# Patient Record
Sex: MALE | Race: WHITE | NOT HISPANIC OR LATINO | Employment: OTHER | URBAN - METROPOLITAN AREA
[De-identification: names, ages, dates, MRNs, and addresses within clinical notes are randomized per-mention and may not be internally consistent; named-entity substitution may affect disease eponyms.]

---

## 2017-01-03 ENCOUNTER — TELEPHONE (OUTPATIENT)
Dept: INTERNAL MEDICINE | Facility: MEDICAL CENTER | Age: 82
End: 2017-01-03

## 2017-01-03 NOTE — TELEPHONE ENCOUNTER
Called patient. Cough did not resolve with taking omeprazole. He stopped the losartan and his cough improved significantly. He saw Dr. Nicole and he  Ordered a CT scan and an xray. Told him to get robitussin DM  His blood pressures over the last 2 days have been in the 145 range.  Informed patient continue to hold losartan, if bp > 155 call office and I will need to add another bp medication.

## 2017-01-03 NOTE — TELEPHONE ENCOUNTER
1. Caller Name: pt                      Call Back Number: [phone] cell phone    2. Message: pt requesting a phone call from Dr Farley is in town today has some issues in regards to Losartan and ENT consult    3. Patient approves office to leave a detailed voicemail/MyChart message: yes

## 2017-01-19 ENCOUNTER — OFFICE VISIT (OUTPATIENT)
Dept: NEPHROLOGY | Facility: MEDICAL CENTER | Age: 82
End: 2017-01-19
Payer: MEDICARE

## 2017-01-19 VITALS
TEMPERATURE: 98 F | BODY MASS INDEX: 28.42 KG/M2 | DIASTOLIC BLOOD PRESSURE: 72 MMHG | HEIGHT: 71 IN | WEIGHT: 203 LBS | HEART RATE: 64 BPM | SYSTOLIC BLOOD PRESSURE: 138 MMHG | RESPIRATION RATE: 14 BRPM

## 2017-01-19 DIAGNOSIS — I10 ESSENTIAL HYPERTENSION: ICD-10-CM

## 2017-01-19 DIAGNOSIS — N18.30 CKD (CHRONIC KIDNEY DISEASE), STAGE III (HCC): ICD-10-CM

## 2017-01-19 DIAGNOSIS — D64.9 ANEMIA, UNSPECIFIED TYPE: ICD-10-CM

## 2017-01-19 DIAGNOSIS — E55.9 VITAMIN D DEFICIENCY DISEASE: ICD-10-CM

## 2017-01-19 PROCEDURE — 99214 OFFICE O/P EST MOD 30 MIN: CPT | Performed by: INTERNAL MEDICINE

## 2017-01-19 NOTE — TELEPHONE ENCOUNTER
----- Message from Toyin Crabtree sent at 1/19/2017 12:06 PM PST -----  Regarding: Dr Farley/rx refill  Contact: [phone]  Patient needs renewal on his medications for this year he states he saw Dr Farley last month but forgot to ask him to renew them. Please call them all into [facility]. Please call pt if you have any questions.

## 2017-01-19 NOTE — TELEPHONE ENCOUNTER
Spoke with patient and asked what medication he needed to get refilled. He stated he needed Metoprolol, Omeprazole, and Lovastatin. Patient had concerns about Losartan if it should be refilled.    Patient saw Dr. Farley on 12/23/16. Next Appointment with Dr. Farley on 02/24/17.

## 2017-01-19 NOTE — MR AVS SNAPSHOT
"        Kobe Cyr   2017 11:45 AM   Office Visit   MRN: [id number]    Department:  [facility]   Dept Phone:  [phone]    Description:  Male : 1931   Provider:  Cassandra Mccauley M.D.           Reason for Visit     Follow-Up           Allergies as of 2017     Allergen Noted Reactions    Atorvastatin 2016   Myalgia    Simvastatin 2010       ADVERSE REACTION: MYALGIA.      You were diagnosed with     CKD (chronic kidney disease), stage III   [696737]       Essential hypertension   [7551122]       Anemia, unspecified type   [4774374]       Vitamin D deficiency disease   [018768]         Vital Signs     Blood Pressure Pulse Temperature Respirations Height Weight    138/72 mmHg 64 36.7 °C (98 °F) (Temporal) 14 1.803 m (5' 11\") 92.08 kg (203 lb)    Body Mass Index Smoking Status                28.33 kg/m2 Former Smoker          Basic Information     Date Of Birth Sex Race Ethnicity Preferred Language    1931 Male White Non- English      Your appointments     2017  1:20 PM   Established Patient with Randall Farley M.D.   [facility] (--)    [address] [place] [address]  [address]  [place] [address] [phone]           You will be receiving a confirmation call a few days before your appointment from our automated call confirmation system.            2017 11:00 AM   FOLLOW UP with Edy Bradshaw M.D.   [facility] (--)    [address] [place] [address] [address]  [place] [id number] [phone]            May 18, 2017 11:30 AM   Follow Up Visit with Cassandra Mccauley M.D.   [facility] ([address])    [address] [place] [address] [address]  [place] [id number] [phone]           You will be receiving a confirmation call a few days before your appointment from our automated call confirmation system.              Problem List              ICD-10-CM Priority " Class Noted - Resolved    Hyperglycemia (Chronic) R73.9   1/6/2010 - Present    Hyperlipidemia (Chronic) E78.5   5/24/2011 - Present    CAD (coronary artery disease) I25.10   3/15/2012 - Present    Myocardial infarction (CMS-HCC) (Chronic) I21.3   Unknown - Present    Stented coronary artery Z95.5   8/29/2013 - Present    Essential hypertension I10   4/20/2016 - Present    Delirium, acute R41.0   8/1/2016 - Present    Leg pain, bilateral M79.604, M79.605   8/1/2016 - Present    Impaired fasting glucose R73.01   8/1/2016 - Present    Hematochezia K92.1   8/1/2016 - Present    Osteoarthritis of knees, bilateral M17.0   8/1/2016 - Present    GERD (gastroesophageal reflux disease) K21.9   8/1/2016 - Present    Anemia D64.9   8/1/2016 - Present    Dyslipidemia E78.5   8/1/2016 - Present    DJD (degenerative joint disease) M19.90   8/1/2016 - Present    Bilateral low back pain with left-sided sciatica M54.42   8/18/2016 - Present    CKD (chronic kidney disease), stage III N18.3   8/24/2016 - Present    Cough R05   9/23/2016 - Present    Nasal bleeding R04.0   11/21/2016 - Present    Vitamin D deficiency disease E55.9   1/19/2017 - Present      Health Maintenance        Date Due Completion Dates    IMM DTaP/Tdap/Td Vaccine (1 - Tdap) 5/17/1950 ---    COLONOSCOPY 5/17/1981 ---    IMM ZOSTER VACCINE 5/17/1991 ---    IMM PNEUMOCOCCAL 65+ (ADULT) LOW/MEDIUM RISK SERIES (1 of 2 - PCV13) 5/17/1996 ---    IMM INFLUENZA (1) 9/1/2016 11/7/2011            Current Immunizations     Influenza TIV (IM) 11/7/2011    Pneumococcal Vaccine (UF)Historical Data 11/8/2010      Below and/or attached are the medications your provider expects you to take. Review all of your home medications and newly ordered medications with your provider and/or pharmacist. Follow medication instructions as directed by your provider and/or pharmacist. Please keep your medication list with you and share with your provider. Update the information when medications  are discontinued, doses are changed, or new medications (including over-the-counter products) are added; and carry medication information at all times in the event of emergency situations     Allergies:  ATORVASTATIN - Myalgia     SIMVASTATIN - (reactions not documented)               Medications  Valid as of: January 19, 2017 - 12:00 PM    Generic Name Brand Name Tablet Size Instructions for use    Aspirin (Tab) aspirin 81 MG Take 81 mg by mouth every day.        Atorvastatin Calcium (Tab) LIPITOR 40 MG Take 40 mg by mouth every evening.        Cholecalciferol (Tab) cholecalciferol 1000 UNIT Take 1,000 Units by mouth every day.        Losartan Potassium (Tab) COZAAR 100 MG Take 100 mg by mouth every day.        Lovastatin (Tab) MEVACOR 40 MG Take 80 mg by mouth every evening.        Metoprolol Tartrate (Tab) LOPRESSOR 25 MG Take 25 mg by mouth 2 times a day.        Nitroglycerin (SL Tab) NITROSTAT 0.4 MG Place 1 Tab under tongue as needed for Chest Pain.        Omeprazole (CAPSULE DELAYED RELEASE) PRILOSEC 20 MG Take 20 mg by mouth every day.        TraMADol HCl (Tab) ULTRAM 50 MG TK 1 T PO  BID PRN        .                 Medicines prescribed today were sent to:     [facility] [id number] [place] [address], [place] [address] [facility] [address]    [address] [place] [address]    Phone: [phone] Fax: [phone]    Open 24 Hours?: No      Medication refill instructions:       If your prescription bottle indicates you have medication refills left, it is not necessary to call your provider’s office. Please contact your pharmacy and they will refill your medication.    If your prescription bottle indicates you do not have any refills left, you may request refills at any time through one of the following ways: The online [employer] system (except Urgent Care), by calling your provider’s office, or by asking your pharmacy to contact your provider’s office with a refill request. Medication refills are processed  only during regular business hours and may not be available until the next business day. Your provider may request additional information or to have a follow-up visit with you prior to refilling your medication.   *Please Note: Medication refills are assigned a new Rx number when refilled electronically. Your pharmacy may indicate that no refills were authorized even though a new prescription for the same medication is available at the pharmacy. Please request the medicine by name with the pharmacy before contacting your provider for a refill.        Your To Do List     Future Labs/Procedures Complete By Expires    BASIC METABOLIC PANEL  As directed 7/19/2017         [employer] Access Code: Activation code not generated  Current [employer] Status: Active

## 2017-01-19 NOTE — PROGRESS NOTES
"Subjective:      Kobe Cyr is a 85 y.o. male who presents with Follow-Up and Chronic Kidney Disease            HPI  Vince is coming today for f/u of CKD III with worsening creatinine level  Still c/o chronic cough ( only in the morning) -improving  No difficulties to urinate: no dysuria/hematuria.  HTN: BP well controlled  CKD : serum creatinine at baseline 1.6     Review of Systems   Constitutional: Negative.  Negative for fever, chills, weight loss, malaise/fatigue and diaphoresis.   HENT: Positive for congestion and hearing loss. Negative for nosebleeds and sore throat.    Eyes: Negative.    Respiratory: Positive for cough and sputum production. Negative for hemoptysis, shortness of breath and wheezing.    Cardiovascular: Negative for chest pain, palpitations and orthopnea. No edema  Gastrointestinal: Negative for heartburn, nausea, vomiting, abdominal pain, diarrhea and constipation.   Genitourinary: Negative for dysuria, urgency, frequency, hematuria and flank pain.   Musculoskeletal: Positive for back pain and neck pain. Negative for myalgias, joint pain and falls.   Skin: Negative.  Negative for itching and rash.   Neurological: Negative.  Negative for weakness and headaches.   All other systems reviewed and are negative.    PMH/SH/FH/allergies and allergies reviewed     Objective:     /72 mmHg  Pulse 64  Temp(Src) 36.7 °C (98 °F) (Temporal)  Resp 14  Ht 1.803 m (5' 11\")  Wt 92.08 kg (203 lb)  BMI 28.33 kg/m2     Physical Exam   Constitutional: He is oriented to person, place, and time. He appears well-developed and well-nourished. No distress.   HENT:   Head: Normocephalic and atraumatic.   Nose: Nose normal.   Mouth/Throat: Oropharynx is clear and moist.   Eyes: Conjunctivae and EOM are normal. Pupils are equal, round, and reactive to light. No scleral icterus.   Neck: Normal range of motion. Neck supple.   Cardiovascular: Normal rate.  Exam reveals no gallop and no friction rub.  "   Pulmonary/Chest: Effort normal and breath sounds normal. No respiratory distress. He has no wheezes. He has no rales.   Abdominal: Soft. Bowel sounds are normal. He exhibits no distension and no mass. There is no tenderness.   Musculoskeletal: No edema  Neurological: He is alert and oriented to person, place, and time. No cranial nerve deficit. Coordination normal.   Skin: Skin is warm. No rash noted. No erythema.   Nursing note and vitals reviewed.            Laboratory results: reviewed: d/w Pt    Creat level at 1.98  -1.95 -1.6 -1.6  Assessment/Plan:     1. CKD (chronic kidney disease), stage III      Creatinine level stable at baseline - to monitor    2. Essential hypertension      BP well controlled     3. Anemia, unspecified type     Hb stable    4. Vitamin D deficiency disease       Vit D and PTH well controlled    Recs: low Na diet, mopnitor BP, avoid nephrotoxic agents             F/u in 2-3 months with  BMP

## 2017-01-20 RX ORDER — LOVASTATIN 40 MG/1
40 TABLET ORAL 2 TIMES DAILY
Qty: 180 TAB | Refills: 1 | Status: SHIPPED | OUTPATIENT
Start: 2017-01-20 | End: 2017-07-25 | Stop reason: SDUPTHER

## 2017-01-20 RX ORDER — OMEPRAZOLE 20 MG/1
20 CAPSULE, DELAYED RELEASE ORAL DAILY
Qty: 90 CAP | Refills: 1 | Status: SHIPPED | OUTPATIENT
Start: 2017-01-20 | End: 2017-07-25 | Stop reason: SDUPTHER

## 2017-01-20 NOTE — TELEPHONE ENCOUNTER
Called patient and asked if cough has gotten better, since getting off the losartan and it has. I also asked where his blood pressure stands and he responded 130/70 to 138/74.     Spoke to Dr. Farley he would like the patient to stay off of the Losartan.     Called patient to let know what Dr. Farley had responded. Patient understood.

## 2017-02-24 ENCOUNTER — OFFICE VISIT (OUTPATIENT)
Dept: INTERNAL MEDICINE | Facility: MEDICAL CENTER | Age: 82
End: 2017-02-24
Payer: MEDICARE

## 2017-02-24 VITALS
HEART RATE: 68 BPM | HEIGHT: 71 IN | DIASTOLIC BLOOD PRESSURE: 77 MMHG | OXYGEN SATURATION: 93 % | WEIGHT: 249.8 LBS | SYSTOLIC BLOOD PRESSURE: 145 MMHG | BODY MASS INDEX: 34.97 KG/M2 | TEMPERATURE: 97.5 F

## 2017-02-24 DIAGNOSIS — R04.0 NASAL BLEEDING: ICD-10-CM

## 2017-02-24 DIAGNOSIS — R93.89 ABNORMAL CHEST X-RAY: ICD-10-CM

## 2017-02-24 DIAGNOSIS — R05.9 COUGH: ICD-10-CM

## 2017-02-24 DIAGNOSIS — I65.23 BILATERAL CAROTID ARTERY STENOSIS: ICD-10-CM

## 2017-02-24 PROCEDURE — 4040F PNEUMOC VAC/ADMIN/RCVD: CPT | Mod: 8P | Performed by: INTERNAL MEDICINE

## 2017-02-24 PROCEDURE — 1036F TOBACCO NON-USER: CPT | Performed by: INTERNAL MEDICINE

## 2017-02-24 PROCEDURE — G8419 CALC BMI OUT NRM PARAM NOF/U: HCPCS | Performed by: INTERNAL MEDICINE

## 2017-02-24 PROCEDURE — 99214 OFFICE O/P EST MOD 30 MIN: CPT | Performed by: INTERNAL MEDICINE

## 2017-02-24 PROCEDURE — G8598 ASA/ANTIPLAT THER USED: HCPCS | Performed by: INTERNAL MEDICINE

## 2017-02-24 PROCEDURE — G8484 FLU IMMUNIZE NO ADMIN: HCPCS | Performed by: INTERNAL MEDICINE

## 2017-02-24 PROCEDURE — G8432 DEP SCR NOT DOC, RNG: HCPCS | Performed by: INTERNAL MEDICINE

## 2017-02-24 PROCEDURE — 1101F PT FALLS ASSESS-DOCD LE1/YR: CPT | Performed by: INTERNAL MEDICINE

## 2017-02-24 NOTE — PROGRESS NOTES
Established Patient    Mr. Ramirez a 85 y.o. male who presents today with:  CC: Follow-Up        Assessment and Plan    1. Nasal bleeding  Improved. Currently managed by ENT. I do not have Dr. Nicole's notes to review but according to the patient he is requesting an MRI with contrast to evaluate his sinuses. The patient is concerned about the risk of contrast to his overall health. There is a slight risk of gadolinium-induced kidney disease with an MRI. Because he does have chronic kidney disease (creatinine 1.61 and GFR of 41 in January 2017), I would suggest that the patient discuss imaging in the use of contrast with his ENT and see if there is an alternative option     2. Cough  Significantly improved after discontinuation of losartan.    3. Abnormal chest x-ray  In reviewing his results from his emergency department visit in January, the radiologist read a questionable masslike density posterior to the aortic arch and recommended a CT scan. He currently has an appointment with his pulmonologist Dr. Solano on Monday. He will take the report on the chest x-ray to his pulmonologist to review    4. Bilateral carotid artery stenosis  He does have a history of arterial sclerosis and an incidental finding of some calcification of the carotids on his CT scan of the sinuses . I will order a ultrasound of his carotids for evaluation    5. Essential hypertension  I would like for his systolic blood pressure to be in the 120s to 130s. He currently is in the 140s to 150 range after discontinuing losartan secondary to a cough. He appears to be tolerating beta blockade very well and currently he does not have significant bradycardia therefore I will increase his metoprolol to 25 mg 2 tablets in the morning and one tablet in the afternoon and he will monitor for bradycardia    Current Outpatient Prescriptions   Medication Sig Dispense Refill   • lovastatin (MEVACOR) 40 MG tablet Take 1 Tab by mouth 2 Times a Day. 180 Tab 1   •  omeprazole (PRILOSEC) 20 MG delayed-release capsule Take 1 Cap by mouth every day. 90 Cap 1   • metoprolol (LOPRESSOR) 25 MG Tab Take 1 Tab by mouth 2 times a day. 180 Tab 1   • vitamin D (CHOLECALCIFEROL) 1000 UNIT Tab Take 1,000 Units by mouth every day.     • aspirin 81 MG tablet Take 81 mg by mouth every day.     • atorvastatin (LIPITOR) 40 MG Tab Take 40 mg by mouth every evening.     • nitroglycerin (NITROSTAT) 0.4 MG SUBL Place 1 Tab under tongue as needed for Chest Pain. 25 Tab 1     No current facility-administered medications for this visit.         followup Return in about 6 months (around 8/24/2017).      _______________________________________________________    HPI:   1. Nasal bleeding  He is here for follow-up of both epistaxis and chronic cough. Currently he is being managed by Dr. Nicole of ENT. Last week he had a significant nosebleed and was evaluated in the emergency department. He was told that he was bleeding from deep in the sinuses. CT scan of the sinuses performed January 5, 2017, revealed clear paranasal sinuses with a patent ostiomeatal complex. There was bilateral calcified plaque involving the internal carotid arteries. He was advised to follow up with his ENT the next day. He had a flexible laryngoscop in office. He also had his nostrils packed.. They were subsequently removed and he has not had a problem with nosebleeds since. His ENT wants to have a MRI of the sinuses with contrast the patient states. He was told that he needed to have his creatinine checked. He is concerned about the risk of kidney disease with contrast.    2. Cough  Previously was complaining about a chronic cough. I treated him empirically with PPIs for potential gastroesophageal reflux disease. This did not have any effect on his chronic cough. I also discontinued his losartan and with this his cough is remarkably better.    LOSARTAN and only taking Lopressor 25 twice a day his systolic blood pressures have been in  "the 145 or 150 range in the afternoon. The diastolic values are in the mid 70s.         has a past medical history of CAD (coronary artery disease); Myocardial infarction (CMS-HCC) (2 yrs ago); Arthritis; Cancer (CMS-HCC); Snoring; Hypertension; High cholesterol; Hyperglycemia (1/6/2010); Hyperlipidemia (5/24/2011); Left knee pain (8/1/2016); Impaired fasting glucose (8/1/2016); Hematochezia (8/1/2016); Osteoarthritis of knees, bilateral (8/1/2016); GERD (gastroesophageal reflux disease) (8/1/2016); Anemia (8/1/2016); Chronic kidney disease (CKD), stage II (mild) (8/1/2016); Dyslipidemia (8/1/2016); and DJD (degenerative joint disease) (8/1/2016).     reports that he quit smoking about 43 years ago. His smoking use included Cigarettes. He smoked 2.00 packs per day. He has never used smokeless tobacco. He reports that he drinks alcohol. He reports that he does not use illicit drugs.      ROS: As per HPI. Additional pertinent symptoms as noted below:  He denies any hemoptysis, epistaxis, sinus pain or congestion or shortness of breath      Physical Exam  /77 mmHg  Pulse 68  Temp(Src) 36.4 °C (97.5 °F)  Ht 1.803 m (5' 11\")  Wt 113.309 kg (249 lb 12.8 oz)  BMI 34.86 kg/m2  SpO2 93%  Constitutional:  oriented to person, place, and time. No distress.     Current Outpatient Prescriptions on File Prior to Visit   Medication Sig Dispense Refill   • lovastatin (MEVACOR) 40 MG tablet Take 1 Tab by mouth 2 Times a Day. 180 Tab 1   • omeprazole (PRILOSEC) 20 MG delayed-release capsule Take 1 Cap by mouth every day. 90 Cap 1   • metoprolol (LOPRESSOR) 25 MG Tab Take 1 Tab by mouth 2 times a day. 180 Tab 1   • vitamin D (CHOLECALCIFEROL) 1000 UNIT Tab Take 1,000 Units by mouth every day.     • aspirin 81 MG tablet Take 81 mg by mouth every day.     • atorvastatin (LIPITOR) 40 MG Tab Take 40 mg by mouth every evening.     • nitroglycerin (NITROSTAT) 0.4 MG SUBL Place 1 Tab under tongue as needed for Chest Pain. 25 Tab 1 "     No current facility-administered medications on file prior to visit.           Signed by: Randall Farley M.D.

## 2017-02-24 NOTE — MR AVS SNAPSHOT
"        Kobe Cyr   2017 1:20 PM   Office Visit   MRN: [id number]    Department:  [facility] Med - Internal Med   Dept Phone:  [phone]    Description:  Male : 1931   Provider:  Randall Farley M.D.           Reason for Visit     Follow-Up follow up      Allergies as of 2017     Allergen Noted Reactions    Atorvastatin 2016   Myalgia    Simvastatin 2010       ADVERSE REACTION: MYALGIA.      You were diagnosed with     Nasal bleeding   [657425]       Cough   [786.2.ICD-9-CM]         Vital Signs     Blood Pressure Pulse Temperature Height Weight Body Mass Index    145/77 mmHg 68 36.4 °C (97.5 °F) 1.803 m (5' 11\") 113.309 kg (249 lb 12.8 oz) 34.86 kg/m2    Oxygen Saturation Smoking Status                93% Former Smoker          Basic Information     Date Of Birth Sex Race Ethnicity Preferred Language    1931 Male White Non- English      Your appointments     2017 11:20 AM   FOLLOW UP with Edy Bradshaw M.D.   [facility] (--)    [address] [place] [address] [address]  [place] [address] [phone]            May 18, 2017 11:30 AM   Follow Up Visit with Cassandra Mccauley M.D.   [facility] ([address])    [address] [place] [address] [address]  [place] [address] [phone]           You will be receiving a confirmation call a few days before your appointment from our automated call confirmation system.            Aug 25, 2017  1:00 PM   Established Patient with Randall Farley M.D.   [facility] Med - Internal Medicine (--)    [address]  [address]  [place] [address] [phone]           You will be receiving a confirmation call a few days before your appointment from our automated call confirmation system.              Problem List              ICD-10-CM Priority Class Noted - Resolved    Hyperglycemia (Chronic) R73.9   2010 - Present    Hyperlipidemia (Chronic) " E78.5   5/24/2011 - Present    CAD (coronary artery disease) I25.10   3/15/2012 - Present    Myocardial infarction (CMS-HCC) (Chronic) I21.3   Unknown - Present    Stented coronary artery Z95.5   8/29/2013 - Present    Essential hypertension I10   4/20/2016 - Present    Delirium, acute R41.0   8/1/2016 - Present    Leg pain, bilateral M79.604, M79.605   8/1/2016 - Present    Impaired fasting glucose R73.01   8/1/2016 - Present    Hematochezia K92.1   8/1/2016 - Present    Osteoarthritis of knees, bilateral M17.0   8/1/2016 - Present    GERD (gastroesophageal reflux disease) K21.9   8/1/2016 - Present    Anemia D64.9   8/1/2016 - Present    Dyslipidemia E78.5   8/1/2016 - Present    DJD (degenerative joint disease) M19.90   8/1/2016 - Present    Bilateral low back pain with left-sided sciatica M54.42   8/18/2016 - Present    CKD (chronic kidney disease), stage III N18.3   8/24/2016 - Present    Cough R05   9/23/2016 - Present    Nasal bleeding R04.0   11/21/2016 - Present    Vitamin D deficiency disease E55.9   1/19/2017 - Present      Health Maintenance        Date Due Completion Dates    IMM DTaP/Tdap/Td Vaccine (1 - Tdap) 5/17/1950 ---    COLONOSCOPY 5/17/1981 ---    IMM ZOSTER VACCINE 5/17/1991 ---    IMM PNEUMOCOCCAL 65+ (ADULT) LOW/MEDIUM RISK SERIES (1 of 2 - PCV13) 5/17/1996 ---    IMM INFLUENZA (1) 9/1/2016 11/7/2011            Current Immunizations     Influenza TIV (IM) 11/7/2011    Pneumococcal Vaccine (UF)Historical Data 11/8/2010      Below and/or attached are the medications your provider expects you to take. Review all of your home medications and newly ordered medications with your provider and/or pharmacist. Follow medication instructions as directed by your provider and/or pharmacist. Please keep your medication list with you and share with your provider. Update the information when medications are discontinued, doses are changed, or new medications (including over-the-counter products) are added;  and carry medication information at all times in the event of emergency situations     Allergies:  ATORVASTATIN - Myalgia     SIMVASTATIN - (reactions not documented)               Medications  Valid as of: February 24, 2017 -  2:01 PM    Generic Name Brand Name Tablet Size Instructions for use    Aspirin (Tab) aspirin 81 MG Take 81 mg by mouth every day.        Atorvastatin Calcium (Tab) LIPITOR 40 MG Take 40 mg by mouth every evening.        Cholecalciferol (Tab) cholecalciferol 1000 UNIT Take 1,000 Units by mouth every day.        Lovastatin (Tab) MEVACOR 40 MG Take 1 Tab by mouth 2 Times a Day.        Metoprolol Tartrate (Tab) LOPRESSOR 25 MG Take 1 Tab by mouth 2 times a day.        Nitroglycerin (SL Tab) NITROSTAT 0.4 MG Place 1 Tab under tongue as needed for Chest Pain.        Omeprazole (CAPSULE DELAYED RELEASE) PRILOSEC 20 MG Take 1 Cap by mouth every day.        .                 Medicines prescribed today were sent to:     [email or web address] [id number] [facility] [place] [address] Replaced by [facility] [address]     [address] [facility] [address]    Phone: [phone] Fax: [phone]    Open 24 Hours?: No      Medication refill instructions:       If your prescription bottle indicates you have medication refills left, it is not necessary to call your provider’s office. Please contact your pharmacy and they will refill your medication.    If your prescription bottle indicates you do not have any refills left, you may request refills at any time through one of the following ways: The online [employer] system (except Urgent Care), by calling your provider’s office, or by asking your pharmacy to contact your provider’s office with a refill request. Medication refills are processed only during regular business hours and may not be available until the next business day. Your provider may request additional information or to have a follow-up visit with you prior to refilling your medication.   *Please Note: Medication  refills are assigned a new Rx number when refilled electronically. Your pharmacy may indicate that no refills were authorized even though a new prescription for the same medication is available at the pharmacy. Please request the medicine by name with the pharmacy before contacting your provider for a refill.        Instructions    Increase metoprolol to 2 tablets in am and one in pm          MyChart Access Code: Activation code not generated  Current MyChart Status: Active

## 2017-02-27 ENCOUNTER — OFFICE VISIT (OUTPATIENT)
Dept: CARDIOLOGY | Facility: MEDICAL CENTER | Age: 82
End: 2017-02-27
Payer: MEDICARE

## 2017-02-27 VITALS
OXYGEN SATURATION: 92 % | WEIGHT: 248 LBS | HEIGHT: 71 IN | SYSTOLIC BLOOD PRESSURE: 130 MMHG | BODY MASS INDEX: 34.72 KG/M2 | DIASTOLIC BLOOD PRESSURE: 70 MMHG | HEART RATE: 62 BPM

## 2017-02-27 DIAGNOSIS — I10 ESSENTIAL HYPERTENSION: ICD-10-CM

## 2017-02-27 DIAGNOSIS — N18.30 CKD (CHRONIC KIDNEY DISEASE), STAGE III (HCC): ICD-10-CM

## 2017-02-27 DIAGNOSIS — I25.10 CORONARY ARTERY DISEASE INVOLVING NATIVE CORONARY ARTERY OF NATIVE HEART WITHOUT ANGINA PECTORIS: ICD-10-CM

## 2017-02-27 DIAGNOSIS — E78.2 MIXED HYPERLIPIDEMIA: Chronic | ICD-10-CM

## 2017-02-27 DIAGNOSIS — Z95.5 STENTED CORONARY ARTERY: ICD-10-CM

## 2017-02-27 DIAGNOSIS — R93.89 ABNORMAL CHEST X-RAY: ICD-10-CM

## 2017-02-27 PROCEDURE — G8598 ASA/ANTIPLAT THER USED: HCPCS | Performed by: INTERNAL MEDICINE

## 2017-02-27 PROCEDURE — G8432 DEP SCR NOT DOC, RNG: HCPCS | Performed by: INTERNAL MEDICINE

## 2017-02-27 PROCEDURE — G8484 FLU IMMUNIZE NO ADMIN: HCPCS | Performed by: INTERNAL MEDICINE

## 2017-02-27 PROCEDURE — 1036F TOBACCO NON-USER: CPT | Performed by: INTERNAL MEDICINE

## 2017-02-27 PROCEDURE — G8419 CALC BMI OUT NRM PARAM NOF/U: HCPCS | Performed by: INTERNAL MEDICINE

## 2017-02-27 PROCEDURE — 4040F PNEUMOC VAC/ADMIN/RCVD: CPT | Mod: 8P | Performed by: INTERNAL MEDICINE

## 2017-02-27 PROCEDURE — 99213 OFFICE O/P EST LOW 20 MIN: CPT | Performed by: INTERNAL MEDICINE

## 2017-02-27 PROCEDURE — 1101F PT FALLS ASSESS-DOCD LE1/YR: CPT | Performed by: INTERNAL MEDICINE

## 2017-02-27 ASSESSMENT — ENCOUNTER SYMPTOMS
VOMITING: 0
SHORTNESS OF BREATH: 0
NAUSEA: 0
WEAKNESS: 0
PALPITATIONS: 0
NEUROLOGICAL NEGATIVE: 1
BACK PAIN: 1

## 2017-02-27 NOTE — PROGRESS NOTES
Subjective:   Kobe Cyr is a 85 y.o. male who presents today for follow-up of coronary disease with stenting of the mid and proximal LAD in December, 2005. He's had no angina at all. The patient gets short of breath with activity such as getting 50 pound bags of pallets for his wit still. He had a recent chest x-ray that was mildly abnormal. This was discussed and would recommend repeat chest x-ray in 3 months, he was given a order for this.  He's also had difficulties with recurrent epistaxis. He had a chronic cough and this resolved when he was taken off losartan.    Past Medical History   Diagnosis Date   • CAD (coronary artery disease)    • Myocardial infarction (CMS-[facility]) 2 yrs ago     2005   • Arthritis    • Cancer (CMS-[facility])      skin   • Snoring    • Hypertension    • High cholesterol    • Hyperglycemia 1/6/2010   • Hyperlipidemia 5/24/2011   • Left knee pain 8/1/2016   • Impaired fasting glucose 8/1/2016   • Hematochezia 8/1/2016   • Osteoarthritis of knees, bilateral 8/1/2016   • GERD (gastroesophageal reflux disease) 8/1/2016   • Anemia 8/1/2016   • Chronic kidney disease (CKD), stage II (mild) 8/1/2016   • Dyslipidemia 8/1/2016   • DJD (degenerative joint disease) 8/1/2016     Past Surgical History   Procedure Laterality Date   • Coronary stent procedure lad     • Knee unicompartmental  10/13/2009     Performed by SAM LINDER at SURGERY [facility]S   • Other orthopedic surgery       right shoulder rotator cuff surgery x2     No family history on file.  History   Smoking status   • Former Smoker -- 2.00 packs/day   • Types: Cigarettes   • Quit date: 04/17/1973   Smokeless tobacco   • Never Used     Allergies   Allergen Reactions   • Atorvastatin Myalgia   • Simvastatin      ADVERSE REACTION: MYALGIA.     Outpatient Encounter Prescriptions as of 2/27/2017   Medication Sig Dispense Refill   • metoprolol (LOPRESSOR) 25 MG Tab Take 3 Tabs by mouth See Admin Instructions. 2 tablets in the morning  "and one tablet in the afternoon (Patient taking differently: Take 50 mg by mouth See Admin Instructions. 2 tablets in the morning and one tablet in the afternoon) 2 Tab 0   • lovastatin (MEVACOR) 40 MG tablet Take 1 Tab by mouth 2 Times a Day. (Patient taking differently: Take 80 mg by mouth every day.) 180 Tab 1   • atorvastatin (LIPITOR) 40 MG Tab Take 40 mg by mouth every evening. Indications: Procedure to Reestablish Blood Supply to the Heart, not taking not taking     • aspirin 81 MG tablet Take 81 mg by mouth every day.     • omeprazole (PRILOSEC) 20 MG delayed-release capsule Take 1 Cap by mouth every day. 90 Cap 1   • vitamin D (CHOLECALCIFEROL) 1000 UNIT Tab Take 1,000 Units by mouth every day.     • nitroglycerin (NITROSTAT) 0.4 MG SUBL Place 1 Tab under tongue as needed for Chest Pain. 25 Tab 1     No facility-administered encounter medications on file as of 2/27/2017.     Review of Systems   Constitutional: Negative for malaise/fatigue.   Respiratory: Negative for shortness of breath.    Cardiovascular: Negative for chest pain, palpitations and leg swelling.   Gastrointestinal: Negative for nausea and vomiting.   Musculoskeletal: Positive for back pain and joint pain.   Neurological: Negative.  Negative for weakness.        Objective:   /70 mmHg  Pulse 62  Ht 1.803 m (5' 10.98\")  Wt 112.492 kg (248 lb)  BMI 34.60 kg/m2  SpO2 92%    Physical Exam   Constitutional: He is oriented to person, place, and time. He appears well-developed and well-nourished. No distress.   HENT:   Head: Atraumatic.   Eyes: Conjunctivae and EOM are normal. Pupils are equal, round, and reactive to light.   Neck: Neck supple. No JVD present.   Cardiovascular: Normal rate and regular rhythm.    No murmur heard.  Pulmonary/Chest: Effort normal and breath sounds normal. No respiratory distress. He has no wheezes. He has no rales.   Abdominal: Soft. There is no tenderness.   Obese   Musculoskeletal: He exhibits no edema. "   Neurological: He is alert and oriented to person, place, and time.   Skin: Skin is warm and dry. He is not diaphoretic.       Assessment:     1. CKD (chronic kidney disease), stage III     2. Essential hypertension     3. Stented coronary artery  DX-CHEST-2 VIEWS   4. Mixed hyperlipidemia     5. Coronary artery disease involving native coronary artery of native heart without angina pectoris     6. Abnormal chest x-ray  DX-CHEST-2 VIEWS       Medical Decision Making:  Today's Assessment / Status / Plan:     The patient is doing well without any angina. Continue current medications. Blood pressures controlled. Repeat chest x-ray in 3 months.

## 2017-02-27 NOTE — Clinical Note
[facility]   [address] [facility] [address]  [place] [address]  Phone: [phone]  Fax: [phone]              Kobe Cyr  5/17/1931    Encounter Date: 2/27/2017    Edy Bradshaw M.D.          PROGRESS NOTE:  Subjective:   Kobe Cyr is a 85 y.o. male who presents today for follow-up of coronary disease with stenting of the mid and proximal LAD in December, 2005. He's had no angina at all. The patient gets short of breath with activity such as getting 50 pound bags of pallets for his wit still. He had a recent chest x-ray that was mildly abnormal. This was discussed and would recommend repeat chest x-ray in 3 months, he was given a order for this.  He's also had difficulties with recurrent epistaxis. He had a chronic cough and this resolved when he was taken off losartan.    Past Medical History   Diagnosis Date   • CAD (coronary artery disease)    • Myocardial infarction (CMS-HCC) 2 yrs ago     2005   • Arthritis    • Cancer (CMS-[facility])      skin   • Snoring    • Hypertension    • High cholesterol    • Hyperglycemia 1/6/2010   • Hyperlipidemia 5/24/2011   • Left knee pain 8/1/2016   • Impaired fasting glucose 8/1/2016   • Hematochezia 8/1/2016   • Osteoarthritis of knees, bilateral 8/1/2016   • GERD (gastroesophageal reflux disease) 8/1/2016   • Anemia 8/1/2016   • Chronic kidney disease (CKD), stage II (mild) 8/1/2016   • Dyslipidemia 8/1/2016   • DJD (degenerative joint disease) 8/1/2016     Past Surgical History   Procedure Laterality Date   • Coronary stent procedure lad     • Knee unicompartmental  10/13/2009     Performed by SAM LINDER at SURGERY [facility] ORS   • Other orthopedic surgery       right shoulder rotator cuff surgery x2     No family history on file.  History   Smoking status   • Former Smoker -- 2.00 packs/day   • Types: Cigarettes   • Quit date: 04/17/1973   Smokeless tobacco   • Never Used     Allergies   Allergen Reactions   •  "Atorvastatin Myalgia   • Simvastatin      ADVERSE REACTION: MYALGIA.     Outpatient Encounter Prescriptions as of 2/27/2017   Medication Sig Dispense Refill   • metoprolol (LOPRESSOR) 25 MG Tab Take 3 Tabs by mouth See Admin Instructions. 2 tablets in the morning and one tablet in the afternoon (Patient taking differently: Take 50 mg by mouth See Admin Instructions. 2 tablets in the morning and one tablet in the afternoon) 2 Tab 0   • lovastatin (MEVACOR) 40 MG tablet Take 1 Tab by mouth 2 Times a Day. (Patient taking differently: Take 80 mg by mouth every day.) 180 Tab 1   • atorvastatin (LIPITOR) 40 MG Tab Take 40 mg by mouth every evening. Indications: Procedure to Reestablish Blood Supply to the Heart, not taking not taking     • aspirin 81 MG tablet Take 81 mg by mouth every day.     • omeprazole (PRILOSEC) 20 MG delayed-release capsule Take 1 Cap by mouth every day. 90 Cap 1   • vitamin D (CHOLECALCIFEROL) 1000 UNIT Tab Take 1,000 Units by mouth every day.     • nitroglycerin (NITROSTAT) 0.4 MG SUBL Place 1 Tab under tongue as needed for Chest Pain. 25 Tab 1     No facility-administered encounter medications on file as of 2/27/2017.     Review of Systems   Constitutional: Negative for malaise/fatigue.   Respiratory: Negative for shortness of breath.    Cardiovascular: Negative for chest pain, palpitations and leg swelling.   Gastrointestinal: Negative for nausea and vomiting.   Musculoskeletal: Positive for back pain and joint pain.   Neurological: Negative.  Negative for weakness.        Objective:   /70 mmHg  Pulse 62  Ht 1.803 m (5' 10.98\")  Wt 112.492 kg (248 lb)  BMI 34.60 kg/m2  SpO2 92%    Physical Exam   Constitutional: He is oriented to person, place, and time. He appears well-developed and well-nourished. No distress.   HENT:   Head: Atraumatic.   Eyes: Conjunctivae and EOM are normal. Pupils are equal, round, and reactive to light.   Neck: Neck supple. No JVD present.   Cardiovascular: " Normal rate and regular rhythm.    No murmur heard.  Pulmonary/Chest: Effort normal and breath sounds normal. No respiratory distress. He has no wheezes. He has no rales.   Abdominal: Soft. There is no tenderness.   Obese   Musculoskeletal: He exhibits no edema.   Neurological: He is alert and oriented to person, place, and time.   Skin: Skin is warm and dry. He is not diaphoretic.       Assessment:     1. CKD (chronic kidney disease), stage III     2. Essential hypertension     3. Stented coronary artery  DX-CHEST-2 VIEWS   4. Mixed hyperlipidemia     5. Coronary artery disease involving native coronary artery of native heart without angina pectoris     6. Abnormal chest x-ray  DX-CHEST-2 VIEWS       Medical Decision Making:  Today's Assessment / Status / Plan:     The patient is doing well without any angina. Continue current medications. Blood pressures controlled. Repeat chest x-ray in 3 months.      Randall Farley M.D.  [address] [address]  VIA In Basket

## 2017-02-27 NOTE — MR AVS SNAPSHOT
"        Kobe Cyr   2017 11:20 AM   Office Visit   MRN: [id number]    Department:  [facility]   Dept Phone:  [phone]    Description:  Male : 1931   Provider:  Edy Bradshaw M.D.           Reason for Visit     Follow-Up           Allergies as of 2017     Allergen Noted Reactions    Atorvastatin 2016   Myalgia    Simvastatin 2010       ADVERSE REACTION: MYALGIA.      You were diagnosed with     CKD (chronic kidney disease), stage III   [228856]       Essential hypertension   [3102545]       Stented coronary artery   [285430]       Mixed hyperlipidemia   [272.2.ICD-9-CM]       Coronary artery disease involving native coronary artery of native heart without angina pectoris   [2567791]       Abnormal chest x-ray   [044216]         Vital Signs     Blood Pressure Pulse Height Weight Body Mass Index Oxygen Saturation    130/70 mmHg 62 1.803 m (5' 10.98\") 112.492 kg (248 lb) 34.60 kg/m2 92%    Smoking Status                   Former Smoker           Basic Information     Date Of Birth Sex Race Ethnicity Preferred Language    1931 Male White Non- English      Your appointments     May 18, 2017 11:30 AM   Follow Up Visit with Cassandra Mccauley M.D.   [facility] ([address])    [address] [place] [address], [address]  [facility] [address] [phone]           You will be receiving a confirmation call a few days before your appointment from our automated call confirmation system.            Aug 25, 2017  1:00 PM   Established Patient with Randall Farley M.D.   [facility] - Internal Medicine (--)    [address] [place] [address]  [address]  [facility] [address] [phone]           You will be receiving a confirmation call a few days before your appointment from our automated call confirmation system.              Problem List              ICD-10-CM Priority Class Noted - Resolved    Hyperglycemia (Chronic) R73.9   2010 - " Present    Hyperlipidemia (Chronic) E78.5 High  5/24/2011 - Present    CAD (coronary artery disease) I25.10 High  3/15/2012 - Present    Myocardial infarction (CMS-HCC) (Chronic) I21.3   Unknown - Present    Stented coronary artery Z95.5 High  8/29/2013 - Present    Essential hypertension I10 High  4/20/2016 - Present    Delirium, acute R41.0   8/1/2016 - Present    Leg pain, bilateral M79.604, M79.605   8/1/2016 - Present    Impaired fasting glucose R73.01   8/1/2016 - Present    Hematochezia K92.1   8/1/2016 - Present    Osteoarthritis of knees, bilateral M17.0   8/1/2016 - Present    GERD (gastroesophageal reflux disease) K21.9   8/1/2016 - Present    Anemia D64.9   8/1/2016 - Present    Dyslipidemia E78.5   8/1/2016 - Present    DJD (degenerative joint disease) M19.90   8/1/2016 - Present    Bilateral low back pain with left-sided sciatica M54.42   8/18/2016 - Present    CKD (chronic kidney disease), stage III N18.3   8/24/2016 - Present    Cough R05   9/23/2016 - Present    Nasal bleeding R04.0   11/21/2016 - Present    Vitamin D deficiency disease E55.9   1/19/2017 - Present    Abnormal chest x-ray R93.8   2/24/2017 - Present    Bilateral carotid artery stenosis I65.23   2/24/2017 - Present      Health Maintenance        Date Due Completion Dates    IMM DTaP/Tdap/Td Vaccine (1 - Tdap) 5/17/1950 ---    COLONOSCOPY 5/17/1981 ---    IMM ZOSTER VACCINE 5/17/1991 ---    IMM PNEUMOCOCCAL 65+ (ADULT) LOW/MEDIUM RISK SERIES (1 of 2 - PCV13) 5/17/1996 ---    IMM INFLUENZA (1) 9/1/2016 11/7/2011            Current Immunizations     Influenza TIV (IM) 11/7/2011    Pneumococcal Vaccine (UF)Historical Data 11/8/2010      Below and/or attached are the medications your provider expects you to take. Review all of your home medications and newly ordered medications with your provider and/or pharmacist. Follow medication instructions as directed by your provider and/or pharmacist. Please keep your medication list with you and  share with your provider. Update the information when medications are discontinued, doses are changed, or new medications (including over-the-counter products) are added; and carry medication information at all times in the event of emergency situations     Allergies:  ATORVASTATIN - Myalgia     SIMVASTATIN - (reactions not documented)               Medications  Valid as of: February 27, 2017 - 12:10 PM    Generic Name Brand Name Tablet Size Instructions for use    Aspirin (Tab) aspirin 81 MG Take 81 mg by mouth every day.        Atorvastatin Calcium (Tab) LIPITOR 40 MG Take 40 mg by mouth every evening. Indications: Procedure to Reestablish Blood Supply to the Heart, not taking not taking        Cholecalciferol (Tab) cholecalciferol 1000 UNIT Take 1,000 Units by mouth every day.        Lovastatin (Tab) MEVACOR 40 MG Take 1 Tab by mouth 2 Times a Day.        Metoprolol Tartrate (Tab) LOPRESSOR 25 MG Take 3 Tabs by mouth See Admin Instructions. 2 tablets in the morning and one tablet in the afternoon        Nitroglycerin (SL Tab) NITROSTAT 0.4 MG Place 1 Tab under tongue as needed for Chest Pain.        Omeprazole (CAPSULE DELAYED RELEASE) PRILOSEC 20 MG Take 1 Cap by mouth every day.        .                 Medicines prescribed today were sent to:     [facility] [id number] [facility] [place] [address] [facility] [address]     [address] [place] [address]    Phone: [phone] Fax: [phone]    Open 24 Hours?: No      Medication refill instructions:       If your prescription bottle indicates you have medication refills left, it is not necessary to call your provider’s office. Please contact your pharmacy and they will refill your medication.    If your prescription bottle indicates you do not have any refills left, you may request refills at any time through one of the following ways: The online [employer] system (except Urgent Care), by calling your provider’s office, or by asking your pharmacy to  contact your provider’s office with a refill request. Medication refills are processed only during regular business hours and may not be available until the next business day. Your provider may request additional information or to have a follow-up visit with you prior to refilling your medication.   *Please Note: Medication refills are assigned a new Rx number when refilled electronically. Your pharmacy may indicate that no refills were authorized even though a new prescription for the same medication is available at the pharmacy. Please request the medicine by name with the pharmacy before contacting your provider for a refill.        Your To Do List     Future Labs/Procedures Complete By Expires    DX-CHEST-2 VIEWS  As directed 2/27/2018         [employer] Access Code: Activation code not generated  Current [employer] Status: Active

## 2017-03-29 DIAGNOSIS — R93.89 ABNORMAL CHEST X-RAY: ICD-10-CM

## 2017-03-29 DIAGNOSIS — Z95.5 STENTED CORONARY ARTERY: ICD-10-CM

## 2017-04-14 ENCOUNTER — TELEPHONE (OUTPATIENT)
Dept: CARDIOLOGY | Facility: MEDICAL CENTER | Age: 82
End: 2017-04-14

## 2017-04-14 ENCOUNTER — TELEPHONE (OUTPATIENT)
Dept: INTERNAL MEDICINE | Facility: MEDICAL CENTER | Age: 82
End: 2017-04-14

## 2017-04-14 DIAGNOSIS — R93.89 ABNORMAL CHEST X-RAY: ICD-10-CM

## 2017-04-14 DIAGNOSIS — R09.89 ABNORMAL PULMONARY FINDING: ICD-10-CM

## 2017-04-14 DIAGNOSIS — R04.2 HEMOPTYSIS: ICD-10-CM

## 2017-04-14 NOTE — TELEPHONE ENCOUNTER
----- Message from Your Healthcare Team sent at 4/12/2017  3:02 PM PDT -----  Regarding: RE:.  Contact: [phone]  Dr Bradshaw has not replied to me. I had another X-Ray on March 28,17 which was supposed to be compared to previous x-ray. Maybe you may call him on 4th floor of your building. thank you  ----- Message -----  From: Randall Farley M.D.  Sent: 4/12/2017 11:50 AM PDT  To: Carrie Cyr  Subject: .    At this point I am not positive it coming from your posterior throat vs. The upper part of the lung. There may be an area that bleeding from time to time. At this point I don't have further suggestion for acute management, however in February we discussed an abnormal Chest xray you had on 1/5/2017 with a spot in the lung. You were going to discuss this with Dr. Solano the following week. What became of that? You ultimately may need a broncoscopy    ----- Message -----     From: CARRIE CYR     Sent: 4/12/2017 10:35 AM PDT       To: Randall Farley M.D.  Subject: RE: Procedure Question    I do not know how related the cough is to the blood although it seems to still be  coming from my nasal area in the back of my throat when I clear my throat. Best I can tell you. Thank you  ----- Message -----  From: Randall Farley M.D.  Sent: 4/11/2017  8:53 AM PDT  To: Carrie Cyr  Subject: RE: Procedure Question    Were you coughing heavily for quite some time and then started to cough blood?  Is there sputum mixed in with the blood?  How much blood are you producing?    ----- Message -----     From: CARRIE CYR     Sent: 4/10/2017  3:49 PM PDT       To: Randall Farley M.D.  Subject: Procedure Question    Hi Dr. NGUYEN, just the old peyton from [place],I haven't had the coughing the blood for couple months Started coughing again this am, I had a lot of clots and they were very dark in color. and started spitting the red blood again. My next appointment is not till the later part of may.What  would you suggest. Thank you. Milad Cyr [birth year] .

## 2017-04-14 NOTE — TELEPHONE ENCOUNTER
Called 's office message was sent to nurse pt coughing up blood and needs appt to be seen  booked out till May scheduled appt for pt to see VANI Mcarthur on Monday at 9:40 am.Pt notified of scheduled appt for Monday

## 2017-04-14 NOTE — TELEPHONE ENCOUNTER
Dr. Bradshaw reviewed chest x ray. Per Dr. Bradshaw: pt. Needs urgent referral to pulmonology ([facility]). He does not need to come in to see Sanchez on Mon. If he doesn't want to.  Called pt. To advise. He elected to cancel appointment with Sanchez.   FYI to Sanchez Artis.

## 2017-04-14 NOTE — TELEPHONE ENCOUNTER
Mr. Cyr  You need to see Dr. Bradshaw for this coughing up blood. The xray from the 28th still shows the questionable presence of mass. This is too complicated to try to diagnose via MyChart- set up an appt with him. I will ask my staff to call his office and have them contact you

## 2017-04-14 NOTE — TELEPHONE ENCOUNTER
----- Message from Makenna Perez sent at 4/14/2017 10:59 AM PDT -----  Regarding: inquiring about order for xray  RS/Kady      Patient said at last appt with Dr. Bradshaw he was told he wanted the patient to have another xray, and he hasn't heard anything back yet. He can be reached at [phone], or [phone]

## 2017-04-14 NOTE — TELEPHONE ENCOUNTER
"Spoke with pt. At 2-27-17 F[facility] Dr. Bradshaw reviewed abnormal CXR and recommended pt. Repeat CXR in 3 months. Pt. Had CXR done 3-28-17. \"Masslike density posterior to aortic arch\" persists. Pt. Has had intermittent hemoptosis, both bright red blood and dark clots. CXR was never reviewed/received by Dr. Bradshaw.   Will hopefully have Dr. Bradshaw review today. Pt. Has [facility] with Sanchez on Mon.  Note to Sanchez Kong, Dr. Farley.  "

## 2017-04-17 NOTE — TELEPHONE ENCOUNTER
I'm referring him to pulmonary for this. Not sure if Mr. Cyr actually has a pulmonologist although he informed he had one.

## 2017-05-08 ENCOUNTER — TELEPHONE (OUTPATIENT)
Dept: INTERNAL MEDICINE | Facility: MEDICAL CENTER | Age: 82
End: 2017-05-08

## 2017-05-08 NOTE — TELEPHONE ENCOUNTER
Regarding: Prescription Question  Contact: [phone]  ----- Message from Mana Elliott, Med Ass't sent at 5/5/2017  3:12 PM PDT -----       ----- Message from Kobe Cyr to Randall Farley M.D. sent at 5/5/2017  3:05 PM -----   Dr. Farley , Please check on what pills I am taking. The last time I meet with you we  changed to taking 2 metoprolol morning, 2 at night and note that my blood pressure did not exceed 145 that it is. I still have that awful cough. Reason for the review the chart says different. thanks Milad Cyr [birth year]

## 2017-05-13 ASSESSMENT — ENCOUNTER SYMPTOMS
SHORTNESS OF BREATH: 0
RESPIRATORY SYMPTOMS COMMENTS: NO
DYSPNEA AT REST: 0
HEMOPTYSIS: 1
CHEST TIGHTNESS: 0
WHEEZING: 0

## 2017-05-15 ENCOUNTER — HOSPITAL ENCOUNTER (OUTPATIENT)
Dept: RADIOLOGY | Facility: MEDICAL CENTER | Age: 82
End: 2017-05-15

## 2017-05-16 ENCOUNTER — OFFICE VISIT (OUTPATIENT)
Dept: PULMONOLOGY | Facility: HOSPICE | Age: 82
End: 2017-05-16
Payer: MEDICARE

## 2017-05-16 VITALS
TEMPERATURE: 97.2 F | BODY MASS INDEX: 34.5 KG/M2 | SYSTOLIC BLOOD PRESSURE: 130 MMHG | HEIGHT: 71 IN | RESPIRATION RATE: 16 BRPM | HEART RATE: 68 BPM | WEIGHT: 246.4 LBS | DIASTOLIC BLOOD PRESSURE: 82 MMHG

## 2017-05-16 DIAGNOSIS — R04.2 HEMOPTYSIS: ICD-10-CM

## 2017-05-16 DIAGNOSIS — Z87.891 FORMER SMOKER, STOPPED SMOKING MANY YEARS AGO: ICD-10-CM

## 2017-05-16 DIAGNOSIS — R93.89 NONSPECIFIC ABNORMAL FINDINGS ON RADIOLOGICAL AND EXAMINATION OF INTRATHORACIC ORGANS: ICD-10-CM

## 2017-05-16 DIAGNOSIS — R05.9 COUGH: ICD-10-CM

## 2017-05-16 PROCEDURE — 1101F PT FALLS ASSESS-DOCD LE1/YR: CPT | Performed by: INTERNAL MEDICINE

## 2017-05-16 PROCEDURE — 99204 OFFICE O/P NEW MOD 45 MIN: CPT | Performed by: INTERNAL MEDICINE

## 2017-05-16 PROCEDURE — G8419 CALC BMI OUT NRM PARAM NOF/U: HCPCS | Performed by: INTERNAL MEDICINE

## 2017-05-16 NOTE — MR AVS SNAPSHOT
"        Kobe Cyr   2017 10:40 AM   Office Visit   MRN: [id number]    Department:  [facility]   Dept Phone:  [phone]    Description:  Male : 1931   Provider:  Krystyna Wood M.D.           Reason for Visit     Establish Care Referred by  for Hemoptysis.      Allergies as of 2017     Allergen Noted Reactions    Atorvastatin 2016   Myalgia    Simvastatin 2010       ADVERSE REACTION: MYALGIA.      You were diagnosed with     Nonspecific abnormal findings on radiological and examination of intrathoracic organs   [451454]       Cough   [786.2.ICD-9-CM]       Hemoptysis   [6716710]       Former smoker, stopped smoking many years ago   [941093]         Vital Signs     Blood Pressure Pulse Temperature Respirations Height Weight    130/82 mmHg 68 36.2 °C (97.2 °F) (Temporal) 16 1.803 m (5' 11\") 111.766 kg (246 lb 6.4 oz)    Body Mass Index Smoking Status                34.38 kg/m2 Former Smoker          Basic Information     Date Of Birth Sex Race Ethnicity Preferred Language    1931 Male White Non- English      Your appointments     May 18, 2017 11:30 AM   Follow Up Visit with Cassandra Mccauley M.D.   [facility] ([address])    [address] [place] [address], [address]  [facility] [id number] [phone]           You will be receiving a confirmation call a few days before your appointment from our automated call confirmation system.            Aug 08, 2017  1:40 PM   XRAY 15 with PULMONARY DX 1   [facility] ([address])    [address] [facility]  [place] [address]               Aug 08, 2017  2:00 PM   Pulmonary Function Test with PFT-RM3   [facility] (--)    [address] [facility] [address] [facility] [address]  [address]  [place] [id number] [phone]            Aug 24, 2017 11:00 AM   Established Patient with Randall Farley M.D.   [facility] (--)    [address] [place] [address]  [address]  [facility] " [id number]   [phone]           You will be receiving a confirmation call a few days before your appointment from our automated call confirmation system.            Aug 30, 2017  1:00 PM   FOLLOW UP with Edy Bradshaw M.D.   [facility] (--)    [address]  [place] [id number]              Problem List              ICD-10-CM Priority Class Noted - Resolved    Hyperglycemia (Chronic) R73.9   1/6/2010 - Present    Hyperlipidemia (Chronic) E78.5 High  5/24/2011 - Present    CAD (coronary artery disease) I25.10 High  3/15/2012 - Present    Myocardial infarction (CMS-HCC) (Chronic) I21.3   Unknown - Present    Stented coronary artery Z95.5 High  8/29/2013 - Present    Essential hypertension I10 High  4/20/2016 - Present    Delirium, acute R41.0   8/1/2016 - Present    Leg pain, bilateral M79.604, M79.605   8/1/2016 - Present    Impaired fasting glucose R73.01   8/1/2016 - Present    Hematochezia K92.1   8/1/2016 - Present    Osteoarthritis of knees, bilateral M17.0   8/1/2016 - Present    GERD (gastroesophageal reflux disease) K21.9   8/1/2016 - Present    Anemia D64.9   8/1/2016 - Present    Dyslipidemia E78.5   8/1/2016 - Present    DJD (degenerative joint disease) M19.90   8/1/2016 - Present    Bilateral low back pain with left-sided sciatica M54.42   8/18/2016 - Present    CKD (chronic kidney disease), stage III N18.3   8/24/2016 - Present    Cough R05   9/23/2016 - Present    Nasal bleeding R04.0   11/21/2016 - Present    Vitamin D deficiency disease E55.9   1/19/2017 - Present    Abnormal chest x-ray R93.8   2/24/2017 - Present    Bilateral carotid artery stenosis I65.23   2/24/2017 - Present      Health Maintenance        Date Due Completion Dates    IMM DTaP/Tdap/Td Vaccine (1 - Tdap) 5/17/1950 ---    COLONOSCOPY 5/17/1981 ---    IMM ZOSTER VACCINE 5/17/1991 ---    IMM PNEUMOCOCCAL 65+ (ADULT) LOW/MEDIUM RISK SERIES (1 of 2 - PCV13)  5/17/1996 ---            Current Immunizations     13-VALENT PCV PREVNAR 12/1/2016    Influenza TIV (IM) 10/1/2016, 11/7/2011    Pneumococcal Vaccine (UF)Historical Data 11/8/2010      Below and/or attached are the medications your provider expects you to take. Review all of your home medications and newly ordered medications with your provider and/or pharmacist. Follow medication instructions as directed by your provider and/or pharmacist. Please keep your medication list with you and share with your provider. Update the information when medications are discontinued, doses are changed, or new medications (including over-the-counter products) are added; and carry medication information at all times in the event of emergency situations     Allergies:  ATORVASTATIN - Myalgia     SIMVASTATIN - (reactions not documented)               Medications  Valid as of: May 16, 2017 - 11:37 AM    Generic Name Brand Name Tablet Size Instructions for use    Aspirin (Tab) aspirin 81 MG Take 81 mg by mouth every day.        Atorvastatin Calcium (Tab) LIPITOR 40 MG Take 40 mg by mouth every evening. Indications: Procedure to Reestablish Blood Supply to the Heart, not taking not taking        Cholecalciferol (Tab) cholecalciferol 1000 UNIT Take 1,000 Units by mouth every day.        Lovastatin (Tab) MEVACOR 40 MG Take 1 Tab by mouth 2 Times a Day.        Metoprolol Tartrate (Tab) LOPRESSOR 25 MG 2 tablets in the morning and 2 tablets in pm        Nitroglycerin (SL Tab) NITROSTAT 0.4 MG Place 1 Tab under tongue as needed for Chest Pain.        Omeprazole (CAPSULE DELAYED RELEASE) PRILOSEC 20 MG Take 1 Cap by mouth every day.        .                 Medicines prescribed today were sent to:     [id number] - [place], [place] - [address] [facility] [address]    [address] [place] [address]    Phone: [phone] Fax: [phone]    Open 24 Hours?: No      Medication refill instructions:       If your prescription  bottle indicates you have medication refills left, it is not necessary to call your provider’s office. Please contact your pharmacy and they will refill your medication.    If your prescription bottle indicates you do not have any refills left, you may request refills at any time through one of the following ways: The online Donde system (except Urgent Care), by calling your provider’s office, or by asking your pharmacy to contact your provider’s office with a refill request. Medication refills are processed only during regular business hours and may not be available until the next business day. Your provider may request additional information or to have a follow-up visit with you prior to refilling your medication.   *Please Note: Medication refills are assigned a new Rx number when refilled electronically. Your pharmacy may indicate that no refills were authorized even though a new prescription for the same medication is available at the pharmacy. Please request the medicine by name with the pharmacy before contacting your provider for a refill.        Your To Do List     Future Labs/Procedures Complete By Expires    MR-CHEST-WITH & W/O AND SEQ  As directed 5/16/2018         Donde Access Code: Activation code not generated  Current Donde Status: Active

## 2017-05-17 ENCOUNTER — OFFICE VISIT (OUTPATIENT)
Dept: NEPHROLOGY | Facility: MEDICAL CENTER | Age: 82
End: 2017-05-17
Payer: MEDICARE

## 2017-05-17 VITALS
OXYGEN SATURATION: 94 % | WEIGHT: 242 LBS | SYSTOLIC BLOOD PRESSURE: 124 MMHG | HEART RATE: 63 BPM | RESPIRATION RATE: 20 BRPM | TEMPERATURE: 97.2 F | HEIGHT: 69 IN | BODY MASS INDEX: 35.84 KG/M2 | DIASTOLIC BLOOD PRESSURE: 60 MMHG

## 2017-05-17 DIAGNOSIS — I10 ESSENTIAL HYPERTENSION: ICD-10-CM

## 2017-05-17 DIAGNOSIS — E55.9 VITAMIN D DEFICIENCY DISEASE: ICD-10-CM

## 2017-05-17 DIAGNOSIS — R22.2 CHEST MASS: ICD-10-CM

## 2017-05-17 DIAGNOSIS — N18.30 CKD (CHRONIC KIDNEY DISEASE), STAGE III (HCC): ICD-10-CM

## 2017-05-17 PROCEDURE — G8419 CALC BMI OUT NRM PARAM NOF/U: HCPCS | Performed by: INTERNAL MEDICINE

## 2017-05-17 PROCEDURE — G8432 DEP SCR NOT DOC, RNG: HCPCS | Performed by: INTERNAL MEDICINE

## 2017-05-17 PROCEDURE — 1036F TOBACCO NON-USER: CPT | Performed by: INTERNAL MEDICINE

## 2017-05-17 PROCEDURE — 1101F PT FALLS ASSESS-DOCD LE1/YR: CPT | Performed by: INTERNAL MEDICINE

## 2017-05-17 PROCEDURE — 4040F PNEUMOC VAC/ADMIN/RCVD: CPT | Performed by: INTERNAL MEDICINE

## 2017-05-17 PROCEDURE — 99214 OFFICE O/P EST MOD 30 MIN: CPT | Performed by: INTERNAL MEDICINE

## 2017-05-17 PROCEDURE — G8598 ASA/ANTIPLAT THER USED: HCPCS | Performed by: INTERNAL MEDICINE

## 2017-05-17 NOTE — PROGRESS NOTES
"Chief Complaint   Patient presents with   • Establish Care     Referred by  for Hemoptysis.       HPI: This patient is an 86 y.o. Male who is referred for hemoptysis and abnormal chest x-ray. He has a remote smoking history, having quit in 1973. Over the past 6 months he has been experiencing recurrent trace hemoptysis. He feels the bleeding is coming from his sinuses as he can \"snort\" and bring up sputum which is blood tinged. He denies gross hemoptysis. Symptoms occur sporadically. He has seen Dr. Nicole, ENT, with alleged negative ENT workup. His sinus CAT scan was unremarkable. He had a screening chest x-ray performed January 2017 showing a vague mass noted principally on lateral view. Follow-up chest x-ray March 2017 showed stable findings. He denies associated chest pain, shortness of breath, wheezing, fevers, chills, night sweats or weight loss. He had a cough however this resolved with stopping losartan. He has always run a ranch, and remains quite physically active.   He denies any past pulmonary history. Although medical records record \"pulmonary embolism\" I see no indication of PE by records or chest imaging.  He has a history of chronic kidney disease, consequently cannot have IV contrast.    Past Medical History   Diagnosis Date   • CAD (coronary artery disease)    • Myocardial infarction (CMS-HCC) 2 yrs ago     2005   • Arthritis    • Cancer (CMS-[facility])      skin   • Snoring    • Hypertension    • High cholesterol    • Hyperglycemia 1/6/2010   • Hyperlipidemia 5/24/2011   • Left knee pain 8/1/2016   • Impaired fasting glucose 8/1/2016   • Hematochezia 8/1/2016   • Osteoarthritis of knees, bilateral 8/1/2016   • GERD (gastroesophageal reflux disease) 8/1/2016   • Anemia 8/1/2016   • Chronic kidney disease (CKD), stage II (mild) 8/1/2016   • Dyslipidemia 8/1/2016   • DJD (degenerative joint disease) 8/1/2016   • Back pain    • Obesity    • Pulmonary embolism (CMS-[facility])    • Coronary heart disease  "   • Nasal drainage    • Pashto measles    • Rheumatic fever    • Scarlet fever    • Smallpox        Social History     Social History   • Marital Status:      Spouse Name: N/A   • Number of Children: N/A   • Years of Education: N/A     Occupational History   • Not on file.     Social History Main Topics   • Smoking status: Former Smoker -- 2.00 packs/day     Types: Cigarettes     Quit date: 04/17/1973   • Smokeless tobacco: Never Used   • Alcohol Use: Yes      Comment: cocktail every night   • Drug Use: No   • Sexual Activity: Not on file     Other Topics Concern   • Not on file     Social History Narrative       Family History   Problem Relation Age of Onset   • No Known Problems Mother    • No Known Problems Father        Current Outpatient Prescriptions on File Prior to Visit   Medication Sig Dispense Refill   • metoprolol (LOPRESSOR) 25 MG Tab 2 tablets in the morning and 2 tablets in pm (Patient taking differently: 1 tablet in the morning and 2 tablets in pm) 120 Tab 0   • lovastatin (MEVACOR) 40 MG tablet Take 1 Tab by mouth 2 Times a Day. (Patient taking differently: Take 80 mg by mouth every day.) 180 Tab 1   • omeprazole (PRILOSEC) 20 MG delayed-release capsule Take 1 Cap by mouth every day. 90 Cap 1   • vitamin D (CHOLECALCIFEROL) 1000 UNIT Tab Take 1,000 Units by mouth every day.     • aspirin 81 MG tablet Take 81 mg by mouth every day.     • atorvastatin (LIPITOR) 40 MG Tab Take 40 mg by mouth every evening. Indications: Procedure to Reestablish Blood Supply to the Heart, not taking not taking     • nitroglycerin (NITROSTAT) 0.4 MG SUBL Place 1 Tab under tongue as needed for Chest Pain. 25 Tab 1     No current facility-administered medications on file prior to visit.       Allergies: Atorvastatin and Simvastatin    ROS:   Constitutional: Denies fevers, chills, night sweats, fatigue or weight loss  Eyes: Denies vision loss, pain, drainage, double vision  Ears, Nose, Throat: Denies earache,  "difficulty hearing, tinnitus, +nasal congestion, hoarseness, ?epistaxis  Cardiovascular: Denies chest pain, tightness, palpitations, orthopnea or edema  Respiratory: As in history of present illness  Sleep: Denies daytime sleepiness, snoring, apneas, insomnia, morning headaches  GI: Denies heartburn, dysphagia, nausea, abdominal pain, diarrhea or constipation  : Denies frequent urination, hematuria, discharge or painful urination  Musculoskeletal: Denies back pain, painful joints, sore muscles  Neurological: Denies weakness or headaches  Skin: No rashes    Blood pressure 130/82, pulse 68, temperature 36.2 °C (97.2 °F), temperature source Temporal, resp. rate 16, height 1.803 m (5' 11\"), weight 111.766 kg (246 lb 6.4 oz).  Multi-Ox Readings  Multi Ox #1     O2 sat % at rest     O2 sat % on exertion     O2 sat average on exertion     Multi Ox #2     O2 sat % at rest     O2 sat % on exertion     O2 sat average on exertion       Oxygen Use     Oxygen Frequency     Duration of need     Is the patient mobile within the home?     CPAP Use?     BIPAP Use?     Servo Titration         Physical Exam:  Appearance: Well-nourished, well-developed, in no acute distress  HEENT: Normocephalic, atraumatic, white sclera, PERRLA, oropharynx clear  Neck: No adenopathy or masses  Respiratory: no intercostal retractions or accessory muscle use  Lungs auscultation: Clear to auscultation bilaterally  Cardiovascular: Regular rate rhythm. No murmurs, rubs or gallops.  No LE edema  Abdomen: soft, nondistended  Gait: Normal  Digits: No clubbing, cyanosis  Motor: No focal deficits  Orientation: Oriented to time, person and place    Diagnosis:  1. Nonspecific abnormal findings on radiological and examination of intrathoracic organs  MR-CHEST-WITH & W/O AND SEQ   2. Cough     3. Hemoptysis     4. Former smoker, stopped smoking many years ago         Plan:  The patient presents with recurrent trace hemoptysis, ? epistaxis, with negative ENT " evaluation. His chest x-ray raises suspicion for a posterior lung mass. Further evaluation with chest imaging is indicated. Unfortunately he cannot tolerate IV contrast secondary to his chronic kidney disease. Recommend MRI Chest for assessment. Additionally we discussed fiberoptic bronchoscopy to exclude an endobronchial source of bleeding. He was interested in pursuing further evaluation.  He will follow-up after his MRI chest.  Return for after testing.         Answers for HPI/ROS submitted by the patient on 5/13/2017   What is the reason for your visit today?: cough  Have to stop when walking or walk at a slow pace? : 2016, no  Do you cough first thing in the morning or at other times during the day?: yes  Do you have a cough on most days? If so, how long have you had this cough?: yes,2016  Bring up phlegm (mucus, sputum) in the morning or other times during the day?: yes  If so, how long have you produced phlegm (Months, Years), and what is the usual color of your phlegm?: 1.5 years, yellow,blood  Do you cough up blood from your chest?: Yes  If so, how many times, and approximately how much?: often  Do you experience wheezing?: No  How often?: frequently  Do you experience any chest tightness?: No  Experience shortness of breath?: No  Experience shortness of breath at rest?: No  Have you ever been hospitalized?: Yes  Reason, year, and hospital in which you were hospitalized:: knee,heart,throat,shoulder  Have you ever needed to be intubated or placed on a ventilator? : No  Have you ever had problems with anesthesia?: No  Have you experienced post-operative delirium?: Yes  Any complications with surgery?: No  What year did you receive your last Flu shot?: 2016  What year did you receive you last Pneumonia shot?: 2016  Please list all your occupations from your first job to your current job. Include Industry/Company, location, year, and your specific job.: flying,sales,[place]  Please list the places you have  lived, the year, and Country or State in the [place]:: [place]  Birds?: No  Dogs?: Yes  Cats?: No  Mice and/or Deer Mice?: No  Reptiles?: No  Blood Chemistries: [email or web address]  Coffee: 2

## 2017-05-17 NOTE — PROGRESS NOTES
"Subjective:      Kobe Cyr is a 86 y.o. male who presents with Follow-Up and Chronic Kidney Disease            HPI  Vince is coming today for f/u of CKD III with worsening creatinine level  Still c/o chronic cough ( only in the morning) - scheduled for MRI chest  No difficulties to urinate: no dysuria/hematuria.  HTN: BP well controlled  CKD : serum creatinine baseline 1.6  -better to 1.5    Review of Systems   Constitutional: Negative.  Negative for fever, chills, weight loss, malaise/fatigue and diaphoresis.   HENT: Positive for congestion and hearing loss. Negative for nosebleeds and sore throat.    Eyes: Negative.    Respiratory: Positive for cough and sputum production. Negative for hemoptysis, shortness of breath and wheezing.    Cardiovascular: Negative for chest pain, palpitations and orthopnea. No edema  Gastrointestinal: Negative for heartburn, nausea, vomiting, abdominal pain, diarrhea and constipation.   Genitourinary: Negative for dysuria, urgency, frequency, hematuria and flank pain.   Musculoskeletal: Positive for back pain and neck pain. Negative for myalgias, joint pain and falls.   Skin: Negative.  Negative for itching and rash.   Neurological: Negative.  Negative for weakness and headaches.   All other systems reviewed and are negative.    PMH/SH/FH/allergies and allergies reviewed     Objective:     /60 mmHg  Pulse 63  Temp(Src) 36.2 °C (97.2 °F)  Resp 20  Ht 1.753 m (5' 9.02\")  Wt 109.77 kg (242 lb)  BMI 35.72 kg/m2  SpO2 94%     Physical Exam   Constitutional: He is oriented to person, place, and time. He appears well-developed and well-nourished. No distress.   HENT:   Head: Normocephalic and atraumatic.   Nose: Nose normal.   Mouth/Throat: Oropharynx is clear and moist.   Eyes: Conjunctivae and EOM are normal. Pupils are equal, round, and reactive to light. No scleral icterus.   Neck: Normal range of motion. Neck supple.   Cardiovascular: Normal rate.  Exam reveals no " gallop and no friction rub.    Pulmonary/Chest: Effort normal and breath sounds normal. No respiratory distress. He has no wheezes. He has no rales.   Abdominal: Soft. Bowel sounds are normal. He exhibits no distension and no mass. There is no tenderness.   Musculoskeletal: No edema  Neurological: He is alert and oriented to person, place, and time. No cranial nerve deficit. Coordination normal.   Skin: Skin is warm. No rash noted. No erythema.   Nursing note and vitals reviewed.            Laboratory results: reviewed: d/w Pt    Creat level at 1.98  -1.95 -1.6 -1.6 - 1.54  Assessment/Plan:     1. CKD (chronic kidney disease), stage III      Creatinine level better - to monitor    2. Essential hypertension      BP well controlled     3. Anemia, unspecified type     Hb stable    4. Vitamin D deficiency disease       Vit D and PTH well controlled    Recs: low Na diet, monitor BP, avoid nephrotoxic agents             F/u in 3 months with  BMP

## 2017-05-17 NOTE — MR AVS SNAPSHOT
"        Kobe Cyr   2017 1:00 PM   Office Visit   MRN: [id number]    Department:  [facility]   Dept Phone:  [phone]    Description:  Male : 1931   Provider:  Cassandra Mccauley M.D.           Reason for Visit     Follow-Up           Allergies as of 2017     Allergen Noted Reactions    Atorvastatin 2016   Myalgia    Simvastatin 2010       ADVERSE REACTION: MYALGIA.      You were diagnosed with     CKD (chronic kidney disease), stage III   [408370]       Vitamin D deficiency disease   [989755]       Essential hypertension   [2759014]         Vital Signs     Blood Pressure Pulse Temperature Respirations Height Weight    124/60 mmHg 63 36.2 °C (97.2 °F) 20 1.753 m (5' 9.02\") 109.77 kg (242 lb)    Body Mass Index Oxygen Saturation Smoking Status             35.72 kg/m2 94% Former Smoker         Basic Information     Date Of Birth Sex Race Ethnicity Preferred Language    1931 Male White Non- English      Your appointments     May 17, 2017  1:00 PM   Follow Up Visit with Cassandra Mccauley M.D.   [facility] ([facility])    [address]  [place] [address]   [phone]           You will be receiving a confirmation call a few days before your appointment from our automated call confirmation system.            2017 10:40 AM   Established Patient Pul with VINNIE [facility] (--)    [address]  [address]  [place] [address]   [phone]            Aug 17, 2017 11:30 AM   Follow Up Visit with Cassandra Mccauley M.D.   [facility] ([employer])    [address]  [place] [address]   [phone]           You will be receiving a confirmation call a few days before your appointment from our automated call confirmation system.            Aug 24, 2017 11:00 AM   Established Patient with [facility]INNIE [facility] (--)    [address]  [address]  [place] [address]   [phone]           You will be receiving a confirmation call a few days before your appointment from our automated call confirmation system.            Aug 30, 2017  1:00 PM   FOLLOW UP with Edy Bradshaw M.D.   [facility] (--)    [address]  [place] [id number] [phone]              Problem List              ICD-10-CM Priority Class Noted - Resolved    Hyperglycemia (Chronic) R73.9   1/6/2010 - Present    Hyperlipidemia (Chronic) E78.5 High  5/24/2011 - Present    CAD (coronary artery disease) I25.10 High  3/15/2012 - Present    Myocardial infarction (CMS-HCC) (Chronic) I21.3   Unknown - Present    Stented coronary artery Z95.5 High  8/29/2013 - Present    Essential hypertension I10 High  4/20/2016 - Present    Delirium, acute R41.0   8/1/2016 - Present    Leg pain, bilateral M79.604, M79.605   8/1/2016 - Present    Impaired fasting glucose R73.01   8/1/2016 - Present    Hematochezia K92.1   8/1/2016 - Present    Osteoarthritis of knees, bilateral M17.0   8/1/2016 - Present    GERD (gastroesophageal reflux disease) K21.9   8/1/2016 - Present    Anemia D64.9   8/1/2016 - Present    Dyslipidemia E78.5   8/1/2016 - Present    DJD (degenerative joint disease) M19.90   8/1/2016 - Present    Bilateral low back pain with left-sided sciatica M54.42   8/18/2016 - Present    CKD (chronic kidney disease), stage III N18.3   8/24/2016 - Present    Cough R05   9/23/2016 - Present    Nasal bleeding R04.0   11/21/2016 - Present    Vitamin D deficiency disease E55.9   1/19/2017 - Present    Abnormal chest x-ray R93.8   2/24/2017 - Present    Bilateral carotid artery stenosis I65.23   2/24/2017 - Present      Health Maintenance        Date Due Completion Dates    IMM DTaP/Tdap/Td Vaccine (1 - Tdap) 5/17/1950 ---    COLONOSCOPY 5/17/1981 ---    IMM ZOSTER VACCINE  5/17/1991 ---    IMM PNEUMOCOCCAL 65+ (ADULT) LOW/MEDIUM RISK SERIES (2 of 2 - PPSV23) 12/1/2017 12/1/2016            Current Immunizations     13-VALENT PCV PREVNAR 12/1/2016    Influenza TIV (IM) 10/1/2016, 11/7/2011    Pneumococcal Vaccine (UF)Historical Data 11/8/2010      Below and/or attached are the medications your provider expects you to take. Review all of your home medications and newly ordered medications with your provider and/or pharmacist. Follow medication instructions as directed by your provider and/or pharmacist. Please keep your medication list with you and share with your provider. Update the information when medications are discontinued, doses are changed, or new medications (including over-the-counter products) are added; and carry medication information at all times in the event of emergency situations     Allergies:  ATORVASTATIN - Myalgia     SIMVASTATIN - (reactions not documented)               Medications  Valid as of: May 17, 2017 - 11:53 AM    Generic Name Brand Name Tablet Size Instructions for use    Aspirin (Tab) aspirin 81 MG Take 81 mg by mouth every day.        Atorvastatin Calcium (Tab) LIPITOR 40 MG Take 40 mg by mouth every evening. Indications: Procedure to Reestablish Blood Supply to the Heart, not taking not taking        Cholecalciferol (Tab) cholecalciferol 1000 UNIT Take 1,000 Units by mouth every day.        Lovastatin (Tab) MEVACOR 40 MG Take 1 Tab by mouth 2 Times a Day.        Metoprolol Tartrate (Tab) LOPRESSOR 25 MG 2 tablets in the morning and 2 tablets in pm        Nitroglycerin (SL Tab) NITROSTAT 0.4 MG Place 1 Tab under tongue as needed for Chest Pain.        Omeprazole (CAPSULE DELAYED RELEASE) PRILOSEC 20 MG Take 1 Cap by mouth every day.        .                 Medicines prescribed today were sent to:     [id number] - [place], [place] - [address] [facility] [address]    [address] [place] [address]    Phone: [phone] Fax:  [phone]    Open 24 Hours?: No      Medication refill instructions:       If your prescription bottle indicates you have medication refills left, it is not necessary to call your provider’s office. Please contact your pharmacy and they will refill your medication.    If your prescription bottle indicates you do not have any refills left, you may request refills at any time through one of the following ways: The online [email or web address] system (except Urgent Care), by calling your provider’s office, or by asking your pharmacy to contact your provider’s office with a refill request. Medication refills are processed only during regular business hours and may not be available until the next business day. Your provider may request additional information or to have a follow-up visit with you prior to refilling your medication.   *Please Note: Medication refills are assigned a new Rx number when refilled electronically. Your pharmacy may indicate that no refills were authorized even though a new prescription for the same medication is available at the pharmacy. Please request the medicine by name with the pharmacy before contacting your provider for a refill.        Your To Do List     Future Labs/Procedures Complete By Expires    BASIC METABOLIC PANEL  As directed 11/14/2017         [email or web address] Access Code: Activation code not generated  Current [email or web address] Status: Active

## 2017-05-18 ENCOUNTER — APPOINTMENT (OUTPATIENT)
Dept: NEPHROLOGY | Facility: MEDICAL CENTER | Age: 82
End: 2017-05-18
Payer: MEDICARE

## 2017-05-31 ENCOUNTER — TELEPHONE (OUTPATIENT)
Dept: PULMONOLOGY | Facility: HOSPICE | Age: 82
End: 2017-05-31

## 2017-05-31 ENCOUNTER — HOSPITAL ENCOUNTER (OUTPATIENT)
Dept: RADIOLOGY | Facility: MEDICAL CENTER | Age: 82
End: 2017-05-31
Attending: INTERNAL MEDICINE
Payer: MEDICARE

## 2017-05-31 DIAGNOSIS — R93.89 NONSPECIFIC ABNORMAL FINDINGS ON RADIOLOGICAL AND EXAMINATION OF INTRATHORACIC ORGANS: ICD-10-CM

## 2017-05-31 PROCEDURE — 71550 MRI CHEST W/O DYE: CPT

## 2017-05-31 NOTE — TELEPHONE ENCOUNTER
[facility] and stated that Dr. Torres said that he thinks the patient should have a chest CT instead of the MRI to check for lesions.  His extension is [phone].  Thank you.

## 2017-06-20 ENCOUNTER — OFFICE VISIT (OUTPATIENT)
Dept: PULMONOLOGY | Facility: HOSPICE | Age: 82
End: 2017-06-20
Payer: MEDICARE

## 2017-06-20 VITALS
RESPIRATION RATE: 16 BRPM | SYSTOLIC BLOOD PRESSURE: 116 MMHG | WEIGHT: 245.4 LBS | HEIGHT: 71 IN | OXYGEN SATURATION: 91 % | HEART RATE: 65 BPM | DIASTOLIC BLOOD PRESSURE: 76 MMHG | TEMPERATURE: 97.3 F | BODY MASS INDEX: 34.35 KG/M2

## 2017-06-20 DIAGNOSIS — J40 BRONCHITIS: ICD-10-CM

## 2017-06-20 DIAGNOSIS — R91.8 LUNG MASS: ICD-10-CM

## 2017-06-20 PROCEDURE — 99214 OFFICE O/P EST MOD 30 MIN: CPT | Performed by: INTERNAL MEDICINE

## 2017-06-20 RX ORDER — CEPHALEXIN 500 MG/1
CAPSULE ORAL
Refills: 0 | COMMUNITY
Start: 2017-05-18 | End: 2018-05-10

## 2017-06-20 RX ORDER — DOXYCYCLINE HYCLATE 100 MG/1
100 CAPSULE ORAL 2 TIMES DAILY
Qty: 20 CAP | Refills: 0 | Status: SHIPPED | OUTPATIENT
Start: 2017-06-20 | End: 2017-12-06 | Stop reason: SDUPTHER

## 2017-06-20 NOTE — MR AVS SNAPSHOT
"        Kobe Cyr   2017 10:40 AM   Office Visit   MRN: [id number]    Department[facility]  [facility]   Dept Phone:  [phone]    Description:  Male : 1931   Provider:  Krystyna Wood M.D.           Reason for Visit     Results MRI results.      Allergies as of 2017     Allergen Noted Reactions    Atorvastatin 2016   Myalgia    Simvastatin 2010       ADVERSE REACTION: MYALGIA.      You were diagnosed with     Lung mass   [791854]       Bronchitis   [728412]         Vital Signs     Blood Pressure Pulse Temperature Respirations Height Weight    116/76 mmHg 65 36.3 °C (97.3 °F) 16 1.803 m (5' 10.98\") 111.313 kg (245 lb 6.4 oz)    Body Mass Index Oxygen Saturation Smoking Status             34.24 kg/m2 91% Former Smoker         Basic Information     Date Of Birth Sex Race Ethnicity Preferred Language    1931 Male White Non- English      Your appointments     Aug 17, 2017 11:30 AM   Follow Up Visit with Cassandra Mccauley M.D.   [facility] ([facility])    [address] [place] [address] [address]  [place] [id number] [phone]           You will be receiving a confirmation call a few days before your appointment from our automated call confirmation system.            Aug 24, 2017 11:00 AM   Established Patient with Randall Farley M.D.   [facility] / [facility] (--)    [address] [place] [address]  [address]  [place] [id number] [phone]           You will be receiving a confirmation call a few days before your appointment from our automated call confirmation system.            Aug 30, 2017  1:00 PM   FOLLOW UP with Edy Bradshaw M.D.   [facility] (--)    [address] [facility] [facility] [address]  [place] [id number] [phone]              Problem List              ICD-10-CM Priority Class Noted - Resolved    Hyperglycemia (Chronic) R73.9   2010 - Present    Hyperlipidemia (Chronic) " E78.5 High  5/24/2011 - Present    CAD (coronary artery disease) I25.10 High  3/15/2012 - Present    Myocardial infarction (CMS-HCC) (Chronic) I21.3   Unknown - Present    Stented coronary artery Z95.5 High  8/29/2013 - Present    Essential hypertension I10 High  4/20/2016 - Present    Delirium, acute R41.0   8/1/2016 - Present    Leg pain, bilateral M79.604, M79.605   8/1/2016 - Present    Impaired fasting glucose R73.01   8/1/2016 - Present    Hematochezia K92.1   8/1/2016 - Present    Osteoarthritis of knees, bilateral M17.0   8/1/2016 - Present    GERD (gastroesophageal reflux disease) K21.9   8/1/2016 - Present    Anemia D64.9   8/1/2016 - Present    Dyslipidemia E78.5   8/1/2016 - Present    DJD (degenerative joint disease) M19.90   8/1/2016 - Present    Bilateral low back pain with left-sided sciatica M54.42   8/18/2016 - Present    CKD (chronic kidney disease), stage III N18.3   8/24/2016 - Present    Cough R05   9/23/2016 - Present    Nasal bleeding R04.0   11/21/2016 - Present    Vitamin D deficiency disease E55.9   1/19/2017 - Present    Abnormal chest x-ray R93.8   2/24/2017 - Present    Bilateral carotid artery stenosis I65.23   2/24/2017 - Present      Health Maintenance        Date Due Completion Dates    IMM DTaP/Tdap/Td Vaccine (1 - Tdap) 5/17/1950 ---    COLONOSCOPY 5/17/1981 ---    IMM ZOSTER VACCINE 5/17/1991 ---    IMM PNEUMOCOCCAL 65+ (ADULT) LOW/MEDIUM RISK SERIES (2 of 2 - PPSV23) 12/1/2017 12/1/2016            Current Immunizations     13-VALENT PCV PREVNAR 12/1/2016    Influenza TIV (IM) 10/1/2016, 11/7/2011    Pneumococcal Vaccine (UF)Historical Data 11/8/2010      Below and/or attached are the medications your provider expects you to take. Review all of your home medications and newly ordered medications with your provider and/or pharmacist. Follow medication instructions as directed by your provider and/or pharmacist. Please keep your medication list with you and share with your  provider. Update the information when medications are discontinued, doses are changed, or new medications (including over-the-counter products) are added; and carry medication information at all times in the event of emergency situations     Allergies:  ATORVASTATIN - Myalgia     SIMVASTATIN - (reactions not documented)               Medications  Valid as of: June 20, 2017 - 11:09 AM    Generic Name Brand Name Tablet Size Instructions for use    Aspirin (Tab) aspirin 81 MG Take 81 mg by mouth every day.        Atorvastatin Calcium (Tab) LIPITOR 40 MG Take 40 mg by mouth every evening. Indications: Procedure to Reestablish Blood Supply to the Heart, not taking not taking        Cephalexin (Cap) KEFLEX 500 MG TK ONE C PO  TID TAT        Cholecalciferol (Tab) cholecalciferol 1000 UNIT Take 1,000 Units by mouth every day.        Doxycycline Hyclate (Cap) VIBRAMYCIN 100 MG Take 1 Cap by mouth 2 times a day. Take until gone.        Lovastatin (Tab) MEVACOR 40 MG Take 1 Tab by mouth 2 Times a Day.        Metoprolol Tartrate (Tab) LOPRESSOR 25 MG 2 tablets in the morning and 2 tablets in pm        Nitroglycerin (SL Tab) NITROSTAT 0.4 MG Place 1 Tab under tongue as needed for Chest Pain.        Omeprazole (CAPSULE DELAYED RELEASE) PRILOSEC 20 MG Take 1 Cap by mouth every day.        .                 Medicines prescribed today were sent to:     [id number] - [place], [place] - [address] [facility] [address]    [address] [facility] [address]    Phone: [phone] Fax: [phone]    Open 24 Hours?: No      Medication refill instructions:       If your prescription bottle indicates you have medication refills left, it is not necessary to call your provider’s office. Please contact your pharmacy and they will refill your medication.    If your prescription bottle indicates you do not have any refills left, you may request refills at any time through one of the following ways: The online [employer] system  (except Urgent Care), by calling your provider’s office, or by asking your pharmacy to contact your provider’s office with a refill request. Medication refills are processed only during regular business hours and may not be available until the next business day. Your provider may request additional information or to have a follow-up visit with you prior to refilling your medication.   *Please Note: Medication refills are assigned a new Rx number when refilled electronically. Your pharmacy may indicate that no refills were authorized even though a new prescription for the same medication is available at the pharmacy. Please request the medicine by name with the pharmacy before contacting your provider for a refill.        Your To Do List     Future Labs/Procedures Complete By Expires    [id number] BASE TO MID-THIGH  As directed 6/20/2018         [employer] Access Code: Activation code not generated  Current [employer] Status: Active

## 2017-06-20 NOTE — PROGRESS NOTES
"Chief Complaint   Patient presents with   • Results     MRI results.     HPI: This patient is an 86 y.o. Male who returns for MRI chest results. He has a very remote smoking history, having quit in 1973. Over the past 6 months he has been experiencing recurrent trace hemoptysis although has not experienced symptoms in the past 2 months. He felt bleeding was coming from his sinuses as he could \"snort\" sputum which was blood tinged. He denies gross hemoptysis.  He had seen Dr. Nicole, ENT, with alleged negative ENT workup. His sinus CAT scan was unremarkable. He had a screening chest x-ray performed January 2017 showing a vague mass noted principally on lateral view. Follow-up chest x-ray March 2017 showed stable findings. He denies associated chest pain, shortness of breath, wheezing, fevers, chills, night sweats or weight loss. He had a cough productive of sputum principally in the mornings, which has been bothersome. 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    Medical records record \"pulmonary embolism\" I see no indication of PE by records or chest imaging.  He was told when he was released from the  after the [place] War that he had \"a spot on my lung\" .  He has a history of chronic kidney disease, consequently cannot have IV contrast. He had MRI of the chest May 31, 2017 which showed no mediastinal mass or adenopathy however a 3.4 cm right upper lobe spiculated mass in the lung.      Past Medical History   Diagnosis Date   • CAD (coronary artery disease)    • Myocardial infarction (CMS-HCC) 2 yrs ago     2005   • Arthritis    • Cancer (CMS-HCC)      skin   • Snoring    • Hypertension    • High cholesterol    • Hyperglycemia 1/6/2010   • Hyperlipidemia 5/24/2011   • Left knee pain 8/1/2016   • Impaired fasting glucose 8/1/2016   • Hematochezia 8/1/2016   • Osteoarthritis of knees, bilateral 8/1/2016   • GERD (gastroesophageal reflux disease) 8/1/2016   • Anemia 8/1/2016   • Chronic kidney " disease (CKD), stage II (mild) 8/1/2016   • Dyslipidemia 8/1/2016   • DJD (degenerative joint disease) 8/1/2016   • Back pain    • Obesity    • Pulmonary embolism (CMS-HCC)    • Coronary heart disease    • Nasal drainage    • [place] measles    • Rheumatic fever    • Scarlet fever    • Smallpox        Social History     Social History   • Marital Status:      Spouse Name: N/A   • Number of Children: N/A   • Years of Education: N/A     Occupational History   • Not on file.     Social History Main Topics   • Smoking status: Former Smoker -- 2.00 packs/day for 35 years     Types: Cigarettes     Quit date: 04/17/1973   • Smokeless tobacco: Never Used   • Alcohol Use: Yes      Comment: cocktail every night   • Drug Use: No   • Sexual Activity: Not on file     Other Topics Concern   • Not on file     Social History Narrative       Family History   Problem Relation Age of Onset   • No Known Problems Mother    • No Known Problems Father        Current Outpatient Prescriptions on File Prior to Visit   Medication Sig Dispense Refill   • metoprolol (LOPRESSOR) 25 MG Tab 2 tablets in the morning and 2 tablets in pm (Patient taking differently: 1 tablet in the morning and 2 tablets in pm) 120 Tab 0   • lovastatin (MEVACOR) 40 MG tablet Take 1 Tab by mouth 2 Times a Day. (Patient taking differently: Take 80 mg by mouth every day.) 180 Tab 1   • omeprazole (PRILOSEC) 20 MG delayed-release capsule Take 1 Cap by mouth every day. 90 Cap 1   • aspirin 81 MG tablet Take 81 mg by mouth every day.     • vitamin D (CHOLECALCIFEROL) 1000 UNIT Tab Take 1,000 Units by mouth every day.     • atorvastatin (LIPITOR) 40 MG Tab Take 40 mg by mouth every evening. Indications: Procedure to Reestablish Blood Supply to the Heart, not taking not taking     • nitroglycerin (NITROSTAT) 0.4 MG SUBL Place 1 Tab under tongue as needed for Chest Pain. 25 Tab 1     No current facility-administered medications on file prior to visit.       Allergies:  "Atorvastatin and Simvastatin    ROS:   Constitutional: Denies fevers, chills, night sweats, fatigue or weight loss  Eyes: Denies vision loss, pain, drainage, double vision  Ears, Nose, Throat: Denies earache, difficulty hearing, tinnitus, nasal congestion, hoarseness  Cardiovascular: Denies chest pain, tightness, palpitations, orthopnea or edema  Respiratory: As in history of present illness  Sleep: Denies daytime sleepiness, snoring, apneas, insomnia, morning headaches  GI: Denies heartburn, dysphagia, nausea, abdominal pain, diarrhea or constipation  : Denies frequent urination, hematuria, discharge or painful urination  Musculoskeletal: Denies back pain, painful joints, sore muscles  Neurological: Denies weakness or headaches  Skin: No rashes    Blood pressure 116/76, pulse 65, temperature 36.3 °C (97.3 °F), resp. rate 16, height 1.803 m (5' 10.98\"), weight 111.313 kg (245 lb 6.4 oz), SpO2 91 %.    Physical Exam:  Appearance: Well-nourished, well-developed, in no acute distress  HEENT: Normocephalic, atraumatic, white sclera, PERRLA, oropharynx clear  Neck: No adenopathy or masses  Respiratory: no intercostal retractions or accessory muscle use  Lungs auscultation: Clear to auscultation bilaterally  Cardiovascular: Regular rate rhythm. No murmurs, rubs or gallops.  No LE edema  Abdomen: soft, nondistended  Gait: Normal  Digits: No clubbing, cyanosis  Motor: No focal deficits  Orientation: Oriented to time, person and place    Diagnosis:  1. Lung mass  [id number] BASE TO MID-THIGH   2. Bronchitis  doxycycline (VIBRAMYCIN) 100 MG Cap       Plan:  The patient chest MRI shows a 3.4 cm spiculated mass in the right upper lobe of the lung. He was allegedly told that he had a mass when he was in his 20s in the , however I explained that as we cannot confirm this is the same mass, I would recommend diagnostic biopsy for evaluation. He was reluctant to pursue biopsy, and we discussed the option of PET imaging " as an alternative-if the mass represents scar tissue then no metabolic uptake would be expected on PET imaging, whereas a malignancy should show elevated uptake. After discussion, he was amenable to PET scan.  Doxycycline 100 mg twice a day for 10 days prescribed for acute bronchitis.  Return for after other testing, PET/CT.

## 2017-06-28 ENCOUNTER — HOSPITAL ENCOUNTER (OUTPATIENT)
Dept: RADIOLOGY | Facility: MEDICAL CENTER | Age: 82
End: 2017-06-28
Attending: INTERNAL MEDICINE
Payer: MEDICARE

## 2017-06-28 DIAGNOSIS — R91.8 LUNG MASS: ICD-10-CM

## 2017-06-28 PROCEDURE — A9552 F18 FDG: HCPCS

## 2017-06-29 ENCOUNTER — OFFICE VISIT (OUTPATIENT)
Dept: PULMONOLOGY | Facility: HOSPICE | Age: 82
End: 2017-06-29
Payer: MEDICARE

## 2017-06-29 VITALS
HEART RATE: 58 BPM | SYSTOLIC BLOOD PRESSURE: 120 MMHG | OXYGEN SATURATION: 93 % | RESPIRATION RATE: 16 BRPM | DIASTOLIC BLOOD PRESSURE: 82 MMHG | WEIGHT: 244.6 LBS | BODY MASS INDEX: 34.24 KG/M2 | TEMPERATURE: 97.3 F | HEIGHT: 71 IN

## 2017-06-29 DIAGNOSIS — R91.8 LUNG MASS: ICD-10-CM

## 2017-06-29 PROCEDURE — 99214 OFFICE O/P EST MOD 30 MIN: CPT | Performed by: INTERNAL MEDICINE

## 2017-06-29 NOTE — MR AVS SNAPSHOT
"        Kobe Cyr   2017 1:20 PM   Office Visit   MRN: [id number]    Department:  [facility]   Dept Phone:  [phone]    Description:  Male : 1931   Provider:  Krystyna Wood M.D.           Reason for Visit     Results PET results.      Allergies as of 2017     Allergen Noted Reactions    Atorvastatin 2016   Myalgia    Simvastatin 2010       ADVERSE REACTION: MYALGIA.      You were diagnosed with     Lung mass   [549326]         Vital Signs     Blood Pressure Pulse Temperature Respirations Height Weight    120/82 mmHg 58 36.3 °C (97.3 °F) 16 1.803 m (5' 10.98\") 110.95 kg (244 lb 9.6 oz)    Body Mass Index Oxygen Saturation Smoking Status             34.13 kg/m2 93% Former Smoker         Basic Information     Date Of Birth Sex Race Ethnicity Preferred Language    1931 Male White Non- English      Your appointments     Aug 17, 2017 11:30 AM   Follow Up Visit with Cassandra Mccauley M.D.   [facility] ([facility])    [address] [place] [address] [address]  [place] [address] [phone]           You will be receiving a confirmation call a few days before your appointment from our automated call confirmation system.            Aug 24, 2017 11:00 AM   Established Patient with Randall Farley M.D.   [facility] Med - Internal Medicine (--)    [address]  [address]  [place] [address] [phone]           You will be receiving a confirmation call a few days before your appointment from our automated call confirmation system.            Aug 30, 2017  1:00 PM   FOLLOW UP with Edy Bradshaw M.D.   [facility] (--)    [address]  [place] [address] [phone]            Dec 28, 2017 11:20 AM   Established Patient Pul with Krystyna Wood M.D.   [facility] Pulmonary Medicine (--)    [address]  [address]  [place] [address] [phone]      "        Problem List              ICD-10-CM Priority Class Noted - Resolved    Hyperglycemia (Chronic) R73.9   1/6/2010 - Present    Hyperlipidemia (Chronic) E78.5 High  5/24/2011 - Present    CAD (coronary artery disease) I25.10 High  3/15/2012 - Present    Myocardial infarction (CMS-HCC) (Chronic) I21.3   Unknown - Present    Stented coronary artery Z95.5 High  8/29/2013 - Present    Essential hypertension I10 High  4/20/2016 - Present    Delirium, acute R41.0   8/1/2016 - Present    Leg pain, bilateral M79.604, M79.605   8/1/2016 - Present    Impaired fasting glucose R73.01   8/1/2016 - Present    Hematochezia K92.1   8/1/2016 - Present    Osteoarthritis of knees, bilateral M17.0   8/1/2016 - Present    GERD (gastroesophageal reflux disease) K21.9   8/1/2016 - Present    Anemia D64.9   8/1/2016 - Present    Dyslipidemia E78.5   8/1/2016 - Present    DJD (degenerative joint disease) M19.90   8/1/2016 - Present    Bilateral low back pain with left-sided sciatica M54.42   8/18/2016 - Present    CKD (chronic kidney disease), stage III N18.3   8/24/2016 - Present    Cough R05   9/23/2016 - Present    Nasal bleeding R04.0   11/21/2016 - Present    Vitamin D deficiency disease E55.9   1/19/2017 - Present    Abnormal chest x-ray R93.8   2/24/2017 - Present    Bilateral carotid artery stenosis I65.23   2/24/2017 - Present      Health Maintenance        Date Due Completion Dates    IMM DTaP/Tdap/Td Vaccine (1 - Tdap) 5/17/1950 ---    COLONOSCOPY 5/17/1981 ---    IMM ZOSTER VACCINE 5/17/1991 ---    IMM PNEUMOCOCCAL 65+ (ADULT) LOW/MEDIUM RISK SERIES (2 of 2 - PPSV23) 12/1/2017 12/1/2016            Current Immunizations     13-VALENT PCV PREVNAR 12/1/2016    Influenza TIV (IM) 10/1/2016, 11/7/2011    Pneumococcal Vaccine (UF)Historical Data 11/8/2010      Below and/or attached are the medications your provider expects you to take. Review all of your home medications and newly ordered medications with your provider and/or  pharmacist. Follow medication instructions as directed by your provider and/or pharmacist. Please keep your medication list with you and share with your provider. Update the information when medications are discontinued, doses are changed, or new medications (including over-the-counter products) are added; and carry medication information at all times in the event of emergency situations     Allergies:  ATORVASTATIN - Myalgia     SIMVASTATIN - (reactions not documented)               Medications  Valid as of: June 29, 2017 -  1:49 PM    Generic Name Brand Name Tablet Size Instructions for use    Aspirin (Tab) aspirin 81 MG Take 81 mg by mouth every day.        Atorvastatin Calcium (Tab) LIPITOR 40 MG Take 40 mg by mouth every evening. Indications: Procedure to Reestablish Blood Supply to the Heart, not taking not taking        Cephalexin (Cap) KEFLEX 500 MG TK ONE C PO  TID TAT        Cholecalciferol (Tab) cholecalciferol 1000 UNIT Take 1,000 Units by mouth every day.        Doxycycline Hyclate (Cap) VIBRAMYCIN 100 MG Take 1 Cap by mouth 2 times a day. Take until gone.        Lovastatin (Tab) MEVACOR 40 MG Take 1 Tab by mouth 2 Times a Day.        Metoprolol Tartrate (Tab) LOPRESSOR 25 MG 2 tablets in the morning and 2 tablets in pm        Nitroglycerin (SL Tab) NITROSTAT 0.4 MG Place 1 Tab under tongue as needed for Chest Pain.        Omeprazole (CAPSULE DELAYED RELEASE) PRILOSEC 20 MG Take 1 Cap by mouth every day.        .                 Medicines prescribed today were sent to:     [facility] [id number] [place] [address], [place] [address] [facility]    [address] [place] [address]    Phone: [phone] Fax: [phone]    Open 24 Hours?: No      Medication refill instructions:       If your prescription bottle indicates you have medication refills left, it is not necessary to call your provider’s office. Please contact your pharmacy and they will refill your medication.    If your  prescription bottle indicates you do not have any refills left, you may request refills at any time through one of the following ways: The online Prompt[employer]ly system (except Urgent Care), by calling your provider’s office, or by asking your pharmacy to contact your provider’s office with a refill request. Medication refills are processed only during regular business hours and may not be available until the next business day. Your provider may request additional information or to have a follow-up visit with you prior to refilling your medication.   *Please Note: Medication refills are assigned a new Rx number when refilled electronically. Your pharmacy may indicate that no refills were authorized even though a new prescription for the same medication is available at the pharmacy. Please request the medicine by name with the pharmacy before contacting your provider for a refill.        Your To Do List     Future Labs/Procedures Complete By Expires    DX-CHEST-2 VIEWS  As directed 6/29/2018    Scheduling Instructions:    When: 12/17  Where: [facility]         ChatStathart Access Code: Activation code not generated  Current Prompt.ly Status: Active

## 2017-06-30 NOTE — PROGRESS NOTES
"Chief Complaint   Patient presents with   • Results     PET results.     HPI: This patient is an 86 y.o. Male who returns for lung mass. He has a very remote smoking history, having quit in 1973. Over the past 6 months he has been experiencing recurrent trace hemoptysis although has not experienced symptoms in the past 2 months. He felt bleeding was coming from his sinuses as he could \"snort\" sputum which was blood tinged. He denies gross hemoptysis.  He had seen Dr. Nicole, ENT, with alleged negative ENT workup. His sinus CAT scan was unremarkable. He had a screening chest x-ray January 2017 showing a vague mass noted principally on lateral view. Follow-up chest x-ray March 2017 showed stable findings. He denies associated chest pain, shortness of breath, wheezing, fevers, chills, night sweats or weight loss. He had a productive cough which was treated with doxycycline, with resolution. He feels well and has no specific complaints.  Medical records record \"pulmonary embolism\" I see no indication of PE by records or chest imaging.  He was told when he was released from the  after the [place] War that he had \"a spot on my lung\" .  He has a history of chronic kidney disease, consequently cannot have IV contrast. He had MRI of the chest May 31, 2017 which showed no mediastinal mass or adenopathy however a 3.4 cm right upper lobe spiculated mass in the lung. Subsequent PET scan was performed on June 28, 2017 showing the cavitary, partially calcified 3.8 x 2.9 cm right upper lobe mass which appeared radiologically stable since 2005, and showed SUV uptake of 6.9. Mild honeycombing was noted within the lower lobes with mild FDG uptake, SUV 2.9-3.8.    Past Medical History   Diagnosis Date   • CAD (coronary artery disease)    • Myocardial infarction (CMS-HCC) 2 yrs ago     2005   • Arthritis    • Cancer (CMS-HCC)      skin   • Snoring    • Hypertension    • High cholesterol    • Hyperglycemia 1/6/2010   • " Hyperlipidemia 5/24/2011   • Left knee pain 8/1/2016   • Impaired fasting glucose 8/1/2016   • Hematochezia 8/1/2016   • Osteoarthritis of knees, bilateral 8/1/2016   • GERD (gastroesophageal reflux disease) 8/1/2016   • Anemia 8/1/2016   • Chronic kidney disease (CKD), stage II (mild) 8/1/2016   • Dyslipidemia 8/1/2016   • DJD (degenerative joint disease) 8/1/2016   • Back pain    • Obesity    • Pulmonary embolism (CMS-HCC)    • Coronary heart disease    • Nasal drainage    • B[place] measles    • Rheumatic fever    • Scarlet fever    • Smallpox        Social History     Social History   • Marital Status:      Spouse Name: N/A   • Number of Children: N/A   • Years of Education: N/A     Occupational History   • Not on file.     Social History Main Topics   • Smoking status: Former Smoker -- 2.00 packs/day for 35 years     Types: Cigarettes     Quit date: 04/17/1973   • Smokeless tobacco: Never Used   • Alcohol Use: Yes      Comment: cocktail every night   • Drug Use: No   • Sexual Activity: Not on file     Other Topics Concern   • Not on file     Social History Narrative       Family History   Problem Relation Age of Onset   • No Known Problems Mother    • No Known Problems Father        Current Outpatient Prescriptions on File Prior to Visit   Medication Sig Dispense Refill   • doxycycline (VIBRAMYCIN) 100 MG Cap Take 1 Cap by mouth 2 times a day. Take until gone. 20 Cap 0   • metoprolol (LOPRESSOR) 25 MG Tab 2 tablets in the morning and 2 tablets in pm (Patient taking differently: 1 tablet in the morning and 2 tablets in pm) 120 Tab 0   • lovastatin (MEVACOR) 40 MG tablet Take 1 Tab by mouth 2 Times a Day. (Patient taking differently: Take 80 mg by mouth every day.) 180 Tab 1   • omeprazole (PRILOSEC) 20 MG delayed-release capsule Take 1 Cap by mouth every day. 90 Cap 1   • aspirin 81 MG tablet Take 81 mg by mouth every day.     • cephALEXin (KEFLEX) 500 MG Cap TK ONE C PO  TID TAT  0   • vitamin D  "(CHOLECALCIFEROL) 1000 UNIT Tab Take 1,000 Units by mouth every day.     • atorvastatin (LIPITOR) 40 MG Tab Take 40 mg by mouth every evening. Indications: Procedure to Reestablish Blood Supply to the Heart, not taking not taking     • nitroglycerin (NITROSTAT) 0.4 MG SUBL Place 1 Tab under tongue as needed for Chest Pain. 25 Tab 1     No current facility-administered medications on file prior to visit.       Allergies: Atorvastatin and Simvastatin    ROS:   Constitutional: Denies fevers, chills, night sweats, fatigue or weight loss  Eyes: Denies vision loss, pain, drainage, double vision  Ears, Nose, Throat: Denies earache, difficulty hearing, tinnitus, nasal congestion, hoarseness  Cardiovascular: Denies chest pain, tightness, palpitations, orthopnea or edema  Respiratory: As in history of present illness  Sleep: Denies daytime sleepiness, snoring, apneas, insomnia, morning headaches  GI: Denies heartburn, dysphagia, nausea, abdominal pain, diarrhea or constipation  : Denies frequent urination, hematuria, discharge or painful urination  Musculoskeletal: Denies back pain, painful joints, sore muscles  Neurological: Denies weakness or headaches  Skin: No rashes    Blood pressure 120/82, pulse 58, temperature 36.3 °C (97.3 °F), resp. rate 16, height 1.803 m (5' 10.98\"), weight 110.95 kg (244 lb 9.6 oz), SpO2 93 %.    Physical Exam:  Appearance: Well-nourished, well-developed, in no acute distress  HEENT: Normocephalic, atraumatic, white sclera, PERRLA, oropharynx clear  Neck: No adenopathy or masses  Respiratory: no intercostal retractions or accessory muscle use  Lungs auscultation: Clear to auscultation bilaterally  Cardiovascular: Regular rate rhythm. No murmurs, rubs or gallops.  No LE edema  Abdomen: soft, nondistended  Gait: Normal  Digits: No clubbing, cyanosis  Motor: No focal deficits  Orientation: Oriented to time, person and place    Diagnosis:  1. Lung mass  DX-CHEST-2 VIEWS       Plan:  The patient has " a cavitary right upper lobe lung mass, which has been present radiologically for over a decade. The mass does demonstrate mild FDG uptake, which may be secondary to infection, inflammation, less likely malignancy given radiographic stability. We discussed surgical resection, which he declines. We will present at lung tumor board for medical treatment consensus. At this time the patient is asymptomatic and does not wish to pursue further diagnostic workup.  Return in about 6 months (around 12/29/2017) for with CXR.

## 2017-08-17 ENCOUNTER — OFFICE VISIT (OUTPATIENT)
Dept: NEPHROLOGY | Facility: MEDICAL CENTER | Age: 82
End: 2017-08-17
Payer: MEDICARE

## 2017-08-17 VITALS
RESPIRATION RATE: 14 BRPM | SYSTOLIC BLOOD PRESSURE: 118 MMHG | HEART RATE: 54 BPM | OXYGEN SATURATION: 93 % | DIASTOLIC BLOOD PRESSURE: 60 MMHG | WEIGHT: 244 LBS | TEMPERATURE: 97.2 F | HEIGHT: 70 IN | BODY MASS INDEX: 34.93 KG/M2

## 2017-08-17 DIAGNOSIS — N18.30 CKD (CHRONIC KIDNEY DISEASE), STAGE III (HCC): ICD-10-CM

## 2017-08-17 DIAGNOSIS — D64.9 ANEMIA, UNSPECIFIED TYPE: ICD-10-CM

## 2017-08-17 DIAGNOSIS — I10 ESSENTIAL HYPERTENSION: ICD-10-CM

## 2017-08-17 DIAGNOSIS — E55.9 VITAMIN D DEFICIENCY DISEASE: ICD-10-CM

## 2017-08-17 PROCEDURE — 99214 OFFICE O/P EST MOD 30 MIN: CPT | Performed by: INTERNAL MEDICINE

## 2017-08-17 ASSESSMENT — PATIENT HEALTH QUESTIONNAIRE - PHQ9: CLINICAL INTERPRETATION OF PHQ2 SCORE: 0

## 2017-08-17 ASSESSMENT — PAIN SCALES - GENERAL: PAINLEVEL: NO PAIN

## 2017-08-17 NOTE — MR AVS SNAPSHOT
"        Kobe Cyr   2017 11:30 AM   Office Visit   MRN: [id number]    Department:  [facility]   Dept Phone:  [phone]    Description:  Male : 1931   Provider:  Cassandra Mccauley M.D.           Reason for Visit     Follow-Up           Allergies as of 2017     Allergen Noted Reactions    Atorvastatin 2016   Myalgia    Simvastatin 2010       ADVERSE REACTION: MYALGIA.      You were diagnosed with     CKD (chronic kidney disease), stage III   [405718]       Essential hypertension   [1356422]       Vitamin D deficiency disease   [897632]       Anemia, unspecified type   [6331255]         Vital Signs     Blood Pressure Pulse Temperature Respirations Height Weight    118/60 mmHg 54 36.2 °C (97.2 °F) 14 1.778 m (5' 10\") 110.678 kg (244 lb)    Body Mass Index Oxygen Saturation Smoking Status             35.01 kg/m2 93% Former Smoker         Basic Information     Date Of Birth Sex Race Ethnicity Preferred Language    1931 Male White Non- English      Your appointments     Aug 24, 2017 11:00 AM   Established Patient with Randall Farley M.D.   [facility] - Internal Medicine (--)    [address] [place] [address]  [address]  [facility] [address] [phone]           You will be receiving a confirmation call a few days before your appointment from our automated call confirmation system.            Aug 30, 2017  1:00 PM   FOLLOW UP with dEy Bradhsaw M.D.   [facility] (--)    [address] [place] [address] [address]  [place] [address] [phone]            Dec 07, 2017 11:30 AM   Follow Up Visit with Cassandra Mccauley M.D.   [facility] ([address])    [address] [place] [address] [address]  [place] [address] [phone]           You will be receiving a confirmation call a few days before your appointment from our automated call confirmation system.            Dec 28, 2017 11:00 AM   XRAY 15 with " PULMONARY DX 1   [facility] - Pulmonary ([address])    [address]  [place] [address]               Dec 28, 2017 11:20 AM   Established Patient Pul with Krystyna Wood M.D.   [facility] Pulmonary Medicine (--)    [address]  [place] [address] [phone]              Problem List              ICD-10-CM Priority Class Noted - Resolved    Hyperglycemia (Chronic) R73.9   1/6/2010 - Present    Hyperlipidemia (Chronic) E78.5 High  5/24/2011 - Present    CAD (coronary artery disease) I25.10 High  3/15/2012 - Present    Myocardial infarction (CMS-HCC) (Chronic) I21.3   Unknown - Present    Stented coronary artery Z95.5 High  8/29/2013 - Present    Essential hypertension I10 High  4/20/2016 - Present    Delirium, acute R41.0   8/1/2016 - Present    Leg pain, bilateral M79.604, M79.605   8/1/2016 - Present    Impaired fasting glucose R73.01   8/1/2016 - Present    Hematochezia K92.1   8/1/2016 - Present    Osteoarthritis of knees, bilateral M17.0   8/1/2016 - Present    GERD (gastroesophageal reflux disease) K21.9   8/1/2016 - Present    Anemia D64.9   8/1/2016 - Present    Dyslipidemia E78.5   8/1/2016 - Present    DJD (degenerative joint disease) M19.90   8/1/2016 - Present    Bilateral low back pain with left-sided sciatica M54.42   8/18/2016 - Present    CKD (chronic kidney disease), stage III N18.3   8/24/2016 - Present    Cough R05   9/23/2016 - Present    Nasal bleeding R04.0   11/21/2016 - Present    Vitamin D deficiency disease E55.9   1/19/2017 - Present    Abnormal chest x-ray R93.8   2/24/2017 - Present    Bilateral carotid artery stenosis I65.23   2/24/2017 - Present      Health Maintenance        Date Due Completion Dates    IMM DTaP/Tdap/Td Vaccine (1 - Tdap) 5/17/1950 ---    COLONOSCOPY 5/17/1981 ---    IMM ZOSTER VACCINE 5/17/1991 ---    IMM INFLUENZA (1) 9/1/2017 10/1/2016, 11/7/2011    IMM PNEUMOCOCCAL 65+ (ADULT) LOW/MEDIUM RISK SERIES (2 of 2 - PPSV23) 12/1/2017 12/1/2016             Current Immunizations     13-VALENT PCV PREVNAR 12/1/2016    Influenza TIV (IM) 10/1/2016, 11/7/2011    Pneumococcal Vaccine (UF)Historical Data 11/8/2010      Below and/or attached are the medications your provider expects you to take. Review all of your home medications and newly ordered medications with your provider and/or pharmacist. Follow medication instructions as directed by your provider and/or pharmacist. Please keep your medication list with you and share with your provider. Update the information when medications are discontinued, doses are changed, or new medications (including over-the-counter products) are added; and carry medication information at all times in the event of emergency situations     Allergies:  ATORVASTATIN - Myalgia     SIMVASTATIN - (reactions not documented)               Medications  Valid as of: August 17, 2017 - 11:59 AM    Generic Name Brand Name Tablet Size Instructions for use    Aspirin (Tab) aspirin 81 MG Take 81 mg by mouth every day.        Atorvastatin Calcium (Tab) LIPITOR 40 MG Take 40 mg by mouth every evening. Indications: Procedure to Reestablish Blood Supply to the Heart, not taking not taking        Cephalexin (Cap) KEFLEX 500 MG TK ONE C PO  TID TAT        Cholecalciferol (Tab) cholecalciferol 1000 UNIT Take 1,000 Units by mouth every day.        Doxycycline Hyclate (Cap) VIBRAMYCIN 100 MG Take 1 Cap by mouth 2 times a day. Take until gone.        Lovastatin (Tab) MEVACOR 40 MG TAKE 1 TABLET BY MOUTH TWICE DAILY        Metoprolol Tartrate (Tab) LOPRESSOR 25 MG 2 tablets in the morning and 2 tablets in pm        Metoprolol Tartrate (Tab) LOPRESSOR 25 MG TAKE 1 TABLET BY MOUTH TWICE DAILY        Nitroglycerin (SL Tab) NITROSTAT 0.4 MG Place 1 Tab under tongue as needed for Chest Pain.        Omeprazole (CAPSULE DELAYED RELEASE) PRILOSEC 20 MG TAKE 1 CAPSULE BY MOUTH EVERY DAY        .                 Medicines prescribed today were sent to:     [id number] - [place], [place] - [address] [facility] [address]    [address] [place] [address]    Phone: [phone] Fax: [phone]    Open 24 Hours?: No      Medication refill instructions:       If your prescription bottle indicates you have medication refills left, it is not necessary to call your provider’s office. Please contact your pharmacy and they will refill your medication.    If your prescription bottle indicates you do not have any refills left, you may request refills at any time through one of the following ways: The online [employer] system (except Urgent Care), by calling your provider’s office, or by asking your pharmacy to contact your provider’s office with a refill request. Medication refills are processed only during regular business hours and may not be available until the next business day. Your provider may request additional information or to have a follow-up visit with you prior to refilling your medication.   *Please Note: Medication refills are assigned a new Rx number when refilled electronically. Your pharmacy may indicate that no refills were authorized even though a new prescription for the same medication is available at the pharmacy. Please request the medicine by name with the pharmacy before contacting your provider for a refill.        Your To Do List     Future Labs/Procedures Complete By Expires    BASIC METABOLIC PANEL  As directed 2/14/2018    CBC WITHOUT DIFFERENTIAL  As directed 2/14/2018    URINALYSIS  As directed 2/14/2018         PFI Acquisitionhart Access Code: Activation code not generated  Current [employer] Status: Active

## 2017-08-17 NOTE — PROGRESS NOTES
"Subjective:      Kobe Cyr is a 86 y.o. male who presents with Follow-Up and Chronic Kidney Disease            HPI  Vicne is coming today for f/u of CKD III   No complaints, doing well  No difficulties to urinate: no dysuria/hematuria.  HTN: BP well controlled  CKD III : serum creatinine baseline 1.6  - stable  Cough better.  Review of Systems   Constitutional: Negative.  Negative for fever, chills, weight loss, malaise/fatigue and diaphoresis.   HENT: Positive for congestion and hearing loss. Negative for nosebleeds and sore throat.    Eyes: Negative.    Respiratory: Positive for cough and sputum production. Negative for hemoptysis, shortness of breath and wheezing.    Cardiovascular: Negative for chest pain, palpitations and orthopnea. No edema  Gastrointestinal: Negative for heartburn, nausea, vomiting, abdominal pain, diarrhea and constipation.   Genitourinary: Negative for dysuria, urgency, frequency, hematuria and flank pain.   Musculoskeletal: Positive for back pain and neck pain. Negative for myalgias, joint pain and falls.   Skin: Negative.  Negative for itching and rash.   Neurological: Negative.  Negative for weakness and headaches.   All other systems reviewed and are negative.    PMH/SH/FH/allergies and allergies reviewed     Objective:     /60 mmHg  Pulse 54  Temp(Src) 36.2 °C (97.2 °F)  Resp 14  Ht 1.778 m (5' 10\")  Wt 110.678 kg (244 lb)  BMI 35.01 kg/m2  SpO2 93%     Physical Exam   Constitutional: He is oriented to person, place, and time. He appears well-developed and well-nourished. No distress.   HENT:   Head: Normocephalic and atraumatic.   Nose: Nose normal.   Mouth/Throat: Oropharynx is clear and moist.   Eyes: Conjunctivae and EOM are normal. Pupils are equal, round, and reactive to light. No scleral icterus.   Neck: Normal range of motion. Neck supple.   Cardiovascular: Normal rate.  Exam reveals no gallop and no friction rub.    Pulmonary/Chest: Effort normal and breath " sounds normal. No respiratory distress. He has no wheezes. He has no rales.   Abdominal: Soft. Bowel sounds are normal. He exhibits no distension and no mass. There is no tenderness.   Musculoskeletal: No edema  Neurological: He is alert and oriented to person, place, and time. No cranial nerve deficit. Coordination normal.   Skin: Skin is warm. No rash noted. No erythema.   Nursing note and vitals reviewed.            Laboratory results: reviewed: d/w Pt    Creat level at 1.98  -1.95 -1.6 -1.6 - 1.54 -1.6  Assessment/Plan:     1. CKD (chronic kidney disease), stage III      Creatinine level stable - to monitor    2. Essential hypertension      BP well controlled     3. Anemia, unspecified type     Hb stable    4. Vitamin D deficiency disease       Vit D and PTH well controlled    Recs: low Na diet, monitor BP, avoid nephrotoxic agents             F/u in 3 months with  BMP, CBC, UA. Vit D

## 2017-08-24 ENCOUNTER — OFFICE VISIT (OUTPATIENT)
Dept: INTERNAL MEDICINE | Facility: MEDICAL CENTER | Age: 82
End: 2017-08-24
Payer: MEDICARE

## 2017-08-24 VITALS
OXYGEN SATURATION: 96 % | HEIGHT: 70 IN | HEART RATE: 61 BPM | SYSTOLIC BLOOD PRESSURE: 138 MMHG | BODY MASS INDEX: 35.3 KG/M2 | DIASTOLIC BLOOD PRESSURE: 78 MMHG | TEMPERATURE: 96.6 F | WEIGHT: 246.6 LBS

## 2017-08-24 DIAGNOSIS — R73.9 HYPERGLYCEMIA: Chronic | ICD-10-CM

## 2017-08-24 DIAGNOSIS — R05.9 COUGH: ICD-10-CM

## 2017-08-24 DIAGNOSIS — R04.2 HEMOPTYSIS: ICD-10-CM

## 2017-08-24 DIAGNOSIS — M25.512 CHRONIC LEFT SHOULDER PAIN: ICD-10-CM

## 2017-08-24 DIAGNOSIS — I10 ESSENTIAL HYPERTENSION: ICD-10-CM

## 2017-08-24 DIAGNOSIS — E78.2 MIXED HYPERLIPIDEMIA: Chronic | ICD-10-CM

## 2017-08-24 DIAGNOSIS — G89.29 CHRONIC LEFT SHOULDER PAIN: ICD-10-CM

## 2017-08-24 DIAGNOSIS — N18.30 CKD (CHRONIC KIDNEY DISEASE), STAGE III (HCC): ICD-10-CM

## 2017-08-24 PROCEDURE — 99214 OFFICE O/P EST MOD 30 MIN: CPT | Performed by: INTERNAL MEDICINE

## 2017-08-24 NOTE — PROGRESS NOTES
Established Patient    Mr. Ramirez a 86 y.o. male who presents today with:  CC: Hypertension and Hyperlipidemia        Assessment and Plan    1. Hemoptysis  Improving. 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 PET scan not suggestive of malignancy but potential chronic infection versus inflammation. Patient appears to have  responded very well to doxycycline and has no hemoptysis since finishing the dose.    2. Cough  Improved.    3. Chronic left shoulder pain  He has a pretty bad shoulder. Rotator cuff tears, labral tears, DJD. He is to the point where he wants some intervention. I think it very reasonable to refer him to orthopedic surgery and obtain a current MRI of the shoulder.    4. Hyperglycemia  We'll order A1c. He has not had one checked in quite some time  - HEMOGLOBIN A1C; Future    5. Mixed hyperlipidemia  Continue with lovastatin 40 mg daily for now. May try to increase again to 80 mg in the future.  - LIPID PANEL    6. Essential hypertension  Stable his systolic blood pressures in the 130s on metoprolol 80 mg twice a day. He does not like taking medications and thus I think this is reasonable for now    7. CKD (chronic kidney disease), stage III  Stable. Followed by nephrology      Current Outpatient Prescriptions   Medication Sig Dispense Refill   • omeprazole (PRILOSEC) 20 MG delayed-release capsule TAKE 1 CAPSULE BY MOUTH EVERY DAY 90 Cap 0   • lovastatin (MEVACOR) 40 MG tablet TAKE 1 TABLET BY MOUTH TWICE DAILY (Patient taking differently: One tablet at hs) 180 Tab 2   • metoprolol (LOPRESSOR) 25 MG Tab 2 tablets in the morning and 2 tablets in pm (Patient taking differently: Take 25 mg by mouth 2 times a day.) 120 Tab 0   • vitamin D (CHOLECALCIFEROL) 1000 UNIT Tab Take 1,000 Units by mouth every day.     • nitroglycerin (NITROSTAT) 0.4 MG SUBL Place 1 Tab under tongue as needed for Chest Pain. 25 Tab 1   • aspirin 81 MG tablet Take 81 mg by mouth  every day.     • metoprolol (LOPRESSOR) 25 MG Tab TAKE 1 TABLET BY MOUTH TWICE DAILY 180 Tab 2   • cephALEXin (KEFLEX) 500 MG Cap TK ONE C PO  TID TAT  0   • doxycycline (VIBRAMYCIN) 100 MG Cap Take 1 Cap by mouth 2 times a day. Take until gone. 20 Cap 0   • atorvastatin (LIPITOR) 40 MG Tab Take 40 mg by mouth every evening. Indications: Procedure to Reestablish Blood Supply to the Heart, not taking not taking       No current facility-administered medications for this visit.         followup Return in about 6 weeks (around 10/5/2017).    This note was created using voice recognition software (Dragon). The accuracy of the dictation is limited by the abilities of the software. I have reviewed the note prior to signing, however some errors in grammar and context are still possible. If you have any questions related to this note please do not hesitate to contact our office.   _______________________________________________________    HPI:   1. Hemoptysis  He is here for follow-up of chronic hemoptysis. He has a history of chronic intermittent hemoptysis. Chest x-ray in the past is abnormal. CT scan revealed a cavitary mass in the right upper quadrant. Cavitary lesion has been present on prior imaging for several years. He has a prior history of tobacco use. He was evaluated by pulmonology. Given option of surgical resection which he refused. Treat empirically with doxycycline. This was in June. Since the antibiotics, he has not had any further hemoptysis. Cough is also much better. Denies any weight loss, fevers, pleuritic pain, chest pain. As she is doing quite well.     2. Cough  Improved significantly since doxycycline.    3. Chronic left shoulder pain  His main complaint today is his left shoulder. The pain and limitation in range of motion is becoming severe. He's had problems with his left shoulder for several years. He had an MRI of the left shoulder in 2015 which showed many areas of abnormalities including  rotator cuff tendon tears, labral tears, DJD. He currently describes daily pain with most activities. He has a very difficult time abducting the shoulder greater than 30°. He has a hard time putting on longsleeved shirts. It is very difficult for him to find a comfortable position. He states about one month ago he was helping someone lift the couch had sudden severe arm pain and then bruising in the area of the medial biceps. Since then his strength has not been the same as compared to the right. He has not had any procedures for his left shoulder pain. He has never had an injection.      5. Mixed hyperlipidemia  We reviewed his medication list. He currently is compliant with lovastatin 40 mg daily. Previously he was taking 80 mg but he cut down I believe because a sore muscle and fatigue. He is doing okay on the 40 mg daily    6. Essential hypertension  As a history of essential hypertension. He brought in his blood pressures. Systolic are running in the 120 to 1:30 range with diastolics over the 70-90. With a pulse greater than 60. He is compliant with metoprolol twice a day     7. 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       has a past medical history of CAD (coronary artery disease); Myocardial infarction (CMS-HCC) (2 yrs ago); Arthritis; Cancer (CMS-HCC); Snoring; Hypertension; High cholesterol; Hyperglycemia (1/6/2010); Hyperlipidemia (5/24/2011); Left knee pain (8/1/2016); Impaired fasting glucose (8/1/2016); Hematochezia (8/1/2016); Osteoarthritis of knees, bilateral (8/1/2016); GERD (gastroesophageal reflux disease) (8/1/2016); Anemia (8/1/2016); Chronic kidney disease (CKD), stage II (mild) (8/1/2016); Dyslipidemia (8/1/2016); DJD (degenerative joint disease) (8/1/2016); Back pain; Obesity; Pulmonary embolism (CMS-HCC); Coronary heart disease; Nasal drainage; [place] measles; Rheumatic fever; Scarlet fever; and Smallpox.     reports that he  "quit smoking about 44 years ago. His smoking use included Cigarettes. He has a 70 pack-year smoking history. He has never used smokeless tobacco. He reports that he drinks alcohol. He reports that he does not use illicit drugs.      ROS: Pertinent positives as stated in HPI, all others reviewed as F:        Physical Exam  /78 mmHg  Pulse 61  Temp(Src) 35.9 °C (96.6 °F)  Ht 1.778 m (5' 10\")  Wt 111.857 kg (246 lb 9.6 oz)  BMI 35.38 kg/m2  SpO2 96%  muscle skeletal: Very limited range of motion of the left shoulder without significant pain. No lumps or bruising along the left biceps. Pain along the acromion and at the subacromial bursa. Positive Burks and Neer's test. Strength in the left biceps 4 minus out of 5 compared to right.        Current Outpatient Prescriptions on File Prior to Visit   Medication Sig Dispense Refill   • omeprazole (PRILOSEC) 20 MG delayed-release capsule TAKE 1 CAPSULE BY MOUTH EVERY DAY 90 Cap 0   • lovastatin (MEVACOR) 40 MG tablet TAKE 1 TABLET BY MOUTH TWICE DAILY (Patient taking differently: One tablet at hs) 180 Tab 2   • metoprolol (LOPRESSOR) 25 MG Tab 2 tablets in the morning and 2 tablets in pm (Patient taking differently: Take 25 mg by mouth 2 times a day.) 120 Tab 0   • vitamin D (CHOLECALCIFEROL) 1000 UNIT Tab Take 1,000 Units by mouth every day.     • nitroglycerin (NITROSTAT) 0.4 MG SUBL Place 1 Tab under tongue as needed for Chest Pain. 25 Tab 1   • aspirin 81 MG tablet Take 81 mg by mouth every day.     • metoprolol (LOPRESSOR) 25 MG Tab TAKE 1 TABLET BY MOUTH TWICE DAILY 180 Tab 2   • cephALEXin (KEFLEX) 500 MG Cap TK ONE C PO  TID TAT  0   • doxycycline (VIBRAMYCIN) 100 MG Cap Take 1 Cap by mouth 2 times a day. Take until gone. 20 Cap 0   • atorvastatin (LIPITOR) 40 MG Tab Take 40 mg by mouth every evening. Indications: Procedure to Reestablish Blood Supply to the Heart, not taking not taking       No current facility-administered medications on file prior to " visit.           Signed by: Randall Farley M.D.

## 2017-08-24 NOTE — MR AVS SNAPSHOT
"        Kobe Cyr   2017 11:00 AM   Office Visit   MRN: [id number]    Department:  [facility] - Internal Med   Dept Phone:  [phone]    Description:  Male : 1931   Provider:  Randall Farley M.D.           Reason for Visit     Hypertension F/u     Hyperlipidemia Decreased Lovastatin to q hs       Allergies as of 2017     Allergen Noted Reactions    Atorvastatin 2016   Myalgia    Simvastatin 2010       ADVERSE REACTION: MYALGIA.      You were diagnosed with     Hyperglycemia   [324234]       Mixed hyperlipidemia   [272.2.ICD-9-CM]         Vital Signs     Blood Pressure Pulse Temperature Height Weight Body Mass Index    138/78 mmHg 61 35.9 °C (96.6 °F) 1.778 m (5' 10\") 111.857 kg (246 lb 9.6 oz) 35.38 kg/m2    Oxygen Saturation Smoking Status                96% Former Smoker          Basic Information     Date Of Birth Sex Race Ethnicity Preferred Language    1931 Male White Non- English      Your appointments     Aug 30, 2017  1:20 PM   FOLLOW UP with Edy Bradshaw M.D.   [facility] (--)    [address]  [place] [id number] [phone]            Oct 05, 2017 10:40 AM   Established Patient with Randall Farley M.D.   [facility] - Internal Medicine (--)    [address]  [address]  [place] [id number] [phone]           You will be receiving a confirmation call a few days before your appointment from our automated call confirmation system.            Dec 07, 2017 11:30 AM   Follow Up Visit with Cassandra Mccauley M.D.   [facility] ([address])    [address] [place] [address] [address]  [place] [id number] [phone]           You will be receiving a confirmation call a few days before your appointment from our automated call confirmation system.            Dec 28, 2017 11:00 AM   XRAY 15 with PULMONARY DX 1   [facility] - Pulmonary ([address])    [address] [facility] [place] [address]               Dec 28, 2017 11:20 AM   Established Patient Pul with Krystyna Wood M.D.   [facility] (--)    [address] [place] [address] [address]  [place] [id number] [phone]              Problem List              ICD-10-CM Priority Class Noted - Resolved    Hyperglycemia (Chronic) R73.9   1/6/2010 - Present    Hyperlipidemia (Chronic) E78.5 High  5/24/2011 - Present    CAD (coronary artery disease) I25.10 High  3/15/2012 - Present    Myocardial infarction (CMS-HCC) (Chronic) I21.3   Unknown - Present    Stented coronary artery Z95.5 High  8/29/2013 - Present    Essential hypertension I10 High  4/20/2016 - Present    Delirium, acute R41.0   8/1/2016 - Present    Leg pain, bilateral M79.604, M79.605   8/1/2016 - Present    Impaired fasting glucose R73.01   8/1/2016 - Present    Hematochezia K92.1   8/1/2016 - Present    Osteoarthritis of knees, bilateral M17.0   8/1/2016 - Present    GERD (gastroesophageal reflux disease) K21.9   8/1/2016 - Present    Anemia D64.9   8/1/2016 - Present    Dyslipidemia E78.5   8/1/2016 - Present    DJD (degenerative joint disease) M19.90   8/1/2016 - Present    Bilateral low back pain with left-sided sciatica M54.42   8/18/2016 - Present    CKD (chronic kidney disease), stage III N18.3   8/24/2016 - Present    Cough R05   9/23/2016 - Present    Nasal bleeding R04.0   11/21/2016 - Present    Vitamin D deficiency disease E55.9   1/19/2017 - Present    Abnormal chest x-ray R93.8   2/24/2017 - Present    Bilateral carotid artery stenosis I65.23   2/24/2017 - Present      Health Maintenance        Date Due Completion Dates    IMM DTaP/Tdap/Td Vaccine (1 - Tdap) 5/17/1950 ---    COLONOSCOPY 5/17/1981 ---    IMM ZOSTER VACCINE 5/17/1991 ---    IMM PNEUMOCOCCAL 65+ (ADULT) LOW/MEDIUM RISK SERIES (2 of 2 - PPSV23) 6/12/2017 6/12/2016    IMM INFLUENZA (1) 9/1/2017 10/1/2016, 11/7/2011            Current Immunizations     13-VALENT PCV  PREVNAR 6/12/2016    Influenza TIV (IM) 10/1/2016, 11/7/2011    Pneumococcal Vaccine (UF)Historical Data 11/8/2010      Below and/or attached are the medications your provider expects you to take. Review all of your home medications and newly ordered medications with your provider and/or pharmacist. Follow medication instructions as directed by your provider and/or pharmacist. Please keep your medication list with you and share with your provider. Update the information when medications are discontinued, doses are changed, or new medications (including over-the-counter products) are added; and carry medication information at all times in the event of emergency situations     Allergies:  ATORVASTATIN - Myalgia     SIMVASTATIN - (reactions not documented)               Medications  Valid as of: August 24, 2017 - 12:30 PM    Generic Name Brand Name Tablet Size Instructions for use    Aspirin (Tab) aspirin 81 MG Take 81 mg by mouth every day.        Atorvastatin Calcium (Tab) LIPITOR 40 MG Take 40 mg by mouth every evening. Indications: Procedure to Reestablish Blood Supply to the Heart, not taking not taking        Cephalexin (Cap) KEFLEX 500 MG TK ONE C PO  TID TAT        Cholecalciferol (Tab) cholecalciferol 1000 UNIT Take 1,000 Units by mouth every day.        Doxycycline Hyclate (Cap) VIBRAMYCIN 100 MG Take 1 Cap by mouth 2 times a day. Take until gone.        Lovastatin (Tab) MEVACOR 40 MG TAKE 1 TABLET BY MOUTH TWICE DAILY        Metoprolol Tartrate (Tab) LOPRESSOR 25 MG 2 tablets in the morning and 2 tablets in pm        Metoprolol Tartrate (Tab) LOPRESSOR 25 MG TAKE 1 TABLET BY MOUTH TWICE DAILY        Nitroglycerin (SL Tab) NITROSTAT 0.4 MG Place 1 Tab under tongue as needed for Chest Pain.        Omeprazole (CAPSULE DELAYED RELEASE) PRILOSEC 20 MG TAKE 1 CAPSULE BY MOUTH EVERY DAY        .                 Medicines prescribed today were sent to:     [id number] - [place], [place] - [address]  [facility] [address]    [address] [place] [address]    Phone: [phone] Fax: [phone]    Open 24 Hours?: No      Medication refill instructions:       If your prescription bottle indicates you have medication refills left, it is not necessary to call your provider’s office. Please contact your pharmacy and they will refill your medication.    If your prescription bottle indicates you do not have any refills left, you may request refills at any time through one of the following ways: The online [employer] system (except Urgent Care), by calling your provider’s office, or by asking your pharmacy to contact your provider’s office with a refill request. Medication refills are processed only during regular business hours and may not be available until the next business day. Your provider may request additional information or to have a follow-up visit with you prior to refilling your medication.   *Please Note: Medication refills are assigned a new Rx number when refilled electronically. Your pharmacy may indicate that no refills were authorized even though a new prescription for the same medication is available at the pharmacy. Please request the medicine by name with the pharmacy before contacting your provider for a refill.        Your To Do List     Future Labs/Procedures Complete By Expires    HEMOGLOBIN A1C  11/22/2017 8/24/2018         [employer] Access Code: Activation code not generated  Current [employer] Status: Active

## 2017-08-30 ENCOUNTER — OFFICE VISIT (OUTPATIENT)
Dept: CARDIOLOGY | Facility: MEDICAL CENTER | Age: 82
End: 2017-08-30
Payer: MEDICARE

## 2017-08-30 VITALS
HEART RATE: 60 BPM | SYSTOLIC BLOOD PRESSURE: 132 MMHG | HEIGHT: 71 IN | WEIGHT: 247 LBS | BODY MASS INDEX: 34.58 KG/M2 | DIASTOLIC BLOOD PRESSURE: 70 MMHG

## 2017-08-30 DIAGNOSIS — I25.10 CORONARY ARTERY DISEASE INVOLVING NATIVE CORONARY ARTERY OF NATIVE HEART WITHOUT ANGINA PECTORIS: ICD-10-CM

## 2017-08-30 DIAGNOSIS — I21.09 ST ELEVATION MYOCARDIAL INFARCTION (STEMI) INVOLVING OTHER CORONARY ARTERY OF ANTERIOR WALL (HCC): Chronic | ICD-10-CM

## 2017-08-30 DIAGNOSIS — N18.30 CKD (CHRONIC KIDNEY DISEASE), STAGE III (HCC): ICD-10-CM

## 2017-08-30 PROCEDURE — 99214 OFFICE O/P EST MOD 30 MIN: CPT | Performed by: INTERNAL MEDICINE

## 2017-08-30 ASSESSMENT — ENCOUNTER SYMPTOMS
PALPITATIONS: 0
WEAKNESS: 0
HEMOPTYSIS: 1
NAUSEA: 0
NEUROLOGICAL NEGATIVE: 1
BACK PAIN: 1
VOMITING: 0
SHORTNESS OF BREATH: 0

## 2017-08-30 NOTE — LETTER
[facility]   [address], [facility], [place] [address]  [place] [address]  Phone: [phone]  Fax: [phone]              Kobe Cyr  5/17/1931    Encounter Date: 8/30/2017    Edy Bradshaw M.D.          PROGRESS NOTE:  Subjective:   Kobe Cyr is a 86 y.o. male who presents today For follow-up of remote anterior wall MI with stenting of the LAD in 2005. He also has hyperlipidemia and hypertension. The patient has had no angina. He was recently seen by pulmonary because of hemoptysis and PET scan revealed no tumor. He's had mild recurrent hemoptysis which is felt to be infectious in etiology.  The patient is not feeling well and states he is weak and has low energy. He denies depression. He denies edema. There is no history of hypothyroidism.    Past Medical History:   Diagnosis Date   • Left knee pain 8/1/2016   • Impaired fasting glucose 8/1/2016   • Hematochezia 8/1/2016   • Osteoarthritis of knees, bilateral 8/1/2016   • GERD (gastroesophageal reflux disease) 8/1/2016   • Anemia 8/1/2016   • Chronic kidney disease (CKD), stage II (mild) 8/1/2016   • Dyslipidemia 8/1/2016   • DJD (degenerative joint disease) 8/1/2016   • Hyperlipidemia 5/24/2011   • Hyperglycemia 1/6/2010   • Arthritis    • Back pain    • CAD (coronary artery disease)    • Cancer (CMS-[facility])     skin   • Coronary heart disease    • Pashto measles    • High cholesterol    • Hypertension    • Myocardial infarction (CMS-HCC) 2 yrs ago    2005   • Nasal drainage    • Obesity    • Pulmonary embolism (CMS-HCC)    • Rheumatic fever    • Scarlet fever    • Smallpox    • Snoring      Past Surgical History:   Procedure Laterality Date   • KNEE UNICOMPARTMENTAL  10/13/2009    Performed by SAM LINDER at SURGERY [facility] ORS   • ARTHROSCOPY, KNEE     • CORONARY STENT PROCEDURE LAD     • OTHER ORTHOPEDIC SURGERY      right shoulder rotator cuff surgery x2     Family History   Problem Relation Age of Onset    • No Known Problems Mother    • No Known Problems Father      History   Smoking Status   • Former Smoker   • Packs/day: 2.00   • Years: 35.00   • Types: Cigarettes   • Quit date: 4/17/1973   Smokeless Tobacco   • Never Used     Allergies   Allergen Reactions   • Atorvastatin Myalgia   • Simvastatin      ADVERSE REACTION: MYALGIA.     Outpatient Encounter Prescriptions as of 8/30/2017   Medication Sig Dispense Refill   • omeprazole (PRILOSEC) 20 MG delayed-release capsule TAKE 1 CAPSULE BY MOUTH EVERY DAY 90 Cap 0   • lovastatin (MEVACOR) 40 MG tablet TAKE 1 TABLET BY MOUTH TWICE DAILY (Patient taking differently: One tablet at hs) 180 Tab 2   • metoprolol (LOPRESSOR) 25 MG Tab 2 tablets in the morning and 2 tablets in pm (Patient taking differently: Take 25 mg by mouth 2 times a day.) 120 Tab 0   • vitamin D (CHOLECALCIFEROL) 1000 UNIT Tab Take 1,000 Units by mouth every day.     • nitroglycerin (NITROSTAT) 0.4 MG SUBL Place 1 Tab under tongue as needed for Chest Pain. 25 Tab 1   • aspirin 81 MG tablet Take 81 mg by mouth every day.     • metoprolol (LOPRESSOR) 25 MG Tab TAKE 1 TABLET BY MOUTH TWICE DAILY 180 Tab 2   • cephALEXin (KEFLEX) 500 MG Cap TK ONE C PO  TID TAT  0   • doxycycline (VIBRAMYCIN) 100 MG Cap Take 1 Cap by mouth 2 times a day. Take until gone. 20 Cap 0   • [DISCONTINUED] atorvastatin (LIPITOR) 40 MG Tab Take 40 mg by mouth every evening. Indications: Procedure to Reestablish Blood Supply to the Heart, not taking not taking       No facility-administered encounter medications on file as of 8/30/2017.      Review of Systems   Constitutional: Positive for malaise/fatigue.   Respiratory: Positive for hemoptysis. Negative for shortness of breath.    Cardiovascular: Negative for chest pain, palpitations and leg swelling.   Gastrointestinal: Negative for nausea and vomiting.   Musculoskeletal: Positive for back pain and joint pain.   Neurological: Negative.  Negative for weakness.        Objective:    "  /70   Pulse 60   Ht 1.803 m (5' 11\")   Wt 112 kg (247 lb)   BMI 34.45 kg/m²      Physical Exam   Constitutional: He is oriented to person, place, and time. He appears well-developed and well-nourished. No distress.   HENT:   Head: Atraumatic.   Eyes: Conjunctivae and EOM are normal. Pupils are equal, round, and reactive to light.   Neck: Neck supple. No JVD present.   Cardiovascular: Normal rate and regular rhythm.    No murmur heard.  Pulmonary/Chest: Effort normal and breath sounds normal. No respiratory distress. He has no wheezes. He has no rales.   Abdominal: Soft. There is no tenderness.   Obese   Musculoskeletal: He exhibits no edema.   Neurological: He is alert and oriented to person, place, and time.   Skin: Skin is warm and dry. He is not diaphoretic.       Assessment:     1. CKD (chronic kidney disease), stage III  LIPID PROFILE    THYROID PANEL   2. Coronary artery disease involving native coronary artery of native heart without angina pectoris  Echocardiogram Comp w/ Cont    LIPID PROFILE    THYROID PANEL   3. ST elevation myocardial infarction (STEMI) involving other coronary artery of anterior wall  Echocardiogram Comp w/ Cont       Medical Decision Making:  Today's Assessment / Status / Plan:   On exam, there is no evidence of volume overload. The patient's cardiac exam is unchanged. He complains of fatigue. Recommend checking CBC and thyroid panel also check an echo to evaluate his LV function and also to evaluate his pulmonary artery pressures. He'll be notified of results. Otherwise return in 6 months.      Randall Farley M.D.  [address] [address]  VIA In Basket                 "

## 2017-08-30 NOTE — PROGRESS NOTES
Subjective:   Kobe Cyr is a 86 y.o. male who presents today For follow-up of remote anterior wall MI with stenting of the LAD in 2005. He also has hyperlipidemia and hypertension. The patient has had no angina. He was recently seen by pulmonary because of hemoptysis and PET scan revealed no tumor. He's had mild recurrent hemoptysis which is felt to be infectious in etiology.  The patient is not feeling well and states he is weak and has low energy. He denies depression. He denies edema. There is no history of hypothyroidism.    Past Medical History:   Diagnosis Date   • Left knee pain 8/1/2016   • Impaired fasting glucose 8/1/2016   • Hematochezia 8/1/2016   • Osteoarthritis of knees, bilateral 8/1/2016   • GERD (gastroesophageal reflux disease) 8/1/2016   • Anemia 8/1/2016   • Chronic kidney disease (CKD), stage II (mild) 8/1/2016   • Dyslipidemia 8/1/2016   • DJD (degenerative joint disease) 8/1/2016   • Hyperlipidemia 5/24/2011   • Hyperglycemia 1/6/2010   • Arthritis    • Back pain    • CAD (coronary artery disease)    • Cancer (CMS-HCC)     skin   • Coronary heart disease    • Fijian measles    • High cholesterol    • Hypertension    • Myocardial infarction (CMS-HCC) 2 yrs ago    2005   • Nasal drainage    • Obesity    • Pulmonary embolism (CMS-HCC)    • Rheumatic fever    • Scarlet fever    • Smallpox    • Snoring      Past Surgical History:   Procedure Laterality Date   • KNEE UNICOMPARTMENTAL  10/13/2009    Performed by SAM LINDER at SURGERY [facility] ORS   • ARTHROSCOPY, KNEE     • CORONARY STENT PROCEDURE LAD     • OTHER ORTHOPEDIC SURGERY      right shoulder rotator cuff surgery x2     Family History   Problem Relation Age of Onset   • No Known Problems Mother    • No Known Problems Father      History   Smoking Status   • Former Smoker   • Packs/day: 2.00   • Years: 35.00   • Types: Cigarettes   • Quit date: 4/17/1973   Smokeless Tobacco   • Never Used     Allergies   Allergen Reactions  "  • Atorvastatin Myalgia   • Simvastatin      ADVERSE REACTION: MYALGIA.     Outpatient Encounter Prescriptions as of 8/30/2017   Medication Sig Dispense Refill   • omeprazole (PRILOSEC) 20 MG delayed-release capsule TAKE 1 CAPSULE BY MOUTH EVERY DAY 90 Cap 0   • lovastatin (MEVACOR) 40 MG tablet TAKE 1 TABLET BY MOUTH TWICE DAILY (Patient taking differently: One tablet at hs) 180 Tab 2   • metoprolol (LOPRESSOR) 25 MG Tab 2 tablets in the morning and 2 tablets in pm (Patient taking differently: Take 25 mg by mouth 2 times a day.) 120 Tab 0   • vitamin D (CHOLECALCIFEROL) 1000 UNIT Tab Take 1,000 Units by mouth every day.     • nitroglycerin (NITROSTAT) 0.4 MG SUBL Place 1 Tab under tongue as needed for Chest Pain. 25 Tab 1   • aspirin 81 MG tablet Take 81 mg by mouth every day.     • metoprolol (LOPRESSOR) 25 MG Tab TAKE 1 TABLET BY MOUTH TWICE DAILY 180 Tab 2   • cephALEXin (KEFLEX) 500 MG Cap TK ONE C PO  TID TAT  0   • doxycycline (VIBRAMYCIN) 100 MG Cap Take 1 Cap by mouth 2 times a day. Take until gone. 20 Cap 0   • [DISCONTINUED] atorvastatin (LIPITOR) 40 MG Tab Take 40 mg by mouth every evening. Indications: Procedure to Reestablish Blood Supply to the Heart, not taking not taking       No facility-administered encounter medications on file as of 8/30/2017.      Review of Systems   Constitutional: Positive for malaise/fatigue.   Respiratory: Positive for hemoptysis. Negative for shortness of breath.    Cardiovascular: Negative for chest pain, palpitations and leg swelling.   Gastrointestinal: Negative for nausea and vomiting.   Musculoskeletal: Positive for back pain and joint pain.   Neurological: Negative.  Negative for weakness.        Objective:   /70   Pulse 60   Ht 1.803 m (5' 11\")   Wt 112 kg (247 lb)   BMI 34.45 kg/m²     Physical Exam   Constitutional: He is oriented to person, place, and time. He appears well-developed and well-nourished. No distress.   HENT:   Head: Atraumatic.   Eyes: " Conjunctivae and EOM are normal. Pupils are equal, round, and reactive to light.   Neck: Neck supple. No JVD present.   Cardiovascular: Normal rate and regular rhythm.    No murmur heard.  Pulmonary/Chest: Effort normal and breath sounds normal. No respiratory distress. He has no wheezes. He has no rales.   Abdominal: Soft. There is no tenderness.   Obese   Musculoskeletal: He exhibits no edema.   Neurological: He is alert and oriented to person, place, and time.   Skin: Skin is warm and dry. He is not diaphoretic.       Assessment:     1. CKD (chronic kidney disease), stage III  LIPID PROFILE    THYROID PANEL   2. Coronary artery disease involving native coronary artery of native heart without angina pectoris  Echocardiogram Comp w/ Cont    LIPID PROFILE    THYROID PANEL   3. ST elevation myocardial infarction (STEMI) involving other coronary artery of anterior wall  Echocardiogram Comp w/ Cont       Medical Decision Making:  Today's Assessment / Status / Plan:   On exam, there is no evidence of volume overload. The patient's cardiac exam is unchanged. He complains of fatigue. Recommend checking CBC and thyroid panel also check an echo to evaluate his LV function and also to evaluate his pulmonary artery pressures. He'll be notified of results. Otherwise return in 6 months.

## 2017-09-07 ENCOUNTER — APPOINTMENT (OUTPATIENT)
Dept: RADIOLOGY | Facility: MEDICAL CENTER | Age: 82
End: 2017-09-07
Attending: INTERNAL MEDICINE
Payer: MEDICARE

## 2017-09-07 DIAGNOSIS — G89.29 CHRONIC LEFT SHOULDER PAIN: ICD-10-CM

## 2017-09-07 DIAGNOSIS — M25.512 CHRONIC LEFT SHOULDER PAIN: ICD-10-CM

## 2017-09-07 DIAGNOSIS — I25.10 CORONARY ARTERY DISEASE INVOLVING NATIVE CORONARY ARTERY OF NATIVE HEART WITHOUT ANGINA PECTORIS: ICD-10-CM

## 2017-09-07 DIAGNOSIS — N18.30 CKD (CHRONIC KIDNEY DISEASE), STAGE III: ICD-10-CM

## 2017-09-07 PROCEDURE — 73221 MRI JOINT UPR EXTREM W/O DYE: CPT | Mod: LT

## 2017-09-08 ENCOUNTER — PATIENT MESSAGE (OUTPATIENT)
Dept: INTERNAL MEDICINE | Facility: MEDICAL CENTER | Age: 82
End: 2017-09-08

## 2017-09-08 NOTE — TELEPHONE ENCOUNTER
From: Kobe Cyr  To: Randall Farley M.D.  Sent: 9/8/2017 11:53 AM PDT  Subject: Non-Urgent Medical Question    Dr. Farley: Milad Cyr, [birth year]. I had the MRI done 9/7/17 at [facility] on [address]. Please send a copy to Dr. Avina his phone number is  for my appointment with him on 9/25/17 at 3:30 P. M. Thank you,

## 2017-09-11 NOTE — TELEPHONE ENCOUNTER
Please help patient request medical record documentation to his orthopedic surgeon for the MRI of the shoulder I ordered

## 2017-09-12 ENCOUNTER — HOSPITAL ENCOUNTER (OUTPATIENT)
Dept: CARDIOLOGY | Facility: MEDICAL CENTER | Age: 82
End: 2017-09-12
Attending: INTERNAL MEDICINE
Payer: MEDICARE

## 2017-09-12 DIAGNOSIS — I21.09 ST ELEVATION MYOCARDIAL INFARCTION (STEMI) INVOLVING OTHER CORONARY ARTERY OF ANTERIOR WALL (HCC): Chronic | ICD-10-CM

## 2017-09-12 DIAGNOSIS — I25.10 CORONARY ARTERY DISEASE INVOLVING NATIVE CORONARY ARTERY OF NATIVE HEART WITHOUT ANGINA PECTORIS: ICD-10-CM

## 2017-09-12 PROCEDURE — 93325 DOPPLER ECHO COLOR FLOW MAPG: CPT | Mod: 26 | Performed by: INTERNAL MEDICINE

## 2017-09-12 PROCEDURE — 93321 DOPPLER ECHO F-UP/LMTD STD: CPT | Mod: 26 | Performed by: INTERNAL MEDICINE

## 2017-09-12 PROCEDURE — 93325 DOPPLER ECHO COLOR FLOW MAPG: CPT

## 2017-09-12 PROCEDURE — 93321 DOPPLER ECHO F-UP/LMTD STD: CPT

## 2017-09-12 PROCEDURE — 93308 TTE F-UP OR LMTD: CPT | Mod: 26 | Performed by: INTERNAL MEDICINE

## 2017-09-12 PROCEDURE — 93308 TTE F-UP OR LMTD: CPT

## 2017-09-13 LAB
LV EJECT FRACT  99904: 55
LV EJECT FRACT MOD 2C 99903: 54.25
LV EJECT FRACT MOD 4C 99902: 57.21
LV EJECT FRACT MOD BP 99901: 54.13

## 2017-09-14 ENCOUNTER — TELEPHONE (OUTPATIENT)
Dept: CARDIOLOGY | Facility: MEDICAL CENTER | Age: 82
End: 2017-09-14

## 2017-09-14 NOTE — TELEPHONE ENCOUNTER
----- Message from Sisi Calvert R.N. sent at 9/13/2017  5:20 PM PDT -----      ----- Message -----  From: Edy Bradshaw M.D.  Sent: 9/13/2017   4:34 PM  To: Kady Deshpande L.P.N.      Echo shows normal LV function  Pulmonary artery pressures are moderately elevated which may be to to sleep apnea and weight.  Nothing alarming on echo.

## 2017-10-25 ENCOUNTER — OFFICE VISIT (OUTPATIENT)
Dept: INTERNAL MEDICINE | Facility: MEDICAL CENTER | Age: 82
End: 2017-10-25
Payer: MEDICARE

## 2017-10-25 VITALS
TEMPERATURE: 97.5 F | OXYGEN SATURATION: 93 % | WEIGHT: 247 LBS | BODY MASS INDEX: 34.58 KG/M2 | HEART RATE: 64 BPM | DIASTOLIC BLOOD PRESSURE: 64 MMHG | HEIGHT: 71 IN | SYSTOLIC BLOOD PRESSURE: 116 MMHG

## 2017-10-25 DIAGNOSIS — I10 ESSENTIAL HYPERTENSION: ICD-10-CM

## 2017-10-25 DIAGNOSIS — R04.2 HEMOPTYSIS: ICD-10-CM

## 2017-10-25 DIAGNOSIS — R73.9 HYPERGLYCEMIA: Chronic | ICD-10-CM

## 2017-10-25 DIAGNOSIS — M25.512 CHRONIC LEFT SHOULDER PAIN: ICD-10-CM

## 2017-10-25 DIAGNOSIS — E78.5 DYSLIPIDEMIA: ICD-10-CM

## 2017-10-25 DIAGNOSIS — N18.30 CKD (CHRONIC KIDNEY DISEASE), STAGE III (HCC): ICD-10-CM

## 2017-10-25 DIAGNOSIS — R53.83 OTHER FATIGUE: ICD-10-CM

## 2017-10-25 DIAGNOSIS — G89.29 CHRONIC LEFT SHOULDER PAIN: ICD-10-CM

## 2017-10-25 PROCEDURE — 99214 OFFICE O/P EST MOD 30 MIN: CPT | Performed by: INTERNAL MEDICINE

## 2017-10-25 NOTE — PROGRESS NOTES
Established Patient    Mr. Ramirez a 86 y.o. male who presents today with:  CC: No chief complaint on file.        Assessment and Plan    1. Other fatigue  Discussed with patient. He is not that worried about fatigue at this time. Suspect he may have dysthmia. He may also have deconditioning as well. No organic etiologies that I can identify. Will check CBC, CMP to complete the workup    2. Hemoptysis  Stable. Had some mild hemoptysis with coughing but resolving. He had no problems after treatment with doxycycline.    3. Chronic left shoulder pain  Patient has significant chronic DJD, tears and bicep tear of the left shoulder. He was improving with PT and steroid injections. Recently suffered a set back after a fall. He is going to see his pain physician tomorrow.    4. Hyperglycemia  Will provide him with a lab slip to A1c  5. Dyslipidemia  Reviewed his most recent lipid panel. LDL is 80 on statin    6. Essential hypertension  Stable and controlled    7. CKD (chronic kidney disease), stage III  Stable. Followed by nephrology      Current Outpatient Prescriptions   Medication Sig Dispense Refill   • omeprazole (PRILOSEC) 20 MG delayed-release capsule TAKE 1 CAPSULE BY MOUTH EVERY DAY 90 Cap 0   • metoprolol (LOPRESSOR) 25 MG Tab TAKE 1 TABLET BY MOUTH TWICE DAILY 180 Tab 2   • lovastatin (MEVACOR) 40 MG tablet TAKE 1 TABLET BY MOUTH TWICE DAILY (Patient taking differently: One tablet at hs) 180 Tab 2   • cephALEXin (KEFLEX) 500 MG Cap TK ONE C PO  TID TAT  0   • doxycycline (VIBRAMYCIN) 100 MG Cap Take 1 Cap by mouth 2 times a day. Take until gone. 20 Cap 0   • metoprolol (LOPRESSOR) 25 MG Tab 2 tablets in the morning and 2 tablets in pm (Patient taking differently: Take 25 mg by mouth 2 times a day.) 120 Tab 0   • vitamin D (CHOLECALCIFEROL) 1000 UNIT Tab Take 1,000 Units by mouth every day.     • nitroglycerin (NITROSTAT) 0.4 MG SUBL Place 1 Tab under tongue as needed for Chest Pain. 25 Tab 1   • aspirin 81 MG  tablet Take 81 mg by mouth every day.       No current facility-administered medications for this visit.          followup No Follow-up on file.    This note was created using voice recognition software (Dragon). The accuracy of the dictation is limited by the abilities of the software. I have reviewed the note prior to signing, however some errors in grammar and context are still possible. If you have any questions related to this note please do not hesitate to contact our office.   _______________________________________________________    HPI:   1. Other fatigue  He was seen by his cardiologist, Dr. Bradshaw in August for routine follow-up appointment. Patient complained of fatigue at the time. Differential diagnosis included pulmonary hypertension cardiomyopathy hypothyroidism, anemia. An echocardiogram was performed. Results were as follows  Normal left ventricular chamber size.  Left ventricular ejection fraction is visually estimated to be 55%.  Moderate concentric left ventricular hypertrophy.  Mild tricuspid regurgitation.  Right ventricular systolic pressure is estimated to be 55 mmHg.  TSH was within normal limits. CBC and BMP were not drawn for some reason. He relates the fatigue to sitting around all of the time. He has nothing to do everyday. But he feels like he runs out of breath with activity, but this only happens when he rushes to do activities toward the end of the day. Sometimes he has lack of energy. Could be depression. He states he doesn't do anything. Spends a lot of time cruising the web. Wakes up at 630, drinks coffee, feeds the horses and dogs. Eats breakfast at 10. Goes to post office at 1130. Balances books when he gets home. EVening chores at 630, Jeopardy at 7pm. Then news at 8 and then [employer] and other program until he goes to sleep. Wakes up 1130 and then goes to back to sleep. No true anhedonia or depressed mood     2. Hemoptysis  Resolved. No further episodes until today after falling  yesterday. This morning when he coughed he saw traces of blood in sputum for a small amount. Hasn't had more since this morning. No cough    3. Chronic left shoulder pain  He has history of chronic left shoulder pain. Get an MRI September which revealed rotator cuff tears, labral tears DJD. I referred him to orthopedic surgery. He was seen by orthopedic and was told he would need a tremoundous operation on his shoulder- not a candidate. He received a steroid injection of the left shoulder and was referred to PT. Slight improvement with both treatments  Yesterday while standing he tripped and fell on his left side and landed on his left shoulder and hit his head without LoC. Before he fell he could lift his shoulder completely. Today, he can still abduct 90 but it is still painful.  4. Hyperglycemia  At the last visit, I recommended he have a hemoglobin A1c. He has a history of hyperglycemia. This test was not performed.    5. Dyslipidemia  He has history of CAD. He is compliant with lovastatin. Most recent lipid panel from September 7 his total cholesterol 157, HDL 50, LDL 80, triglycerides 118.    6. Essential hypertension  He has a history of essential hypertension and CAD. This is being controlled with metoprolol very well.    7. CKD (chronic kidney disease), stage III  He has chronic kidney disease followed by Dr. Mccauley of nephrology.       has a past medical history of Anemia (8/1/2016); Arthritis; Back pain; CAD (coronary artery disease); Cancer (CMS-HCC); Chronic kidney disease (CKD), stage II (mild) (8/1/2016); Coronary heart disease; DJD (degenerative joint disease) (8/1/2016); Dyslipidemia (8/1/2016); GERD (gastroesophageal reflux disease) (8/1/2016); Kazakh measles; Hematochezia (8/1/2016); High cholesterol; Hyperglycemia (1/6/2010); Hyperlipidemia (5/24/2011); Hypertension; Impaired fasting glucose (8/1/2016); Left knee pain (8/1/2016); Myocardial infarction (2 yrs ago); Nasal drainage; Obesity;  Osteoarthritis of knees, bilateral (8/1/2016); Pulmonary embolism (CMS-HCC); Rheumatic fever; Scarlet fever; Smallpox; and Snoring.     reports that he quit smoking about 44 years ago. His smoking use included Cigarettes. He has a 70.00 pack-year smoking history. He has never used smokeless tobacco. He reports that he drinks alcohol. He reports that he does not use drugs.      ROS: Pertinent positives as stated in HPI, all others reviewed as negative:        Physical Exam  There were no vitals taken for this visit.  Constitutional:  oriented to person, place, and time. No distress.   Musculoskeletal:   no edema. Able to abduct left shoulder 120° with mild pain. Full range of motion at the shoulder with overall pain.        Current Outpatient Prescriptions on File Prior to Visit   Medication Sig Dispense Refill   • omeprazole (PRILOSEC) 20 MG delayed-release capsule TAKE 1 CAPSULE BY MOUTH EVERY DAY 90 Cap 0   • metoprolol (LOPRESSOR) 25 MG Tab TAKE 1 TABLET BY MOUTH TWICE DAILY 180 Tab 2   • lovastatin (MEVACOR) 40 MG tablet TAKE 1 TABLET BY MOUTH TWICE DAILY (Patient taking differently: One tablet at hs) 180 Tab 2   • cephALEXin (KEFLEX) 500 MG Cap TK ONE C PO  TID TAT  0   • doxycycline (VIBRAMYCIN) 100 MG Cap Take 1 Cap by mouth 2 times a day. Take until gone. 20 Cap 0   • metoprolol (LOPRESSOR) 25 MG Tab 2 tablets in the morning and 2 tablets in pm (Patient taking differently: Take 25 mg by mouth 2 times a day.) 120 Tab 0   • vitamin D (CHOLECALCIFEROL) 1000 UNIT Tab Take 1,000 Units by mouth every day.     • nitroglycerin (NITROSTAT) 0.4 MG SUBL Place 1 Tab under tongue as needed for Chest Pain. 25 Tab 1   • aspirin 81 MG tablet Take 81 mg by mouth every day.       No current facility-administered medications on file prior to visit.            Signed by: Randall Farley M.D.

## 2017-11-03 LAB — HBA1C MFR BLD: 6.4 % (ref ?–5.8)

## 2017-12-06 ENCOUNTER — OFFICE VISIT (OUTPATIENT)
Dept: PULMONOLOGY | Facility: HOSPICE | Age: 82
End: 2017-12-06
Payer: MEDICARE

## 2017-12-06 ENCOUNTER — APPOINTMENT (OUTPATIENT)
Dept: RADIOLOGY | Facility: IMAGING CENTER | Age: 82
End: 2017-12-06
Attending: INTERNAL MEDICINE
Payer: MEDICARE

## 2017-12-06 VITALS
HEART RATE: 63 BPM | SYSTOLIC BLOOD PRESSURE: 136 MMHG | BODY MASS INDEX: 34.72 KG/M2 | WEIGHT: 248 LBS | OXYGEN SATURATION: 94 % | HEIGHT: 71 IN | RESPIRATION RATE: 16 BRPM | DIASTOLIC BLOOD PRESSURE: 74 MMHG | TEMPERATURE: 97.7 F

## 2017-12-06 DIAGNOSIS — J40 BRONCHITIS: ICD-10-CM

## 2017-12-06 DIAGNOSIS — R91.8 LUNG MASS: ICD-10-CM

## 2017-12-06 PROCEDURE — 99214 OFFICE O/P EST MOD 30 MIN: CPT | Performed by: INTERNAL MEDICINE

## 2017-12-06 PROCEDURE — 71020 DX-CHEST-2 VIEWS: CPT | Mod: TC | Performed by: INTERNAL MEDICINE

## 2017-12-06 RX ORDER — DOXYCYCLINE HYCLATE 100 MG/1
100 CAPSULE ORAL 2 TIMES DAILY
Qty: 20 CAP | Refills: 2 | Status: SHIPPED | OUTPATIENT
Start: 2017-12-06 | End: 2018-05-10

## 2017-12-07 ENCOUNTER — APPOINTMENT (OUTPATIENT)
Dept: NEPHROLOGY | Facility: MEDICAL CENTER | Age: 82
End: 2017-12-07
Payer: MEDICARE

## 2017-12-07 NOTE — PROGRESS NOTES
"Chief Complaint   Patient presents with   • Follow-Up     Hx of lung mass     HPI: This patient is an 86 y.o. Male who returns for lung mass. He has a very remote smoking history, having quit in 1973. He experiences recurrent trace hemoptysis although has not experienced symptoms in months. He felt bleeding was coming from his sinuses as he could \"snort\" sputum which was blood tinged. He denies gross hemoptysis.  He had seen Dr. Nicole, ENT, with alleged negative ENT workup. His sinus CAT scan was unremarkable. He had a screening chest x-ray January 2017 showing a vague mass noted principally on lateral view. Follow-up chest x-ray March 2017 showed stable findings. He denies associated chest pain, shortness of breath, wheezing, fevers, chills, night sweats or weight loss. He was treated empirically with doxycycline with subjective benefit.   He was told when he was released from the  after the [place] War that he had \"a spot on my lung\" .  He has a history of chronic kidney disease, consequently cannot have IV contrast. He had MRI of the chest May 31, 2017 which showed no mediastinal mass or adenopathy however a 3.4 cm right upper lobe spiculated mass in the lung. Subsequent PET scan was performed on June 28, 2017 showing the cavitary, partially calcified 3.8 x 2.9 cm right upper lobe mass which appeared radiologically stable since 2005, and showed SUV uptake of 6.9. Mild honeycombing was noted within the lower lobes with mild FDG uptake, SUV 2.9-3.8. [facility] and it was felt given the duration of stability of the lung mass with minimal FDG uptake on PET that malignancy was very unlikely.  He has declined surgical resection or any further diagnostic workup. Chest x-ray today shows stable right upper lobe mass.      Past Medical History:   Diagnosis Date   • Anemia 8/1/2016   • Arthritis    • Back pain    • CAD (coronary artery disease)    • Cancer (CMS-HCC)     skin   • Chronic " kidney disease (CKD), stage II (mild) 8/1/2016   • Coronary heart disease    • DJD (degenerative joint disease) 8/1/2016   • Dyslipidemia 8/1/2016   • GERD (gastroesophageal reflux disease) 8/1/2016   • [place] measles    • Hematochezia 8/1/2016   • High cholesterol    • Hyperglycemia 1/6/2010   • Hyperlipidemia 5/24/2011   • Hypertension    • Impaired fasting glucose 8/1/2016   • Left knee pain 8/1/2016   • Myocardial infarction 2 yrs ago    2005   • Nasal drainage    • Obesity    • Osteoarthritis of knees, bilateral 8/1/2016   • Pulmonary embolism (CMS-HCC)    • Rheumatic fever    • Scarlet fever    • Smallpox    • Snoring        Social History     Social History   • Marital status:      Spouse name: N/A   • Number of children: N/A   • Years of education: N/A     Occupational History   • Not on file.     Social History Main Topics   • Smoking status: Former Smoker     Packs/day: 2.00     Years: 35.00     Types: Cigarettes     Quit date: 4/17/1973   • Smokeless tobacco: Never Used   • Alcohol use Yes      Comment: cocktail every night   • Drug use: No   • Sexual activity: Not on file     Other Topics Concern   • Not on file     Social History Narrative   • No narrative on file       Family History   Problem Relation Age of Onset   • No Known Problems Mother    • No Known Problems Father        Current Outpatient Prescriptions on File Prior to Visit   Medication Sig Dispense Refill   • omeprazole (PRILOSEC) 20 MG delayed-release capsule TAKE 1 CAPSULE BY MOUTH EVERY DAY 90 Cap 0   • metoprolol (LOPRESSOR) 25 MG Tab TAKE 1 TABLET BY MOUTH TWICE DAILY 180 Tab 2   • metoprolol (LOPRESSOR) 25 MG Tab 2 tablets in the morning and 2 tablets in pm (Patient taking differently: Take 25 mg by mouth 2 times a day.) 120 Tab 0   • vitamin D (CHOLECALCIFEROL) 1000 UNIT Tab Take 1,000 Units by mouth every day.     • nitroglycerin (NITROSTAT) 0.4 MG SUBL Place 1 Tab under tongue as needed for Chest Pain. 25 Tab 1   • aspirin  "81 MG tablet Take 81 mg by mouth every day.     • lovastatin (MEVACOR) 40 MG tablet TAKE 1 TABLET BY MOUTH TWICE DAILY (Patient taking differently: One tablet at hs) 180 Tab 2   • cephALEXin (KEFLEX) 500 MG Cap TK ONE C PO  TID TAT  0     No current facility-administered medications on file prior to visit.        Allergies: Atorvastatin and Simvastatin    ROS:   Constitutional: Denies fevers, chills, night sweats, fatigue or weight loss  Eyes: Denies vision loss, pain, drainage, double vision  Ears, Nose, Throat: Denies earache, difficulty hearing, tinnitus, nasal congestion, hoarseness  Cardiovascular: Denies chest pain, tightness, palpitations, orthopnea or edema  Respiratory: As in history of present illness  Sleep: Denies daytime sleepiness, snoring, apneas, insomnia, morning headaches  GI: Denies heartburn, dysphagia, nausea, abdominal pain, diarrhea or constipation  : Denies frequent urination, hematuria, discharge or painful urination  Musculoskeletal: Denies back pain, painful joints, sore muscles  Neurological: Denies weakness or headaches  Skin: No rashes    Blood pressure 136/74, pulse 63, temperature 36.5 °C (97.7 °F), resp. rate 16, height 1.803 m (5' 11\"), weight 112.5 kg (248 lb), SpO2 94 %.    Physical Exam:  Appearance: Well-nourished, well-developed, in no acute distress  HEENT: Normocephalic, atraumatic, white sclera, PERRLA, oropharynx clear  Neck: No adenopathy or masses  Respiratory: no intercostal retractions or accessory muscle use  Lungs auscultation: Clear to auscultation bilaterally, distant breath sounds  Cardiovascular: Regular rate rhythm. No murmurs, rubs or gallops.  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 Bronchitis  doxycycline (VIBRAMYCIN) 100 MG Cap "       Plan:  The patient has a right upper lobe lung mass, stable for over a decade, showing minimal PET uptake. This is highly unlikely to represent malignancy. He declines biopsy or surgical resection.  He was provided doxycycline to maintain on hand for bronchitic symptoms.  Update sputum culture and QuantiFERON TB Gold test.  Return in about 6 months (around 6/6/2018).

## 2017-12-08 DIAGNOSIS — R91.8 LUNG MASS: ICD-10-CM

## 2018-04-02 NOTE — TELEPHONE ENCOUNTER
Last seen: 10/25/17 by Dr. Farley  Next appt: 04/27/18 with Dr. Farley    Was the patient seen in the last year in this department? Yes   Does patient have an active prescription for medications requested? No   Received Request Via: Pharmacy

## 2018-05-10 ENCOUNTER — OFFICE VISIT (OUTPATIENT)
Dept: INTERNAL MEDICINE | Facility: MEDICAL CENTER | Age: 83
End: 2018-05-10
Payer: MEDICARE

## 2018-05-10 VITALS
WEIGHT: 243 LBS | HEIGHT: 71 IN | SYSTOLIC BLOOD PRESSURE: 136 MMHG | DIASTOLIC BLOOD PRESSURE: 74 MMHG | BODY MASS INDEX: 34.02 KG/M2 | OXYGEN SATURATION: 94 % | HEART RATE: 54 BPM | TEMPERATURE: 97.1 F

## 2018-05-10 DIAGNOSIS — R04.0 NASAL BLEEDING: ICD-10-CM

## 2018-05-10 DIAGNOSIS — M10.079 ACUTE IDIOPATHIC GOUT OF FOOT, UNSPECIFIED LATERALITY: ICD-10-CM

## 2018-05-10 DIAGNOSIS — D64.9 ANEMIA, UNSPECIFIED TYPE: ICD-10-CM

## 2018-05-10 DIAGNOSIS — E78.2 MIXED HYPERLIPIDEMIA: Chronic | ICD-10-CM

## 2018-05-10 DIAGNOSIS — Z13.29 SCREENING FOR THYROID DISORDER: ICD-10-CM

## 2018-05-10 DIAGNOSIS — R73.01 IMPAIRED FASTING GLUCOSE: ICD-10-CM

## 2018-05-10 DIAGNOSIS — K21.9 GASTROESOPHAGEAL REFLUX DISEASE WITHOUT ESOPHAGITIS: ICD-10-CM

## 2018-05-10 DIAGNOSIS — E55.9 VITAMIN D DEFICIENCY DISEASE: ICD-10-CM

## 2018-05-10 DIAGNOSIS — I10 ESSENTIAL HYPERTENSION: ICD-10-CM

## 2018-05-10 PROBLEM — M10.9 ACUTE GOUT OF FOOT: Status: ACTIVE | Noted: 2018-05-10

## 2018-05-10 PROCEDURE — 99214 OFFICE O/P EST MOD 30 MIN: CPT | Performed by: INTERNAL MEDICINE

## 2018-05-10 RX ORDER — LOVASTATIN 40 MG/1
TABLET ORAL
Qty: 180 TAB | Refills: 2 | Status: SHIPPED | OUTPATIENT
Start: 2018-05-10 | End: 2019-02-27 | Stop reason: SDUPTHER

## 2018-05-10 NOTE — PATIENT INSTRUCTIONS
Follow gout appropriate gout diet  Get labs drawn prior to next visit  Try breath right nasal streets

## 2018-05-10 NOTE — PROGRESS NOTES
Established Patient    Mr. Ramirez a 86 y.o. male who presents today with:  CC: Results (Labs)        Assessment and Plan    1. Acute idiopathic gout of foot, bilateral-he's had 3 steroid injections within the last 2 months. His symptoms and signs are consistent with gout. He also has significant osteoarthritis of both big toes. I think it reasonable to check a uric acid. He will most likely be a candidate for allopurinol treatment. We discussed appropriate diet. His frequent eating of nuts, tunafish in beans may exacerbate flares. He is taking dark cherry juice, ttumeric and pineapple which he can continue for now  2. Vitamin D deficiency-history of vitamin D deficiency. Continue with supplementation and I will check a vitamin D level  3. Impaired fasting glucose-last hemoglobin A1c was 6.4 November 2017. I will recheck. Consider using metformin if he has not developed diabetes. Unlikely that he can manage this with lifestyle modifications  4. Mixed dyslipidemia.-Compliant with lovastatin. Recheck lipid panel  5. History of gastroesophageal reflux disease-currently asymptomatic on PPI.  6. Essential hypertension-at goal on current medications.  7. Anemia-history of anemia but most recent hemoglobin in November 2017 14  8. Nasal bleeding-resolved most likely secondary to sinusitis                  Current Outpatient Prescriptions   Medication Sig Dispense Refill   • metoprolol (LOPRESSOR) 25 MG Tab TAKE 1 TABLET BY MOUTH TWICE DAILY 180 Tab 0   • lovastatin (MEVACOR) 40 MG tablet TAKE 1 TABLET BY MOUTH TWICE DAILY (Patient taking differently: One tablet at hs) 180 Tab 2   • aspirin 81 MG tablet Take 81 mg by mouth every day.     • doxycycline (VIBRAMYCIN) 100 MG Cap Take 1 Cap by mouth 2 times a day. Take until gone. 20 Cap 2   • omeprazole (PRILOSEC) 20 MG delayed-release capsule TAKE 1 CAPSULE BY MOUTH EVERY DAY 90 Cap 0   • cephALEXin (KEFLEX) 500 MG Cap TK ONE C PO  TID TAT  0   • vitamin D (CHOLECALCIFEROL)  1000 UNIT Tab Take 1,000 Units by mouth every day.     • nitroglycerin (NITROSTAT) 0.4 MG SUBL Place 1 Tab under tongue as needed for Chest Pain. 25 Tab 1     No current facility-administered medications for this visit.          followup No Follow-up on file.    This note was created using voice recognition software (Dragon). The accuracy of the dictation is limited by the abilities of the software. I have reviewed the note prior to signing, however some errors in grammar and context are still possible. If you have any questions related to this note please do not hesitate to contact our office.   _______________________________________________________    HPI  He is here today to discuss gout.  He has not had a significant problem with gout before in the past. However he is now Having problems with the 1st metatarsals of both feet. Intermittent swelling with significant pain. And redness.. Right foot swelling from the first digit to the dorsum to the ankle several weeks ago.. Most of his shoes irritated the area. His podiatrist has injected both toes twice within 2 weeks. Not effective for him. 1.5 weeks ago had pain again and then saw Dr. Mendoza. Diagnosed with gout. 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    Dr. Mendoza gave him a 3rd shot. Dr. Mendoza thought he would need surgery for arthritis on his first toe.  Someone suggested dark cherry juice and he's been drinking this since 1 week. Going back to Dr. Mendoza in 6 weeks. Currently doing okay  Per patient podiatry ordered uric acid and the value was 5.6.  He eats a lot of nuts, sweet beans and tunafish. He also occasionally drinks alcohol.  He previously complained of intermittent hemoptysis, nasal congestion. He was evaluated by his pulmonologist who gave him a prescription of doxycycline with refills. This is for sinusitis. It appears that the doxycycline helped with his symptoms significantly. He denies shortness of breath, hemoptysis currently, sinus  "pain, sinus discharge. He also has not had nasal bleeding recently. He has a history gastroesophageal reflux disease but currently is asymptomatic. History of vitamin D deficiency and is compliant with over-the-counter vitamin D supplementation. History of impaired fasting glucose and unfortunately not very active in trying to lose weight.   has a past medical history of Anemia (8/1/2016); Arthritis; Back pain; CAD (coronary artery disease); Cancer (CMS-HCC) ([facility]); Chronic kidney disease (CKD), stage II (mild) (8/1/2016); Coronary heart disease; DJD (degenerative joint disease) (8/1/2016); Dyslipidemia (8/1/2016); GERD (gastroesophageal reflux disease) (8/1/2016); [place] measles; Hematochezia (8/1/2016); High cholesterol; Hyperglycemia (1/6/2010); Hyperlipidemia (5/24/2011); Hypertension; Impaired fasting glucose (8/1/2016); Left knee pain (8/1/2016); Myocardial infarction ([facility]) (2 yrs ago); Nasal drainage; Obesity; Osteoarthritis of knees, bilateral (8/1/2016); Pulmonary embolism (CMS-HCC) ([facility]); Rheumatic fever; Scarlet fever; Smallpox; and Snoring.     reports that he quit smoking about 45 years ago. His smoking use included Cigarettes. He has a 70.00 pack-year smoking history. He has never used smokeless tobacco. He reports that he drinks alcohol. He reports that he does not use drugs.      ROS: Pertinent positives as stated in HPI, all others reviewed as negative:  Musculoskeletal/Extremities ROS: No pain, redness or swelling on the joints        Physical Exam  /74   Pulse (!) 54   Temp 36.2 °C (97.1 °F)   Ht 1.803 m (5' 11\")   Wt 110.2 kg (243 lb)   SpO2 94%   BMI 33.89 kg/m²   Constitutional:  oriented to person, place, and time. No distress.     Musculoskeletal: Bilateral hypertrophy of the first metatarsals without erythema, swelling or pain with passive motion.          Current Outpatient Prescriptions on File Prior to Visit   Medication Sig Dispense Refill   • metoprolol (LOPRESSOR) 25 MG Tab " TAKE 1 TABLET BY MOUTH TWICE DAILY 180 Tab 0   • lovastatin (MEVACOR) 40 MG tablet TAKE 1 TABLET BY MOUTH TWICE DAILY (Patient taking differently: One tablet at hs) 180 Tab 2   • aspirin 81 MG tablet Take 81 mg by mouth every day.     • doxycycline (VIBRAMYCIN) 100 MG Cap Take 1 Cap by mouth 2 times a day. Take until gone. 20 Cap 2   • omeprazole (PRILOSEC) 20 MG delayed-release capsule TAKE 1 CAPSULE BY MOUTH EVERY DAY 90 Cap 0   • cephALEXin (KEFLEX) 500 MG Cap TK ONE C PO  TID TAT  0   • vitamin D (CHOLECALCIFEROL) 1000 UNIT Tab Take 1,000 Units by mouth every day.     • nitroglycerin (NITROSTAT) 0.4 MG SUBL Place 1 Tab under tongue as needed for Chest Pain. 25 Tab 1     No current facility-administered medications on file prior to visit.            Signed by: Randall Farley M.D.

## 2018-05-24 ENCOUNTER — APPOINTMENT (RX ONLY)
Dept: URBAN - METROPOLITAN AREA CLINIC 4 | Facility: CLINIC | Age: 83
Setting detail: DERMATOLOGY
End: 2018-05-24

## 2018-05-24 DIAGNOSIS — L81.4 OTHER MELANIN HYPERPIGMENTATION: ICD-10-CM

## 2018-05-24 DIAGNOSIS — L82.1 OTHER SEBORRHEIC KERATOSIS: ICD-10-CM

## 2018-05-24 DIAGNOSIS — Z85.828 PERSONAL HISTORY OF OTHER MALIGNANT NEOPLASM OF SKIN: ICD-10-CM

## 2018-05-24 PROBLEM — K21.9 GASTRO-ESOPHAGEAL REFLUX DISEASE WITHOUT ESOPHAGITIS: Status: ACTIVE | Noted: 2018-05-24

## 2018-05-24 PROBLEM — D48.5 NEOPLASM OF UNCERTAIN BEHAVIOR OF SKIN: Status: ACTIVE | Noted: 2018-05-24

## 2018-05-24 PROBLEM — L57.0 ACTINIC KERATOSIS: Status: ACTIVE | Noted: 2018-05-24

## 2018-05-24 PROBLEM — E78.5 HYPERLIPIDEMIA, UNSPECIFIED: Status: ACTIVE | Noted: 2018-05-24

## 2018-05-24 PROBLEM — I10 ESSENTIAL (PRIMARY) HYPERTENSION: Status: ACTIVE | Noted: 2018-05-24

## 2018-05-24 PROCEDURE — ? OBSERVATION

## 2018-05-24 PROCEDURE — 11100: CPT

## 2018-05-24 PROCEDURE — 99213 OFFICE O/P EST LOW 20 MIN: CPT | Mod: 25

## 2018-05-24 PROCEDURE — ? BIOPSY BY SHAVE METHOD

## 2018-05-24 ASSESSMENT — LOCATION DETAILED DESCRIPTION DERM
LOCATION DETAILED: LEFT DISTAL POSTERIOR UPPER ARM
LOCATION DETAILED: LEFT SUPERIOR PARIETAL SCALP
LOCATION DETAILED: RIGHT DISTAL POSTERIOR UPPER ARM
LOCATION DETAILED: RIGHT MEDIAL UPPER BACK
LOCATION DETAILED: MID POSTERIOR NECK
LOCATION DETAILED: LEFT MEDIAL FOREHEAD
LOCATION DETAILED: SUPERIOR THORACIC SPINE

## 2018-05-24 ASSESSMENT — LOCATION ZONE DERM
LOCATION ZONE: ARM
LOCATION ZONE: SCALP
LOCATION ZONE: TRUNK
LOCATION ZONE: FACE
LOCATION ZONE: NECK

## 2018-05-24 ASSESSMENT — LOCATION SIMPLE DESCRIPTION DERM
LOCATION SIMPLE: LEFT FOREHEAD
LOCATION SIMPLE: LEFT UPPER ARM
LOCATION SIMPLE: POSTERIOR NECK
LOCATION SIMPLE: RIGHT UPPER ARM
LOCATION SIMPLE: UPPER BACK
LOCATION SIMPLE: SCALP
LOCATION SIMPLE: RIGHT UPPER BACK

## 2018-05-24 NOTE — PROCEDURE: BIOPSY BY SHAVE METHOD
Type Of Destruction Used: Curettage
Detail Level: Detailed
Biopsy Type: H and E
Anesthesia Volume In Cc: 0.5
Notification Instructions: Patient will be notified of biopsy results. However, patient instructed to call the office if not contacted within 2 weeks.
Path Notes (To The Dermatopathologist): Re check in 2 mo if +
Curettage Text: The wound bed was treated with curettage after the biopsy was performed.
Additional Anesthesia Volume In Cc (Will Not Render If 0): 0
Was A Bandage Applied: Yes
Hemostasis: Drysol and Electrocautery
Post-Care Instructions: I reviewed with the patient in detail post-care instructions. Patient is to keep the biopsy site dry overnight, and then apply vasaline twice daily until healed.
Dressing: Band-Aid
Bill 12090 For Specimen Handling/Conveyance To Laboratory?: no
Anesthesia Type: 1% lidocaine with epinephrine and a 1:10 solution of 8.4% sodium bicarbonate
Lab Facility: 
Lab: [id number]
Biopsy Method: Personna blade
Electrodesiccation And Curettage Text: The wound bed was treated with electrodesiccation and curettage after the biopsy was performed.
Billing Type: Third-Party Bill
Consent: Written consent was obtained and risks were reviewed including but not limited to scarring, infection, bleeding, scabbing, incomplete removal, nerve damage and allergy to anesthesia.
Wound Care: Vaseline
Electrodesiccation Text: The wound bed was treated with electrodesiccation after the biopsy was performed.

## 2018-06-06 ENCOUNTER — TELEPHONE (OUTPATIENT)
Dept: PULMONOLOGY | Facility: HOSPICE | Age: 83
End: 2018-06-06

## 2018-06-06 NOTE — TELEPHONE ENCOUNTER
I called the patient to see if he had the lab work ordered by  on 12/6/17. He stated that he had no symptoms so he didn't get the labs done. He will keep his appointment for 06/07/2018

## 2018-06-07 ENCOUNTER — OFFICE VISIT (OUTPATIENT)
Dept: INTERNAL MEDICINE | Facility: MEDICAL CENTER | Age: 83
End: 2018-06-07
Payer: MEDICARE

## 2018-06-07 ENCOUNTER — OFFICE VISIT (OUTPATIENT)
Dept: PULMONOLOGY | Facility: HOSPICE | Age: 83
End: 2018-06-07
Payer: MEDICARE

## 2018-06-07 VITALS
DIASTOLIC BLOOD PRESSURE: 88 MMHG | RESPIRATION RATE: 16 BRPM | SYSTOLIC BLOOD PRESSURE: 146 MMHG | HEART RATE: 61 BPM | BODY MASS INDEX: 33.96 KG/M2 | TEMPERATURE: 97.7 F | OXYGEN SATURATION: 94 % | HEIGHT: 71 IN | WEIGHT: 242.6 LBS

## 2018-06-07 VITALS
HEIGHT: 71 IN | HEART RATE: 64 BPM | DIASTOLIC BLOOD PRESSURE: 60 MMHG | SYSTOLIC BLOOD PRESSURE: 110 MMHG | BODY MASS INDEX: 33.94 KG/M2 | WEIGHT: 242.4 LBS | OXYGEN SATURATION: 91 % | TEMPERATURE: 98.1 F

## 2018-06-07 DIAGNOSIS — R73.9 HYPERGLYCEMIA: Chronic | ICD-10-CM

## 2018-06-07 DIAGNOSIS — I10 ESSENTIAL HYPERTENSION: ICD-10-CM

## 2018-06-07 DIAGNOSIS — M10.079 ACUTE IDIOPATHIC GOUT OF FOOT, UNSPECIFIED LATERALITY: ICD-10-CM

## 2018-06-07 DIAGNOSIS — E78.2 MIXED HYPERLIPIDEMIA: Chronic | ICD-10-CM

## 2018-06-07 DIAGNOSIS — N18.30 CKD (CHRONIC KIDNEY DISEASE), STAGE III (HCC): ICD-10-CM

## 2018-06-07 DIAGNOSIS — I25.10 CORONARY ARTERY DISEASE INVOLVING NATIVE CORONARY ARTERY OF NATIVE HEART WITHOUT ANGINA PECTORIS: ICD-10-CM

## 2018-06-07 DIAGNOSIS — R91.8 LUNG MASS: ICD-10-CM

## 2018-06-07 PROCEDURE — 99214 OFFICE O/P EST MOD 30 MIN: CPT | Performed by: INTERNAL MEDICINE

## 2018-06-07 RX ORDER — PENICILLIN V POTASSIUM 500 MG/1
TABLET ORAL
COMMUNITY
End: 2019-05-29

## 2018-06-07 RX ORDER — CEPHALEXIN 500 MG/1
CAPSULE ORAL
COMMUNITY
End: 2019-02-27

## 2018-06-07 RX ORDER — LOSARTAN POTASSIUM 100 MG/1
TABLET ORAL
COMMUNITY
End: 2018-09-05

## 2018-06-07 RX ORDER — OMEPRAZOLE 20 MG/1
CAPSULE, DELAYED RELEASE ORAL
COMMUNITY
End: 2019-02-27

## 2018-06-07 RX ORDER — DOXYCYCLINE HYCLATE 100 MG/1
CAPSULE ORAL
COMMUNITY
End: 2018-09-05 | Stop reason: SDUPTHER

## 2018-06-07 RX ORDER — LOVASTATIN 40 MG/1
TABLET ORAL
COMMUNITY
End: 2019-02-27

## 2018-06-07 NOTE — PROGRESS NOTES
"Chief Complaint   Patient presents with   • Follow-Up     Hx of lung mass/6 mo f/v     HPI: This patient is an 87 y.o. Male who returns for lung mass. He has a remote smoking history, having quit in 1973. He was experiencing recurrent trace hemoptysis, had seen Dr. Nicole, ENT, with alleged negative ENT workup. His sinus CAT scan was unremarkable. He had a screening chest x-ray January 2017 showing a vague mass. He denies associated chest pain, shortness of breath, wheezing, fevers, chills, night sweats or weight loss.  He was told when he was released from the  after the [place] War that he had \"a spot on my lung\". He has chronic kidney disease, consequently cannot have IV contrast. He consequently had MRI of the chest May 31, 2017 which showed a 3.4 cm right upper lobe spiculated mass in the lung. Subsequent PET scan was performed on June 28, 2017 showing cavitary, partially calcified 3.8 x 2.9 cm right upper lobe mass which appeared radiologically stable since 2005, with SUV uptake of 6.9. Mild honeycombing was noted within the lower lobes with mild FDG uptake, SUV 2.9-3.8. [facility] was [facility] at [facility] and it was felt given the stability of the lung mass from 2005, and minimal FDG uptake on PET, that malignancy was very unlikely.  He has in any case declined surgical resection or any further diagnostic workup. Last chest x-ray showed 12/17 showed  stable right upper lobe mass.  He took a course of doxycycline and has had resolution of hemoptysis and sputum production.  He has never been unable to provide sputum culture.  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  He denies fevers, night sweats or weight loss.      Past Medical History:   Diagnosis Date   • Anemia 8/1/2016   • Arthritis    • Back pain    • CAD (coronary artery disease)    • Cancer (HCC)     skin   • Chronic kidney disease (CKD), stage II (mild) 8/1/2016   • Coronary heart disease    • DJD " (degenerative joint disease) 8/1/2016   • Dyslipidemia 8/1/2016   • GERD (gastroesophageal reflux disease) 8/1/2016   • [place] measles    • Hematochezia 8/1/2016   • High cholesterol    • Hyperglycemia 1/6/2010   • Hyperlipidemia 5/24/2011   • Hypertension    • Impaired fasting glucose 8/1/2016   • Left knee pain 8/1/2016   • Myocardial infarction (HCC) 2 yrs ago    2005   • Nasal drainage    • Obesity    • Osteoarthritis of knees, bilateral 8/1/2016   • Pulmonary embolism (HCC)    • Rheumatic fever    • Scarlet fever    • Smallpox    • Snoring        Social History     Social History   • Marital status:      Spouse name: N/A   • Number of children: N/A   • Years of education: N/A     Occupational History   • Not on file.     Social History Main Topics   • Smoking status: Former Smoker     Packs/day: 2.00     Years: 35.00     Types: Cigarettes     Quit date: 4/17/1973   • Smokeless tobacco: Never Used   • Alcohol use Yes      Comment: cocktail every night   • Drug use: No   • Sexual activity: Not on file     Other Topics Concern   • Not on file     Social History Narrative   • No narrative on file       Family History   Problem Relation Age of Onset   • No Known Problems Mother    • No Known Problems Father        Current Outpatient Prescriptions on File Prior to Visit   Medication Sig Dispense Refill   • lovastatin (MEVACOR) 40 MG tablet One tablet at hs 180 Tab 2   • metoprolol (LOPRESSOR) 25 MG Tab Take 1 Tab by mouth 2 times a day. 180 Tab 3   • nitroglycerin (NITROSTAT) 0.4 MG SUBL Place 1 Tab under tongue as needed for Chest Pain. 25 Tab 1   • aspirin 81 MG tablet Take 81 mg by mouth every day.       No current facility-administered medications on file prior to visit.        Allergies: Atorvastatin and Simvastatin    ROS:   Constitutional: Denies fevers, chills, night sweats, fatigue or weight loss  Eyes: Denies vision loss, pain, drainage, double vision  Ears, Nose, Throat: Denies earache, difficulty  "hearing, tinnitus, nasal congestion, hoarseness  Cardiovascular: Denies chest pain, tightness, palpitations, orthopnea or edema  Respiratory: Denies shortness of breath, cough, wheezing, hemoptysis  Sleep: Denies daytime sleepiness, snoring, apneas, insomnia, morning headaches  GI: Denies heartburn, dysphagia, nausea, abdominal pain, diarrhea or constipation  : Denies frequent urination, hematuria, discharge or painful urination  Musculoskeletal: Denies back pain, painful joints, sore muscles  Neurological: Denies weakness or headaches  Skin: No rashes    Blood pressure 146/88, pulse 61, temperature 36.5 °C (97.7 °F), resp. rate 16, height 1.803 m (5' 11\"), weight 110 kg (242 lb 9.6 oz), SpO2 94 %.    Physical Exam:  Appearance: Well-nourished, well-developed, in no acute distress  HEENT: Normocephalic, atraumatic, white sclera, PERRLA, oropharynx clear  Neck: No adenopathy or masses  Respiratory: no intercostal retractions or accessory muscle use  Lungs auscultation: Clear to auscultation bilaterally  Cardiovascular: Regular rate rhythm. No murmurs, rubs or gallops.  No LE edema  Abdomen: soft, nondistended  Gait: Normal  Digits: No clubbing, cyanosis  Motor: No focal deficits  Orientation: Oriented to time, person and place    Diagnosis:  1. 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  He declines biopsy or surgical resection.  His respiratory symptoms have resolved.  We will monitor with chest x-ray in another 6 months.  He was encouraged to obtain his QuantiFERON TB Gold test.  Return in about 6 months (around 12/7/2018) for with CXR.      "

## 2018-06-08 NOTE — PROGRESS NOTES
Established Patient    Mr. Ramirez a 87 y.o. male who presents today with:  CC: Follow-Up (idiopathic gout and labs)        Assessment and Plan    1. Hyperglycemia  A1c at 6.4. Risk factors include htn, CKD dyslipidemia and patient with established arteriosclerosis. Fortunately no neuropathy. He is 87 and does not prefer more medications. Although he may benefit from a DDP4, not glucophage due to CKD, I think it reasonable to work on diet such as minimal high glycemic index foods and weight loss to reduce his risk of complications from hyperglycemia. He agrees with this plan. I provided him with a list of glycemic index foods    2. Mixed hyperlipidemia  Stable and at goal on lovastatin. I would prefer high dose statins such as  Crestor or Lipitor but patient cannot tolerate these meds    3. Coronary artery disease involving native coronary artery of native heart without angina pectoris  Stable CAD and asymptomatic. Continue statin, ASA, losartan and beta blockade    4. Essential hypertension  Controlled and at goal. Continue current regimen    5. CKD (chronic kidney disease), stage III  Stable and baseline GFR. Continue BP control, ARB, statin and avoidance of NSAIDs    6. Acute idiopathic gout of foot, unspecified laterality  Patient has UA of 7.4. Could consider allopurinol but patient is sastified with diet control of gout at this time thus he can continue his cherry juice and pineapple and elimination diet      Current Outpatient Prescriptions   Medication Sig Dispense Refill   • lovastatin (MEVACOR) 40 MG tablet lovastatin 40 mg tablet     • metoprolol (LOPRESSOR) 25 MG Tab metoprolol tartrate 25 mg tablet     • cephALEXin (KEFLEX) 500 MG Cap cephalexin 500 mg capsule     • doxycycline (VIBRAMYCIN) 100 MG Cap doxycycline hyclate 100 mg capsule     • losartan (COZAAR) 100 MG Tab losartan 100 mg tablet     • omeprazole (PRILOSEC) 20 MG delayed-release capsule omeprazole 20 mg capsule,delayed release     •  penicillin v potassium (VEETID) 500 MG Tab penicillin V potassium 500 mg tablet     • lovastatin (MEVACOR) 40 MG tablet One tablet at hs 180 Tab 2   • metoprolol (LOPRESSOR) 25 MG Tab Take 1 Tab by mouth 2 times a day. 180 Tab 3   • nitroglycerin (NITROSTAT) 0.4 MG SUBL Place 1 Tab under tongue as needed for Chest Pain. 25 Tab 1   • aspirin 81 MG tablet Take 81 mg by mouth every day.       No current facility-administered medications for this visit.          followup No Follow-up on file.    This note was created using voice recognition software (Dragon). The accuracy of the dictation is limited by the abilities of the software. I have reviewed the note prior to signing, however some errors in grammar and context are still possible. If you have any questions related to this note please do not hesitate to contact our office.   _______________________________________________________    HPI: He is here to discuss lab results and follow up on gout  1. Hyperglycemia  He has a history of hyperglycemia. A1c 2017 6.4 and most recent A1c in May 6.4. He eats a fair amount of high glycemic foods. No exercise regimen. History of CAD, HTN and dyslipidemia    2. Mixed hyperlipidemia  Currently treated with lovastatin due to intolerance to more recent statins. No side effects. LDL <100 May 2018    3. Coronary artery disease involving native coronary artery of native heart without angina pectoris  Hx of CAD. Compliant with beta blocker, ASA and statin. BP controlled. No chest pain or decreased exercise tolerance. No specific physical activity plan    4. Essential hypertension  Compliant with beta blocker and losartan. States BP at home with sbp in the 1 teens thru 120s and dbp in the 80s. Minimal salt use and occasional alcohol use    5. CKD (chronic kidney disease), stage III  Managed by nephrology. Baseline GFR approx 43 and most recent GFR in May at 43. On ARB, statin and BP controlled. No NSAIDs per patient    6. Acute  idiopathic gout of foot, unspecified laterality  Last visit, he complained of significant gouty flares in 2018. Required steroids and injections by podiatrist. Modified diet since last visit since we last discussed. Daily tart cherry juice and pineapples. Minimal beans, nuts and tuna. Has not had flare of gout since last visit. UA 7.4 in May 2018.       has a past medical history of Anemia (8/1/2016); Arthritis; Back pain; CAD (coronary artery disease); Cancer (HCC); Chronic kidney disease (CKD), stage II (mild) (8/1/2016); Coronary heart disease; DJD (degenerative joint disease) (8/1/2016); Dyslipidemia (8/1/2016); GERD (gastroesophageal reflux disease) (8/1/2016); Burmese measles; Hematochezia (8/1/2016); High cholesterol; Hyperglycemia (1/6/2010); Hyperlipidemia (5/24/2011); Hypertension; Impaired fasting glucose (8/1/2016); Left knee pain (8/1/2016); Myocardial infarction (HCC) (2 yrs ago); Nasal drainage; Obesity; Osteoarthritis of knees, bilateral (8/1/2016); Pulmonary embolism (HCC); Rheumatic fever; Scarlet fever; Smallpox; and Snoring.     reports that he quit smoking about 45 years ago. His smoking use included Cigarettes. He has a 70.00 pack-year smoking history. He has never used smokeless tobacco. He reports that he drinks alcohol. He reports that he does not use drugs.      ROS: Pertinent positives as stated in HPI, all others reviewed as negative:  Constitutional ROS: No unexpected change in weight, No weakness, No fatigue, No unexplained fevers, sweats, or chills  Mouth/Throat ROS: No bleeding gums, No tongue complaints, No recent change in voice or hoarseness  Pulmonary ROS: No chronic cough, sputum, or hemoptysis, No dyspnea on exertion, No wheezing, No shortness of breath, No recent change in breathing, no nosebleeds  Cardiovascular ROS: No exercise intolerance, No chest pain, No shortness of breath, No dyspnea on exertion, No diaphoresis, No cyanosis, No edema, No  "palpitations  Musculoskeletal/Extremities ROS: No peripheral edema, No pain, redness or swelling on the joints  Psychiatric ROS: No depression      Physical Exam  /60   Pulse 64   Temp 36.7 °C (98.1 °F)   Ht 1.803 m (5' 11\")   Wt 110 kg (242 lb 6.4 oz)   SpO2 91%   BMI 33.81 kg/m²   Constitutional:  oriented to person, place, and time. No distress.   Eyes: Pupils are equal, round, and reactive to light. No scleral icterus.     Musculoskeletal:   no edema. No joint tenderness    Neurological: alert and oriented to person, place, and time.   Skin: No cyanosis. Nails show no clubbing.          Current Outpatient Prescriptions on File Prior to Visit   Medication Sig Dispense Refill   • lovastatin (MEVACOR) 40 MG tablet lovastatin 40 mg tablet     • metoprolol (LOPRESSOR) 25 MG Tab metoprolol tartrate 25 mg tablet     • cephALEXin (KEFLEX) 500 MG Cap cephalexin 500 mg capsule     • doxycycline (VIBRAMYCIN) 100 MG Cap doxycycline hyclate 100 mg capsule     • losartan (COZAAR) 100 MG Tab losartan 100 mg tablet     • omeprazole (PRILOSEC) 20 MG delayed-release capsule omeprazole 20 mg capsule,delayed release     • penicillin v potassium (VEETID) 500 MG Tab penicillin V potassium 500 mg tablet     • lovastatin (MEVACOR) 40 MG tablet One tablet at hs 180 Tab 2   • metoprolol (LOPRESSOR) 25 MG Tab Take 1 Tab by mouth 2 times a day. 180 Tab 3   • nitroglycerin (NITROSTAT) 0.4 MG SUBL Place 1 Tab under tongue as needed for Chest Pain. 25 Tab 1   • aspirin 81 MG tablet Take 81 mg by mouth every day.       No current facility-administered medications on file prior to visit.            Signed by: Randall Farley M.D.  "

## 2018-06-15 DIAGNOSIS — R91.8 LUNG MASS: ICD-10-CM

## 2018-07-31 ENCOUNTER — APPOINTMENT (RX ONLY)
Dept: URBAN - METROPOLITAN AREA CLINIC 4 | Facility: CLINIC | Age: 83
Setting detail: DERMATOLOGY
End: 2018-07-31

## 2018-07-31 DIAGNOSIS — L82.0 INFLAMED SEBORRHEIC KERATOSIS: ICD-10-CM

## 2018-07-31 PROBLEM — C44.722 SQUAMOUS CELL CARCINOMA OF SKIN OF RIGHT LOWER LIMB, INCLUDING HIP: Status: ACTIVE | Noted: 2018-07-31

## 2018-07-31 PROCEDURE — ? LIQUID NITROGEN (COSMETIC)

## 2018-07-31 PROCEDURE — ? OBSERVATION

## 2018-07-31 PROCEDURE — 99212 OFFICE O/P EST SF 10 MIN: CPT

## 2018-07-31 ASSESSMENT — LOCATION DETAILED DESCRIPTION DERM
LOCATION DETAILED: RIGHT CENTRAL LATERAL NECK
LOCATION DETAILED: RIGHT INFERIOR LATERAL NECK

## 2018-07-31 ASSESSMENT — LOCATION SIMPLE DESCRIPTION DERM: LOCATION SIMPLE: NECK

## 2018-07-31 ASSESSMENT — LOCATION ZONE DERM: LOCATION ZONE: NECK

## 2018-07-31 NOTE — PROCEDURE: LIQUID NITROGEN (COSMETIC)
Price (Use Numbers Only, No Special Characters Or $): 0
Consent: The patient's consent was obtained including but not limited to risks of crusting, scabbing, blistering, scarring, darker or lighter pigmentary change, recurrence, incomplete removal and infection. The patient understands that the procedure is cosmetic in nature and is not covered by insurance.
Detail Level: Detailed
Render Post-Care Instructions In Note?: no
Post-Care Instructions: I reviewed with the patient in detail post-care instructions. Patient is to wear sunprotection, and avoid picking at any of the treated lesions. Pt may apply Vaseline to crusted or scabbing areas.

## 2018-08-28 ENCOUNTER — TELEPHONE (OUTPATIENT)
Dept: INTERNAL MEDICINE | Facility: MEDICAL CENTER | Age: 83
End: 2018-08-28

## 2018-08-28 NOTE — TELEPHONE ENCOUNTER
He should see Dr. Wood first but if unable to be seen, he can be seen by any of the doctors here in clinic

## 2018-08-28 NOTE — TELEPHONE ENCOUNTER
1. Caller Name: Pt                      Call Back Number: [phone] (home)     2. Message: Patient called and left a message stating he needed a call back.    3. Patient approves office to leave a detailed voicemail/MyChart message: N\A    I called patient back today and was able to get a hold of him. He let me know he has been having trouble with a cough now for 3 weeks and is asking if he should be seen here or with Dr. Wood? I told him I would put the message in but that I would recommend seeing Dr. Wood to see if it's not pulmonary stuff. He said he would wait to see if he can be placed anywhere on [facility]'s schedule first then he would check with Dr. Wood

## 2018-08-28 NOTE — TELEPHONE ENCOUNTER
Spoke to patient and let him know that he should try seeing Dr. Wood and if he can not get in to see her then he can make an appt with out office with any available doctor. He states he will be calling to see what they have available

## 2018-09-05 ENCOUNTER — OFFICE VISIT (OUTPATIENT)
Dept: INTERNAL MEDICINE | Facility: MEDICAL CENTER | Age: 83
End: 2018-09-05
Payer: MEDICARE

## 2018-09-05 VITALS
SYSTOLIC BLOOD PRESSURE: 110 MMHG | WEIGHT: 243.6 LBS | HEIGHT: 71 IN | BODY MASS INDEX: 34.1 KG/M2 | TEMPERATURE: 97.4 F | HEART RATE: 64 BPM | DIASTOLIC BLOOD PRESSURE: 60 MMHG | OXYGEN SATURATION: 95 %

## 2018-09-05 DIAGNOSIS — R05.9 COUGH: ICD-10-CM

## 2018-09-05 PROCEDURE — 99214 OFFICE O/P EST MOD 30 MIN: CPT | Performed by: INTERNAL MEDICINE

## 2018-09-05 RX ORDER — FLUTICASONE PROPIONATE 50 MCG
1 SPRAY, SUSPENSION (ML) NASAL 2 TIMES DAILY
Qty: 2 BOTTLE | Refills: 3 | Status: SHIPPED | OUTPATIENT
Start: 2018-09-05 | End: 2019-05-29

## 2018-09-05 RX ORDER — DOXYCYCLINE HYCLATE 100 MG/1
CAPSULE ORAL
Qty: 20 CAP | Refills: 0 | Status: SHIPPED | OUTPATIENT
Start: 2018-09-05 | End: 2019-05-29 | Stop reason: SDUPTHER

## 2018-09-05 ASSESSMENT — PATIENT HEALTH QUESTIONNAIRE - PHQ9: CLINICAL INTERPRETATION OF PHQ2 SCORE: 0

## 2018-09-05 NOTE — PROGRESS NOTES
Established Patient    Mr. Ramirez a 87 y.o. male who presents today with:  CC: Follow-Up (hyperglycemia) and Cough (x4 months)        Assessment and Plan    There are no diagnoses linked to this encounter.    Current Outpatient Prescriptions   Medication Sig Dispense Refill   • cephALEXin (KEFLEX) 500 MG Cap cephalexin 500 mg capsule     • losartan (COZAAR) 100 MG Tab losartan 100 mg tablet     • omeprazole (PRILOSEC) 20 MG delayed-release capsule omeprazole 20 mg capsule,delayed release     • lovastatin (MEVACOR) 40 MG tablet lovastatin 40 mg tablet     • metoprolol (LOPRESSOR) 25 MG Tab metoprolol tartrate 25 mg tablet     • lovastatin (MEVACOR) 40 MG tablet One tablet at hs 180 Tab 2   • metoprolol (LOPRESSOR) 25 MG Tab Take 1 Tab by mouth 2 times a day. 180 Tab 3   • nitroglycerin (NITROSTAT) 0.4 MG SUBL Place 1 Tab under tongue as needed for Chest Pain. 25 Tab 1   • aspirin 81 MG tablet Take 81 mg by mouth every day.     • doxycycline (VIBRAMYCIN) 100 MG Cap doxycycline hyclate 100 mg capsule     • penicillin v potassium (VEETID) 500 MG Tab penicillin V potassium 500 mg tablet       No current facility-administered medications for this visit.          followup No Follow-up on file.    This note was created using voice recognition software (Dragon). The accuracy of the dictation is limited by the abilities of the software. I have reviewed the note prior to signing, however some errors in grammar and context are still possible. If you have any questions related to this note please do not hesitate to contact our office.   _______________________________________________________    HPI:   His wife is here. Describes his cough. Wife states it sounds chesty. It is worse when he spits up blood. He coughs and sneezes at the same time. He loses his breath. Mostly in the morning. He'll wake and start coughing. He coughs so hard he feels like he has to vomit.  He does not have much blood when he coughs. He only coughs  "blood a small amount after a long period of coughing. When he is not coughing his wife states he is short of breath. 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 Doesn't have SOB with this, until he works with the feed and the hay     has a past medical history of Anemia (8/1/2016); Arthritis; Back pain; CAD (coronary artery disease); Cancer (HCC); Chronic kidney disease (CKD), stage II (mild) (8/1/2016); Coronary heart disease; DJD (degenerative joint disease) (8/1/2016); Dyslipidemia (8/1/2016); GERD (gastroesophageal reflux disease) (8/1/2016); Portuguese measles; Hematochezia (8/1/2016); High cholesterol; Hyperglycemia (1/6/2010); Hyperlipidemia (5/24/2011); Hypertension; Impaired fasting glucose (8/1/2016); Left knee pain (8/1/2016); Myocardial infarction (HCC) (2 yrs ago); Nasal drainage; Obesity; Osteoarthritis of knees, bilateral (8/1/2016); Pulmonary embolism (HCC); Rheumatic fever; Scarlet fever; Smallpox; and Snoring.     reports that he quit smoking about 45 years ago. His smoking use included Cigarettes. He has a 70.00 pack-year smoking history. He has never used smokeless tobacco. He reports that he drinks alcohol. He reports that he does not use drugs.      ROS: Pertinent positives as stated in HPI, all others reviewed as negative:  {ROS:15879}      Physical Exam  /60   Pulse 64   Temp 36.3 °C (97.4 °F)   Ht 1.803 m (5' 11\")   Wt 110.5 kg (243 lb 9.6 oz)   SpO2 95%   BMI 33.98 kg/m²   {DPPE:23647}        Current Outpatient Prescriptions on File Prior to Visit   Medication Sig Dispense Refill   • cephALEXin (KEFLEX) 500 MG Cap cephalexin 500 mg capsule     • losartan (COZAAR) 100 MG Tab losartan 100 mg tablet     • omeprazole (PRILOSEC) 20 MG delayed-release capsule omeprazole 20 mg capsule,delayed release     • lovastatin (MEVACOR) 40 MG tablet lovastatin 40 mg tablet     • metoprolol (LOPRESSOR) 25 MG Tab metoprolol tartrate 25 mg tablet     • lovastatin (MEVACOR) 40 MG tablet One tablet " at hs 180 Tab 2   • metoprolol (LOPRESSOR) 25 MG Tab Take 1 Tab by mouth 2 times a day. 180 Tab 3   • nitroglycerin (NITROSTAT) 0.4 MG SUBL Place 1 Tab under tongue as needed for Chest Pain. 25 Tab 1   • aspirin 81 MG tablet Take 81 mg by mouth every day.     • doxycycline (VIBRAMYCIN) 100 MG Cap doxycycline hyclate 100 mg capsule     • penicillin v potassium (VEETID) 500 MG Tab penicillin V potassium 500 mg tablet       No current facility-administered medications on file prior to visit.            Signed by: Randall Farley M.D.

## 2018-09-07 NOTE — PROGRESS NOTES
Established Patient    Mr. Ramirez a 87 y.o. male who presents today with:  CC: Follow-Up (hyperglycemia) and Cough (x4 months)        Assessment and Plan    1.  Cough he has a history of intermittent coughs.  Sometimes responsive to treatment with doxycycline.  Currently no evidence of gastroesophageal reflux or esophagitis.  Could be secondary to postnasal drip but I suspect that he may have allergies.  It is noticeable that his cough is worse after feeding his horses and being exposed to feed and headache.  He may actually have asthma that is not being addressed.  He does occasionally have shortness of breath and he could have cough variant asthma.  I think it reasonable to treat him with Nasonex for now and order pulmonary function tests.  Because he may have some symptoms suggestive of sinusitis may be reasonable for him to use doxycycline again.    Current Outpatient Prescriptions   Medication Sig Dispense Refill   • cephALEXin (KEFLEX) 500 MG Cap cephalexin 500 mg capsule     • losartan (COZAAR) 100 MG Tab losartan 100 mg tablet     • omeprazole (PRILOSEC) 20 MG delayed-release capsule omeprazole 20 mg capsule,delayed release     • lovastatin (MEVACOR) 40 MG tablet lovastatin 40 mg tablet     • metoprolol (LOPRESSOR) 25 MG Tab metoprolol tartrate 25 mg tablet     • lovastatin (MEVACOR) 40 MG tablet One tablet at hs 180 Tab 2   • metoprolol (LOPRESSOR) 25 MG Tab Take 1 Tab by mouth 2 times a day. 180 Tab 3   • nitroglycerin (NITROSTAT) 0.4 MG SUBL Place 1 Tab under tongue as needed for Chest Pain. 25 Tab 1   • aspirin 81 MG tablet Take 81 mg by mouth every day.     • doxycycline (VIBRAMYCIN) 100 MG Cap doxycycline hyclate 100 mg capsule     • penicillin v potassium (VEETID) 500 MG Tab penicillin V potassium 500 mg tablet       No current facility-administered medications for this visit.          followup No Follow-up on file.    This note was created using voice recognition software (Dragon). The accuracy of  the dictation is limited by the abilities of the software. I have reviewed the note prior to signing, however some errors in grammar and context are still possible. If you have any questions related to this note please do not hesitate to contact our office.   _______________________________________________________    HPI:   His wife is here. Describes his cough. Wife states it sounds chesty. It is worse when he spits up blood. He coughs and sneezes at the same time. He loses his breath. Mostly in the morning. He'll wake and start coughing. He coughs so hard he feels like he has to vomit.  He does not have much blood when he coughs. He only coughs blood a small amount after a long period of coughing. When he is not coughing his wife states he is short of breath. 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 Doesn't have SOB with this, until he works with the feed and the hay.     has a past medical history of Anemia (8/1/2016); Arthritis; Back pain; CAD (coronary artery disease); Cancer (HCC); Chronic kidney disease (CKD), stage II (mild) (8/1/2016); Coronary heart disease; DJD (degenerative joint disease) (8/1/2016); Dyslipidemia (8/1/2016); GERD (gastroesophageal reflux disease) (8/1/2016); Maltese measles; Hematochezia (8/1/2016); High cholesterol; Hyperglycemia (1/6/2010); Hyperlipidemia (5/24/2011); Hypertension; Impaired fasting glucose (8/1/2016); Left knee pain (8/1/2016); Myocardial infarction (HCC) (2 yrs ago); Nasal drainage; Obesity; Osteoarthritis of knees, bilateral (8/1/2016); Pulmonary embolism (HCC); Rheumatic fever; Scarlet fever; Smallpox; and Snoring.     reports that he quit smoking about 45 years ago. His smoking use included Cigarettes. He has a 70.00 pack-year smoking history. He has never used smokeless tobacco. He reports that he drinks alcohol. He reports that he does not use drugs.      ROS: Pertinent positives as stated in HPI, all others reviewed as negative:  Mild sinus congestion  "and pain bilaterally.    Physical Exam  /60   Pulse 64   Temp 36.3 °C (97.4 °F)   Ht 1.803 m (5' 11\")   Wt 110.5 kg (243 lb 9.6 oz)   SpO2 95%   BMI 33.98 kg/m²   Constitutional:  oriented to person, place, and time. No distress.   Eyes: Pupils are equal, round, and reactive to light. No scleral icterus.   Neck: Neck supple. No thyromegaly present.   Cardiovascular: Normal rate, regular rhythm and normal heart sounds.  Exam reveals no gallop and no friction rub.    No murmur heard.  Pulmonary/Chest: Breath sounds normal. Chest wall is not dull to percussion.   Musculoskeletal:   no edema.   Lymphadenopathy: no cervical adenopathy  Neurological: alert and oriented to person, place, and time.   Skin: No cyanosis. Nails show no clubbing.        Current Outpatient Prescriptions on File Prior to Visit   Medication Sig Dispense Refill   • cephALEXin (KEFLEX) 500 MG Cap cephalexin 500 mg capsule     • losartan (COZAAR) 100 MG Tab losartan 100 mg tablet     • omeprazole (PRILOSEC) 20 MG delayed-release capsule omeprazole 20 mg capsule,delayed release     • lovastatin (MEVACOR) 40 MG tablet lovastatin 40 mg tablet     • metoprolol (LOPRESSOR) 25 MG Tab metoprolol tartrate 25 mg tablet     • lovastatin (MEVACOR) 40 MG tablet One tablet at hs 180 Tab 2   • metoprolol (LOPRESSOR) 25 MG Tab Take 1 Tab by mouth 2 times a day. 180 Tab 3   • nitroglycerin (NITROSTAT) 0.4 MG SUBL Place 1 Tab under tongue as needed for Chest Pain. 25 Tab 1   • aspirin 81 MG tablet Take 81 mg by mouth every day.     • doxycycline (VIBRAMYCIN) 100 MG Cap doxycycline hyclate 100 mg capsule     • penicillin v potassium (VEETID) 500 MG Tab penicillin V potassium 500 mg tablet       No current facility-administered medications on file prior to visit.            Signed by: Randall Farley M.D.  "

## 2018-09-25 ENCOUNTER — TELEPHONE (OUTPATIENT)
Dept: INTERNAL MEDICINE | Facility: MEDICAL CENTER | Age: 83
End: 2018-09-25

## 2018-09-25 DIAGNOSIS — H49.9 OCULAR PALSY OF LEFT EYE: ICD-10-CM

## 2018-09-25 DIAGNOSIS — H53.2 DOUBLE VISION: ICD-10-CM

## 2018-09-25 NOTE — TELEPHONE ENCOUNTER
1. Caller Name: Pt                      Call Back Number: [phone] (home)     2. Message: Patient called yesterday stating he went to his eye doctor and was told it looked like he might have had a mini heart attack or a mini stroke and would like for Dr. Farley to call him in regards to this matter as well to update on cough and needing maybe a nasal spray.    3. Patient approves office to leave a detailed voicemail/MyChart message: N\A

## 2018-09-26 NOTE — TELEPHONE ENCOUNTER
Symptoms of left eye double vision since Friday of last week.  No numbness, weakness, change in speech, LOC, dizziness, difficulty swallowing.   Symptoms the same since Friday  Seen by his opthalmology (Dr. Vargas Lay [phone] in [place]). Told he may have a mild stroke affecting his ocular muscles.   No change since Friday.    Will order CT of the head. Patient will increase his ASA to 325mg coated and buffered    Please check with patient tomorrow to confirm CT scheduled.   Set patient up with appointment with resident for double vision left eye.  May require carotid doppler

## 2018-09-26 NOTE — TELEPHONE ENCOUNTER
Called patient and spoke with wife. Asked if it was okay to schedule for patient which ever date and time would be fine. I called imaging scheduling and got him scheduled for tomorrow at the [place] location at 11:45AM. Called [facility] back but she did not answer. I left a voicemail notifying of time and date with address of location

## 2018-09-27 ENCOUNTER — APPOINTMENT (OUTPATIENT)
Dept: RADIOLOGY | Facility: MEDICAL CENTER | Age: 83
End: 2018-09-27
Attending: INTERNAL MEDICINE
Payer: MEDICARE

## 2018-10-09 ENCOUNTER — OFFICE VISIT (OUTPATIENT)
Dept: INTERNAL MEDICINE | Facility: MEDICAL CENTER | Age: 83
End: 2018-10-09
Payer: MEDICARE

## 2018-10-09 VITALS
SYSTOLIC BLOOD PRESSURE: 132 MMHG | DIASTOLIC BLOOD PRESSURE: 68 MMHG | WEIGHT: 240.5 LBS | BODY MASS INDEX: 33.67 KG/M2 | TEMPERATURE: 97.4 F | OXYGEN SATURATION: 96 % | HEIGHT: 71 IN | HEART RATE: 60 BPM

## 2018-10-09 DIAGNOSIS — R04.2 HEMOPTYSIS: ICD-10-CM

## 2018-10-09 DIAGNOSIS — H53.2 DIPLOPIA: ICD-10-CM

## 2018-10-09 DIAGNOSIS — R05.9 COUGH: ICD-10-CM

## 2018-10-09 PROCEDURE — 99214 OFFICE O/P EST MOD 30 MIN: CPT | Performed by: INTERNAL MEDICINE

## 2018-10-09 NOTE — PROGRESS NOTES
Established Patient    Mr. Ramirez a 87 y.o. male who presents today with:  CC: Cough (Follow up.Nasal spray. Cough came back and now coughing up blood.) and Possible Stroke (Mini. Per Eye dr, he had a mini stroke. Follow up.)        Assessment and Plan    1 hemoptysis and cough-patient has long-standing history of hemoptysis and cough. Although he reports improvement with doxycycline CT scans and ENT evaluations have been essentially negative. He has not responded to intranasal steroids. 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 QuantiFERON goal was negative. His hemoptysis is precipitated by coughing spasms.  He is very frustrated with his hemoptysis as it is significantly affecting his quality of life. I have added low-dose of his hemoptysis that he brought in to clinic into his medical record. He is requesting to be referred to [facility]. I suggested that he be seen at a cough clinic and it appears that there is one in the [facility] system and I will refer him there for further evaluation..    2. Diplopia-improving. CT scan negative for TIA or stroke. Patient will continue to follow-up with ophthalmology. Will request medical records to evaluate diagnostic workup.    Current Outpatient Prescriptions   Medication Sig Dispense Refill   • lovastatin (MEVACOR) 40 MG tablet One tablet at hs 180 Tab 2   • metoprolol (LOPRESSOR) 25 MG Tab Take 1 Tab by mouth 2 times a day. 180 Tab 3   • nitroglycerin (NITROSTAT) 0.4 MG SUBL Place 1 Tab under tongue as needed for Chest Pain. 25 Tab 1   • doxycycline (VIBRAMYCIN) 100 MG Cap doxycycline hyclate 100 mg capsule 20 Cap 0   • fluticasone (FLONASE) 50 MCG/ACT nasal spray Spray 1 Spray in nose 2 times a day. 2 Bottle 3   • cephALEXin (KEFLEX) 500 MG Cap cephalexin 500 mg capsule     • omeprazole (PRILOSEC) 20 MG delayed-release capsule omeprazole 20 mg capsule,delayed release     • penicillin v potassium (VEETID) 500 MG Tab  penicillin V potassium 500 mg tablet     • lovastatin (MEVACOR) 40 MG tablet lovastatin 40 mg tablet     • metoprolol (LOPRESSOR) 25 MG Tab metoprolol tartrate 25 mg tablet     • aspirin 81 MG tablet Take 81 mg by mouth every day.       No current facility-administered medications for this visit.          followup No Follow-up on file.    This note was created using voice recognition software (Dragon). The accuracy of the dictation is limited by the abilities of the software. I have reviewed the note prior to signing, however some errors in grammar and context are still possible. If you have any questions related to this note please do not hesitate to contact our office.   _______________________________________________________    HPI:   He is here for follow-up of hemoptysis. It is becoming more frequent and lasting longer. He is very frustrated with this. He has been evaluated by both pulmonology and ENT. He has been treated for sinusitis, gastroesophageal reflux disease. Ace inhibitors have been discontinued without effect. At the last visit I prescribed nasal steroids for possible postnasal drip related to allergies, however Flonase cause more nosebleeds according to patient.    Still coughing up blood   multiple times per day. Symptoms currently rated them 1 year. Usually small amounts mixed in with mucus. Also had nose bleeds x 4. Nosebleeds are spontaneous bleeds. Stops spontaneously. When he coughs he can start bleeding.  Usually he starts with coughing spasms. After several minutes he will have a small amount of blood with the cough. The coughing spasms can last anywhere from 1 minute to 15 minutes. Eventually the bleeding stops. Occasionally he coughs up small blood clots. He does not have hemoptysis without coughing. He denies any shortness of breath, chest pain. No fevers no night sweats.  Chest x-ray December 2017 with ill-defined right upper lobe opacity with central lucency will correlate to a  "residual cavitary mass or scarring seen on prior PET CT. Sinus CT scan in the past unremarkable. MRI chest 2017 with 3.4 cm right upper lobe speculated mass PET scan in June 2017 with cavitary 3.8 x 2.9 right upper lobe mass had not changed since 2005. Pulmonologist discuss his case with the tumor Board and unlikely that mass was malignant. QuantiFERON negative in June 2018    He recently called because of acute changes in vision and double vision in the left eye. His ophthalmologist thought he may have had a mini stroke. CT scan of the head was not consistent with that. In the interim I increased his aspirin to 325. His vision has improved significantly.   has a past medical history of Anemia (8/1/2016); Arthritis; Back pain; CAD (coronary artery disease); Cancer (HCC); Chronic kidney disease (CKD), stage II (mild) (8/1/2016); Coronary heart disease; DJD (degenerative joint disease) (8/1/2016); Dyslipidemia (8/1/2016); GERD (gastroesophageal reflux disease) (8/1/2016); Cymro measles; Hematochezia (8/1/2016); High cholesterol; Hyperglycemia (1/6/2010); Hyperlipidemia (5/24/2011); Hypertension; Impaired fasting glucose (8/1/2016); Left knee pain (8/1/2016); Myocardial infarction (HCC) (2 yrs ago); Nasal drainage; Obesity; Osteoarthritis of knees, bilateral (8/1/2016); Pulmonary embolism (HCC); Rheumatic fever; Scarlet fever; Smallpox; and Snoring.     reports that he quit smoking about 45 years ago. His smoking use included Cigarettes. He has a 70.00 pack-year smoking history. He has never used smokeless tobacco. He reports that he drinks alcohol. He reports that he does not use drugs.      ROS: Pertinent positives as stated in HPI, all others reviewed as negative:        Physical Exam  /68 (BP Location: Left arm, Patient Position: Sitting, BP Cuff Size: Large adult)   Pulse 60   Temp 36.3 °C (97.4 °F) (Temporal)   Ht 1.803 m (5' 11\")   Wt 109.1 kg (240 lb 8 oz)   SpO2 96%   BMI 33.54 kg/m² "   Constitutional:  oriented to person, place, and time. No distress.   Cardiovascular: Normal rate, regular rhythm and normal heart sounds.  Exam reveals no gallop and no friction rub.    No murmur heard.  Pulmonary/Chest: Breath sounds normal. Chest wall is not dull to percussion.   Eyes: Extraocular movements intact bilaterally.    Current Outpatient Prescriptions on File Prior to Visit   Medication Sig Dispense Refill   • lovastatin (MEVACOR) 40 MG tablet One tablet at hs 180 Tab 2   • metoprolol (LOPRESSOR) 25 MG Tab Take 1 Tab by mouth 2 times a day. 180 Tab 3   • nitroglycerin (NITROSTAT) 0.4 MG SUBL Place 1 Tab under tongue as needed for Chest Pain. 25 Tab 1   • doxycycline (VIBRAMYCIN) 100 MG Cap doxycycline hyclate 100 mg capsule 20 Cap 0   • fluticasone (FLONASE) 50 MCG/ACT nasal spray Spray 1 Spray in nose 2 times a day. 2 Bottle 3   • cephALEXin (KEFLEX) 500 MG Cap cephalexin 500 mg capsule     • omeprazole (PRILOSEC) 20 MG delayed-release capsule omeprazole 20 mg capsule,delayed release     • penicillin v potassium (VEETID) 500 MG Tab penicillin V potassium 500 mg tablet     • lovastatin (MEVACOR) 40 MG tablet lovastatin 40 mg tablet     • metoprolol (LOPRESSOR) 25 MG Tab metoprolol tartrate 25 mg tablet     • aspirin 81 MG tablet Take 81 mg by mouth every day.       No current facility-administered medications on file prior to visit.            Signed by: Randall Farley M.D.

## 2018-10-11 ENCOUNTER — TELEPHONE (OUTPATIENT)
Dept: INTERNAL MEDICINE | Facility: MEDICAL CENTER | Age: 83
End: 2018-10-11

## 2018-10-11 NOTE — TELEPHONE ENCOUNTER
[facility] called stating they would like to do the US for patient but the dx code placed on order wasn't a covered code under medicare. Dr. Farley stepped out of office so I had to call and ask what other code would be covered. He let me know to try TIA and I let the schedulers know and they were able to run it through no problem

## 2018-11-29 ENCOUNTER — APPOINTMENT (OUTPATIENT)
Dept: RADIOLOGY | Facility: IMAGING CENTER | Age: 83
End: 2018-11-29
Attending: INTERNAL MEDICINE
Payer: MEDICARE

## 2018-11-29 ENCOUNTER — APPOINTMENT (OUTPATIENT)
Dept: PULMONOLOGY | Facility: HOSPICE | Age: 83
End: 2018-11-29
Payer: MEDICARE

## 2018-12-04 ENCOUNTER — OFFICE VISIT (OUTPATIENT)
Dept: PULMONOLOGY | Facility: HOSPICE | Age: 83
End: 2018-12-04
Payer: MEDICARE

## 2018-12-04 ENCOUNTER — APPOINTMENT (OUTPATIENT)
Dept: RADIOLOGY | Facility: IMAGING CENTER | Age: 83
End: 2018-12-04
Payer: MEDICARE

## 2018-12-04 VITALS
BODY MASS INDEX: 32.76 KG/M2 | OXYGEN SATURATION: 93 % | RESPIRATION RATE: 16 BRPM | DIASTOLIC BLOOD PRESSURE: 78 MMHG | HEIGHT: 71 IN | WEIGHT: 234 LBS | HEART RATE: 62 BPM | SYSTOLIC BLOOD PRESSURE: 130 MMHG | TEMPERATURE: 97.2 F

## 2018-12-04 DIAGNOSIS — R04.2 HEMOPTYSIS: ICD-10-CM

## 2018-12-04 DIAGNOSIS — R91.8 LUNG MASS: ICD-10-CM

## 2018-12-04 DIAGNOSIS — R05.9 COUGH: ICD-10-CM

## 2018-12-04 PROCEDURE — 99214 OFFICE O/P EST MOD 30 MIN: CPT | Performed by: INTERNAL MEDICINE

## 2018-12-04 PROCEDURE — 71046 X-RAY EXAM CHEST 2 VIEWS: CPT | Mod: TC,FY | Performed by: INTERNAL MEDICINE

## 2018-12-04 RX ORDER — AZITHROMYCIN 250 MG/1
TABLET, FILM COATED ORAL
Qty: 6 TAB | Refills: 0 | Status: SHIPPED | OUTPATIENT
Start: 2018-12-04 | End: 2019-02-27

## 2018-12-04 RX ORDER — PREDNISONE 10 MG/1
TABLET ORAL
Qty: 18 TAB | Refills: 0 | Status: SHIPPED | OUTPATIENT
Start: 2018-12-04 | End: 2019-02-27

## 2018-12-04 NOTE — PROGRESS NOTES
"Chief Complaint   Patient presents with   • Follow-Up     6 month follow up with CXR.       HPI: This patient is an 88 y.o. Male who returns for lung mass. He has a remote smoking history, having quit in 1973. He was experiencing recurrent trace hemoptysis, had seen Dr. Nicole, ENT, with alleged negative ENT workup. His sinus CAT scan was unremarkable. He had a screening chest x-ray January 2017 showing a vague mass. He denies associated chest pain, shortness of breath, wheezing, fevers, chills, night sweats or weight loss.  He was told when he was released from the  after the [place] that he had \"a spot on my lung\". He has chronic kidney disease, consequently cannot have IV contrast. He consequently had MRI of the chest May 31, 2017 which showed a 3.4 cm right upper lobe spiculated mass in the lung. Subsequent PET scan June 28, 2017 showed cavitary, partially calcified 3.8 x 2.9 cm right upper lobe mass which appeared radiologically stable since 2005, with SUV uptake of 6.9. Mild honeycombing was noted within the lower lobes with mild FDG uptake, SUV 2.9-3.8. He was presented at [facility] and it was felt given the stability of the lung mass from 2005, and minimal FDG uptake on PET, that malignancy was very unlikely.  He has declined surgical resection or any further diagnostic workup. CXR today shows stable right upper lobe mass.  Quantiferon TB Gold is negative.  Since his last visit he was evaluated by [facility] pulmonary, who concurred that his recurrent hemoptysis was likely from his lung mass.  He remains uninterested in any invasive treatment.  He denies hemoptysis at this time however has been suffering from productive cough over the past 2 months.      Past Medical History:   Diagnosis Date   • Anemia 8/1/2016   • Arthritis    • Back pain    • CAD (coronary artery disease)    • Cancer (HCC)     skin   • Chronic kidney disease (CKD), stage II (mild) 8/1/2016   • Coronary heart disease    • " DJD (degenerative joint disease) 8/1/2016   • Dyslipidemia 8/1/2016   • GERD (gastroesophageal reflux disease) 8/1/2016   • Faroese measles    • Hematochezia 8/1/2016   • High cholesterol    • Hyperglycemia 1/6/2010   • Hyperlipidemia 5/24/2011   • Hypertension    • Impaired fasting glucose 8/1/2016   • Left knee pain 8/1/2016   • Myocardial infarction (HCC) 2 yrs ago    2005   • Nasal drainage    • Obesity    • Osteoarthritis of knees, bilateral 8/1/2016   • Pulmonary embolism (HCC)    • Rheumatic fever    • Scarlet fever    • Smallpox    • Snoring        Social History     Social History   • Marital status:      Spouse name: N/A   • Number of children: N/A   • Years of education: N/A     Occupational History   • Not on file.     Social History Main Topics   • Smoking status: Former Smoker     Packs/day: 2.00     Years: 35.00     Types: Cigarettes     Quit date: 4/17/1973   • Smokeless tobacco: Never Used   • Alcohol use Yes      Comment: cocktail every night   • Drug use: No   • Sexual activity: Not on file     Other Topics Concern   • Not on file     Social History Narrative   • No narrative on file       Family History   Problem Relation Age of Onset   • No Known Problems Mother    • No Known Problems Father        Current Outpatient Prescriptions on File Prior to Visit   Medication Sig Dispense Refill   • aspirin 81 MG tablet Take 81 mg by mouth every day.     • doxycycline (VIBRAMYCIN) 100 MG Cap doxycycline hyclate 100 mg capsule 20 Cap 0   • fluticasone (FLONASE) 50 MCG/ACT nasal spray Spray 1 Spray in nose 2 times a day. 2 Bottle 3   • cephALEXin (KEFLEX) 500 MG Cap cephalexin 500 mg capsule     • omeprazole (PRILOSEC) 20 MG delayed-release capsule omeprazole 20 mg capsule,delayed release     • penicillin v potassium (VEETID) 500 MG Tab penicillin V potassium 500 mg tablet     • lovastatin (MEVACOR) 40 MG tablet lovastatin 40 mg tablet     • metoprolol (LOPRESSOR) 25 MG Tab metoprolol tartrate 25 mg  "tablet     • lovastatin (MEVACOR) 40 MG tablet One tablet at hs (Patient taking differently: Take 40 mg by mouth every day. One tablet at hs) 180 Tab 2   • metoprolol (LOPRESSOR) 25 MG Tab Take 1 Tab by mouth 2 times a day. 180 Tab 3   • nitroglycerin (NITROSTAT) 0.4 MG SUBL Place 1 Tab under tongue as needed for Chest Pain. 25 Tab 1     No current facility-administered medications on file prior to visit.        Allergies: Atorvastatin and Simvastatin    ROS:   Constitutional: Denies fevers, chills, night sweats, fatigue or weight loss  Eyes: Denies vision loss, pain, drainage, double vision  Ears, Nose, Throat: Denies earache, difficulty hearing, tinnitus, nasal congestion, hoarseness  Cardiovascular: Denies chest pain, tightness, palpitations, orthopnea or edema  Respiratory: Denies shortness of breath, +cough, denies wheezing, hemoptysis  Sleep: Denies daytime sleepiness, snoring, apneas, insomnia, morning headaches  GI: Denies heartburn, dysphagia, nausea, abdominal pain, diarrhea or constipation  : Denies frequent urination, hematuria, discharge or painful urination  Musculoskeletal: Denies back pain, painful joints, sore muscles  Neurological: Denies weakness or headaches  Skin: No rashes    Blood pressure 130/78, pulse 62, temperature 36.2 °C (97.2 °F), resp. rate 16, height 1.803 m (5' 11\"), weight 106.1 kg (234 lb), SpO2 93 %.    Physical Exam:  Appearance: Well-nourished, well-developed, in no acute distress  HEENT: Normocephalic, atraumatic, white sclera, PERRLA, oropharynx clear  Neck: No adenopathy or masses  Respiratory: no intercostal retractions or accessory muscle use  Lungs auscultation: Basilar dry rales  Cardiovascular: Regular rate rhythm. No murmurs, rubs or gallops.  No LE edema  Abdomen: soft, nondistended  Gait: Normal  Digits: No clubbing, cyanosis  Motor: No focal deficits  Orientation: Oriented to time, person and place    Diagnosis:  1. Cough  predniSONE (DELTASONE) 10 MG Tab    " azithromycin (ZITHROMAX) 250 MG Tab   2.      Lung mass  3.      Hemoptysis      Plan:  The patient's lung mass has been stable for over a decade, and is highly unlikely to be malignant.  He does however experience periodic hemoptysis secondary to his mass.  Symptoms typically resolve with antibiotics.  [facility] pulmonary concurred with his evaluation.  He is not interested in any invasive procedures for treatment.  Recommend azithromycin and prednisone taper for the following week; if hemoptysis becomes severe then surgical treatment may need to be reconsidered.  Return in about 6 months (around 6/4/2019).

## 2019-01-15 ENCOUNTER — OFFICE VISIT (OUTPATIENT)
Dept: CARDIOLOGY | Facility: MEDICAL CENTER | Age: 84
End: 2019-01-15
Payer: MEDICARE

## 2019-01-15 VITALS
BODY MASS INDEX: 34.02 KG/M2 | SYSTOLIC BLOOD PRESSURE: 132 MMHG | HEART RATE: 60 BPM | DIASTOLIC BLOOD PRESSURE: 70 MMHG | HEIGHT: 71 IN | WEIGHT: 243 LBS | OXYGEN SATURATION: 97 %

## 2019-01-15 DIAGNOSIS — I25.10 CORONARY ARTERY DISEASE INVOLVING NATIVE CORONARY ARTERY OF NATIVE HEART WITHOUT ANGINA PECTORIS: ICD-10-CM

## 2019-01-15 DIAGNOSIS — N18.30 CKD (CHRONIC KIDNEY DISEASE), STAGE III (HCC): ICD-10-CM

## 2019-01-15 DIAGNOSIS — Z95.5 STENTED CORONARY ARTERY: ICD-10-CM

## 2019-01-15 DIAGNOSIS — I10 ESSENTIAL HYPERTENSION: ICD-10-CM

## 2019-01-15 DIAGNOSIS — E78.2 MIXED HYPERLIPIDEMIA: Chronic | ICD-10-CM

## 2019-01-15 PROCEDURE — 99213 OFFICE O/P EST LOW 20 MIN: CPT | Performed by: INTERNAL MEDICINE

## 2019-01-15 RX ORDER — PREDNISOLONE ACETATE 10 MG/ML
1 SUSPENSION/ DROPS OPHTHALMIC 4 TIMES DAILY
Refills: 2 | Status: ON HOLD | COMMUNITY
Start: 2018-12-28 | End: 2021-07-09

## 2019-01-15 RX ORDER — OFLOXACIN 3 MG/ML
0.3 SOLUTION/ DROPS OPHTHALMIC 4 TIMES DAILY
Refills: 2 | Status: ON HOLD | COMMUNITY
Start: 2018-12-28 | End: 2021-07-09

## 2019-01-15 RX ORDER — DOXYCYCLINE HYCLATE 100 MG
100 TABLET ORAL
Refills: 3 | COMMUNITY
Start: 2018-11-05 | End: 2019-02-27

## 2019-01-15 ASSESSMENT — ENCOUNTER SYMPTOMS
NEUROLOGICAL NEGATIVE: 1
BLURRED VISION: 1
PALPITATIONS: 0
NAUSEA: 0
VOMITING: 0
SHORTNESS OF BREATH: 1
HEMOPTYSIS: 1
WEAKNESS: 0
BACK PAIN: 1

## 2019-01-15 NOTE — PROGRESS NOTES
Chief Complaint   Patient presents with   • Coronary Artery Disease       Subjective:   Kobe Cyr is a 87 y.o. male who presents today for follow-up of coronary disease and remote stenting of his LAD in 2005.  He has had no angina.  His last stress test was a year ago.  [facility] and [facility] to get me a copy.  He has known stage III kidney disease and is seeing nephrology previously but did not want to follow-up.  He had recent cataract surgery last week.  No other interval problems    Past Medical History:   Diagnosis Date   • Anemia 8/1/2016   • Arthritis    • Back pain    • CAD (coronary artery disease)    • Cancer (HCC)     skin   • Chronic kidney disease (CKD), stage II (mild) 8/1/2016   • Coronary heart disease    • DJD (degenerative joint disease) 8/1/2016   • Dyslipidemia 8/1/2016   • GERD (gastroesophageal reflux disease) 8/1/2016   • Serbian measles    • Hematochezia 8/1/2016   • High cholesterol    • Hyperglycemia 1/6/2010   • Hyperlipidemia 5/24/2011   • Hypertension    • Impaired fasting glucose 8/1/2016   • Left knee pain 8/1/2016   • Myocardial infarction (HCC) 2 yrs ago    2005   • Nasal drainage    • Obesity    • Osteoarthritis of knees, bilateral 8/1/2016   • Pulmonary embolism (HCC)    • Rheumatic fever    • Scarlet fever    • Smallpox    • Snoring      Past Surgical History:   Procedure Laterality Date   • KNEE UNICOMPARTMENTAL  10/13/2009    Performed by SAM LINDER at SURGERY [facility] ORS   • ARTHROSCOPY, KNEE     • CORONARY STENT PROCEDURE LAD     • OTHER ORTHOPEDIC SURGERY      right shoulder rotator cuff surgery x2     Family History   Problem Relation Age of Onset   • No Known Problems Mother    • No Known Problems Father      Social History     Social History   • Marital status:      Spouse name: N/A   • Number of children: N/A   • Years of education: N/A     Occupational History   • Not on file.     Social History Main Topics   • Smoking  status: Former Smoker     Packs/day: 2.00     Years: 35.00     Types: Cigarettes     Quit date: 4/17/1973   • Smokeless tobacco: Never Used   • Alcohol use Yes      Comment: cocktail every night   • Drug use: No   • Sexual activity: Not on file     Other Topics Concern   • Not on file     Social History Narrative   • No narrative on file     Allergies   Allergen Reactions   • Atorvastatin Myalgia   • Simvastatin      ADVERSE REACTION: MYALGIA.     Outpatient Encounter Prescriptions as of 1/15/2019   Medication Sig Dispense Refill   • doxycycline (VIBRAMYCIN) 100 MG Tab Take 100 mg by mouth 1 time daily as needed.  3   • ofloxacin (OCUFLOX) 0.3 % Solution Place 0.3 Drops in both eyes 4 times a day.  2   • prednisoLONE acetate (PRED FORTE) 1 % Suspension Place 1 Drop in both eyes 4 times a day.  2   • predniSONE (DELTASONE) 10 MG Tab Take 30mg x 3 days, then take 20mg x 3 days, then take 10mg x 3 days, with food, then discontinue. 18 Tab 0   • doxycycline (VIBRAMYCIN) 100 MG Cap doxycycline hyclate 100 mg capsule 20 Cap 0   • omeprazole (PRILOSEC) 20 MG delayed-release capsule omeprazole 20 mg capsule,delayed release     • penicillin v potassium (VEETID) 500 MG Tab penicillin V potassium 500 mg tablet     • lovastatin (MEVACOR) 40 MG tablet One tablet at hs (Patient taking differently: Take 40 mg by mouth every day. One tablet at hs) 180 Tab 2   • metoprolol (LOPRESSOR) 25 MG Tab Take 1 Tab by mouth 2 times a day. 180 Tab 3   • nitroglycerin (NITROSTAT) 0.4 MG SUBL Place 1 Tab under tongue as needed for Chest Pain. 25 Tab 1   • aspirin 81 MG tablet Take 81 mg by mouth every day.     • azithromycin (ZITHROMAX) 250 MG Tab Take 2 tablets on day 1, then take 1 tablet a day for 4 days. (Patient not taking: Reported on 1/15/2019) 6 Tab 0   • fluticasone (FLONASE) 50 MCG/ACT nasal spray Spray 1 Spray in nose 2 times a day. (Patient not taking: Reported on 1/15/2019) 2 Bottle 3   • cephALEXin (KEFLEX) 500 MG Cap cephalexin  "500 mg capsule     • lovastatin (MEVACOR) 40 MG tablet lovastatin 40 mg tablet     • metoprolol (LOPRESSOR) 25 MG Tab metoprolol tartrate 25 mg tablet       No facility-administered encounter medications on file as of 1/15/2019.      Review of Systems   Constitutional: Positive for malaise/fatigue.   Eyes: Positive for blurred vision.        Recent bilateral cataract surgery   Respiratory: Positive for hemoptysis and shortness of breath.    Cardiovascular: Negative for chest pain, palpitations and leg swelling.   Gastrointestinal: Negative for nausea and vomiting.   Musculoskeletal: Positive for back pain and joint pain.   Neurological: Negative.  Negative for weakness.        Objective:   /70 (BP Location: Left arm, Patient Position: Sitting, BP Cuff Size: Adult)   Pulse 60   Ht 1.803 m (5' 11\")   Wt 110.2 kg (243 lb)   SpO2 97%   BMI 33.89 kg/m²     Physical Exam   Constitutional: He is oriented to person, place, and time. He appears well-nourished. No distress.   Obese   HENT:   Head: Normocephalic and atraumatic.   Eyes: Pupils are equal, round, and reactive to light. No scleral icterus.   Pulmonary/Chest: He has rales.   Bibasilar rales   Abdominal: Soft. He exhibits no distension. There is no tenderness.   Obese   Musculoskeletal: He exhibits no edema.   Neurological: He is alert and oriented to person, place, and time.   Skin: Skin is warm and dry.       Assessment:     1. Coronary artery disease involving native coronary artery of native heart without angina pectoris     2. Essential hypertension     3. Mixed hyperlipidemia     4. Stented coronary artery     5. CKD (chronic kidney disease), stage III ([facility])         Medical Decision Making:  Today's Assessment / Status / Plan:   .  Coronary disease: Status post stenting of LAD in 2005.  No angina.  Plan stress testing next visit.    Hypertension: Under good control on current regimen.    Hyperlipidemia: [facility] " copy.    Chronic kidney disease stage III: He was on a copy of his lab.    Return in 6 months and plan stress testing that time

## 2019-02-27 ENCOUNTER — OFFICE VISIT (OUTPATIENT)
Dept: INTERNAL MEDICINE | Facility: MEDICAL CENTER | Age: 84
End: 2019-02-27
Payer: MEDICARE

## 2019-02-27 VITALS
TEMPERATURE: 97.5 F | DIASTOLIC BLOOD PRESSURE: 62 MMHG | OXYGEN SATURATION: 93 % | SYSTOLIC BLOOD PRESSURE: 120 MMHG | HEART RATE: 54 BPM | BODY MASS INDEX: 34.5 KG/M2 | HEIGHT: 71 IN | WEIGHT: 246.4 LBS

## 2019-02-27 DIAGNOSIS — I25.10 CORONARY ARTERY DISEASE INVOLVING NATIVE CORONARY ARTERY OF NATIVE HEART WITHOUT ANGINA PECTORIS: ICD-10-CM

## 2019-02-27 DIAGNOSIS — R04.2 HEMOPTYSIS: ICD-10-CM

## 2019-02-27 PROCEDURE — 99214 OFFICE O/P EST MOD 30 MIN: CPT | Performed by: INTERNAL MEDICINE

## 2019-02-27 ASSESSMENT — PATIENT HEALTH QUESTIONNAIRE - PHQ9: CLINICAL INTERPRETATION OF PHQ2 SCORE: 0

## 2019-02-27 NOTE — PROGRESS NOTES
Established Patient    Mr. Ramirez a 87 y.o. male who presents today with:  CC: Follow-Up (labs)        Assessment and Plan    1.  Hemoptysis-stable.  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  Right upper lobe lung cavitary lesion may occasionally be infected.  Patient was seen at [facility] recommended to use patient was not interested in surgical options or bronchoscopy.  He was instructed for larger bleeds to lie on the right side and call 911 if it occurs.  In the last 2 months he has not had any hemoptysis    2.  CAD-stable.  Status post stents.  Compliant with statin therapy.  On ASA.  Blood pressure well controlled.  No signs or symptoms of ischemia.  Has follow-up appointment with his cardiologist in July will most likely have a stress test at that time.    Current Outpatient Prescriptions   Medication Sig Dispense Refill   • ofloxacin (OCUFLOX) 0.3 % Solution Place 0.3 Drops in both eyes 4 times a day.  2   • prednisoLONE acetate (PRED FORTE) 1 % Suspension Place 1 Drop in both eyes 4 times a day.  2   • doxycycline (VIBRAMYCIN) 100 MG Cap doxycycline hyclate 100 mg capsule 20 Cap 0   • penicillin v potassium (VEETID) 500 MG Tab penicillin V potassium 500 mg tablet     • lovastatin (MEVACOR) 40 MG tablet One tablet at hs (Patient taking differently: Take 40 mg by mouth every day. One tablet at hs) 180 Tab 2   • metoprolol (LOPRESSOR) 25 MG Tab Take 1 Tab by mouth 2 times a day. 180 Tab 3   • aspirin 81 MG tablet Take 81 mg by mouth every day.     • doxycycline (VIBRAMYCIN) 100 MG Tab Take 100 mg by mouth 1 time daily as needed.  3   • predniSONE (DELTASONE) 10 MG Tab Take 30mg x 3 days, then take 20mg x 3 days, then take 10mg x 3 days, with food, then discontinue. 18 Tab 0   • azithromycin (ZITHROMAX) 250 MG Tab Take 2 tablets on day 1, then take 1 tablet a day for 4 days. (Patient not taking: Reported on 1/15/2019) 6 Tab 0   •  fluticasone (FLONASE) 50 MCG/ACT nasal spray Spray 1 Spray in nose 2 times a day. (Patient not taking: Reported on 1/15/2019) 2 Bottle 3   • cephALEXin (KEFLEX) 500 MG Cap cephalexin 500 mg capsule     • omeprazole (PRILOSEC) 20 MG delayed-release capsule omeprazole 20 mg capsule,delayed release     • lovastatin (MEVACOR) 40 MG tablet lovastatin 40 mg tablet     • metoprolol (LOPRESSOR) 25 MG Tab metoprolol tartrate 25 mg tablet     • nitroglycerin (NITROSTAT) 0.4 MG SUBL Place 1 Tab under tongue as needed for Chest Pain. 25 Tab 1     No current facility-administered medications for this visit.          followup No Follow-up on file.    This note was created using voice recognition software (Dragon). The accuracy of the dictation is limited by the abilities of the software. I have reviewed the note prior to signing, however some errors in grammar and context are still possible. If you have any questions related to this note please do not hesitate to contact our office.   _______________________________________________________    HPI:   Mr. Cyr is a 87-year-old gentleman with history of chronic cough right upper lobe cavitary lesion, chronic back pain and knee pain, CAD and carotid artery stenosis, chronic kidney disease, hypertension.    He has intermittent chronic hemoptysis of a small amount..  He was referred to JUSTIN [facility] for this.  I reviewed the medical record.  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 They discuss surgery, bronchoscopy or using as needed antibiotics as needed.  Suspected that he may occasionally have an infection of the right upper lobe cavity which presents as hemoptysis.  It was recommended that he take doxycycline as needed when he had hemoptysis.  He was called after the visit by  Nicholas to take doxycycline one pill when he has a cough for 10 days. Since the visit only one episode of coughing that he took the doxycycline and cough went away. The last time  he had hemoptysis was several months ago. Still has a morning cough with clear sputum.   He has a history of heart disease. Stent placement in 2005.  His blood pressure s have been okay.  Blood pressures at home 120 or 70s. No chest pain with activity. However, complains of PURI with mild activity like moving iván of hay (2). On metoprolol on statin and aspirin 81.  He has an appointment with his cardiologist in July.  And he was told at the last visit that he would have a stress test.    He sleeps on his right side with his hand under his head. In the morning, his thumb and 2nd finger may have numbness.. His poodle sleeps in the bed and effects his sleep.  The numbness goes away within minutes after he wakes.  No decreased hand strength.    He spent most of the visit discussing boredom.  He does not have depression.  His routine consist of waking up, eating breakfast, reading the paper, feeding the horses  Or throwing a couple of iván of hay, taking the poodle on rides in the tractor, watching the news, having a vodka at night and then falling asleep in his recliner.  His wife states that he needs to get out of the house.  He has one friend that he sees 3 times a week he enjoys those 30-minute r visits.  Does not really leave his property unless he has an errand  to run.     has a past medical history of Anemia (8/1/2016); Arthritis; Back pain; CAD (coronary artery disease); Cancer (HCC); Chronic kidney disease (CKD), stage II (mild) (8/1/2016); Coronary heart disease; DJD (degenerative joint disease) (8/1/2016); Dyslipidemia (8/1/2016); GERD (gastroesophageal reflux disease) (8/1/2016); [place] measles; Hematochezia (8/1/2016); High cholesterol; Hyperglycemia (1/6/2010); Hyperlipidemia (5/24/2011); Hypertension; Impaired fasting glucose (8/1/2016); Left knee pain (8/1/2016); Myocardial infarction (HCC) (2 yrs ago); Nasal drainage; Obesity; Osteoarthritis of knees, bilateral (8/1/2016); Pulmonary embolism (HCC);  "Rheumatic fever; Scarlet fever; Smallpox; and Snoring.     reports that he quit smoking about 45 years ago. His smoking use included Cigarettes. He has a 70.00 pack-year smoking history. He has never used smokeless tobacco. He reports that he drinks alcohol. He reports that he does not use drugs.      ROS: Pertinent positives as stated in HPI, all others reviewed as negative:  Cardiovascular ROS: No exercise intolerance, No chest pain, No shortness of breath, No dyspnea on exertion, No edema, No palpitations, No syncope      Physical Exam  /62 (BP Location: Right arm, Patient Position: Sitting, BP Cuff Size: Large adult)   Pulse (!) 54   Temp 36.4 °C (97.5 °F) (Temporal)   Ht 1.803 m (5' 11\")   Wt 111.8 kg (246 lb 6.4 oz)   SpO2 93%   BMI 34.37 kg/m²   Constitutional:  oriented to person, place, and time. No distress.   Eyes: Pupils are equal, round, and reactive to light. No scleral icterus.   Neck: Neck supple. No thyromegaly present.   Cardiovascular: Normal rate, regular rhythm and normal heart sounds.  Exam reveals no gallop and no friction rub.    No murmur heard.  Pulmonary/Chest: Breath sounds normal. Chest wall is not dull to percussion.   Musculoskeletal:   no edema.  Right hand, no thenar atrophy.  Hand  strength is 5 out of 5.          Current Outpatient Prescriptions on File Prior to Visit   Medication Sig Dispense Refill   • ofloxacin (OCUFLOX) 0.3 % Solution Place 0.3 Drops in both eyes 4 times a day.  2   • prednisoLONE acetate (PRED FORTE) 1 % Suspension Place 1 Drop in both eyes 4 times a day.  2   • doxycycline (VIBRAMYCIN) 100 MG Cap doxycycline hyclate 100 mg capsule 20 Cap 0   • penicillin v potassium (VEETID) 500 MG Tab penicillin V potassium 500 mg tablet     • lovastatin (MEVACOR) 40 MG tablet One tablet at hs (Patient taking differently: Take 40 mg by mouth every day. One tablet at hs) 180 Tab 2   • metoprolol (LOPRESSOR) 25 MG Tab Take 1 Tab by mouth 2 times a day. 180 Tab " 3   • aspirin 81 MG tablet Take 81 mg by mouth every day.     • doxycycline (VIBRAMYCIN) 100 MG Tab Take 100 mg by mouth 1 time daily as needed.  3   • predniSONE (DELTASONE) 10 MG Tab Take 30mg x 3 days, then take 20mg x 3 days, then take 10mg x 3 days, with food, then discontinue. 18 Tab 0   • azithromycin (ZITHROMAX) 250 MG Tab Take 2 tablets on day 1, then take 1 tablet a day for 4 days. (Patient not taking: Reported on 1/15/2019) 6 Tab 0   • fluticasone (FLONASE) 50 MCG/ACT nasal spray Spray 1 Spray in nose 2 times a day. (Patient not taking: Reported on 1/15/2019) 2 Bottle 3   • cephALEXin (KEFLEX) 500 MG Cap cephalexin 500 mg capsule     • omeprazole (PRILOSEC) 20 MG delayed-release capsule omeprazole 20 mg capsule,delayed release     • lovastatin (MEVACOR) 40 MG tablet lovastatin 40 mg tablet     • metoprolol (LOPRESSOR) 25 MG Tab metoprolol tartrate 25 mg tablet     • nitroglycerin (NITROSTAT) 0.4 MG SUBL Place 1 Tab under tongue as needed for Chest Pain. 25 Tab 1     No current facility-administered medications on file prior to visit.            Signed by: Randall Farley M.D.

## 2019-02-27 NOTE — LETTER
[facility]  Randall Farley M.D.  [address] [facility] [address]  [place] [address]  Fax: [phone]   Authorization for Release/Disclosure of   Protected Health Information   Name: CARRIE CAMPBELL : 1931 SSN: [id number]   Address: [address] [place] [id number] Phone:    [phone] (home)    I authorize the entity listed below to release/disclose the PHI below to:   [facility]/Randall Farley M.D. and Randall Farley M.D.   Provider or Entity Name: Dr. Mendez     Address   City, State, Zip   Phone: [phone]      Fax:     Reason for request: continuity of care   Information to be released:    [  ] LAST COLONOSCOPY,  including any PATH REPORT and follow-up  [  ] LAST FIT/COLOGUARD RESULT [  ] LAST DEXA  [  ] LAST MAMMOGRAM  [  ] LAST PAP  [  ] LAST LABS [  ] RETINA EXAM REPORT  [  ] IMMUNIZATION RECORDS  [ x ] Release all info      [  ] Check here and initial the line next to each item to release ALL health information INCLUDING  _____ Care and treatment for drug and / or alcohol abuse  _____ HIV testing, infection status, or AIDS  _____ Genetic Testing    DATES OF SERVICE OR TIME PERIOD TO BE DISCLOSED: _____________  I understand and acknowledge that:  * This Authorization may be revoked at any time by you in writing, except if your health information has already been used or disclosed.  * Your health information that will be used or disclosed as a result of you signing this authorization could be re-disclosed by the recipient. If this occurs, your re-disclosed health information may no longer be protected by State or Federal laws.  * You may refuse to sign this Authorization. Your refusal will not affect your ability to obtain treatment.  * This Authorization becomes effective upon signing and will  on (date) __________.      If no date is indicated, this Authorization will  one (1) year from the signature date.    Name: Carrie Campbell    Signature:   Date:          2/27/2019       PLEASE FAX REQUESTED RECORDS BACK TO: [phone]

## 2019-03-01 RX ORDER — LOVASTATIN 40 MG/1
40 TABLET ORAL DAILY
Qty: 180 TAB | Refills: 2 | Status: SHIPPED | OUTPATIENT
Start: 2019-03-01 | End: 2019-05-29 | Stop reason: SDUPTHER

## 2019-05-29 ENCOUNTER — OFFICE VISIT (OUTPATIENT)
Dept: INTERNAL MEDICINE | Facility: MEDICAL CENTER | Age: 84
End: 2019-05-29
Payer: MEDICARE

## 2019-05-29 VITALS
WEIGHT: 252.2 LBS | HEIGHT: 71 IN | BODY MASS INDEX: 35.31 KG/M2 | SYSTOLIC BLOOD PRESSURE: 122 MMHG | DIASTOLIC BLOOD PRESSURE: 70 MMHG | TEMPERATURE: 97.9 F | OXYGEN SATURATION: 94 % | HEART RATE: 57 BPM

## 2019-05-29 DIAGNOSIS — R06.09 DYSPNEA ON EXERTION: ICD-10-CM

## 2019-05-29 PROCEDURE — 99214 OFFICE O/P EST MOD 30 MIN: CPT | Performed by: INTERNAL MEDICINE

## 2019-05-29 RX ORDER — LOVASTATIN 40 MG/1
40 TABLET ORAL DAILY
Qty: 180 TAB | Refills: 2 | Status: ON HOLD | OUTPATIENT
Start: 2019-05-29 | End: 2021-07-14

## 2019-05-29 RX ORDER — DOXYCYCLINE HYCLATE 100 MG/1
CAPSULE ORAL
Qty: 20 CAP | Refills: 1 | Status: SHIPPED | OUTPATIENT
Start: 2019-05-29 | End: 2020-11-05

## 2019-05-29 NOTE — PROGRESS NOTES
Established Patient    Mr. Ramirez a 88 y.o. male who presents today with:  CC: Follow-Up (hemoptysis); Cough (still presistant); Shortness of Breath; and Medication Refill (metoprolol, lovastatin and doxycycline)        Assessment and Plan  1.  Shortness of breath-patient has gradual increasing shortness of breath with exertion.  He has a prior history of a right upper lung cavitary lesion most likely related to old pulmonary infection.  Malignancy and tuberculosis have both been evaluated and are not present.  On a prior CT scan he has significant honeycombing scarring of the bilateral lungs and at that time a suspicion for interstitial pneumonitis.  He also has a history of coronary disease last echocardiogram revealed a EF of 55%.  He is managed by cardiology.  He was planned to have a stress test in July.  I suspect that his shortness of breath is pulmonary in origin.  Most likely related to interstitial lung disease or pulmonary fibrosis.  His sats have been stable.  I will order a pulmonary function test to evaluate for emphysema or COPD since he does have a chronic cough.  We will also evaluate for restrictive lung disease due to his occupational exposures in the past.  Also reasonable for him to schedule his stress test although ischemic cardiomyopathy is unlikely in his case.  He has an appointment pending with Dr. Wood later in June.  His abnormal CT scan can be addressed during that appointment.      Current Outpatient Prescriptions   Medication Sig Dispense Refill   • lovastatin (MEVACOR) 40 MG tablet Take 1 Tab by mouth every day. One tablet at hs 180 Tab 2   • doxycycline (VIBRAMYCIN) 100 MG Cap doxycycline hyclate 100 mg capsule 20 Cap 0   • metoprolol (LOPRESSOR) 25 MG Tab Take 1 Tab by mouth 2 times a day. 180 Tab 3   • ofloxacin (OCUFLOX) 0.3 % Solution Place 0.3 Drops in both eyes 4 times a day.  2   • prednisoLONE acetate (PRED FORTE) 1 % Suspension Place 1 Drop in both eyes 4 times a day.  2    • fluticasone (FLONASE) 50 MCG/ACT nasal spray Spray 1 Spray in nose 2 times a day. (Patient not taking: Reported on 1/15/2019) 2 Bottle 3   • penicillin v potassium (VEETID) 500 MG Tab penicillin V potassium 500 mg tablet     • nitroglycerin (NITROSTAT) 0.4 MG SUBL Place 1 Tab under tongue as needed for Chest Pain. 25 Tab 1   • aspirin 81 MG tablet Take 81 mg by mouth every day.       No current facility-administered medications for this visit.          followup No Follow-up on file.    This note was created using voice recognition software (Dragon). The accuracy of the dictation is limited by the abilities of the software. I have reviewed the note prior to signing, however some errors in grammar and context are still possible. If you have any questions related to this note please do not hesitate to contact our office.   _______________________________________________________    HPI:   Mr. Cyr is a 88-year-old gentleman with a history of coronary artery disease status post bypass 2005, chronic kidney disease, hemoptysis secondary to chronic right upper lung mass.  He is here to discuss chronic shortness of breath.    Describes SOB that is mostly exertional like when he moves hay in the barn. No symptoms at rest. He has chronic cough which is not that symptomatic currently. Produces phlegm yellowish clear sometimes darker.  Most of the phlegm occurs in the morning.  Very viscous.. ONce he sits the SOB resolves quickly. 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  Denies any wheezing.  Has not had much hemoptysis over the last 4 months.      Chest x-ray December 2018 with redemonstration of ill-defined patchy parenchymal opacities throughout both lungs most in the right upper lobe and bilateral lobes no definite new opacity.  No pleural effusion no pneumothorax.  Stable cardiopericardial silhouette.  Pulmonary function tests were  ordered in 2018 but these were not performed.  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  Managed by pulmonology seen at [facility] pulmonary clinic.  Patient's case was discussed in the tumor board and 2017 thought to be unlikely related to malignancy.  Prior interferon test negative for TB.  Associated with hemoptysis.  Patient recommended to use doxycycline for 10 days in the event of increasing cough.  In the last 4 months he is only used doxycycline once.  CT scan of the lungs in 2017 with PET scan with peripheral fibrotic changes associated with honeycombing predominating in the lower lobes may represent usual interstitial pneumonitis.     has a past medical history of Anemia (8/1/2016); Arthritis; Back pain; CAD (coronary artery disease); Cancer (HCC); Chronic kidney disease (CKD), stage II (mild) (8/1/2016); Coronary heart disease; DJD (degenerative joint disease) (8/1/2016); Dyslipidemia (8/1/2016); GERD (gastroesophageal reflux disease) (8/1/2016); [place] measles; Hematochezia (8/1/2016); High cholesterol; Hyperglycemia (1/6/2010); Hyperlipidemia (5/24/2011); Hypertension; Impaired fasting glucose (8/1/2016); Left knee pain (8/1/2016); Myocardial infarction (HCC) (2 yrs ago); Nasal drainage; Obesity; Osteoarthritis of knees, bilateral (8/1/2016); Pulmonary embolism (HCC); Rheumatic fever; Scarlet fever; Smallpox; and Snoring.     reports that he quit smoking about 46 years ago. His smoking use included Cigarettes. He has a 70.00 pack-year smoking history. He has never used smokeless tobacco. He reports that he drinks alcohol. He reports that he does not use drugs.      ROS: Pertinent positives as stated in HPI, all others reviewed as negative:  Constitutional ROS: No unexpected change in weight, No weakness, No fatigue, No unexplained fevers, sweats, or chills  Pulmonary ROS: No wheezing, No recent change in breathing  Cardiovascular ROS: No exercise  "intolerance, No chest pain, No diaphoresis, No cyanosis, No edema, No palpitations  Musculoskeletal/Extremities ROS: No peripheral edema  Hematologic/Lymphatic ROS: No anemia, No abnormal bleeding, No chills, No night sweats, No swollen nodes      Physical Exam  /70 (BP Location: Right arm, Patient Position: Sitting, BP Cuff Size: Adult)   Pulse (!) 57   Temp 36.6 °C (97.9 °F) (Temporal)   Ht 1.803 m (5' 11\")   Wt 114.4 kg (252 lb 3.2 oz)   SpO2 94%   BMI 35.17 kg/m²   Constitutional:  oriented to person, place, and time. No distress.   Eyes: Pupils are equal, round, and reactive to light. No scleral icterus.   Neck: Neck supple. No thyromegaly present.   Cardiovascular: Normal rate, regular rhythm and normal heart sounds.  Exam reveals no gallop and no friction rub.    No murmur heard.  No S3  Pulmonary/Chest: Breath sounds normal. Chest wall is not dull to percussion.  No crackles no wheezing.  Musculoskeletal:   no edema.   Lymphadenopathy: no cervical adenopathy  Neurological: alert and oriented to person, place, and time.   Skin: No cyanosis. Nails show no clubbing.          Current Outpatient Prescriptions on File Prior to Visit   Medication Sig Dispense Refill   • lovastatin (MEVACOR) 40 MG tablet Take 1 Tab by mouth every day. One tablet at hs 180 Tab 2   • doxycycline (VIBRAMYCIN) 100 MG Cap doxycycline hyclate 100 mg capsule 20 Cap 0   • metoprolol (LOPRESSOR) 25 MG Tab Take 1 Tab by mouth 2 times a day. 180 Tab 3   • ofloxacin (OCUFLOX) 0.3 % Solution Place 0.3 Drops in both eyes 4 times a day.  2   • prednisoLONE acetate (PRED FORTE) 1 % Suspension Place 1 Drop in both eyes 4 times a day.  2   • fluticasone (FLONASE) 50 MCG/ACT nasal spray Spray 1 Spray in nose 2 times a day. (Patient not taking: Reported on 1/15/2019) 2 Bottle 3   • penicillin v potassium (VEETID) 500 MG Tab penicillin V potassium 500 mg tablet     • nitroglycerin (NITROSTAT) 0.4 MG SUBL Place 1 Tab under tongue as needed " for Chest Pain. 25 Tab 1   • aspirin 81 MG tablet Take 81 mg by mouth every day.       No current facility-administered medications on file prior to visit.            Signed by: Randall Farley M.D.

## 2019-06-05 ENCOUNTER — HOSPITAL ENCOUNTER (OUTPATIENT)
Dept: RADIOLOGY | Facility: MEDICAL CENTER | Age: 84
End: 2019-06-05
Attending: INTERNAL MEDICINE
Payer: MEDICARE

## 2019-06-05 DIAGNOSIS — R06.09 DYSPNEA ON EXERTION: ICD-10-CM

## 2019-06-05 PROCEDURE — A9502 TC99M TETROFOSMIN: HCPCS

## 2019-06-05 PROCEDURE — 700111 HCHG RX REV CODE 636 W/ 250 OVERRIDE (IP)

## 2019-06-05 RX ORDER — REGADENOSON 0.08 MG/ML
INJECTION, SOLUTION INTRAVENOUS
Status: COMPLETED
Start: 2019-06-05 | End: 2019-06-05

## 2019-06-05 RX ADMIN — REGADENOSON 0.4 MG: 0.08 INJECTION, SOLUTION INTRAVENOUS at 15:50

## 2019-06-24 ENCOUNTER — TELEPHONE (OUTPATIENT)
Dept: PULMONOLOGY | Facility: HOSPICE | Age: 84
End: 2019-06-24

## 2019-06-24 DIAGNOSIS — R06.02 SOB (SHORTNESS OF BREATH): ICD-10-CM

## 2019-06-25 ENCOUNTER — NON-PROVIDER VISIT (OUTPATIENT)
Dept: PULMONOLOGY | Facility: HOSPICE | Age: 84
End: 2019-06-25
Payer: MEDICARE

## 2019-06-25 DIAGNOSIS — R06.02 SOB (SHORTNESS OF BREATH): ICD-10-CM

## 2019-06-25 PROCEDURE — 94729 DIFFUSING CAPACITY: CPT | Performed by: INTERNAL MEDICINE

## 2019-06-25 PROCEDURE — 94726 PLETHYSMOGRAPHY LUNG VOLUMES: CPT | Performed by: INTERNAL MEDICINE

## 2019-06-25 PROCEDURE — 94060 EVALUATION OF WHEEZING: CPT | Performed by: INTERNAL MEDICINE

## 2019-06-25 ASSESSMENT — PULMONARY FUNCTION TESTS
FVC_PREDICTED: 3.84
FVC: 3.02
FEV1/FVC: 66
FEV1/FVC_PERCENT_PREDICTED: 94
FEV1/FVC_PREDICTED: 70
FEV1/FVC_PERCENT_PREDICTED: 94
FEV1_PERCENT_PREDICTED: 70
FEV1_PERCENT_PREDICTED: 75
FEV1/FVC: 66.23
FVC_PERCENT_PREDICTED: 73
FEV1/FVC_PERCENT_CHANGE: 0
FEV1: 1.87
FVC_PERCENT_PREDICTED: 78
FEV1_PERCENT_CHANGE: 6
FEV1/FVC_PERCENT_CHANGE: 100
FVC_LLN: 3.21
FEV1/FVC: 66
FEV1/FVC_PERCENT_LLN: 58
FVC: 2.84
FEV1: 2
FEV1/FVC_PERCENT_PREDICTED: 96
FEV1/FVC_PERCENT_PREDICTED: 96
FEV1/FVC: 66
FEV1_LLN: 2.22
FEV1_PERCENT_CHANGE: 6
FEV1/FVC_PERCENT_PREDICTED: 69
FEV1_PREDICTED: 2.66

## 2019-06-25 NOTE — PROCEDURES
Good patient effort & cooperation.  The results of this test meet the ATS/ERS standards for acceptability and repeatability.  Predicted equations for Spirometry are N-Nathan II, ITS for lung volumes, and [facility] for DLCO.  The DLCO was uncorrected for Hgb.  A bronchodilator of Ventolin HFA- 2puffs via spacer were administered.  DLCO was performed during dilation period.    The FVC is 3.02 L or 78%, FEV1 is 2 L of 75%, FEV1/FVC: 66%.  Total lung capacity: 77%.  DLCO: 51%.  No significant bronchodilator response.    Interpretation:  Mixed obstructive and restrictive ventilatory defects noted.  Moderate gas transfer impairment.  PFTs are consistent with COPD + obesity/interstitial lung disease. Clinical correlation required.

## 2019-06-27 ENCOUNTER — OFFICE VISIT (OUTPATIENT)
Dept: PULMONOLOGY | Facility: HOSPICE | Age: 84
End: 2019-06-27
Payer: MEDICARE

## 2019-06-27 VITALS
BODY MASS INDEX: 34.97 KG/M2 | HEART RATE: 56 BPM | WEIGHT: 249.8 LBS | RESPIRATION RATE: 16 BRPM | OXYGEN SATURATION: 92 % | SYSTOLIC BLOOD PRESSURE: 144 MMHG | HEIGHT: 71 IN | DIASTOLIC BLOOD PRESSURE: 70 MMHG

## 2019-06-27 DIAGNOSIS — R91.8 LUNG MASS: ICD-10-CM

## 2019-06-27 DIAGNOSIS — R05.9 COUGH: ICD-10-CM

## 2019-06-27 DIAGNOSIS — R04.2 HEMOPTYSIS: ICD-10-CM

## 2019-06-27 PROCEDURE — 99214 OFFICE O/P EST MOD 30 MIN: CPT | Performed by: INTERNAL MEDICINE

## 2019-06-27 RX ORDER — DOXYCYCLINE HYCLATE 100 MG/1
100 CAPSULE ORAL 2 TIMES DAILY
Qty: 20 CAP | Refills: 1 | Status: SHIPPED | OUTPATIENT
Start: 2019-06-27 | End: 2020-11-05

## 2019-06-27 RX ORDER — DOXYCYCLINE HYCLATE 100 MG
TABLET ORAL
Refills: 3 | COMMUNITY
Start: 2019-05-13 | End: 2020-11-05

## 2019-06-27 RX ORDER — PREDNISONE 10 MG/1
TABLET ORAL
Qty: 10 TAB | Refills: 1 | Status: ON HOLD | OUTPATIENT
Start: 2019-06-27 | End: 2021-07-09

## 2019-06-27 NOTE — PROGRESS NOTES
"Chief Complaint   Patient presents with   • Follow-Up     Cough       HPI: This patient is an 88 y.o. Male who returns for lung mass. He has a remote smoking history, having quit in 1973. He was experiencing recurrent hemoptysis, had seen Dr. Nicole, ENT, with alleged negative ENT workup. His sinus CAT scan was unremarkable. He had a screening chest x-ray January 2017 showing a mass. He denies associated chest pain, wheezing, fevers, chills, night sweats or weight loss.  He was told when he was released from the  after the [place] that he had \"a spot on my lung\". He has chronic kidney disease, consequently cannot have IV contrast. He consequently had MRI of the chest May 31, 2017 which showed a 3.4 cm right upper lobe spiculated mass in the lung. Subsequent PET scan June 28, 2017 showed cavitary, partially calcified 3.8 x 2.9 cm right upper lobe mass which appeared radiologically stable since 2005, with SUV uptake of 6.9. Mild honeycombing was noted within the lower lobes with mild FDG uptake, SUV 2.9-3.8. [facility] and it was felt given the stability of the lung mass from 2005, and minimal FDG uptake on PET, that malignancy was very unlikely.  In any case, he has declined surgical resection or any further diagnostic workup. CXR  12/18 showed stable right upper lobe mass. Quantiferon TB Gold is negative.  Pulmonary function testing show mild restrictive ventilatory defect, total lung capacity: 77%, DLCO: 51%.  He has had increased cough in recent months.  He denies any hemoptysis.  He has had good clinical response to doxycycline.  He had been evaluated by [facility] pulmonary, who concurred with our conclusions.    Past Medical History:   Diagnosis Date   • Anemia 8/1/2016   • Arthritis    • Back pain    • CAD (coronary artery disease)    • Cancer (HCC)     skin   • Chronic kidney disease (CKD), stage II (mild) 8/1/2016   • Coronary heart disease    • DJD (degenerative joint " disease) 8/1/2016   • Dyslipidemia 8/1/2016   • GERD (gastroesophageal reflux disease) 8/1/2016   • Icelandic measles    • Hematochezia 8/1/2016   • High cholesterol    • Hyperglycemia 1/6/2010   • Hyperlipidemia 5/24/2011   • Hypertension    • Impaired fasting glucose 8/1/2016   • Left knee pain 8/1/2016   • Myocardial infarction (HCC) 2 yrs ago    2005   • Nasal drainage    • Obesity    • Osteoarthritis of knees, bilateral 8/1/2016   • Pulmonary embolism (HCC)    • Rheumatic fever    • Scarlet fever    • Smallpox    • Snoring        Social History     Social History   • Marital status:      Spouse name: N/A   • Number of children: N/A   • Years of education: N/A     Occupational History   • Not on file.     Social History Main Topics   • Smoking status: Former Smoker     Packs/day: 2.00     Years: 35.00     Types: Cigarettes     Quit date: 4/17/1973   • Smokeless tobacco: Never Used   • Alcohol use Yes      Comment: cocktail every night   • Drug use: No   • Sexual activity: Not on file     Other Topics Concern   • Not on file     Social History Narrative   • No narrative on file       Family History   Problem Relation Age of Onset   • No Known Problems Mother    • No Known Problems Father        Current Outpatient Prescriptions on File Prior to Visit   Medication Sig Dispense Refill   • lovastatin (MEVACOR) 40 MG tablet Take 1 Tab by mouth every day. One tablet at hs 180 Tab 2   • metoprolol (LOPRESSOR) 25 MG Tab Take 1 Tab by mouth 2 times a day. 180 Tab 2   • aspirin 81 MG tablet Take 81 mg by mouth every day.     • doxycycline (VIBRAMYCIN) 100 MG Cap 1 tablet p.o. daily x10 days at the onset of severe cough. (Patient not taking: Reported on 6/27/2019) 20 Cap 1   • ofloxacin (OCUFLOX) 0.3 % Solution Place 0.3 Drops in both eyes 4 times a day.  2   • prednisoLONE acetate (PRED FORTE) 1 % Suspension Place 1 Drop in both eyes 4 times a day.  2   • nitroglycerin (NITROSTAT) 0.4 MG SUBL Place 1 Tab under tongue  "as needed for Chest Pain. (Patient not taking: Reported on 6/27/2019) 25 Tab 1     No current facility-administered medications on file prior to visit.        Allergies: Atorvastatin and Simvastatin    ROS:   Constitutional: Denies fevers, chills, night sweats, +fatigue, denies weight loss  Eyes: Denies vision loss, pain, drainage, double vision  Ears, Nose, Throat: Denies earache, difficulty hearing, tinnitus, nasal congestion, hoarseness  Cardiovascular: Denies chest pain, tightness, palpitations, orthopnea or edema  Respiratory:As in HPI  Sleep: Denies daytime sleepiness, snoring, apneas, insomnia, morning headaches  GI: Denies heartburn, dysphagia, nausea, abdominal pain, diarrhea or constipation  : Denies frequent urination, hematuria, discharge or painful urination  Musculoskeletal: Denies back pain, +painful joints, denies sore muscles  Neurological: Denies weakness or headaches  Skin: No rashes    /70 (BP Location: Left arm, Patient Position: Sitting, BP Cuff Size: Adult)   Pulse (!) 56   Resp 16   Ht 1.803 m (5' 11\")   Wt 113.3 kg (249 lb 12.8 oz)   SpO2 92%     Physical Exam:  Appearance: Well-nourished, well-developed, in no acute distress  HEENT: Normocephalic, atraumatic, white sclera, PERRLA, oropharynx clear  Neck: No adenopathy or masses  Respiratory: no intercostal retractions or accessory muscle use  Lungs auscultation: Basilar dry rales  Cardiovascular: Regular rate rhythm. No murmurs, rubs or gallops.  No LE edema  Abdomen: soft, obese  Gait: Normal  Digits: No clubbing, cyanosis  Motor: No focal deficits  Orientation: Oriented to time, person and place    Diagnosis:  1. Lung mass     2. Hemoptysis     3. Cough         Plan:  The patient has had a cavitary lung mass for over a decade, felt highly unlikely to be malignant given duration.  His chest x-ray shows stable lung mass.  He also has evidence of interstitial lung disease ?IPF. PFT's confirm restrictive lung disease. His " hemoptysis has resolved however he notes increased cough in recent months.  He declines any invasive testing or treatment.  Recommend empiric treatment with doxycycline 100mg twice daily for 10 days with prednisone 10 mg.  Return in about 3 months (around 9/27/2019).

## 2019-07-10 RX ORDER — TIOTROPIUM BROMIDE 18 UG/1
18 CAPSULE ORAL; RESPIRATORY (INHALATION) DAILY
Qty: 30 CAP | Refills: 3 | Status: SHIPPED | OUTPATIENT
Start: 2019-07-10 | End: 2020-11-05

## 2019-07-17 ENCOUNTER — OFFICE VISIT (OUTPATIENT)
Dept: CARDIOLOGY | Facility: MEDICAL CENTER | Age: 84
End: 2019-07-17
Payer: MEDICARE

## 2019-07-17 VITALS
DIASTOLIC BLOOD PRESSURE: 64 MMHG | SYSTOLIC BLOOD PRESSURE: 132 MMHG | HEART RATE: 60 BPM | OXYGEN SATURATION: 92 % | HEIGHT: 71 IN | WEIGHT: 248 LBS | BODY MASS INDEX: 34.72 KG/M2

## 2019-07-17 DIAGNOSIS — I21.01 ST ELEVATION MYOCARDIAL INFARCTION INVOLVING LEFT MAIN CORONARY ARTERY (HCC): Chronic | ICD-10-CM

## 2019-07-17 DIAGNOSIS — E78.00 PURE HYPERCHOLESTEROLEMIA: Chronic | ICD-10-CM

## 2019-07-17 DIAGNOSIS — I10 ESSENTIAL HYPERTENSION: ICD-10-CM

## 2019-07-17 DIAGNOSIS — Z95.5 STENTED CORONARY ARTERY: ICD-10-CM

## 2019-07-17 PROCEDURE — 99213 OFFICE O/P EST LOW 20 MIN: CPT | Performed by: INTERNAL MEDICINE

## 2019-07-17 ASSESSMENT — ENCOUNTER SYMPTOMS
NAUSEA: 0
PALPITATIONS: 0
WEAKNESS: 0
VOMITING: 0
BACK PAIN: 1
NEUROLOGICAL NEGATIVE: 1
SHORTNESS OF BREATH: 1
BLURRED VISION: 0

## 2019-07-17 NOTE — PROGRESS NOTES
"Chief Complaint   Patient presents with   • Coronary Artery Disease   • COPD       Subjective:   Kobe Cyr is a 88 y.o. male who presents today for follow-up of coronary disease with remote stenting of his LAD in 2005.  Recent stress test showed no ischemia or infarction.  He has had 2 types of chest pain one is brief nonexertional last only seconds, and he has also right left lower chest pain where he tripped and fell and landed on his walker recently.  Wife is quite concerned about his \"sciatica\".  Patient minimizes his back problems.  He has had a good response to cataract surgery and is quite happy with the result.  Lab from the [facility] in January, 2019 reveals LDL of 86.    Past Medical History:   Diagnosis Date   • Anemia 8/1/2016   • Arthritis    • Back pain    • CAD (coronary artery disease)    • Cancer (HCC)     skin   • Chronic kidney disease (CKD), stage II (mild) 8/1/2016   • Coronary heart disease    • DJD (degenerative joint disease) 8/1/2016   • Dyslipidemia 8/1/2016   • GERD (gastroesophageal reflux disease) 8/1/2016   • Azeri measles    • Hematochezia 8/1/2016   • High cholesterol    • Hyperglycemia 1/6/2010   • Hyperlipidemia 5/24/2011   • Hypertension    • Impaired fasting glucose 8/1/2016   • Left knee pain 8/1/2016   • Myocardial infarction (HCC) 2 yrs ago    2005   • Nasal drainage    • Obesity    • Osteoarthritis of knees, bilateral 8/1/2016   • Pulmonary embolism (HCC)    • Rheumatic fever    • Scarlet fever    • Smallpox    • Snoring      Past Surgical History:   Procedure Laterality Date   • KNEE UNICOMPARTMENTAL  10/13/2009    Performed by SAM LINDER at SURGERY [facility]   • ARTHROSCOPY, KNEE     • CORONARY STENT PROCEDURE LAD     • OTHER ORTHOPEDIC SURGERY      right shoulder rotator cuff surgery x2     Family History   Problem Relation Age of Onset   • No Known Problems Mother    • No Known Problems Father      Social History     Social History   • Marital status: "      Spouse name: N/A   • Number of children: N/A   • Years of education: N/A     Occupational History   • Not on file.     Social History Main Topics   • Smoking status: Former Smoker     Packs/day: 2.00     Years: 35.00     Types: Cigarettes     Quit date: 4/17/1973   • Smokeless tobacco: Never Used   • Alcohol use Yes      Comment: cocktail every night   • Drug use: No   • Sexual activity: Not on file     Other Topics Concern   • Not on file     Social History Narrative   • No narrative on file     Allergies   Allergen Reactions   • Atorvastatin Myalgia   • Simvastatin      ADVERSE REACTION: MYALGIA.     Outpatient Encounter Prescriptions as of 7/17/2019   Medication Sig Dispense Refill   • tiotropium (SPIRIVA) 18 MCG Cap Inhale 1 Cap by mouth every day. 30 Cap 3   • lovastatin (MEVACOR) 40 MG tablet Take 1 Tab by mouth every day. One tablet at hs 180 Tab 2   • metoprolol (LOPRESSOR) 25 MG Tab Take 1 Tab by mouth 2 times a day. 180 Tab 2   • aspirin 81 MG tablet Take 81 mg by mouth every day.     • doxycycline (VIBRAMYCIN) 100 MG Tab TK 1 T PO  BID FOR 10 DAYS. TK WITH 8 OZ OF WATER  3   • doxycycline (VIBRAMYCIN) 100 MG Cap Take 1 Cap by mouth 2 times a day. Take until gone. 20 Cap 1   • predniSONE (DELTASONE) 10 MG Tab Take 1 pill daily until finished (Patient not taking: Reported on 7/17/2019) 10 Tab 1   • doxycycline (VIBRAMYCIN) 100 MG Cap 1 tablet p.o. daily x10 days at the onset of severe cough. (Patient not taking: Reported on 6/27/2019) 20 Cap 1   • ofloxacin (OCUFLOX) 0.3 % Solution Place 0.3 Drops in both eyes 4 times a day.  2   • prednisoLONE acetate (PRED FORTE) 1 % Suspension Place 1 Drop in both eyes 4 times a day.  2   • nitroglycerin (NITROSTAT) 0.4 MG SUBL Place 1 Tab under tongue as needed for Chest Pain. (Patient not taking: Reported on 6/27/2019) 25 Tab 1     No facility-administered encounter medications on file as of 7/17/2019.      Review of Systems   Constitutional: Positive for  "malaise/fatigue.   Eyes: Negative for blurred vision.        Recent bilateral cataract surgery   Respiratory: Positive for shortness of breath.    Cardiovascular: Positive for chest pain. Negative for palpitations and leg swelling.   Gastrointestinal: Negative for nausea and vomiting.   Musculoskeletal: Positive for back pain and joint pain.   Neurological: Negative.  Negative for weakness.        Objective:   /64 (BP Location: Left arm, Patient Position: Sitting, BP Cuff Size: Adult)   Pulse 60   Ht 1.803 m (5' 11\")   Wt 112.5 kg (248 lb)   SpO2 92%   BMI 34.59 kg/m²     Physical Exam   Constitutional: He is oriented to person, place, and time. He appears well-nourished. No distress.   Obese   HENT:   Head: Normocephalic and atraumatic.   Eyes: Pupils are equal, round, and reactive to light. No scleral icterus.   Pulmonary/Chest: He has wheezes. He has rales.   Bibasilar rales   Abdominal: Soft. He exhibits no distension. There is no tenderness.   Obese   Musculoskeletal: He exhibits no edema.   Neurological: He is alert and oriented to person, place, and time.   Skin: Skin is warm and dry.       Assessment:     1. ST elevation myocardial infarction involving left main coronary artery (HCC)     2. Stented coronary artery     3. Pure hypercholesterolemia     4. Essential hypertension         Medical Decision Making:  Today's Assessment / Status / Plan:   Coronary disease: Status post remote stenting of LAD.  His chest pain does not sound cardiac in etiology.  Recent myocardial perfusion scan was quite reassuring.    Hyperlipidemia: LDL to target.    Hypertension: Under good control with current medications.  Would recommend reducing his metoprolol to once daily because his underlying lung disease.    COPD: Patient having more difficulty with his lungs is followed by pulmonary.  Metoprolol as outlined above ultimately would like to get him off the medication altogether.  Otherwise return in 6 months  "

## 2020-07-30 ENCOUNTER — OFFICE VISIT (OUTPATIENT)
Dept: CARDIOLOGY | Facility: MEDICAL CENTER | Age: 85
End: 2020-07-30
Payer: MEDICARE

## 2020-07-30 ENCOUNTER — TELEPHONE (OUTPATIENT)
Dept: CARDIOLOGY | Facility: MEDICAL CENTER | Age: 85
End: 2020-07-30

## 2020-07-30 VITALS
HEIGHT: 71 IN | HEART RATE: 62 BPM | WEIGHT: 252 LBS | SYSTOLIC BLOOD PRESSURE: 132 MMHG | OXYGEN SATURATION: 90 % | BODY MASS INDEX: 35.28 KG/M2 | DIASTOLIC BLOOD PRESSURE: 70 MMHG | RESPIRATION RATE: 18 BRPM

## 2020-07-30 DIAGNOSIS — I25.10 CORONARY ARTERY DISEASE INVOLVING NATIVE CORONARY ARTERY OF NATIVE HEART WITHOUT ANGINA PECTORIS: ICD-10-CM

## 2020-07-30 DIAGNOSIS — Z95.5 STENTED CORONARY ARTERY: ICD-10-CM

## 2020-07-30 DIAGNOSIS — E78.2 MIXED HYPERLIPIDEMIA: Chronic | ICD-10-CM

## 2020-07-30 DIAGNOSIS — R73.9 HYPERGLYCEMIA: Chronic | ICD-10-CM

## 2020-07-30 DIAGNOSIS — I10 ESSENTIAL HYPERTENSION: ICD-10-CM

## 2020-07-30 PROCEDURE — 99213 OFFICE O/P EST LOW 20 MIN: CPT | Performed by: INTERNAL MEDICINE

## 2020-07-30 ASSESSMENT — ENCOUNTER SYMPTOMS
MUSCULOSKELETAL NEGATIVE: 1
SHORTNESS OF BREATH: 1
COUGH: 1
GASTROINTESTINAL NEGATIVE: 1
NEUROLOGICAL NEGATIVE: 1
SPUTUM PRODUCTION: 1

## 2020-07-30 ASSESSMENT — PATIENT HEALTH QUESTIONNAIRE - PHQ9: CLINICAL INTERPRETATION OF PHQ2 SCORE: 0

## 2020-07-30 NOTE — PROGRESS NOTES
Chief Complaint   Patient presents with   • Follow-Up     CAD       Subjective:   oKbe Cyr is a 89 y.o. male who presents today for follow-up of coronary disease.  Underwent stenting of his LAD in 2005.  Patient is had no exertional angina.  Myocardial perfusion scan in June, 2019 revealed no ischemia.  He has had no exertional chest pain.  He occasionally will get localized chest discomfort that reliably is relieved by drinking a glass of water.  His main complaint is his pulmonary status.  He has had hemoptysis.  The patient been evaluated previously at [facility].  Currently he is having significant sputum production and he also complains of fatigue.  He has placed himself on antibiotics at least 1 time and this has at least temporarily reduced his sputum production.  He denies any sense of palpitations or edema.  Most recent echo revealed pulmonary pressure in the 55 range.    Past Medical History:   Diagnosis Date   • Anemia 8/1/2016   • Arthritis    • Back pain    • CAD (coronary artery disease)    • Cancer (HCC)     skin   • Chronic kidney disease (CKD), stage II (mild) 8/1/2016   • Coronary heart disease    • DJD (degenerative joint disease) 8/1/2016   • Dyslipidemia 8/1/2016   • GERD (gastroesophageal reflux disease) 8/1/2016   • Uzbek measles    • Hematochezia 8/1/2016   • High cholesterol    • Hyperglycemia 1/6/2010   • Hyperlipidemia 5/24/2011   • Hypertension    • Impaired fasting glucose 8/1/2016   • Left knee pain 8/1/2016   • Myocardial infarction (HCC) 2 yrs ago    2005   • Nasal drainage    • Obesity    • Osteoarthritis of knees, bilateral 8/1/2016   • Pulmonary embolism (HCC)    • Rheumatic fever    • Scarlet fever    • Smallpox    • Snoring      Past Surgical History:   Procedure Laterality Date   • KNEE UNICOMPARTMENTAL  10/13/2009    Performed by SAM LINDER at SURGERY [facility]   • ARTHROSCOPY, KNEE     • CORONARY STENT PROCEDURE LAD     • OTHER ORTHOPEDIC SURGERY      right  shoulder rotator cuff surgery x2     Family History   Problem Relation Age of Onset   • No Known Problems Mother    • No Known Problems Father      Social History     Socioeconomic History   • Marital status:      Spouse name: Not on file   • Number of children: Not on file   • Years of education: Not on file   • Highest education level: Not on file   Occupational History   • Not on file   Social Needs   • Financial resource strain: Not on file   • Food insecurity     Worry: Not on file     Inability: Not on file   • Transportation needs     Medical: Not on file     Non-medical: Not on file   Tobacco Use   • Smoking status: Former Smoker     Packs/day: 2.00     Years: 35.00     Pack years: 70.00     Types: Cigarettes     Last attempt to quit: 1973     Years since quittin.3   • Smokeless tobacco: Never Used   Substance and Sexual Activity   • Alcohol use: Yes     Comment: cocktail every night   • Drug use: No   • Sexual activity: Not on file   Lifestyle   • Physical activity     Days per week: Not on file     Minutes per session: Not on file   • Stress: Not on file   Relationships   • Social connections     Talks on phone: Not on file     Gets together: Not on file     Attends Baptism service: Not on file     Active member of club or organization: Not on file     Attends meetings of clubs or organizations: Not on file     Relationship status: Not on file   • Intimate partner violence     Fear of current or ex partner: Not on file     Emotionally abused: Not on file     Physically abused: Not on file     Forced sexual activity: Not on file   Other Topics Concern   • Not on file   Social History Narrative   • Not on file     Allergies   Allergen Reactions   • Atorvastatin Myalgia   • Simvastatin      ADVERSE REACTION: MYALGIA.     Outpatient Encounter Medications as of 2020   Medication Sig Dispense Refill   • tiotropium (SPIRIVA) 18 MCG Cap Inhale 1 Cap by mouth every day. 30 Cap 3   •  "doxycycline (VIBRAMYCIN) 100 MG Cap Take 1 Cap by mouth 2 times a day. Take until gone. 20 Cap 1   • lovastatin (MEVACOR) 40 MG tablet Take 1 Tab by mouth every day. One tablet at hs 180 Tab 2   • metoprolol (LOPRESSOR) 25 MG Tab Take 1 Tab by mouth 2 times a day. 180 Tab 2   • ofloxacin (OCUFLOX) 0.3 % Solution Place 0.3 Drops in both eyes 4 times a day.  2   • prednisoLONE acetate (PRED FORTE) 1 % Suspension Place 1 Drop in both eyes 4 times a day.  2   • nitroglycerin (NITROSTAT) 0.4 MG SUBL Place 1 Tab under tongue as needed for Chest Pain. 25 Tab 1   • aspirin 81 MG tablet Take 81 mg by mouth every day.     • doxycycline (VIBRAMYCIN) 100 MG Tab TK 1 T PO  BID FOR 10 DAYS. TK WITH 8 OZ OF WATER  3   • predniSONE (DELTASONE) 10 MG Tab Take 1 pill daily until finished (Patient not taking: Reported on 7/17/2019) 10 Tab 1   • doxycycline (VIBRAMYCIN) 100 MG Cap 1 tablet p.o. daily x10 days at the onset of severe cough. (Patient not taking: Reported on 6/27/2019) 20 Cap 1     No facility-administered encounter medications on file as of 7/30/2020.      Review of Systems   Constitutional: Positive for malaise/fatigue.   HENT: Negative.    Respiratory: Positive for cough, sputum production and shortness of breath.    Cardiovascular: Positive for chest pain.   Gastrointestinal: Negative.    Musculoskeletal: Negative.    Skin: Negative.    Neurological: Negative.    Endo/Heme/Allergies: Negative.         Objective:   /70 (BP Location: Left arm, Patient Position: Sitting, BP Cuff Size: Adult)   Pulse 62   Resp 18   Ht 1.803 m (5' 11\")   Wt 114.3 kg (252 lb)   SpO2 90%   BMI 35.15 kg/m²     Physical Exam   Constitutional: He is oriented to person, place, and time. He appears well-nourished. No distress.   HENT:   Head: Normocephalic and atraumatic.   Eyes: Pupils are equal, round, and reactive to light. No scleral icterus.   Neck: No JVD present. No tracheal deviation present.   Cardiovascular: Normal rate and " regular rhythm.   No murmur heard.  Pulmonary/Chest: No respiratory distress. He has no wheezes. He has rales.   Bilateral rales at the bases   Abdominal: Soft. Bowel sounds are normal. He exhibits no distension. There is no abdominal tenderness.   Obese   Musculoskeletal:         General: No edema.   Neurological: He is alert and oriented to person, place, and time.   Skin: Skin is warm and dry.   Psychiatric: He has a normal mood and affect.       Assessment:     1. Mixed hyperlipidemia     2. Hyperglycemia     3. Stented coronary artery     4. Coronary artery disease involving native coronary artery of native heart without angina pectoris     5. Essential hypertension         Medical Decision Making:  Today's Assessment / Status / Plan:   Coronary disease: Status post remote stenting of LAD no angina he is up-to-date on stress testing.    Hyperlipidemia: [facility]    Cough and sputum production.  On exam today the patient has significant rales bilaterally mostly at the bases.  I do not hear any wheezes.  He said chronic significant sputum production and has had hemoptysis in the past.  By his report, he has had difficulty obtaining an appointment with his pulmonologist even though he spoken to 1 of the supervisors there previously.  We will try and expedite pulmonary appointment for him as this appears to be his main problem.  Return 1 year

## 2020-07-31 NOTE — TELEPHONE ENCOUNTER
RS wants patient to see PMA for Bilateral Rales.  I left a message with PMA  and did not hear back from her today.  To Kady to follow up.

## 2020-08-04 ENCOUNTER — OFFICE VISIT (OUTPATIENT)
Dept: PULMONOLOGY | Facility: HOSPICE | Age: 85
End: 2020-08-04
Payer: MEDICARE

## 2020-08-04 VITALS
SYSTOLIC BLOOD PRESSURE: 136 MMHG | RESPIRATION RATE: 16 BRPM | BODY MASS INDEX: 34.72 KG/M2 | HEART RATE: 60 BPM | HEIGHT: 71 IN | OXYGEN SATURATION: 94 % | TEMPERATURE: 97.6 F | DIASTOLIC BLOOD PRESSURE: 72 MMHG | WEIGHT: 248 LBS

## 2020-08-04 DIAGNOSIS — R91.8 LUNG MASS: ICD-10-CM

## 2020-08-04 DIAGNOSIS — R04.2 HEMOPTYSIS: ICD-10-CM

## 2020-08-04 DIAGNOSIS — J84.9 ILD (INTERSTITIAL LUNG DISEASE) (HCC): ICD-10-CM

## 2020-08-04 DIAGNOSIS — R09.02 HYPOXEMIA: ICD-10-CM

## 2020-08-04 DIAGNOSIS — R06.02 SOB (SHORTNESS OF BREATH): ICD-10-CM

## 2020-08-04 DIAGNOSIS — R05.9 COUGH: ICD-10-CM

## 2020-08-04 PROCEDURE — 94761 N-INVAS EAR/PLS OXIMETRY MLT: CPT | Performed by: INTERNAL MEDICINE

## 2020-08-04 PROCEDURE — 99214 OFFICE O/P EST MOD 30 MIN: CPT | Mod: 25 | Performed by: INTERNAL MEDICINE

## 2020-08-04 ASSESSMENT — PAIN SCALES - GENERAL: PAINLEVEL: NO PAIN

## 2020-08-04 NOTE — PROCEDURES
Multi-Ox Readings  Multi Ox #1 Room air   O2 sat % at rest 92   O2 sat % on exertion 83   O2 sat average on exertion 88   Multi Ox #2 2 LPM   O2 sat % at rest 97   O2 sat % on exertion 93   O2 sat average on exertion 95     Oxygen Use     Oxygen Frequency     Duration of need     Is the patient mobile within the home?     CPAP Use?     BIPAP Use?     Servo Titration

## 2020-08-04 NOTE — PROGRESS NOTES
"Chief Complaint   Patient presents with   • Follow-Up     Dyspnea with exertion       HPI:  This patient is an 89 y.o. Male who returns for lung mass. He has a remote smoking history, having quit in 1973. He was experiencing recurrent hemoptysis, had seen Dr. Nicole, ENT, with alleged negative ENT workup. His sinus CAT scan was unremarkable. He had a screening chest x-ray January 2017 showing a mass. He denies associated chest pain, wheezing, fevers, chills, night sweats or weight loss.  He was told when he was released from the  after the [place] that he had \"a spot on my lung\". He has chronic kidney disease, consequently cannot have IV contrast. He consequently had MRI of the chest May 31, 2017 which showed a 3.4 cm right upper lobe spiculated mass in the lung. Subsequent PET scan June 28, 2017 showed cavitary, partially calcified 3.8 x 2.9 cm right upper lobe mass which appeared radiologically stable since 2005, with SUV uptake of 6.9. Mild honeycombing was noted within the lower lobes with mild FDG uptake, SUV 2.9-3.8. [facility] was [facility] at [facility] and it was felt given the stability of the lung mass from 2005, and minimal FDG uptake on PET, that malignancy was very unlikely.  In any case, he has declined surgical resection or any further diagnostic workup. CXR  12/18 showed stable right upper lobe mass. Quantiferon TB Gold is negative.  Pulmonary function testing show mild restrictive ventilatory defect, total lung capacity: 77%, DLCO: 51%.  He has had increased cough in recent months.  He denies any hemoptysis.  He has had good clinical response to doxycycline.  He had been evaluated by [facility] pulmonary.    He has noted increasing shortness of breath over the past year or so.  He gets short of breath with activities such as helping his wife change the sheets on the bed.  Overall his activity has been curtailed by chronic shoulder back and hip pain.  He is not as active as he once was.  He " continues with a cough that produces green/yellow sputum with occasionally a spot of blood.  He has taken 1 course of doxycycline which is helped his cough.    In addition to his right upper lobe mass/nodule his chest x-rays have shown bibasilar increased markings.  His pulmonary function testing shows findings consistent with a restrictive process.  He has bibasilar crackles.  He does have some interstitial lung disease.  I do not have any of his multiple prior CT scans for review.  He tells me that most of his scans were done at [facility].  Our system he had a PET scan in 2017 but no other chest CTs.  His recent chest x-ray was in December 2018 showing increased markings at the bases.    In the office his resting oxygen saturation was 92% but dropped to 83% with walking.  On 2 L of supplemental oxygen saturation at rest was 97% falling to 93% with walking and he felt better.    Past Medical History:   Diagnosis Date   • Anemia 8/1/2016   • Arthritis    • Back pain    • CAD (coronary artery disease)    • Cancer (HCC)     skin   • Chronic kidney disease (CKD), stage II (mild) 8/1/2016   • Coronary heart disease    • DJD (degenerative joint disease) 8/1/2016   • Dyslipidemia 8/1/2016   • GERD (gastroesophageal reflux disease) 8/1/2016   • Lao measles    • Hematochezia 8/1/2016   • High cholesterol    • Hyperglycemia 1/6/2010   • Hyperlipidemia 5/24/2011   • Hypertension    • Impaired fasting glucose 8/1/2016   • Left knee pain 8/1/2016   • Myocardial infarction (HCC) 2 yrs ago    2005   • Nasal drainage    • Obesity    • Osteoarthritis of knees, bilateral 8/1/2016   • Pulmonary embolism (HCC)    • Rheumatic fever    • Scarlet fever    • Smallpox    • Snoring        ROS:   Constitutional: Denies fevers, chills, night sweats, fatigue or weight loss  Eyes: Denies vision loss, pain, drainage, double vision  Ears, Nose, Throat: Denies earache, tinnitus, hoarseness  Cardiovascular: Denies chest  pain, tightness, palpitations this time  Respiratory: See HPI  Sleep: Denies, snoring, apnea  GI: Denies abdominal pain, nausea, vomiting, diarrhea  : Denies frequent urination, hematuria, painful urination  Musculoskeletal: Chronic back, hip, and shoulder pain  Neurological: Denies headaches, seizures  Skin: Denies rashes, color changes  Psychiatric: Denies depression or thoughts of suicide  Hematologic: Denies bleeding tendency or clotting tendency  Allergic/Immunologic: Denies rhinitis, skin sensitivity    Social History     Socioeconomic History   • Marital status:      Spouse name: Not on file   • Number of children: Not on file   • Years of education: Not on file   • Highest education level: Not on file   Occupational History   • Not on file   Social Needs   • Financial resource strain: Not on file   • Food insecurity     Worry: Not on file     Inability: Not on file   • Transportation needs     Medical: Not on file     Non-medical: Not on file   Tobacco Use   • Smoking status: Former Smoker     Packs/day: 2.00     Years: 35.00     Pack years: 70.00     Types: Cigarettes     Last attempt to quit: 1973     Years since quittin.3   • Smokeless tobacco: Never Used   Substance and Sexual Activity   • Alcohol use: Yes     Comment: cocktail every night   • Drug use: No   • Sexual activity: Not on file   Lifestyle   • Physical activity     Days per week: Not on file     Minutes per session: Not on file   • Stress: Not on file   Relationships   • Social connections     Talks on phone: Not on file     Gets together: Not on file     Attends Gnosticism service: Not on file     Active member of club or organization: Not on file     Attends meetings of clubs or organizations: Not on file     Relationship status: Not on file   • Intimate partner violence     Fear of current or ex partner: Not on file     Emotionally abused: Not on file     Physically abused: Not on file     Forced sexual activity: Not on  "file   Other Topics Concern   • Not on file   Social History Narrative   • Not on file     Atorvastatin and Simvastatin  Current Outpatient Medications on File Prior to Visit   Medication Sig Dispense Refill   • tiotropium (SPIRIVA) 18 MCG Cap Inhale 1 Cap by mouth every day. 30 Cap 3   • lovastatin (MEVACOR) 40 MG tablet Take 1 Tab by mouth every day. One tablet at hs 180 Tab 2   • metoprolol (LOPRESSOR) 25 MG Tab Take 1 Tab by mouth 2 times a day. 180 Tab 2   • ofloxacin (OCUFLOX) 0.3 % Solution Place 0.3 Drops in both eyes 4 times a day.  2   • prednisoLONE acetate (PRED FORTE) 1 % Suspension Place 1 Drop in both eyes 4 times a day.  2   • nitroglycerin (NITROSTAT) 0.4 MG SUBL Place 1 Tab under tongue as needed for Chest Pain. 25 Tab 1   • aspirin 81 MG tablet Take 81 mg by mouth every day.     • doxycycline (VIBRAMYCIN) 100 MG Tab TK 1 T PO  BID FOR 10 DAYS. TK WITH 8 OZ OF WATER  3   • doxycycline (VIBRAMYCIN) 100 MG Cap Take 1 Cap by mouth 2 times a day. Take until gone. (Patient not taking: Reported on 8/4/2020) 20 Cap 1   • predniSONE (DELTASONE) 10 MG Tab Take 1 pill daily until finished (Patient not taking: Reported on 7/17/2019) 10 Tab 1   • doxycycline (VIBRAMYCIN) 100 MG Cap 1 tablet p.o. daily x10 days at the onset of severe cough. (Patient not taking: Reported on 6/27/2019) 20 Cap 1     No current facility-administered medications on file prior to visit.      /72   Pulse 60   Temp 36.4 °C (97.6 °F) (Temporal)   Resp 16   Ht 1.803 m (5' 11\")   Wt 112.5 kg (248 lb)   SpO2 94%   Family History   Problem Relation Age of Onset   • No Known Problems Mother    • No Known Problems Father        Physical Exam:  No distress at rest on room air  HEENT: PERRLA, EOMI, no scleral icterus, no nasal or oral lesions  Neck: No thyromegaly, no adenopathy, no bruits  Mallampatti: Grade II  Lungs: He has bibasilar crackles  Heart: Regular rate and rhythm, no gallops or murmurs  Abdomen: Soft, benign, no " organomegaly  Extremities: No clubbing, cyanosis, or edema  Neurologic: Cranial nerve, motor, and sensory exam are normal    1. Lung mass    2. Hemoptysis    3. Cough    4. SOB (shortness of breath)    5. Hypoxemia    6. ILD (interstitial lung disease) (HCC)        This man has chronic right upper lobe cavitary nodule which is remained stable for over 10 years.  He is symptomatic with a cough, sputum production, and occasional mild hemoptysis which seems to respond to doxycycline.  He most likely has some underlying emphysema and uses Spiriva.  In addition he has a component of interstitial lung disease as outlined above.  At his age he is not really a candidate for an intensive work-up and treatment with anti-fibrotic medication.  He does not really want any intensive work-up.  He does qualify for supplemental oxygen which will be provided.  We will also make an attempt to obtain any recent imaging.  He said he had recent chest x-rays done at [facility] in [place].  He had prior films done at [facility].  We will see him back in 3 months for repeat evaluation.

## 2020-08-05 ENCOUNTER — TELEPHONE (OUTPATIENT)
Dept: PULMONOLOGY | Facility: HOSPICE | Age: 85
End: 2020-08-05

## 2020-09-16 ENCOUNTER — TELEPHONE (OUTPATIENT)
Dept: PULMONOLOGY | Facility: HOSPICE | Age: 85
End: 2020-09-16

## 2020-09-16 NOTE — TELEPHONE ENCOUNTER
The patient left a  mssg asking for a phone call.  He did not say what he needed.  I returned his call and got his .  Left a [facility] to call back.

## 2020-09-18 NOTE — TELEPHONE ENCOUNTER
The patient called and stated that he has been talking to [employer] for a POC.  Soraya is actually waiting for [employer] to fax a new order to us to have signed and to fax back.  I did transfer the call to Soraya so that she can go over the Medicare reimbursement procedure with him.

## 2020-10-28 ENCOUNTER — TELEPHONE (OUTPATIENT)
Dept: SLEEP MEDICINE | Facility: MEDICAL CENTER | Age: 85
End: 2020-10-28

## 2020-10-28 NOTE — TELEPHONE ENCOUNTER
Caller: Jaylene from [facility]    Phone Number: [phone]  Fax# [phone]    Message: They need to verify pt is on oxygen so they can deliver a POC to pt.         10/28/20-A Recent order was sent to Kristan..  Called and spoke with Gregorioolya.  Asked for her to fax soething over requesting this information. Provided her with our fax number.

## 2020-11-05 ENCOUNTER — OFFICE VISIT (OUTPATIENT)
Dept: SLEEP MEDICINE | Facility: MEDICAL CENTER | Age: 85
End: 2020-11-05
Payer: MEDICARE

## 2020-11-05 VITALS
RESPIRATION RATE: 16 BRPM | HEIGHT: 71 IN | DIASTOLIC BLOOD PRESSURE: 70 MMHG | BODY MASS INDEX: 34.3 KG/M2 | SYSTOLIC BLOOD PRESSURE: 128 MMHG | OXYGEN SATURATION: 97 % | TEMPERATURE: 98.1 F | WEIGHT: 245 LBS | HEART RATE: 74 BPM

## 2020-11-05 DIAGNOSIS — R09.02 HYPOXEMIA: ICD-10-CM

## 2020-11-05 DIAGNOSIS — R93.89 ABNORMAL CHEST CT: ICD-10-CM

## 2020-11-05 DIAGNOSIS — R05.9 COUGH: ICD-10-CM

## 2020-11-05 DIAGNOSIS — R06.02 SOB (SHORTNESS OF BREATH): ICD-10-CM

## 2020-11-05 PROCEDURE — 99214 OFFICE O/P EST MOD 30 MIN: CPT | Performed by: INTERNAL MEDICINE

## 2020-11-05 RX ORDER — BENZONATATE 200 MG/1
200 CAPSULE ORAL 2 TIMES DAILY PRN
Qty: 60 CAP | Refills: 6 | Status: SHIPPED | OUTPATIENT
Start: 2020-11-05 | End: 2021-05-26 | Stop reason: SDUPTHER

## 2020-11-05 RX ORDER — PREDNISONE 10 MG/1
TABLET ORAL
Qty: 18 TAB | Refills: 2 | Status: ON HOLD | OUTPATIENT
Start: 2020-11-05 | End: 2021-07-09

## 2020-11-05 RX ORDER — DOXYCYCLINE HYCLATE 100 MG/1
100 CAPSULE ORAL DAILY
Qty: 21 CAP | Refills: 6 | Status: ON HOLD | OUTPATIENT
Start: 2020-11-05 | End: 2021-07-09

## 2020-11-05 RX ORDER — OMEPRAZOLE 20 MG/1
CAPSULE, DELAYED RELEASE ORAL
Status: ON HOLD | COMMUNITY
End: 2021-07-10

## 2020-11-05 ASSESSMENT — PAIN SCALES - GENERAL: PAINLEVEL: NO PAIN

## 2020-11-05 NOTE — PROGRESS NOTES
Chief Complaint   Patient presents with   • Follow-Up     Hypoxia       HPI:  Patient is an 89-year-old man who has a chronic right upper lobe cavitary mass which has been present for many years.  In the past he has had episodes of hemoptysis that have been successfully treated with doxycycline.  Please see my prior note from 8/4/2020.  After his last visit we got him started on supplemental oxygen at night and with exercise.  He is sleeping much better.  He can do more when he uses his oxygen during the day.  He tried Spiriva which did not help much and he has discontinued it.  His biggest complaint now is a cough that is quite severe.  He says he coughs every morning to the point of almost vomiting.  He says the cough takes a lot out of him.  He is not wheezing.  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  His cough is productive of green-yellow sputum and has responded to doxycycline and prednisone in the past.    Past Medical History:   Diagnosis Date   • Anemia 8/1/2016   • Arthritis    • Back pain    • CAD (coronary artery disease)    • Cancer (HCC)     skin   • Chronic kidney disease (CKD), stage II (mild) 8/1/2016   • Coronary heart disease    • DJD (degenerative joint disease) 8/1/2016   • Dyslipidemia 8/1/2016   • GERD (gastroesophageal reflux disease) 8/1/2016   • [place] measles    • Hematochezia 8/1/2016   • High cholesterol    • Hyperglycemia 1/6/2010   • Hyperlipidemia 5/24/2011   • Hypertension    • Impaired fasting glucose 8/1/2016   • Left knee pain 8/1/2016   • Myocardial infarction (HCC) 2 yrs ago    2005   • Nasal drainage    • Obesity    • Osteoarthritis of knees, bilateral 8/1/2016   • Pulmonary embolism (HCC)    • Rheumatic fever    • Scarlet fever    • Smallpox    • Snoring        ROS:   Constitutional: Denies fevers, chills, night sweats, fatigue or weight loss  Eyes: Denies vision loss, pain, drainage, double vision  Ears, Nose, Throat: Denies earache, tinnitus,  hoarseness  Cardiovascular: Denies chest pain, tightness, palpitations at this time  Respiratory: See HPI  Sleep: Denies, snoring, apnea  GI: Denies abdominal pain, nausea, vomiting, diarrhea  : Denies frequent urination, hematuria, painful urination  Musculoskeletal: Denies back pain, painful joints, sore muscles  Neurological: Denies headaches, seizures  Skin: Denies rashes, color changes  Psychiatric: Denies depression or thoughts of suicide  Hematologic: Denies bleeding tendency or clotting tendency  Allergic/Immunologic: Denies rhinitis, skin sensitivity    Social History     Socioeconomic History   • Marital status:      Spouse name: Not on file   • Number of children: Not on file   • Years of education: Not on file   • Highest education level: Not on file   Occupational History   • Not on file   Social Needs   • Financial resource strain: Not on file   • Food insecurity     Worry: Not on file     Inability: Not on file   • Transportation needs     Medical: Not on file     Non-medical: Not on file   Tobacco Use   • Smoking status: Former Smoker     Packs/day: 2.00     Years: 35.00     Pack years: 70.00     Types: Cigarettes     Quit date: 1973     Years since quittin.5   • Smokeless tobacco: Never Used   Substance and Sexual Activity   • Alcohol use: Yes     Comment: cocktail every night   • Drug use: No   • Sexual activity: Not on file   Lifestyle   • Physical activity     Days per week: Not on file     Minutes per session: Not on file   • Stress: Not on file   Relationships   • Social connections     Talks on phone: Not on file     Gets together: Not on file     Attends Oriental orthodox service: Not on file     Active member of club or organization: Not on file     Attends meetings of clubs or organizations: Not on file     Relationship status: Not on file   • Intimate partner violence     Fear of current or ex partner: Not on file     Emotionally abused: Not on file     Physically abused: Not on  "file     Forced sexual activity: Not on file   Other Topics Concern   • Not on file   Social History Narrative   • Not on file     Atorvastatin and Simvastatin  Current Outpatient Medications on File Prior to Visit   Medication Sig Dispense Refill   • lovastatin (MEVACOR) 40 MG tablet Take 1 Tab by mouth every day. One tablet at hs 180 Tab 2   • metoprolol (LOPRESSOR) 25 MG Tab Take 1 Tab by mouth 2 times a day. 180 Tab 2   • nitroglycerin (NITROSTAT) 0.4 MG SUBL Place 1 Tab under tongue as needed for Chest Pain. 25 Tab 1   • aspirin 81 MG tablet Take 81 mg by mouth every day.     • omeprazole (PRILOSEC) 20 MG delayed-release capsule omeprazole 20 mg capsule,delayed release     • predniSONE (DELTASONE) 10 MG Tab Take 1 pill daily until finished (Patient not taking: Reported on 7/17/2019) 10 Tab 1   • ofloxacin (OCUFLOX) 0.3 % Solution Place 0.3 Drops in both eyes 4 times a day.  2   • prednisoLONE acetate (PRED FORTE) 1 % Suspension Place 1 Drop in both eyes 4 times a day.  2     No current facility-administered medications on file prior to visit.      /70   Pulse 74   Temp 36.7 °C (98.1 °F) (Temporal)   Resp 16   Ht 1.803 m (5' 11\")   Wt 111.1 kg (245 lb)   SpO2 97%   Family History   Problem Relation Age of Onset   • No Known Problems Mother    • No Known Problems Father        Physical Exam:  No distress at rest on supplemental oxygen  HEENT: PERRLA, EOMI, no scleral icterus, no nasal or oral lesions  Neck: No thyromegaly, no adenopathy, no bruits  Mallampatti: Grade II  Lungs: Normal breath sounds, no wheezes or crackles on today's exam  Heart: Regular rate and rhythm, no gallops or murmurs  Abdomen: Soft, benign, no organomegaly  Extremities: No clubbing, cyanosis, or edema  Neurologic: Cranial nerve, motor, and sensory exam are normal    1. SOB (shortness of breath)    2. Hypoxemia    3. Abnormal chest CT    4. Cough      He will continue his supplemental oxygen at night and with activity.  He has " been encouraged to use it more with activity.  His cough is his biggest problem.  We will give him a brief course of prednisone 30 mg x 3 days, 20 mg x 3 days, 10 mg x 3 days.  We will give him 7-day course of doxycycline and initiate treatment with Tessalon Perles twice daily.  I have asked him to use the doxycycline for 7 days each month.  We will see him back in 3 months for repeat evaluation.

## 2021-02-24 ENCOUNTER — OFFICE VISIT (OUTPATIENT)
Dept: SLEEP MEDICINE | Facility: MEDICAL CENTER | Age: 86
End: 2021-02-24
Payer: MEDICARE

## 2021-02-24 VITALS
DIASTOLIC BLOOD PRESSURE: 80 MMHG | HEART RATE: 74 BPM | HEIGHT: 71 IN | RESPIRATION RATE: 18 BRPM | OXYGEN SATURATION: 94 % | BODY MASS INDEX: 32.9 KG/M2 | TEMPERATURE: 98 F | SYSTOLIC BLOOD PRESSURE: 160 MMHG | WEIGHT: 235 LBS

## 2021-02-24 DIAGNOSIS — R05.9 COUGH: ICD-10-CM

## 2021-02-24 DIAGNOSIS — R93.89 ABNORMAL CHEST CT: ICD-10-CM

## 2021-02-24 DIAGNOSIS — R91.8 LUNG MASS: ICD-10-CM

## 2021-02-24 DIAGNOSIS — R06.02 SOB (SHORTNESS OF BREATH): ICD-10-CM

## 2021-02-24 DIAGNOSIS — R09.02 HYPOXEMIA: ICD-10-CM

## 2021-02-24 PROCEDURE — 99214 OFFICE O/P EST MOD 30 MIN: CPT | Performed by: INTERNAL MEDICINE

## 2021-02-24 ASSESSMENT — PAIN SCALES - GENERAL: PAINLEVEL: NO PAIN

## 2021-02-24 NOTE — PROGRESS NOTES
Chief Complaint   Patient presents with   • Follow-Up     Hypoxia       HPI:  The patient is an 89-year-old man who has a chronic right upper lobe cavitary mass which has been present for many years.  In the past he has had episodes of hemoptysis that have been successfully treated with doxycycline.  Please see my prior note from 8/4/2020.  We started him on supplemental oxygen at night and with exercise.  He is sleeping better.  He can do more when he uses his oxygen during the day.  However he is still frustrated that he cannot do as much as he did previously.  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 He tried Spiriva which did not help much and he has discontinued it.  His biggest complaint at his last visit was a severe cough absolutely.  He said he coughs every morning to the point of almost vomiting.  He says the cough takes a lot out of him.  He is not wheezing.  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  His cough is productive of green-yellow sputum and has responded to doxycycline and prednisone in the past.  After that visit we placed him on a brief course of prednisone and doxycycline which helped his cough.  We asked him to use his doxycycline for the first 7 days of each month.  He may have a component of bronchiectasis.  He still tells me that he desaturates sometimes below 80% with activity despite using his portable oxygen concentrator and sometimes in the cylinder.  He was late to the office today and was in a rush.  His initial oxygen saturation was 76%.  With supplemental oxygen at 4 L/min continuous his saturations cash to 94%.  We explained to him that he desaturates very easily with activity if he moves too quickly.  He tells me again that inhalers have never helped him.    Past Medical History:   Diagnosis Date   • Anemia 8/1/2016   • Arthritis    • Back pain    • CAD (coronary artery disease)    •  Cancer (HCC)     skin   • Chronic kidney disease (CKD), stage II (mild) 2016   • Coronary heart disease    • DJD (degenerative joint disease) 2016   • Dyslipidemia 2016   • GERD (gastroesophageal reflux disease) 2016   • [place] measles    • Hematochezia 2016   • High cholesterol    • Hyperglycemia 2010   • Hyperlipidemia 2011   • Hypertension    • Impaired fasting glucose 2016   • Left knee pain 2016   • Myocardial infarction (HCC) 2 yrs ago       • Nasal drainage    • Obesity    • Osteoarthritis of knees, bilateral 2016   • Pulmonary embolism (HCC)    • Rheumatic fever    • Scarlet fever    • Smallpox    • Snoring        ROS:   Constitutional: Denies fevers, chills, night sweats, fatigue or weight loss  Eyes: Denies vision loss, pain, drainage, double vision  Ears, Nose, Throat: Denies earache, tinnitus, hoarseness  Cardiovascular: Denies chest pain, tightness, palpitations  Respiratory: See HPI  Sleep: Denies, snoring, apnea  GI: Denies abdominal pain, nausea, vomiting, diarrhea  : Denies frequent urination, hematuria, painful urination  Musculoskeletal: Denies back pain, painful joints, sore muscles  Neurological: Denies headaches, seizures  Skin: Denies rashes, color changes  Psychiatric: Denies depression or thoughts of suicide  Hematologic: Denies bleeding tendency or clotting tendency  Allergic/Immunologic: Denies rhinitis, skin sensitivity    Social History     Socioeconomic History   • Marital status:      Spouse name: Not on file   • Number of children: Not on file   • Years of education: Not on file   • Highest education level: Not on file   Occupational History   • Not on file   Tobacco Use   • Smoking status: Former Smoker     Packs/day: 2.00     Years: 35.00     Pack years: 70.00     Types: Cigarettes     Quit date: 1973     Years since quittin.8   • Smokeless tobacco: Never Used   Substance and Sexual Activity   • Alcohol use: Yes      Comment: cocktail every night   • Drug use: No   • Sexual activity: Not on file   Other Topics Concern   • Not on file   Social History Narrative   • Not on file     Social Determinants of Health     Financial Resource Strain:    • Difficulty of Paying Living Expenses:    Food Insecurity:    • Worried About Running Out of Food in the Last Year:    • Ran Out of Food in the Last Year:    Transportation Needs:    • Lack of Transportation (Medical):    • Lack of Transportation (Non-Medical):    Physical Activity:    • Days of Exercise per Week:    • Minutes of Exercise per Session:    Stress:    • Feeling of Stress :    Social Connections:    • Frequency of Communication with Friends and Family:    • Frequency of Social Gatherings with Friends and Family:    • Attends Orthodox Services:    • Active Member of Clubs or Organizations:    • Attends Club or Organization Meetings:    • Marital Status:    Intimate Partner Violence:    • Fear of Current or Ex-Partner:    • Emotionally Abused:    • Physically Abused:    • Sexually Abused:      Atorvastatin and Simvastatin  Current Outpatient Medications on File Prior to Visit   Medication Sig Dispense Refill   • omeprazole (PRILOSEC) 20 MG delayed-release capsule omeprazole 20 mg capsule,delayed release     • predniSONE (DELTASONE) 10 MG Tab Take 30mg x 3 days, then take 20mg x 3 days, then take 10mg x 3 days, with food, then discontinue. 18 Tab 2   • doxycycline (VIBRAMYCIN) 100 MG Cap Take 1 Cap by mouth every day. One po qd for 7 days each month 21 Cap 6   • benzonatate (TESSALON) 200 MG capsule Take 1 Cap by mouth 2 times a day as needed for Cough. Do not chew. Swallow whole. 60 Cap 6   • predniSONE (DELTASONE) 10 MG Tab Take 1 pill daily until finished (Patient not taking: Reported on 7/17/2019) 10 Tab 1   • lovastatin (MEVACOR) 40 MG tablet Take 1 Tab by mouth every day. One tablet at hs 180 Tab 2   • metoprolol (LOPRESSOR) 25 MG Tab Take 1 Tab by mouth 2 times a day. 180  Tab 2   • ofloxacin (OCUFLOX) 0.3 % Solution Place 0.3 Drops in both eyes 4 times a day.  2   • prednisoLONE acetate (PRED FORTE) 1 % Suspension Place 1 Drop in both eyes 4 times a day.  2   • nitroglycerin (NITROSTAT) 0.4 MG SUBL Place 1 Tab under tongue as needed for Chest Pain. 25 Tab 1   • aspirin 81 MG tablet Take 81 mg by mouth every day.       No current facility-administered medications on file prior to visit.     There were no vitals taken for this visit.  Family History   Problem Relation Age of Onset   • No Known Problems Mother    • No Known Problems Father        Physical Exam:  No distress at rest on 4 L of supplemental oxygen continuously  HEENT: PERRLA, EOMI, no scleral icterus, no nasal or oral lesions  Neck: No thyromegaly, no adenopathy, no bruits  Mallampatti: Grade II  Lungs: He has wet sounding crackles at the bases  Heart: Regular rate and rhythm, no gallops or murmurs  Abdomen: Soft, benign, no organomegaly  Extremities: No clubbing, cyanosis, or edema  Neurologic: Cranial nerve, motor, and sensory exam are normal    1. SOB (shortness of breath)    2. Hypoxemia    3. Abnormal chest CT    4. Cough      As discussed previously this man has a chronic right upper lobe cavitary nodule which is remained stable for over 10 years.  He also may have a component of COPD with emphysema and also may have a component of bronchiectasis or interstitial lung disease.  We discussed previously the fact that he does not want an intensive work-up at his age.  We have provided supplemental oxygen as well as monthly doxycycline and occasional prednisone taper.  He needs to limit his activity a bit to avoid oxygen desaturation which occurs when he moves too quickly or works for prolonged period of time.  We will see him back in several months for repeat evaluation to see if the doxycycline is helping his cough and sputum production.

## 2021-03-11 ENCOUNTER — TELEPHONE (OUTPATIENT)
Dept: SLEEP MEDICINE | Facility: MEDICAL CENTER | Age: 86
End: 2021-03-11

## 2021-03-11 DIAGNOSIS — R04.0 EPISTAXIS: ICD-10-CM

## 2021-03-11 DIAGNOSIS — R05.9 COUGH: ICD-10-CM

## 2021-03-12 NOTE — TELEPHONE ENCOUNTER
Patient called and is requesting referral for ENT. He states he spoke to Dr. Tobin at his last visit.   He is requesting to go to [facility].

## 2021-05-26 ENCOUNTER — OFFICE VISIT (OUTPATIENT)
Dept: SLEEP MEDICINE | Facility: MEDICAL CENTER | Age: 86
End: 2021-05-26
Payer: MEDICARE

## 2021-05-26 VITALS
HEART RATE: 84 BPM | BODY MASS INDEX: 34.02 KG/M2 | RESPIRATION RATE: 16 BRPM | HEIGHT: 71 IN | OXYGEN SATURATION: 93 % | WEIGHT: 243 LBS | SYSTOLIC BLOOD PRESSURE: 130 MMHG | DIASTOLIC BLOOD PRESSURE: 84 MMHG

## 2021-05-26 DIAGNOSIS — R05.9 COUGH: ICD-10-CM

## 2021-05-26 DIAGNOSIS — R09.02 HYPOXIA: ICD-10-CM

## 2021-05-26 DIAGNOSIS — R04.2 HEMOPTYSIS: ICD-10-CM

## 2021-05-26 DIAGNOSIS — R93.89 ABNORMAL CHEST X-RAY: ICD-10-CM

## 2021-05-26 PROCEDURE — 99214 OFFICE O/P EST MOD 30 MIN: CPT | Performed by: PHYSICIAN ASSISTANT

## 2021-05-26 RX ORDER — BENZONATATE 200 MG/1
200 CAPSULE ORAL 2 TIMES DAILY PRN
Qty: 60 CAPSULE | Refills: 6 | Status: SHIPPED
Start: 2021-05-26 | End: 2021-12-08

## 2021-05-26 RX ORDER — DOXYCYCLINE HYCLATE 100 MG/1
100 CAPSULE ORAL 2 TIMES DAILY
Qty: 14 CAPSULE | Refills: 6 | Status: ON HOLD | OUTPATIENT
Start: 2021-05-26 | End: 2021-07-09

## 2021-05-26 ASSESSMENT — ENCOUNTER SYMPTOMS
COUGH: 1
DIZZINESS: 1
TREMORS: 0
CHILLS: 0
SINUS PAIN: 0
SPUTUM PRODUCTION: 1
WHEEZING: 1
PALPITATIONS: 0
SHORTNESS OF BREATH: 1
WEIGHT LOSS: 0
HEADACHES: 0
INSOMNIA: 0
ORTHOPNEA: 0
SORE THROAT: 0
GASTROINTESTINAL NEGATIVE: 1
HEARTBURN: 0
FEVER: 0

## 2021-05-26 NOTE — PATIENT INSTRUCTIONS
1-prior authorization benzonatate  2-OTC alternative is Mucinex (guaifenesin) available in both pills and liquid can be taken every 12 hours  3-filled out [employer] for [place] [employer]  [facility]    5-resume antibiotics x one week a month for intermittent hemoptysis d/t cavitary lesion  6-follow up in 6 months

## 2021-05-26 NOTE — PROGRESS NOTES
CC: Intermittent hemoptysis    HPI:  Kobe Cyr is a 89 y.o. year old male here today for follow-up on chronic right upper lobe cavitary lesion present for many years.  Last seen in clinic 2/24/2021.  He is a former smoker with reported quit date in 1973 and 70-pack-year history.    Pertinent past medical history includes intermittent hemoptysis, MI, coronary artery disease, stented coronary artery, bilateral carotid artery stenosis, chronic kidney disease stage III, gout, GERD.      Reviewed in clinic vitals including /84, HR 84, oxygen saturation of 93% on 2 L. Patient's body mass index is 33.89 kg/m². Exercise and nutrition counseling were performed at this visit.    Reviewed home medication regimen including doxycycline which he reports taking 1 week a month, omeprazole, benzonatate.  Patient reports having trialed himself off monthly doxycycline with recurrence of hemoptysis including brown sputum in a.m. and stringy red sputum during day.  Reports benzonatate is quite expensive.  Also reports he is on ipratropium nasal spray prescribed by Dr. Valdovinos his ENT.    No recent imaging, chest x-ray obtained 12/4/2018 demonstrated continued ill-defined patchy parenchymal opacities in both lungs, greater in right upper lobe and bilateral lower lobes.  No significant interval change.    Echocardiogram obtained 9/13/2017 demonstrated moderate concentric left ventricular hypertrophy, normal left ventricular chamber size and systolic function, LVEF estimated at 55%.  Diastolic function not assessed.  Normal right ventricular size and function, moderately dilated left atrium, mild tricuspid regurgitation, RVSP estimated at 55 mmHg.    CT PET scan obtained 6/28/2017 demonstrated a spiculated partially cavitary 3.8 x 2.9 cm right upper lobe mass dense central calcification, moderately hypermetabolic maximum standard uptake 6.9, presence of calcification suggests this may be inflammatory, stable radiographically  since 2005, linear density extending to the pleural also supports inflammatory or postinflammatory scarring.  Most consistent with prior granulomatous infection with associated active inflammation or infection. Findings highly suspicious for UIP with peripheral interstitial changes and honeycombing with mild increase in activity indicating active inflammation.    Pulmonary function testing last obtained 6/25/2019 demonstrated FEV1 of 1.87 L or 70% predicted, FVC of 2.84 L or 73% predicted, FEV1/FVC ratio of 66.  No significant postbronchodilator change, residual volume 95% predicted, TLC 77% predicted, DLCO 51% predicted. Per pulmonologist interpretation mixed obstructive and restrictive ventilatory defects noted.  Moderate gas transfer impairment.  PFTs consistent with COPD plus obesity/interstitial lung disease.    Review of Systems   Constitutional: Negative for chills, fever, malaise/fatigue and weight loss.   HENT: Positive for congestion, nosebleeds (very mild) and tinnitus. Negative for hearing loss, sinus pain and sore throat.    Eyes:        Presc glasses   Respiratory: Positive for cough, sputum production (green-yellow ), shortness of breath (does okay with oxygen ) and wheezing (occasioally when sleeping ).    Cardiovascular: Positive for leg swelling. Negative for chest pain, palpitations and orthopnea.   Gastrointestinal: Negative.  Negative for heartburn.        Missing a few, no difficulty swallowing    Neurological: Positive for dizziness. Negative for tremors and headaches.   Psychiatric/Behavioral: The patient does not have insomnia.        Past Medical History:   Diagnosis Date   • Anemia 8/1/2016   • Arthritis    • Back pain    • CAD (coronary artery disease)    • Cancer (HCC)     skin   • Chronic kidney disease (CKD), stage II (mild) 8/1/2016   • Coronary heart disease    • DJD (degenerative joint disease) 8/1/2016   • Dyslipidemia 8/1/2016   • GERD (gastroesophageal reflux disease) 8/1/2016    • [place] measles    • Hematochezia 2016   • High cholesterol    • Hyperglycemia 2010   • Hyperlipidemia 2011   • Hypertension    • Impaired fasting glucose 2016   • Left knee pain 2016   • Myocardial infarction (HCC) 2 yrs ago       • Nasal drainage    • Obesity    • Osteoarthritis of knees, bilateral 2016   • Pulmonary embolism (HCC)    • Rheumatic fever    • Scarlet fever    • Smallpox    • Snoring        Past Surgical History:   Procedure Laterality Date   • KNEE UNICOMPARTMENTAL  10/13/2009    Performed by SAM LINDER at SURGERY [facility]   • ARTHROSCOPY, KNEE     • CORONARY STENT PROCEDURE LAD     • OTHER ORTHOPEDIC SURGERY      right shoulder rotator cuff surgery x2       Family History   Problem Relation Age of Onset   • No Known Problems Mother    • No Known Problems Father        Social History     Socioeconomic History   • Marital status:      Spouse name: Not on file   • Number of children: Not on file   • Years of education: Not on file   • Highest education level: Not on file   Occupational History   • Not on file   Tobacco Use   • Smoking status: Former Smoker     Packs/day: 2.00     Years: 35.00     Pack years: 70.00     Types: Cigarettes     Quit date: 1973     Years since quittin.1   • Smokeless tobacco: Never Used   Vaping Use   • Vaping Use: Never used   Substance and Sexual Activity   • Alcohol use: Yes     Comment: cocktail every night   • Drug use: No   • Sexual activity: Not on file   Other Topics Concern   • Not on file   Social History Narrative   • Not on file     Social Determinants of Health     Financial Resource Strain:    • Difficulty of Paying Living Expenses:    Food Insecurity:    • Worried About Running Out of Food in the Last Year:    • Ran Out of Food in the Last Year:    Transportation Needs:    • Lack of Transportation (Medical):    • Lack of Transportation (Non-Medical):    Physical Activity:    • Days of Exercise per  "Week:    • Minutes of Exercise per Session:    Stress:    • Feeling of Stress :    Social Connections:    • Frequency of Communication with Friends and Family:    • Frequency of Social Gatherings with Friends and Family:    • Attends [employer] Services:    • Active Member of Clubs or Organizations:    • Attends Club or Organization Meetings:    • Marital Status:    Intimate Partner Violence:    • Fear of Current or Ex-Partner:    • Emotionally Abused:    • Physically Abused:    • Sexually Abused:        Allergies as of 05/26/2021 - Reviewed 05/26/2021   Allergen Reaction Noted   • Atorvastatin Myalgia 04/20/2016   • Simvastatin  07/08/2010        @Vital signs for this encounter:  Vitals:    05/26/21 1435   Height: 1.803 m (5' 11\")   Weight: 110 kg (243 lb)   Weight % change since last entry.: 0 %   BP: 130/84   Pulse: 84   BMI (Calculated): 33.89   Resp: 16       Current medications as of today   Current Outpatient Medications   Medication Sig Dispense Refill   • omeprazole (PRILOSEC) 20 MG delayed-release capsule omeprazole 20 mg capsule,delayed release     • benzonatate (TESSALON) 200 MG capsule Take 1 Cap by mouth 2 times a day as needed for Cough. Do not chew. Swallow whole. 60 Cap 6   • lovastatin (MEVACOR) 40 MG tablet Take 1 Tab by mouth every day. One tablet at hs 180 Tab 2   • metoprolol (LOPRESSOR) 25 MG Tab Take 1 Tab by mouth 2 times a day. 180 Tab 2   • ofloxacin (OCUFLOX) 0.3 % Solution Place 0.3 Drops in both eyes 4 times a day.  2   • prednisoLONE acetate (PRED FORTE) 1 % Suspension Place 1 Drop in both eyes 4 times a day.  2   • nitroglycerin (NITROSTAT) 0.4 MG SUBL Place 1 Tab under tongue as needed for Chest Pain. 25 Tab 1   • aspirin 81 MG tablet Take 81 mg by mouth every day.     • predniSONE (DELTASONE) 10 MG Tab Take 30mg x 3 days, then take 20mg x 3 days, then take 10mg x 3 days, with food, then discontinue. (Patient not taking: Reported on 5/26/2021) 18 Tab 2   • doxycycline (VIBRAMYCIN) 100 " MG Cap Take 1 Cap by mouth every day. One po qd for 7 days each month (Patient not taking: Reported on 2/24/2021) 21 Cap 6   • predniSONE (DELTASONE) 10 MG Tab Take 1 pill daily until finished (Patient not taking: Reported on 7/17/2019) 10 Tab 1     No current facility-administered medications for this visit.         Physical Exam:   Gen:           Alert and oriented, No apparent distress. Mood and affect     appropriate, normal interaction with provider.  Eyes:          sclere white, conjunctive moist.  Hearing:     Grossly intact.  Dentition:    Fair dentition.  Oropharynx:   Tongue normal, posterior pharynx without erythema or exudate.  Neck:        Supple, trachea midline, no masses.  Respiratory Effort: No intercostal retractions or use of accessory muscles.   Lung Auscultation:      Diminished, faint scattered rales, no rhonchi or wheezing.  CV:            Regular rate and rhythm.  Trace lower extremity edema. No murmurs, rubs or gallops.  Digits, Nails, Ext: No clubbing, cyanosis, petechiae, or nodes.   Skin:        No rashes, lesions or ulcers noted on exposed skin surfaces.                     Assessment:  1. Cough  benzonatate (TESSALON) 200 MG capsule    doxycycline (VIBRAMYCIN) 100 MG Cap   2. Abnormal chest x-ray     3. Hemoptysis  doxycycline (VIBRAMYCIN) 100 MG Cap   4. Hypoxia         Immunizations:    Flu: 9/25/2020  Pneumovax 23: 11/8/2010  Prevnar 13: 6/28/2016    Plan:    89 y.o. year old male here today for follow-up on chronic right upper lobe cavitary lesion present for many years.  Last seen in clinic 2/24/2021.  He is a former smoker with reported quit date in 1973 and 70-pack-year history.    Pertinent past medical history includes intermittent hemoptysis, MI, coronary artery disease, stented coronary artery, bilateral carotid artery stenosis, chronic kidney disease stage III, gout, GERD.      Reviewed in clinic vitals including /84, HR 84, oxygen saturation of 93% on 2 L. Patient's  body mass index is 33.89 kg/m². Exercise and nutrition counseling were performed at this visit.    Reviewed home medication regimen including doxycycline which he reports taking 1 week a month, omeprazole, benzonatate.  Patient reports having trialed himself off monthly doxycycline with recurrence of hemoptysis including brown sputum in a.m. and stringy red sputum during day.  Reports benzonatate is quite expensive.  Also reports he is on ipratropium nasal spray prescribed by Dr. Valdovinos his ENT.    Hemoptysis: Known cavitary right upper lobe lesion, abnormal imaging.  Declines further work-up given his age.  Patient wishes to continue monthly doxycycline course as per previous provider.  Refill doxycycline.    Cough: Improved short-term following doxycycline.  Benefit with benzonatate, refill provided.  Discussion regarding cost will trial prior authorization.  Trial over-the-counter Mucinex and assess for benefit.    Hypoxia: States continued benefit with oxygen use.  Filled out paperwork for [place] [employer] program.  Office visit notes to [facility] to document continued use and benefit.    Follow-up in 6 months, sooner if needed.    This dictation was created using voice recognition software. The accuracy of the dictation is limited to the abilities of the software. I expect there may be some errors of grammar and possibly content.

## 2021-06-17 ENCOUNTER — TELEPHONE (OUTPATIENT)
Dept: SLEEP MEDICINE | Facility: MEDICAL CENTER | Age: 86
End: 2021-06-17

## 2021-06-17 NOTE — TELEPHONE ENCOUNTER
Patient called to verify status of paperwork to [employer] for his Scooter. Please call him to advise the status  [phone]

## 2021-06-17 NOTE — TELEPHONE ENCOUNTER
We do not provide orders for scooters. This needs to come from PCP or another provider that usually handles these.

## 2021-06-18 ENCOUNTER — TELEPHONE (OUTPATIENT)
Dept: SLEEP MEDICINE | Facility: MEDICAL CENTER | Age: 86
End: 2021-06-18

## 2021-06-18 NOTE — TELEPHONE ENCOUNTER
Patient LVM regarding [place] [employer] program paperwork and it not being received. I do not see that this has been scanned into media.   Please advise patient and see if you have paperwork

## 2021-07-09 ENCOUNTER — HOSPITAL ENCOUNTER (INPATIENT)
Facility: MEDICAL CENTER | Age: 86
LOS: 5 days | DRG: 280 | End: 2021-07-14
Attending: INTERNAL MEDICINE | Admitting: HOSPITALIST
Payer: MEDICARE

## 2021-07-09 DIAGNOSIS — I27.20 PULMONARY HTN (HCC): ICD-10-CM

## 2021-07-09 DIAGNOSIS — I21.4 NSTEMI (NON-ST ELEVATED MYOCARDIAL INFARCTION) (HCC): ICD-10-CM

## 2021-07-09 DIAGNOSIS — J96.21 ACUTE ON CHRONIC RESPIRATORY FAILURE WITH HYPOXIA (HCC): ICD-10-CM

## 2021-07-09 LAB
ALBUMIN SERPL BCP-MCNC: 3.4 G/DL (ref 3.2–4.9)
ALBUMIN/GLOB SERPL: 1.1 G/DL
ALP SERPL-CCNC: 42 U/L (ref 30–99)
ALT SERPL-CCNC: 9 U/L (ref 2–50)
ANION GAP SERPL CALC-SCNC: 10 MMOL/L (ref 7–16)
APTT PPP: 30.4 SEC (ref 24.7–36)
AST SERPL-CCNC: 21 U/L (ref 12–45)
BASOPHILS # BLD AUTO: 1.2 % (ref 0–1.8)
BASOPHILS # BLD: 0.07 K/UL (ref 0–0.12)
BILIRUB SERPL-MCNC: 0.3 MG/DL (ref 0.1–1.5)
BUN SERPL-MCNC: 28 MG/DL (ref 8–22)
CALCIUM SERPL-MCNC: 8.8 MG/DL (ref 8.5–10.5)
CHLORIDE SERPL-SCNC: 104 MMOL/L (ref 96–112)
CO2 SERPL-SCNC: 25 MMOL/L (ref 20–33)
CREAT SERPL-MCNC: 1.84 MG/DL (ref 0.5–1.4)
D DIMER PPP IA.FEU-MCNC: 0.74 UG/ML (FEU) (ref 0–0.5)
EKG IMPRESSION: NORMAL
EOSINOPHIL # BLD AUTO: 0.09 K/UL (ref 0–0.51)
EOSINOPHIL NFR BLD: 1.5 % (ref 0–6.9)
ERYTHROCYTE [DISTWIDTH] IN BLOOD BY AUTOMATED COUNT: 53.5 FL (ref 35.9–50)
ERYTHROCYTE [DISTWIDTH] IN BLOOD BY AUTOMATED COUNT: 53.5 FL (ref 35.9–50)
GLOBULIN SER CALC-MCNC: 3.2 G/DL (ref 1.9–3.5)
GLUCOSE SERPL-MCNC: 127 MG/DL (ref 65–99)
HCT VFR BLD AUTO: 35.1 % (ref 42–52)
HCT VFR BLD AUTO: 35.1 % (ref 42–52)
HGB BLD-MCNC: 11.2 G/DL (ref 14–18)
HGB BLD-MCNC: 11.2 G/DL (ref 14–18)
IMM GRANULOCYTES # BLD AUTO: 0.04 K/UL (ref 0–0.11)
IMM GRANULOCYTES NFR BLD AUTO: 0.7 % (ref 0–0.9)
INR PPP: 1.13 (ref 0.87–1.13)
LYMPHOCYTES # BLD AUTO: 1.23 K/UL (ref 1–4.8)
LYMPHOCYTES NFR BLD: 20.7 % (ref 22–41)
MCH RBC QN AUTO: 33.2 PG (ref 27–33)
MCH RBC QN AUTO: 33.2 PG (ref 27–33)
MCHC RBC AUTO-ENTMCNC: 31.9 G/DL (ref 33.7–35.3)
MCHC RBC AUTO-ENTMCNC: 31.9 G/DL (ref 33.7–35.3)
MCV RBC AUTO: 104.2 FL (ref 81.4–97.8)
MCV RBC AUTO: 104.2 FL (ref 81.4–97.8)
MONOCYTES # BLD AUTO: 0.63 K/UL (ref 0–0.85)
MONOCYTES NFR BLD AUTO: 10.6 % (ref 0–13.4)
NEUTROPHILS # BLD AUTO: 3.88 K/UL (ref 1.82–7.42)
NEUTROPHILS NFR BLD: 65.3 % (ref 44–72)
NRBC # BLD AUTO: 0 K/UL
NRBC BLD-RTO: 0 /100 WBC
PLATELET # BLD AUTO: 152 K/UL (ref 164–446)
PLATELET # BLD AUTO: 152 K/UL (ref 164–446)
PMV BLD AUTO: 10.2 FL (ref 9–12.9)
PMV BLD AUTO: 10.2 FL (ref 9–12.9)
POTASSIUM SERPL-SCNC: 4.7 MMOL/L (ref 3.6–5.5)
PROT SERPL-MCNC: 6.6 G/DL (ref 6–8.2)
PROTHROMBIN TIME: 14.2 SEC (ref 12–14.6)
RBC # BLD AUTO: 3.37 M/UL (ref 4.7–6.1)
RBC # BLD AUTO: 3.37 M/UL (ref 4.7–6.1)
SODIUM SERPL-SCNC: 139 MMOL/L (ref 135–145)
TROPONIN T SERPL-MCNC: 216 NG/L (ref 6–19)
TROPONIN T SERPL-MCNC: 218 NG/L (ref 6–19)
UFH PPP CHRO-ACNC: <0.1 IU/ML
WBC # BLD AUTO: 6.2 K/UL (ref 4.8–10.8)
WBC # BLD AUTO: 6.2 K/UL (ref 4.8–10.8)

## 2021-07-09 PROCEDURE — 93010 ELECTROCARDIOGRAM REPORT: CPT | Performed by: INTERNAL MEDICINE

## 2021-07-09 PROCEDURE — A9270 NON-COVERED ITEM OR SERVICE: HCPCS | Performed by: INTERNAL MEDICINE

## 2021-07-09 PROCEDURE — 80053 COMPREHEN METABOLIC PANEL: CPT

## 2021-07-09 PROCEDURE — 85025 COMPLETE CBC W/AUTO DIFF WBC: CPT

## 2021-07-09 PROCEDURE — 700102 HCHG RX REV CODE 250 W/ 637 OVERRIDE(OP): Performed by: INTERNAL MEDICINE

## 2021-07-09 PROCEDURE — 85520 HEPARIN ASSAY: CPT

## 2021-07-09 PROCEDURE — 99223 1ST HOSP IP/OBS HIGH 75: CPT | Performed by: INTERNAL MEDICINE

## 2021-07-09 PROCEDURE — 85379 FIBRIN DEGRADATION QUANT: CPT

## 2021-07-09 PROCEDURE — 700111 HCHG RX REV CODE 636 W/ 250 OVERRIDE (IP): Performed by: INTERNAL MEDICINE

## 2021-07-09 PROCEDURE — 99223 1ST HOSP IP/OBS HIGH 75: CPT | Mod: AI | Performed by: INTERNAL MEDICINE

## 2021-07-09 PROCEDURE — 36415 COLL VENOUS BLD VENIPUNCTURE: CPT

## 2021-07-09 PROCEDURE — 770020 HCHG ROOM/CARE - TELE (206)

## 2021-07-09 PROCEDURE — 93005 ELECTROCARDIOGRAM TRACING: CPT | Performed by: INTERNAL MEDICINE

## 2021-07-09 PROCEDURE — 85610 PROTHROMBIN TIME: CPT

## 2021-07-09 PROCEDURE — 85730 THROMBOPLASTIN TIME PARTIAL: CPT

## 2021-07-09 PROCEDURE — 84484 ASSAY OF TROPONIN QUANT: CPT | Mod: 91

## 2021-07-09 RX ORDER — ONDANSETRON 4 MG/1
4 TABLET, ORALLY DISINTEGRATING ORAL EVERY 4 HOURS PRN
Status: DISCONTINUED | OUTPATIENT
Start: 2021-07-09 | End: 2021-07-14 | Stop reason: HOSPADM

## 2021-07-09 RX ORDER — HEPARIN SODIUM 1000 [USP'U]/ML
2000 INJECTION, SOLUTION INTRAVENOUS; SUBCUTANEOUS PRN
Status: DISCONTINUED | OUTPATIENT
Start: 2021-07-09 | End: 2021-07-12

## 2021-07-09 RX ORDER — ONDANSETRON 2 MG/ML
4 INJECTION INTRAMUSCULAR; INTRAVENOUS EVERY 4 HOURS PRN
Status: DISCONTINUED | OUTPATIENT
Start: 2021-07-09 | End: 2021-07-14 | Stop reason: HOSPADM

## 2021-07-09 RX ORDER — ENALAPRILAT 1.25 MG/ML
1.25 INJECTION INTRAVENOUS EVERY 6 HOURS PRN
Status: DISCONTINUED | OUTPATIENT
Start: 2021-07-09 | End: 2021-07-14 | Stop reason: HOSPADM

## 2021-07-09 RX ORDER — OMEPRAZOLE 20 MG/1
20 CAPSULE, DELAYED RELEASE ORAL DAILY
Status: DISCONTINUED | OUTPATIENT
Start: 2021-07-10 | End: 2021-07-14 | Stop reason: HOSPADM

## 2021-07-09 RX ORDER — REGADENOSON 0.08 MG/ML
0.4 INJECTION, SOLUTION INTRAVENOUS
Status: DISCONTINUED | OUTPATIENT
Start: 2021-07-09 | End: 2021-07-14 | Stop reason: HOSPADM

## 2021-07-09 RX ORDER — BISACODYL 10 MG
10 SUPPOSITORY, RECTAL RECTAL
Status: DISCONTINUED | OUTPATIENT
Start: 2021-07-09 | End: 2021-07-14 | Stop reason: HOSPADM

## 2021-07-09 RX ORDER — HEPARIN SODIUM 5000 [USP'U]/100ML
0-30 INJECTION, SOLUTION INTRAVENOUS CONTINUOUS
Status: DISCONTINUED | OUTPATIENT
Start: 2021-07-09 | End: 2021-07-12

## 2021-07-09 RX ORDER — LOSARTAN POTASSIUM 25 MG/1
25 TABLET ORAL
Status: DISCONTINUED | OUTPATIENT
Start: 2021-07-09 | End: 2021-07-10

## 2021-07-09 RX ORDER — POLYETHYLENE GLYCOL 3350 17 G/17G
1 POWDER, FOR SOLUTION ORAL
Status: DISCONTINUED | OUTPATIENT
Start: 2021-07-09 | End: 2021-07-14 | Stop reason: HOSPADM

## 2021-07-09 RX ORDER — NITROGLYCERIN 0.4 MG/1
0.4 TABLET SUBLINGUAL
Status: DISCONTINUED | OUTPATIENT
Start: 2021-07-09 | End: 2021-07-14 | Stop reason: HOSPADM

## 2021-07-09 RX ORDER — ACETAMINOPHEN 325 MG/1
650 TABLET ORAL EVERY 6 HOURS PRN
Status: DISCONTINUED | OUTPATIENT
Start: 2021-07-09 | End: 2021-07-14 | Stop reason: HOSPADM

## 2021-07-09 RX ORDER — AMOXICILLIN 250 MG
2 CAPSULE ORAL 2 TIMES DAILY
Status: DISCONTINUED | OUTPATIENT
Start: 2021-07-09 | End: 2021-07-14 | Stop reason: HOSPADM

## 2021-07-09 RX ORDER — LABETALOL HYDROCHLORIDE 5 MG/ML
10 INJECTION, SOLUTION INTRAVENOUS EVERY 4 HOURS PRN
Status: DISCONTINUED | OUTPATIENT
Start: 2021-07-09 | End: 2021-07-14 | Stop reason: HOSPADM

## 2021-07-09 RX ORDER — HEPARIN SODIUM 1000 [USP'U]/ML
4000 INJECTION, SOLUTION INTRAVENOUS; SUBCUTANEOUS ONCE
Status: COMPLETED | OUTPATIENT
Start: 2021-07-09 | End: 2021-07-09

## 2021-07-09 RX ORDER — AMINOPHYLLINE 25 MG/ML
100 INJECTION, SOLUTION INTRAVENOUS
Status: DISCONTINUED | OUTPATIENT
Start: 2021-07-09 | End: 2021-07-14 | Stop reason: HOSPADM

## 2021-07-09 RX ORDER — OFLOXACIN 3 MG/ML
0.3 SOLUTION/ DROPS OPHTHALMIC 4 TIMES DAILY
Status: DISCONTINUED | OUTPATIENT
Start: 2021-07-09 | End: 2021-07-09

## 2021-07-09 RX ORDER — LOVASTATIN 20 MG/1
40 TABLET ORAL DAILY
Status: DISCONTINUED | OUTPATIENT
Start: 2021-07-10 | End: 2021-07-10

## 2021-07-09 RX ADMIN — HEPARIN SODIUM 12 UNITS/KG/HR: 5000 INJECTION, SOLUTION INTRAVENOUS at 23:38

## 2021-07-09 RX ADMIN — LOSARTAN POTASSIUM 25 MG: 25 TABLET, FILM COATED ORAL at 23:31

## 2021-07-09 RX ADMIN — METOPROLOL TARTRATE 25 MG: 25 TABLET, FILM COATED ORAL at 20:20

## 2021-07-09 RX ADMIN — HEPARIN SODIUM 4000 UNITS: 1000 INJECTION, SOLUTION INTRAVENOUS; SUBCUTANEOUS at 23:37

## 2021-07-09 ASSESSMENT — COGNITIVE AND FUNCTIONAL STATUS - GENERAL
DAILY ACTIVITIY SCORE: 24
MOBILITY SCORE: 24
SUGGESTED CMS G CODE MODIFIER DAILY ACTIVITY: CH
SUGGESTED CMS G CODE MODIFIER MOBILITY: CH

## 2021-07-09 ASSESSMENT — LIFESTYLE VARIABLES
ON A TYPICAL DAY WHEN YOU DRINK ALCOHOL HOW MANY DRINKS DO YOU HAVE: 0
TOTAL SCORE: 0
AVERAGE NUMBER OF DAYS PER WEEK YOU HAVE A DRINK CONTAINING ALCOHOL: 0
TOTAL SCORE: 0
HOW MANY TIMES IN THE PAST YEAR HAVE YOU HAD 5 OR MORE DRINKS IN A DAY: 0
CONSUMPTION TOTAL: NEGATIVE
ALCOHOL_USE: NO
TOTAL SCORE: 0
HAVE YOU EVER FELT YOU SHOULD CUT DOWN ON YOUR DRINKING: NO
EVER FELT BAD OR GUILTY ABOUT YOUR DRINKING: NO
HAVE PEOPLE ANNOYED YOU BY CRITICIZING YOUR DRINKING: NO
DOES PATIENT WANT TO STOP DRINKING: NO
EVER HAD A DRINK FIRST THING IN THE MORNING TO STEADY YOUR NERVES TO GET RID OF A HANGOVER: NO

## 2021-07-09 ASSESSMENT — ENCOUNTER SYMPTOMS
VOMITING: 0
DEPRESSION: 0
INSOMNIA: 0
HEARTBURN: 0
COUGH: 0
BACK PAIN: 0
DIARRHEA: 0
ABDOMINAL PAIN: 0
EYE DISCHARGE: 0
FOCAL WEAKNESS: 0
PALPITATIONS: 0
EYE REDNESS: 0
EYE PAIN: 0
CHILLS: 0
FEVER: 0
DIZZINESS: 1
SHORTNESS OF BREATH: 0
ORTHOPNEA: 0
HEADACHES: 0
SEIZURES: 0
NECK PAIN: 0
BLURRED VISION: 0
WEIGHT LOSS: 0
NAUSEA: 0
STRIDOR: 0
MYALGIAS: 0
SPUTUM PRODUCTION: 0
NERVOUS/ANXIOUS: 0

## 2021-07-09 ASSESSMENT — PAIN DESCRIPTION - PAIN TYPE: TYPE: ACUTE PAIN

## 2021-07-10 ENCOUNTER — APPOINTMENT (OUTPATIENT)
Dept: CARDIOLOGY | Facility: MEDICAL CENTER | Age: 86
DRG: 280 | End: 2021-07-10
Attending: INTERNAL MEDICINE
Payer: MEDICARE

## 2021-07-10 PROBLEM — J44.9 COPD (CHRONIC OBSTRUCTIVE PULMONARY DISEASE) (HCC): Status: ACTIVE | Noted: 2021-07-10

## 2021-07-10 PROBLEM — J96.11 CHRONIC RESPIRATORY FAILURE WITH HYPOXIA (HCC): Status: ACTIVE | Noted: 2021-07-10

## 2021-07-10 LAB
25(OH)D3 SERPL-MCNC: 43 NG/ML (ref 30–100)
ANION GAP SERPL CALC-SCNC: 9 MMOL/L (ref 7–16)
BUN SERPL-MCNC: 31 MG/DL (ref 8–22)
CALCIUM SERPL-MCNC: 9.1 MG/DL (ref 8.5–10.5)
CHLORIDE SERPL-SCNC: 103 MMOL/L (ref 96–112)
CHOLEST SERPL-MCNC: 138 MG/DL (ref 100–199)
CO2 SERPL-SCNC: 24 MMOL/L (ref 20–33)
CREAT SERPL-MCNC: 1.97 MG/DL (ref 0.5–1.4)
EKG IMPRESSION: NORMAL
EST. AVERAGE GLUCOSE BLD GHB EST-MCNC: 137 MG/DL
GLUCOSE SERPL-MCNC: 110 MG/DL (ref 65–99)
HBA1C MFR BLD: 6.4 % (ref 4–5.6)
HDLC SERPL-MCNC: 52 MG/DL
LDLC SERPL CALC-MCNC: 75 MG/DL
LV EJECT FRACT  99904: 75
LV EJECT FRACT MOD 2C 99903: 76.9
LV EJECT FRACT MOD 4C 99902: 77.73
LV EJECT FRACT MOD BP 99901: 77.51
POTASSIUM SERPL-SCNC: 5 MMOL/L (ref 3.6–5.5)
SODIUM SERPL-SCNC: 136 MMOL/L (ref 135–145)
TRIGL SERPL-MCNC: 55 MG/DL (ref 0–149)
TROPONIN T SERPL-MCNC: 249 NG/L (ref 6–19)
UFH PPP CHRO-ACNC: 0.35 IU/ML
UFH PPP CHRO-ACNC: 0.4 IU/ML

## 2021-07-10 PROCEDURE — 36415 COLL VENOUS BLD VENIPUNCTURE: CPT

## 2021-07-10 PROCEDURE — 700102 HCHG RX REV CODE 250 W/ 637 OVERRIDE(OP): Performed by: INTERNAL MEDICINE

## 2021-07-10 PROCEDURE — 83036 HEMOGLOBIN GLYCOSYLATED A1C: CPT

## 2021-07-10 PROCEDURE — 700111 HCHG RX REV CODE 636 W/ 250 OVERRIDE (IP): Performed by: INTERNAL MEDICINE

## 2021-07-10 PROCEDURE — 93306 TTE W/DOPPLER COMPLETE: CPT | Mod: 26 | Performed by: INTERNAL MEDICINE

## 2021-07-10 PROCEDURE — 93005 ELECTROCARDIOGRAM TRACING: CPT | Performed by: INTERNAL MEDICINE

## 2021-07-10 PROCEDURE — 80048 BASIC METABOLIC PNL TOTAL CA: CPT

## 2021-07-10 PROCEDURE — 99233 SBSQ HOSP IP/OBS HIGH 50: CPT | Performed by: HOSPITALIST

## 2021-07-10 PROCEDURE — 82306 VITAMIN D 25 HYDROXY: CPT

## 2021-07-10 PROCEDURE — 85520 HEPARIN ASSAY: CPT | Mod: 91

## 2021-07-10 PROCEDURE — 93306 TTE W/DOPPLER COMPLETE: CPT

## 2021-07-10 PROCEDURE — 93010 ELECTROCARDIOGRAM REPORT: CPT | Performed by: INTERNAL MEDICINE

## 2021-07-10 PROCEDURE — A9270 NON-COVERED ITEM OR SERVICE: HCPCS | Performed by: INTERNAL MEDICINE

## 2021-07-10 PROCEDURE — 770020 HCHG ROOM/CARE - TELE (206)

## 2021-07-10 PROCEDURE — A9270 NON-COVERED ITEM OR SERVICE: HCPCS | Performed by: NURSE PRACTITIONER

## 2021-07-10 PROCEDURE — 80061 LIPID PANEL: CPT

## 2021-07-10 PROCEDURE — 700102 HCHG RX REV CODE 250 W/ 637 OVERRIDE(OP): Performed by: NURSE PRACTITIONER

## 2021-07-10 PROCEDURE — 84484 ASSAY OF TROPONIN QUANT: CPT

## 2021-07-10 PROCEDURE — 99233 SBSQ HOSP IP/OBS HIGH 50: CPT | Performed by: INTERNAL MEDICINE

## 2021-07-10 PROCEDURE — 700117 HCHG RX CONTRAST REV CODE 255: Performed by: INTERNAL MEDICINE

## 2021-07-10 RX ORDER — ISOSORBIDE MONONITRATE 30 MG/1
30 TABLET, EXTENDED RELEASE ORAL
Status: DISCONTINUED | OUTPATIENT
Start: 2021-07-10 | End: 2021-07-12

## 2021-07-10 RX ORDER — ALBUTEROL SULFATE 90 UG/1
2 AEROSOL, METERED RESPIRATORY (INHALATION) EVERY 4 HOURS PRN
Status: DISCONTINUED | OUTPATIENT
Start: 2021-07-10 | End: 2021-07-13

## 2021-07-10 RX ORDER — ROSUVASTATIN CALCIUM 20 MG/1
20 TABLET, COATED ORAL EVERY EVENING
Status: DISCONTINUED | OUTPATIENT
Start: 2021-07-10 | End: 2021-07-12

## 2021-07-10 RX ORDER — CLOPIDOGREL BISULFATE 75 MG/1
75 TABLET ORAL DAILY
Status: DISCONTINUED | OUTPATIENT
Start: 2021-07-10 | End: 2021-07-14 | Stop reason: HOSPADM

## 2021-07-10 RX ORDER — ALBUTEROL SULFATE 90 UG/1
2 AEROSOL, METERED RESPIRATORY (INHALATION)
Status: DISCONTINUED | OUTPATIENT
Start: 2021-07-10 | End: 2021-07-10

## 2021-07-10 RX ORDER — FUROSEMIDE 10 MG/ML
20 INJECTION INTRAMUSCULAR; INTRAVENOUS
Status: DISCONTINUED | OUTPATIENT
Start: 2021-07-11 | End: 2021-07-11

## 2021-07-10 RX ADMIN — OMEPRAZOLE 20 MG: 20 CAPSULE, DELAYED RELEASE ORAL at 05:27

## 2021-07-10 RX ADMIN — ASPIRIN 81 MG: 81 TABLET, COATED ORAL at 05:26

## 2021-07-10 RX ADMIN — DOCUSATE SODIUM 50 MG AND SENNOSIDES 8.6 MG 2 TABLET: 8.6; 5 TABLET, FILM COATED ORAL at 18:17

## 2021-07-10 RX ADMIN — ROSUVASTATIN CALCIUM 20 MG: 20 TABLET, FILM COATED ORAL at 20:08

## 2021-07-10 RX ADMIN — DOCUSATE SODIUM 50 MG AND SENNOSIDES 8.6 MG 2 TABLET: 8.6; 5 TABLET, FILM COATED ORAL at 05:27

## 2021-07-10 RX ADMIN — ISOSORBIDE MONONITRATE 30 MG: 30 TABLET, EXTENDED RELEASE ORAL at 13:04

## 2021-07-10 RX ADMIN — HUMAN ALBUMIN MICROSPHERES AND PERFLUTREN 3 ML: 10; .22 INJECTION, SOLUTION INTRAVENOUS at 11:17

## 2021-07-10 RX ADMIN — HEPARIN SODIUM 12 UNITS/KG/HR: 5000 INJECTION, SOLUTION INTRAVENOUS at 20:10

## 2021-07-10 RX ADMIN — LOVASTATIN 40 MG: 20 TABLET ORAL at 05:27

## 2021-07-10 RX ADMIN — CLOPIDOGREL BISULFATE 75 MG: 75 TABLET ORAL at 13:04

## 2021-07-10 ASSESSMENT — ENCOUNTER SYMPTOMS
SINUS PAIN: 0
HEMOPTYSIS: 0
TINGLING: 0
POLYDIPSIA: 0
WHEEZING: 0
COUGH: 0
FLANK PAIN: 0
DEPRESSION: 0
CHILLS: 0
DIAPHORESIS: 0
BACK PAIN: 0
SPUTUM PRODUCTION: 0
STRIDOR: 0
EYE PAIN: 0
NAUSEA: 0
DIZZINESS: 1
HALLUCINATIONS: 0
ABDOMINAL PAIN: 0
CONSTIPATION: 0
FALLS: 0
CHEST TIGHTNESS: 0
PND: 0
CLAUDICATION: 0
DIARRHEA: 0
BLOOD IN STOOL: 0
ORTHOPNEA: 0
SORE THROAT: 0
SHORTNESS OF BREATH: 1
NECK PAIN: 0
FEVER: 0
WEAKNESS: 0
BLURRED VISION: 0
BRUISES/BLEEDS EASILY: 0
PHOTOPHOBIA: 0
PALPITATIONS: 0
MYALGIAS: 0
TREMORS: 0
VOMITING: 0
DOUBLE VISION: 0
HEADACHES: 0
HEARTBURN: 0

## 2021-07-10 ASSESSMENT — PAIN DESCRIPTION - PAIN TYPE
TYPE: ACUTE PAIN

## 2021-07-10 ASSESSMENT — LIFESTYLE VARIABLES: SUBSTANCE_ABUSE: 0

## 2021-07-10 NOTE — PROGRESS NOTES
Cardiology Follow Up Progress Note    Date of Service  7/10/2021    Attending Physician  Mike Schafer M.D.    Presented with lightheadedness, dyspnea, he was transferred from [facility] [place]      Cardiology consultation for elevated troponins      PMH: Hypertension, hyperlipidemia, CAD with PCIx 3 , 2005, COPD on home oxygen, chronic kidney disease, carotid disease      Interim Events    No overnight cardiac events  Telemetry-bradycardia, heart rate 50s- low 60s.  Dyspnea with exertion, heart rate 80s to low 90s with exertion, appears to have chronotropic competence.  Underwent coronary angiography 7/11/2021, severe three-vessel disease, CABG recommendation versus high risk PCI.  Recommend VQ lung scan to rule out PE  Watch creatinine      Review of Systems  Review of Systems   Constitutional: Positive for fatigue.   Respiratory: Negative for apnea, cough, choking, chest tightness, wheezing and stridor.         Severe dyspnea with exertion   Cardiovascular: Negative for chest pain and leg swelling.       Vital signs in last 24 hours  Temp:  [35.9 °C (96.6 °F)-36.5 °C (97.7 °F)] 35.9 °C (96.6 °F)  Pulse:  [54-66] 60  Resp:  [16-18] 18  BP: (100-165)/(43-73) 100/46  SpO2:  [92 %-95 %] 93 %    Physical Exam  Physical Exam  Cardiovascular:      Rate and Rhythm: Bradycardia present.      Pulses: Normal pulses.   Pulmonary:      Breath sounds: No wheezing.   Skin:     General: Skin is warm.      Comments: Right radial TR band intact, good circulation   Neurological:      Mental Status: He is alert. Mental status is at baseline.   Psychiatric:         Mood and Affect: Mood normal.         Lab Review  Lab Results   Component Value Date/Time    WBC 6.2 07/09/2021 10:07 PM    WBC 6.2 07/09/2021 10:07 PM    RBC 3.37 (L) 07/09/2021 10:07 PM    RBC 3.37 (L) 07/09/2021 10:07 PM    HEMOGLOBIN 11.2 (L) 07/09/2021 10:07 PM    HEMOGLOBIN 11.2 (L) 07/09/2021 10:07 PM    HEMATOCRIT 35.1 (L) 07/09/2021 10:07 PM    HEMATOCRIT  35.1 (L) 07/09/2021 10:07 PM    .2 (H) 07/09/2021 10:07 PM    .2 (H) 07/09/2021 10:07 PM    MCH 33.2 (H) 07/09/2021 10:07 PM    MCH 33.2 (H) 07/09/2021 10:07 PM    MCHC 31.9 (L) 07/09/2021 10:07 PM    MCHC 31.9 (L) 07/09/2021 10:07 PM    MPV 10.2 07/09/2021 10:07 PM    MPV 10.2 07/09/2021 10:07 PM      Lab Results   Component Value Date/Time    SODIUM 136 07/10/2021 05:29 AM    POTASSIUM 5.0 07/10/2021 05:29 AM    CHLORIDE 103 07/10/2021 05:29 AM    CO2 24 07/10/2021 05:29 AM    GLUCOSE 110 (H) 07/10/2021 05:29 AM    BUN 31 (H) 07/10/2021 05:29 AM    CREATININE 1.97 (H) 07/10/2021 05:29 AM    CREATININE 1.4 05/06/2008 11:42 AM      Lab Results   Component Value Date/Time    ASTSGOT 21 07/09/2021 10:07 PM    ALTSGPT 9 07/09/2021 10:07 PM     Lab Results   Component Value Date/Time    CHOLSTRLTOT 138 07/10/2021 05:29 AM    LDL 75 07/10/2021 05:29 AM    HDL 52 07/10/2021 05:29 AM    TRIGLYCERIDE 55 07/10/2021 05:29 AM    TROPONINT 249 (H) 07/10/2021 07:59 AM       No results for input(s): NTPROBNP in the last 72 hours.    Cardiac Imaging and Procedures Review  EKG: SR, right bundle branch block, LAFB    Echocardiogram:      Cardiac Catheterization:    2005  90% P LAD, 80% M LAD, 80% circumflex, 50-60 proximal RCA, 50 to 80% posterior descending artery.  Successful PCI of LAD    Imaging  Chest X-Ray:      Stress Test:    2019  No evidence of significant jeopardized viable myocardium or prior MI          Assessment/plan    NSTEMI  -s/p coronary angiography 7/11/2021, severe three-vessel disease with moderate severely left main disease  - CABG recommendation versus high risk PCI.  -Continue with IV heparin  -Continue with aspirin 81, Plavix 75, isosorbide 30, Crestor 20  -No BB secondary to bradycardia and trifascicular block on EKG      Trifascicular block  -Monitor on telemetry    COPD  -On home O2 2 L    Chronic kidney disease stage III  -CR 1.8 through  -Monitor BMP  closely    Hypertension  -130/59    Cardiology will follow along  Recommend VQ lung scan to rule out PE       Thank you for allowing me to participate in the care of this patient.      Please contact me with any questions.    JOEY Teran.   Cardiologist, [facility]  [phone]

## 2021-07-10 NOTE — CONSULTS
"Reason for Consult:  Asked by Dr Graham to see this patient with nstemi    CC: shortness of breath and dizziness    HPI:      89 year old man with PMH COPD started home O2 10/2020, CAD/PCI LAD 2005 with residual multivessel disease, HLD, HTN, CKD,carotid disease presents with dizziness and shortness of breath. Progressive over last few months. Denies syncope. Denies chest pain/pressure. Decreased physical stamina from already poor levels; walks short distances in home and to truck. Baseline oxygen 2L at home worsening breathing despite this. No other cardiac complaints. States had leg swelling if sits in one position for too long. Compliant with medications and denies adverse effects. He repeats himself forgetting that he had already made exact same statement. Calls me \"ma'am\" repeatedly. No doubt hair styles were different in his day and I did not correct him.     Medications / Drug list prior to admission:  No current facility-administered medications on file prior to encounter.     Current Outpatient Medications on File Prior to Encounter   Medication Sig Dispense Refill   • benzonatate (TESSALON) 200 MG capsule Take 1 capsule by mouth 2 times a day as needed for Cough. Do not chew. Swallow whole. 60 capsule 6   • omeprazole (PRILOSEC) 20 MG delayed-release capsule omeprazole 20 mg capsule,delayed release     • lovastatin (MEVACOR) 40 MG tablet Take 1 Tab by mouth every day. One tablet at hs 180 Tab 2   • metoprolol (LOPRESSOR) 25 MG Tab Take 1 Tab by mouth 2 times a day. 180 Tab 2   • nitroglycerin (NITROSTAT) 0.4 MG SUBL Place 1 Tab under tongue as needed for Chest Pain. 25 Tab 1   • aspirin 81 MG tablet Take 81 mg by mouth every day.         Current list of administered Medications:    Current Facility-Administered Medications:   •  [START ON 7/10/2021] aspirin EC (ECOTRIN) tablet 81 mg, 81 mg, Oral, DAILY, Jerrod Graham M.D.  •  [START ON 7/10/2021] lovastatin (MEVACOR) tablet 40 mg, 40 mg, Oral, DAILY, Jerrod SANDERS" VINNIE Graham  •  metoprolol tartrate (LOPRESSOR) tablet 25 mg, 25 mg, Oral, BID, Jerrod Graham M.D., 25 mg at 07/09/21 2020  •  nitroglycerin (NITROSTAT) tablet 0.4 mg, 0.4 mg, Sublingual, Q5 MIN PRN, Jerrod Graham M.D.  •  [START ON 7/10/2021] omeprazole (PRILOSEC) capsule 20 mg, 20 mg, Oral, DAILY, Jerrod Graham M.D.  •  senna-docusate (PERICOLACE or SENOKOT S) 8.6-50 MG per tablet 2 tablet, 2 tablet, Oral, BID **AND** polyethylene glycol/lytes (MIRALAX) PACKET 1 Packet, 1 Packet, Oral, QDAY PRN **AND** magnesium hydroxide (MILK OF MAGNESIA) suspension 30 mL, 30 mL, Oral, QDAY PRN **AND** bisacodyl (DULCOLAX) suppository 10 mg, 10 mg, Rectal, QDAY PRN, Jerrod Graham M.D.  •  regadenoson (LEXISCAN) injection SOLN 0.4 mg, 0.4 mg, Intravenous, Once PRN, Jerrod Graham M.D.  •  aminophylline injection 100 mg, 100 mg, Intravenous, Q5 MIN PRN, Jerrod Graham M.D.  •  acetaminophen (Tylenol) tablet 650 mg, 650 mg, Oral, Q6HRS PRN, Jerrod Graham M.D.  •  enalaprilat (VASOTEC) injection 1.25 mg, 1.25 mg, Intravenous, Q6HRS PRN, Jerrod Graham M.D.  •  labetalol (NORMODYNE/TRANDATE) injection 10 mg, 10 mg, Intravenous, Q4HRS PRN, Jerrod Graham M.D.  •  ondansetron (ZOFRAN) syringe/vial injection 4 mg, 4 mg, Intravenous, Q4HRS PRN, Jerrod Graham M.D.  •  ondansetron (ZOFRAN ODT) dispertab 4 mg, 4 mg, Oral, Q4HRS PRN, Jerrod Graham M.D.  •  losartan (COZAAR) tablet 25 mg, 25 mg, Oral, Q DAY, Luiz Lama M.D.  •  heparin infusion 25,000 units in 500 mL 0.45% NACL, 0-30 Units/kg/hr, Intravenous, Continuous, Luiz Lama M.D.  •  heparin injection 4,000 Units, 4,000 Units, Intravenous, Once, Luiz Lama M.D.  •  heparin injection 2,000 Units, 2,000 Units, Intravenous, PRN, Luiz Lama M.D.    Past Medical History:   Diagnosis Date   • Anemia 8/1/2016   • Arthritis    • Back pain    • CAD (coronary artery disease)    • Cancer (HCC)     skin   • Chronic kidney disease (CKD), stage II (mild) 8/1/2016   • Coronary heart disease    • DJD  (degenerative joint disease) 2016   • Dyslipidemia 2016   • GERD (gastroesophageal reflux disease) 2016   • [place] measles    • Hematochezia 2016   • High cholesterol    • Hyperglycemia 2010   • Hyperlipidemia 2011   • Hypertension    • Impaired fasting glucose 2016   • Left knee pain 2016   • Myocardial infarction (HCC) 2 yrs ago       • Nasal drainage    • Obesity    • Osteoarthritis of knees, bilateral 2016   • Pulmonary embolism (HCC)    • Rheumatic fever    • Scarlet fever    • Smallpox    • Snoring        Past Surgical History:   Procedure Laterality Date   • KNEE UNICOMPARTMENTAL  10/13/2009    Performed by SAM LINDER at SURGERY [facility]   • ARTHROSCOPY, KNEE     • CORONARY STENT PROCEDURE LAD     • OTHER ORTHOPEDIC SURGERY      right shoulder rotator cuff surgery x2       Family History   Problem Relation Age of Onset   • No Known Problems Mother    • No Known Problems Father      Patient family history was personally reviewed, no pertinent family history to current presentation    Social History     Socioeconomic History   • Marital status:      Spouse name: Not on file   • Number of children: Not on file   • Years of education: Not on file   • Highest education level: Not on file   Occupational History   • Not on file   Tobacco Use   • Smoking status: Former Smoker     Packs/day: 2.00     Years: 35.00     Pack years: 70.00     Types: Cigarettes     Quit date: 1973     Years since quittin.2   • Smokeless tobacco: Never Used   Vaping Use   • Vaping Use: Never used   Substance and Sexual Activity   • Alcohol use: Yes     Comment: cocktail every night   • Drug use: No   • Sexual activity: Not on file   Other Topics Concern   • Not on file   Social History Narrative   • Not on file     Social Determinants of Health     Financial Resource Strain:    • Difficulty of Paying Living Expenses:    Food Insecurity:    • Worried About Running Out of  Food in the Last Year:    • Ran Out of Food in the Last Year:    Transportation Needs:    • Lack of Transportation (Medical):    • Lack of Transportation (Non-Medical):    Physical Activity:    • Days of Exercise per Week:    • Minutes of Exercise per Session:    Stress:    • Feeling of Stress :    Social Connections:    • Frequency of Communication with Friends and Family:    • Frequency of Social Gatherings with Friends and Family:    • Attends Faith Services:    • Active Member of Clubs or Organizations:    • Attends Club or Organization Meetings:    • Marital Status:    Intimate Partner Violence:    • Fear of Current or Ex-Partner:    • Emotionally Abused:    • Physically Abused:    • Sexually Abused:        ALLERGIES:  Allergies   Allergen Reactions   • Atorvastatin Myalgia   • Simvastatin      ADVERSE REACTION: MYALGIA.       Review of systems:  A complete review of symptoms was reviewed with patient. This is reviewed in H&P and PMH. ALL OTHERS reviewed and negative    Physical exam:  Patient Vitals for the past 24 hrs:   BP Temp Temp src Pulse Resp SpO2 Weight   07/09/21 1933 140/67 36.1 °C (96.9 °F) Temporal 66 18 92 % --   07/09/21 1811 -- -- -- -- -- -- 110 kg (242 lb 8.1 oz)   07/09/21 1800 (!) 165/73 36.3 °C (97.4 °F) Temporal 64 18 92 % 111 kg (244 lb 7.8 oz)     General: No acute distress.   EYES: no jaundice  HEENT: OP clear   Neck: No bruits No JVD.   CVS:  RRR. S1 + S2. No M/R/G. No edema.  Resp: rales midaxillary. No wheezing or crackles/rhonchi.  Abdomen: Soft, NT, ND,  Skin: Grossly nothing acute no obvious rashes  Neurological: Alert, Moves all extremities, no cranial nerve defects on limited exam  Extremities: Pulse 2+ in b/l LE. No cyanosis.     Data:  Laboratory studies personally reviewed by me:  Recent Results (from the past 24 hour(s))   EKG - STAT Once    Collection Time: 07/09/21  6:19 PM   Result Value Ref Range    Report       [facility]    Test Date:  2021-07-09  Pt Name:     CARRIE CAMPBELL                  Department: 171  MRN:        [id number]                      Room:       T716  Gender:     Male                         Technician: NIKI  :        [id number]                   Requested By:CARLITA YOUSSEF  Order #:    [id number]                    Reading MD: Luiz Lama MD    Measurements  Intervals                                Axis  Rate:       59                           P:          0  MN:         418                          QRS:        -35  QRSD:       134                          T:          -6  QT:         444  QTc:        440    Interpretive Statements  SINUS BRADYCARDIA  FIRST DEGREE AV BLOCK  RIGHT BUNDLE BRANCH BLOCK  LEFT ANTERIOR FASCICULAR BLOCK  Compared to ECG 2012 17:29:52  Right bundle-branch block now present  Sinus rhythm no longer present  Electronically Signed On 2021 18:26:59 PDT by Luiz Lama MD     Troponin - STAT Once    Collection Time: 21  8:30 PM   Result Value Ref Range    Troponin T 216 (H) 6 - 19 ng/L         All pertinent features of laboratory and imaging reviewed including primary images where applicable    EKG 21 sinus 59, RBBB, first deg avb, LAFB - new from 3/8/12    NST pharm 2019  NUCLEAR IMAGING INTERPRETATION   No evidence of significant jeopardized viable myocardium or prior myocardial    infarction.   Normal left ventricular size, ejection fraction, and wall motion.    [facility]   SUMMARY OF FINDINGS     1. A triple-vessel coronary artery disease characterized by,a.    A 90%     proximal left anterior descending artery stenosis and 80% mid left     anterior descending artery stenosis.b.    50% stenosis of the large     ramus intermedius.c.    80% stenosis of a small main circumflex.d.     50% to 60% proximal right coronary artery stenosis.e.    50% to 80%     posterior descending artery stenosis.     2. Successful stenting of the left anterior descending coronary artery.     3. Abnormal left ventriculogram with  "inferior wall hypokinesis.  DISCUSSION AND RECOMMENDATIONS: A successful angioplasty was done on the  left anterior descending artery, which apically was a relatively small  caliber vessel. The small posterior division of the circumflex with the 80%  stenosis could cause some ischemia, although if this vessel occluded, it  would not cause a great deal of damage. Consideration should be given to  stenting of this vessel. The right coronary artery perfuses a hypokinetic  inferior wall. I probably would not do angioplasty or stenting on this  vessel if it were the only problem. If decision were made to proceed with  angioplasty of the circumflex, then I would probably do angioplasty of the  right coronary artery as well.    Active Problems:    * No active hospital problems. *  Resolved Problems:    * No resolved hospital problems. *      Assessment / Plan:  89 year old man with PMH COPD started home O2 10/2020, CAD/PCI LAD 2005 with residual multivessel disease, HLD, HTN, CKD,carotid disease presents with dizziness and shortness of breath. Found NSTEMI.     -trend troponin to peak and EKG. Also check labs.  -EKG with fabled \"tri-fascicular block.\" Monitor on tele. Consider outpatient event monitor.  -aspirin and heparin gtt for nstemi. Plan LHC if renal function stable.   -check TTE  -BP control goal 120/80; add losartan  -ok to continue home metoprolol    I personally discussed his case with Dr Graham    It is my pleasure to participate in the care of Mr. Cyr.  Please do not hesitate to contact me with questions or concerns.    Luiz Lama MD  Cardiologist [facility]  "

## 2021-07-10 NOTE — PROGRESS NOTES
Notified MD Graham regarding patient's troponin of 216 and his recent EKG. Pt denies chest pain and vital signs are stable. MD to consult with cardiology. Will continue to monitor.    MD verified cardiology will come to bedside.

## 2021-07-10 NOTE — ASSESSMENT & PLAN NOTE
Monitor BMP and assess response  Avoid IV contrast/nephrotoxins/NSAIDs  Dose adjust meds for decreased GFR

## 2021-07-10 NOTE — CARE PLAN
The patient is Watcher - Medium risk of patient condition declining or worsening    Shift Goals  Clinical Goals: monitor troponin levels and for new CP  Patient Goals: remain chest pain free    Progress made toward(s) clinical / shift goals:  Pt remains chest pain free, and otherwise asymptomatic. Troponin levels monitored, heparin initiated per cardiology and patient will go to cath lab this morning.     Patient is not progressing towards the following goals: troponin levels remain elevated elevated      Problem: Knowledge Deficit - Standard  Goal: Patient and family/care givers will demonstrate understanding of plan of care, disease process/condition, diagnostic tests and medications  Outcome: Progressing

## 2021-07-10 NOTE — ASSESSMENT & PLAN NOTE
History of CAD with 3 stents in 2005  Started on heparin infusion  Asa, statin, metoprolol, losartan  Cardiac echo EF of 75% mild AS no segmental hypokinesis  Triple-vessel disease    Cardiology discussed with the family, discontinued heparin drip, continue aspirin and Plavix, statin.  Started Imdur, may consider to start ACE/ARB if renal function continues to improve.  Plan for outpatient cath.  Appointment with Dr. Davey arranged.

## 2021-07-10 NOTE — PROGRESS NOTES
Called admitting regarding patient's medications not being verified.    Pt wrist band and patient labels ordered.

## 2021-07-10 NOTE — PROGRESS NOTES
Monitors notified this RN that patient's reading was messy, and it was unclear as to whether he was progressing into a 2nd degree heart block. MD Lama on unit, order placed via verbal with readback for a STAT EKG.

## 2021-07-10 NOTE — PROGRESS NOTES
Med Rec completed: per Pt at bedside with wife present     No ORAL antibiotics in last 30 days - Pt reports taking Doxycycline 100mg daily for the first 7 days of each month, skipped this month, last dosing was 6/7/2021.    Preferred Pharmacy: [facility] P: [phone]    Pt confirmed following allergies:  Allergies   Allergen Reactions   • Atorvastatin Myalgia   • Simvastatin      ADVERSE REACTION: MYALGIA.      Pt's home medications:   No current facility-administered medications on file prior to encounter.     Medication Sig   • benzonatate (TESSALON) 200 MG capsule Take 1 capsule by mouth 2 times a day as needed for Cough. Do not chew. Swallow whole.   • lovastatin (MEVACOR) 40 MG tablet Take 1 Tab by mouth every day. One tablet at hs   • metoprolol (LOPRESSOR) 25 MG Tab Take 1 Tab by mouth 2 times a day.   • nitroglycerin (NITROSTAT) 0.4 MG SUBL Place 1 Tab under tongue as needed for Chest Pain.   • aspirin 81 MG tablet Take 81 mg by mouth every day.       Removed medications:   Medication Removal Reason   • [DISCONTINUED] omeprazole (PRILOSEC) 20 MG delayed-release capsule Pt reports not taking

## 2021-07-10 NOTE — PROGRESS NOTES
Hospital Medicine Daily Progress Note    Date of Service  7/10/2021    Chief Complaint  Kobe Cyr is a 89 y.o. male admitted 7/9/2021 with past medical history of hypertension, hyperlipidemia, coronary disease status with 3 stents in 2005 who presents to the hospital with lightheadedness and shortness of breath.  It associated with orthopnea and shortness of breath gets worse when he bends down.  Patient was transferred from [facility] where he was found to have a troponin of 0.08.    Hospital Course  Upon arrival to the hospital patient was found to have normal sinus rhythm, right bundle branch block, left anterior fascicular block without significant ST segment changes.  He was started on IV heparin, metoprolol and losartan.    Interval Problem Update  7/10: Lightheaded and dizziness could possibly be from his trifascicular block and will continue to monitor this on telemetry.  We will continue heparin drip according to cardiac recommendations.  Cardiac echo still pending.  Blood pressures well controlled today.    I have personally seen and examined the patient at bedside. I discussed the plan of care with patient.    Consultants/Specialty  cardiology    Code Status  Full Code    Disposition  Patient is not medically cleared.   Anticipate discharge to to home with close outpatient follow-up.  I have placed the appropriate orders for post-discharge needs.    Review of Systems  Review of Systems   Constitutional: Negative for chills, diaphoresis, fever and malaise/fatigue.   HENT: Negative for congestion, ear discharge, ear pain, hearing loss, nosebleeds, sinus pain, sore throat and tinnitus.    Eyes: Negative for blurred vision, double vision, photophobia and pain.   Respiratory: Positive for shortness of breath. Negative for cough, hemoptysis, sputum production, wheezing and stridor.    Cardiovascular: Negative for chest pain, palpitations, orthopnea, claudication, leg swelling and PND.    Gastrointestinal: Negative for abdominal pain, blood in stool, constipation, diarrhea, heartburn, melena, nausea and vomiting.   Genitourinary: Negative for dysuria, flank pain, frequency, hematuria and urgency.   Musculoskeletal: Negative for back pain, falls, joint pain, myalgias and neck pain.   Skin: Negative for itching and rash.   Neurological: Positive for dizziness. Negative for tingling, tremors, weakness and headaches.   Endo/Heme/Allergies: Negative for environmental allergies and polydipsia. Does not bruise/bleed easily.   Psychiatric/Behavioral: Negative for depression, hallucinations, substance abuse and suicidal ideas.        Physical Exam  Temp:  [35.9 °C (96.6 °F)-36.5 °C (97.7 °F)] 35.9 °C (96.6 °F)  Pulse:  [54-66] 60  Resp:  [16-18] 18  BP: (100-165)/(43-73) 100/46  SpO2:  [92 %-95 %] 93 %    Physical Exam  Vitals and nursing note reviewed.   Constitutional:       General: He is not in acute distress.     Appearance: Normal appearance. He is not ill-appearing, toxic-appearing or diaphoretic.   HENT:      Head: Normocephalic and atraumatic.      Nose: No congestion or rhinorrhea.      Mouth/Throat:      Pharynx: No oropharyngeal exudate or posterior oropharyngeal erythema.   Eyes:      General: No scleral icterus.  Neck:      Vascular: No carotid bruit.   Cardiovascular:      Rate and Rhythm: Normal rate and regular rhythm.      Pulses: Normal pulses.      Heart sounds: Normal heart sounds. No murmur heard.   No friction rub. No gallop.    Pulmonary:      Effort: Pulmonary effort is normal. No respiratory distress.      Breath sounds: Normal breath sounds. No stridor. No wheezing or rhonchi.   Abdominal:      General: Abdomen is flat. There is no distension.      Palpations: There is no mass.      Tenderness: There is no abdominal tenderness. There is no left CVA tenderness, guarding or rebound.      Hernia: No hernia is present.   Musculoskeletal:         General: No swelling. Normal range of  motion.      Cervical back: No rigidity. No muscular tenderness.      Right lower leg: No edema.      Left lower leg: No edema.   Lymphadenopathy:      Cervical: No cervical adenopathy.   Skin:     General: Skin is warm and dry.      Capillary Refill: Capillary refill takes more than 3 seconds.      Coloration: Skin is not jaundiced or pale.      Findings: No bruising or erythema.   Neurological:      Mental Status: He is alert.         Fluids    Intake/Output Summary (Last 24 hours) at 7/10/2021 1356  Last data filed at 7/9/2021 2200  Gross per 24 hour   Intake 340 ml   Output --   Net 340 ml       Laboratory  Recent Labs     07/09/21 2207   WBC 6.2  6.2   RBC 3.37*  3.37*   HEMOGLOBIN 11.2*  11.2*   HEMATOCRIT 35.1*  35.1*   .2*  104.2*   MCH 33.2*  33.2*   MCHC 31.9*  31.9*   RDW 53.5*  53.5*   PLATELETCT 152*  152*   MPV 10.2  10.2     Recent Labs     07/09/21  2207 07/10/21  0529   SODIUM 139 136   POTASSIUM 4.7 5.0   CHLORIDE 104 103   CO2 25 24   GLUCOSE 127* 110*   BUN 28* 31*   CREATININE 1.84* 1.97*   CALCIUM 8.8 9.1     Recent Labs     07/09/21 2238   APTT 30.4   INR 1.13         Recent Labs     07/10/21  0529   TRIGLYCERIDE 55   HDL 52   LDL 75       Imaging  EC-ECHOCARDIOGRAM COMPLETE W/ CONT         CL-LEFT HEART CATHETERIZATION WITH POSSIBLE INTERVENTION    (Results Pending)        Assessment/Plan  * NSTEMI (non-ST elevated myocardial infarction) ([facility])- (present on admission)  Assessment & Plan  History of CAD with 3 stents in 2005  Started on heparin infusion  Asa, statin, metoprolol, losartan  Cardiac echo is pending  Cardiology states that they want to treat this medically    COPD (chronic obstructive pulmonary disease) ([facility])  Assessment & Plan  Not in exacerbation  Albuterol as needed    Chronic respiratory failure with hypoxia ([facility])  Assessment & Plan  Patient is at his baseline of 2 L of oxygen    CKD (chronic kidney disease), stage III ([facility])- (present on  admission)  Assessment & Plan  Monitor BMP and assess response  Avoid IV contrast/nephrotoxins/NSAIDs  Dose adjust meds for decreased GFR        Essential hypertension- (present on admission)  Assessment & Plan  Continue outpatient meds       VTE prophylaxis: therapeutic anticoagulation with Heparin    I have performed a physical exam and reviewed and updated ROS and Plan today (7/10/2021). In review of yesterday's note (7/9/2021), there are no changes except as documented above.

## 2021-07-10 NOTE — PROGRESS NOTES
Spoke with John from pharmacy to verify weight and height for calculating adjusted dosage prior to heparin infusion.

## 2021-07-10 NOTE — PROGRESS NOTES
Cardiology Follow Up Progress Note    Date of Service  7/10/2021    Attending Physician  Mike Schafer M.D.    Chief Complaint   SOB and dizziness    HPI  Koeb Cyr is a 89 y.o. male admitted 7/9/2021 with COPD started home O2 10/2020, CAD/PCI LAD 2005 with residual multivessel disease, HLD, HTN, CKD,carotid disease presents with dizziness and shortness of breath. Progressive over last few months. Denies syncope. Denies chest pain/pressure. Decreased physical stamina from already poor levels; walks short distances in home and to truck. Baseline oxygen 2L at home worsening breathing despite this. No other cardiac complaints.     Interim Events  7/10/2021: SB-SR 53-60s  Na 136, K 5.0, Cr 1.97, BUN 31, GFR 32  , TG 55, HDL 52, LDL 75  Troponin > 218>249  Chest pain-free  PURI and dizziness although has ambulated to the restroom without dizziness so far today    Review of Systems  Review of Systems   Constitutional: Negative for chills and fever.   Respiratory: Positive for shortness of breath. Negative for cough, chest tightness and wheezing.    Cardiovascular: Negative for chest pain, palpitations and leg swelling.   Gastrointestinal: Negative for nausea and vomiting.   Neurological: Positive for dizziness. Negative for headaches.   All other systems reviewed and are negative.      Vital signs in last 24 hours  Temp:  [36.1 °C (96.9 °F)-36.5 °C (97.7 °F)] 36.1 °C (96.9 °F)  Pulse:  [54-66] 54  Resp:  [16-18] 16  BP: (105-165)/(43-73) 105/43  SpO2:  [92 %-95 %] 95 %    Physical Exam  Physical Exam  Vitals and nursing note reviewed.   Constitutional:       Appearance: Normal appearance.   HENT:      Head: Normocephalic and atraumatic.      Mouth/Throat:      Mouth: Mucous membranes are moist.   Eyes:      Extraocular Movements: Extraocular movements intact.   Neck:      Comments: No JVD  Cardiovascular:      Rate and Rhythm: Normal rate and regular rhythm.      Pulses: Normal pulses.      Heart sounds:  Normal heart sounds. No murmur heard.     Pulmonary:      Effort: Pulmonary effort is normal.      Breath sounds: Normal breath sounds.   Abdominal:      Palpations: Abdomen is soft.   Musculoskeletal:      Cervical back: Normal range of motion and neck supple.   Skin:     General: Skin is warm and dry.   Neurological:      General: No focal deficit present.      Mental Status: He is alert and oriented to person, place, and time.   Psychiatric:         Mood and Affect: Mood normal.         Behavior: Behavior normal.         Lab Review  Lab Results   Component Value Date/Time    WBC 6.2 07/09/2021 10:07 PM    WBC 6.2 07/09/2021 10:07 PM    RBC 3.37 (L) 07/09/2021 10:07 PM    RBC 3.37 (L) 07/09/2021 10:07 PM    HEMOGLOBIN 11.2 (L) 07/09/2021 10:07 PM    HEMOGLOBIN 11.2 (L) 07/09/2021 10:07 PM    HEMATOCRIT 35.1 (L) 07/09/2021 10:07 PM    HEMATOCRIT 35.1 (L) 07/09/2021 10:07 PM    .2 (H) 07/09/2021 10:07 PM    .2 (H) 07/09/2021 10:07 PM    MCH 33.2 (H) 07/09/2021 10:07 PM    MCH 33.2 (H) 07/09/2021 10:07 PM    MCHC 31.9 (L) 07/09/2021 10:07 PM    MCHC 31.9 (L) 07/09/2021 10:07 PM    MPV 10.2 07/09/2021 10:07 PM    MPV 10.2 07/09/2021 10:07 PM      Lab Results   Component Value Date/Time    SODIUM 136 07/10/2021 05:29 AM    POTASSIUM 5.0 07/10/2021 05:29 AM    CHLORIDE 103 07/10/2021 05:29 AM    CO2 24 07/10/2021 05:29 AM    GLUCOSE 110 (H) 07/10/2021 05:29 AM    BUN 31 (H) 07/10/2021 05:29 AM    CREATININE 1.97 (H) 07/10/2021 05:29 AM    CREATININE 1.4 05/06/2008 11:42 AM      Lab Results   Component Value Date/Time    ASTSGOT 21 07/09/2021 10:07 PM    ALTSGPT 9 07/09/2021 10:07 PM     Lab Results   Component Value Date/Time    CHOLSTRLTOT 138 07/10/2021 05:29 AM    LDL 75 07/10/2021 05:29 AM    HDL 52 07/10/2021 05:29 AM    TRIGLYCERIDE 55 07/10/2021 05:29 AM    TROPONINT 218 (H) 07/09/2021 10:07 PM       No results for input(s): NTPROBNP in the last 72 hours.    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   Normal left ventricular size, ejection fraction, and wall motion.     [facility] 2005  SUMMARY OF FINDINGS     1. A triple-vessel coronary artery disease characterized by,a.    A 90%     proximal left anterior descending artery stenosis and 80% mid left     anterior descending artery stenosis.b.    50% stenosis of the large     ramus intermedius.c.    80% stenosis of a small main circumflex.d.     50% to 60% proximal right coronary artery stenosis.e.    50% to 80%     posterior descending artery stenosis.     2. Successful stenting of the left anterior descending coronary artery.     3. Abnormal left ventriculogram with inferior wall hypokinesis.  DISCUSSION AND RECOMMENDATIONS: A successful angioplasty was done on the  left anterior descending artery, which apically was a relatively small  caliber vessel. The small posterior division of the circumflex with the 80%  stenosis could cause some ischemia, although if this vessel occluded, it  would not cause a great deal of damage. Consideration should be given to  stenting of this vessel. The right coronary artery perfuses a hypokinetic  inferior wall. I probably would not do angioplasty or stenting on this  vessel if it were the only problem. If decision were made to proceed with  angioplasty of the circumflex, then I would probably do angioplasty of the  right coronary artery as well.    Echocardiogram 7/10/2021  Pending        Assessment/Plan  1. NSTEMI  -Continue ASA 1 mg daily, lovastatin 40 mg daily metoprolol 25 twice daily, heparin drip  -Start clopidogrel 75 mg daily and Imdur 30 mg daily  -Chest pain-free    2.  HTN  -Losartan 25 mg daily, metoprolol 25 mg twice daily                Thank you for allowing me to participate in the care of this  patient.  I will continue to follow this patient    Please contact me with any questions.          SAMPSON Galvan  [facility]  [phone]    Future Appointments   Date Time Provider Department Center   7/30/2021  1:00 PM Tomas Nieto M.D. [facility] None   11/23/2021 10:00 AM Pati Morrow P.A.-C. [facility] None       Please note that this dictation was created using voice recognition software. I have worked with consultants from the vendor as well as technical experts from [facility] to optimize the interface. I have made every reasonable attempt to correct obvious errors, but I expect that there are errors of grammar and possibly content I did not discover before finalizing the note.

## 2021-07-10 NOTE — ASSESSMENT & PLAN NOTE
Patient is at his baseline of 2 L of oxygen  Requires 4 L oxygen after admission  Likely secondary to COPD and the triple-vessel disease

## 2021-07-10 NOTE — PROGRESS NOTES
Bedside report received from night RN; assumed pt care. Pt assessment complete. Pt A&Ox4 Reviewed plan of care with pt. Tele box on and rhythm verified. Chart and labs reviewed. Bed in lowest position, and 2 side rails up. Pt educated on call light; call light with in reach.

## 2021-07-10 NOTE — PROGRESS NOTES
Bedside report received and patient care assumed. Pt is resting in bed, A&O4, with no complaints of pain, and is on 2 L. Tele box on. All fall precautions are in place, belongings at bedside table.  Pt was updated on POC, no questions or concerns. Pt educated on use of call light for assistance. Will continue to monitor.

## 2021-07-10 NOTE — H&P
Hospital Medicine History & Physical Note    Date of Service  7/9/2021    Primary Care Physician  No primary care provider on file.    Consultants  Cardiology    Specialist name: Dr. Lama  Code Status  Full code    Chief Complaint  Presyncope    History of Presenting Illness  Kobe Cyr is a 89 y.o. male with past medical history of essential hypertension, dyslipidemia, coronary artery disease with 3 stent placed in 2005 who presented 7/9/2021 with lightheadedness earlier today.  The patient denies any syncope episode associated with it.  According to the patient over the past few months he has been having lightheadedness especially with exertion.  But today his symptom is worse than usual.  He was evaluated at [facility] where he was found to have mild elevation of troponin 0 0.08.  The patient denies any chest pain at that time.  He was transferred here for further evaluation and management.  He stated that if he is laying down and resting he does not have lightheaded.  Later today his repeat troponin went up to around 260.  Because of that I started patient on heparin infusion and consulted cardiology.    I discussed the plan of care with patient.    Review of Systems  Review of Systems   Constitutional: Negative for chills, fever and weight loss.   HENT: Negative for congestion and nosebleeds.    Eyes: Negative for blurred vision, pain, discharge and redness.   Respiratory: Negative for cough, sputum production, shortness of breath and stridor.    Cardiovascular: Negative for chest pain, palpitations and orthopnea.   Gastrointestinal: Negative for abdominal pain, diarrhea, heartburn, nausea and vomiting.   Genitourinary: Negative for dysuria, frequency and urgency.   Musculoskeletal: Negative for back pain, myalgias and neck pain.   Skin: Negative for itching and rash.   Neurological: Positive for dizziness. Negative for focal weakness, seizures and headaches.   Psychiatric/Behavioral:  Negative for depression. The patient is not nervous/anxious and does not have insomnia.        Past Medical History   has a past medical history of Anemia (8/1/2016), Arthritis, Back pain, CAD (coronary artery disease), Cancer (HCC), Chronic kidney disease (CKD), stage II (mild) (8/1/2016), Coronary heart disease, DJD (degenerative joint disease) (8/1/2016), Dyslipidemia (8/1/2016), GERD (gastroesophageal reflux disease) (8/1/2016), Setswana measles, Hematochezia (8/1/2016), High cholesterol, Hyperglycemia (1/6/2010), Hyperlipidemia (5/24/2011), Hypertension, Impaired fasting glucose (8/1/2016), Left knee pain (8/1/2016), Myocardial infarction (HCC) (2 yrs ago), Nasal drainage, Obesity, Osteoarthritis of knees, bilateral (8/1/2016), Pulmonary embolism (HCC), Rheumatic fever, Scarlet fever, Smallpox, and Snoring.    Surgical History   has a past surgical history that includes coronary stent procedure lad; knee unicompartmental (10/13/2009); other orthopedic surgery; and arthroscopy, knee.     Family History     Family history reviewed with patient. There is no family history that is pertinent to the chief complaint.     Social History   reports that he quit smoking about 48 years ago. His smoking use included cigarettes. He has a 70.00 pack-year smoking history. He has never used smokeless tobacco. He reports current alcohol use. He reports that he does not use drugs.    Allergies  Allergies   Allergen Reactions   • Atorvastatin Myalgia   • Simvastatin      ADVERSE REACTION: MYALGIA.       Medications  Prior to Admission Medications   Prescriptions Last Dose Informant Patient Reported? Taking?   aspirin 81 MG tablet   Yes No   Sig: Take 81 mg by mouth every day.   benzonatate (TESSALON) 200 MG capsule   No No   Sig: Take 1 capsule by mouth 2 times a day as needed for Cough. Do not chew. Swallow whole.   doxycycline (VIBRAMYCIN) 100 MG Cap   No No   Sig: Take 1 Cap by mouth every day. One po qd for 7 days each month    Patient not taking: Reported on 2/24/2021   doxycycline (VIBRAMYCIN) 100 MG Cap   No No   Sig: Take 1 capsule by mouth 2 times a day. Take as prescribed until gone.   lovastatin (MEVACOR) 40 MG tablet   No No   Sig: Take 1 Tab by mouth every day. One tablet at hs   metoprolol (LOPRESSOR) 25 MG Tab   No No   Sig: Take 1 Tab by mouth 2 times a day.   nitroglycerin (NITROSTAT) 0.4 MG SUBL   No No   Sig: Place 1 Tab under tongue as needed for Chest Pain.   ofloxacin (OCUFLOX) 0.3 % Solution   Yes No   Sig: Place 0.3 Drops in both eyes 4 times a day.   omeprazole (PRILOSEC) 20 MG delayed-release capsule   Yes No   Sig: omeprazole 20 mg capsule,delayed release   predniSONE (DELTASONE) 10 MG Tab   No No   Sig: Take 1 pill daily until finished   Patient not taking: Reported on 7/17/2019   predniSONE (DELTASONE) 10 MG Tab   No No   Sig: Take 30mg x 3 days, then take 20mg x 3 days, then take 10mg x 3 days, with food, then discontinue.   Patient not taking: Reported on 5/26/2021   prednisoLONE acetate (PRED FORTE) 1 % Suspension   Yes No   Sig: Place 1 Drop in both eyes 4 times a day.      Facility-Administered Medications: None       Physical Exam       Physical Exam  Vitals reviewed.   Constitutional:       General: He is not in acute distress.     Appearance: Normal appearance.   HENT:      Head: Normocephalic and atraumatic.      Nose: No congestion or rhinorrhea.   Eyes:      Extraocular Movements: Extraocular movements intact.      Pupils: Pupils are equal, round, and reactive to light.   Cardiovascular:      Rate and Rhythm: Normal rate and regular rhythm.      Pulses: Normal pulses.   Pulmonary:      Effort: Pulmonary effort is normal. No respiratory distress.      Breath sounds: Normal breath sounds.   Abdominal:      General: Bowel sounds are normal. There is no distension.      Palpations: Abdomen is soft.      Tenderness: There is no abdominal tenderness.   Musculoskeletal:         General: No swelling or  tenderness.      Cervical back: Normal range of motion and neck supple.   Skin:     General: Skin is warm.      Findings: No erythema.   Neurological:      General: No focal deficit present.      Mental Status: He is alert and oriented to person, place, and time.         Laboratory:          No results for input(s): ALTSGPT, ASTSGOT, ALKPHOSPHAT, TBILIRUBIN, DBILIRUBIN, GAMMAGT, AMYLASE, LIPASE, ALB, PREALBUMIN, GLUCOSE in the last 72 hours.      No results for input(s): NTPROBNP in the last 72 hours.      No results for input(s): TROPONINT in the last 72 hours.    Imaging:  NM-CARDIAC STRESS TEST    (Results Pending)       EKG:  I have personally reviewed the images and compared with prior images.    Assessment/Plan:  I anticipate this patient will require at least two midnights for appropriate medical management, necessitating inpatient admission.    Essential hypertension- (present on admission)  Assessment & Plan  Continue outpatient meds    NSTEMI (non-ST elevated myocardial infarction) (HCC)- (present on admission)  Assessment & Plan  History of CAD with 3 stents in 2005  Started on heparin infusion  Asa, statin, metoprolol  Echo  Card on and to have cath       VTE prophylaxis: heparin infusion

## 2021-07-10 NOTE — PROGRESS NOTES
4 Eyes Skin Assessment Completed by CHLOÉ Carroll and CHLOÉ Marcelino.    Head WDL  Ears Redness and Blanching  Nose WDL  Mouth WDL  Neck WDL  Breast/Chest Edema  Shoulder Blades WDL  Spine WDL  (R) Arm/Elbow/Hand Bruising  (L) Arm/Elbow/Hand Bruising  Abdomen WDL  Groin WDL  Scrotum/Coccyx/Buttocks WDL  (R) Leg Bruising and Edema  (L) Leg Bruising and Edema  (R) Heel/Foot/Toe Boggy  (L) Heel/Foot/Toe Boggy          Devices In Places Tele Box      Interventions In Place Pressure Redistribution Mattress    Possible Skin Injury No    Pictures Uploaded Into Epic N/A  Wound Consult Placed N/A  RN Wound Prevention Protocol Ordered No

## 2021-07-11 ENCOUNTER — APPOINTMENT (OUTPATIENT)
Dept: RADIOLOGY | Facility: MEDICAL CENTER | Age: 86
DRG: 280 | End: 2021-07-11
Attending: HOSPITALIST
Payer: MEDICARE

## 2021-07-11 ENCOUNTER — APPOINTMENT (OUTPATIENT)
Dept: CARDIOLOGY | Facility: MEDICAL CENTER | Age: 86
DRG: 280 | End: 2021-07-11
Attending: NURSE PRACTITIONER
Payer: MEDICARE

## 2021-07-11 PROBLEM — I35.0 MILD AORTIC STENOSIS: Status: ACTIVE | Noted: 2021-07-11

## 2021-07-11 LAB
ACT BLD: 197 SEC (ref 74–137)
ANION GAP SERPL CALC-SCNC: 8 MMOL/L (ref 7–16)
BUN SERPL-MCNC: 33 MG/DL (ref 8–22)
CALCIUM SERPL-MCNC: 8.8 MG/DL (ref 8.5–10.5)
CHLORIDE SERPL-SCNC: 103 MMOL/L (ref 96–112)
CO2 SERPL-SCNC: 26 MMOL/L (ref 20–33)
CREAT SERPL-MCNC: 1.83 MG/DL (ref 0.5–1.4)
GLUCOSE SERPL-MCNC: 113 MG/DL (ref 65–99)
GRAM STN SPEC: NORMAL
POTASSIUM SERPL-SCNC: 4.7 MMOL/L (ref 3.6–5.5)
SIGNIFICANT IND 70042: NORMAL
SITE SITE: NORMAL
SODIUM SERPL-SCNC: 137 MMOL/L (ref 135–145)
SOURCE SOURCE: NORMAL

## 2021-07-11 PROCEDURE — 4A023N7 MEASUREMENT OF CARDIAC SAMPLING AND PRESSURE, LEFT HEART, PERCUTANEOUS APPROACH: ICD-10-PCS | Performed by: INTERNAL MEDICINE

## 2021-07-11 PROCEDURE — 85347 COAGULATION TIME ACTIVATED: CPT

## 2021-07-11 PROCEDURE — 4A033BC MEASUREMENT OF ARTERIAL PRESSURE, CORONARY, PERCUTANEOUS APPROACH: ICD-10-PCS | Performed by: INTERNAL MEDICINE

## 2021-07-11 PROCEDURE — 87205 SMEAR GRAM STAIN: CPT

## 2021-07-11 PROCEDURE — 99152 MOD SED SAME PHYS/QHP 5/>YRS: CPT | Performed by: INTERNAL MEDICINE

## 2021-07-11 PROCEDURE — 87070 CULTURE OTHR SPECIMN AEROBIC: CPT

## 2021-07-11 PROCEDURE — 700111 HCHG RX REV CODE 636 W/ 250 OVERRIDE (IP)

## 2021-07-11 PROCEDURE — A9270 NON-COVERED ITEM OR SERVICE: HCPCS | Performed by: NURSE PRACTITIONER

## 2021-07-11 PROCEDURE — 93572 IV DOP VEL&/PRESS C FLO EA: CPT | Mod: 26,52,LD | Performed by: INTERNAL MEDICINE

## 2021-07-11 PROCEDURE — 36415 COLL VENOUS BLD VENIPUNCTURE: CPT

## 2021-07-11 PROCEDURE — 93571 IV DOP VEL&/PRESS C FLO 1ST: CPT | Mod: 26,52,LM | Performed by: INTERNAL MEDICINE

## 2021-07-11 PROCEDURE — 93458 L HRT ARTERY/VENTRICLE ANGIO: CPT | Mod: 26 | Performed by: INTERNAL MEDICINE

## 2021-07-11 PROCEDURE — 99153 MOD SED SAME PHYS/QHP EA: CPT

## 2021-07-11 PROCEDURE — 700102 HCHG RX REV CODE 250 W/ 637 OVERRIDE(OP): Performed by: INTERNAL MEDICINE

## 2021-07-11 PROCEDURE — 700101 HCHG RX REV CODE 250

## 2021-07-11 PROCEDURE — B240ZZ3 ULTRASONOGRAPHY OF SINGLE CORONARY ARTERY, INTRAVASCULAR: ICD-10-PCS | Performed by: INTERNAL MEDICINE

## 2021-07-11 PROCEDURE — 99233 SBSQ HOSP IP/OBS HIGH 50: CPT | Performed by: HOSPITALIST

## 2021-07-11 PROCEDURE — 700111 HCHG RX REV CODE 636 W/ 250 OVERRIDE (IP): Performed by: INTERNAL MEDICINE

## 2021-07-11 PROCEDURE — 770020 HCHG ROOM/CARE - TELE (206)

## 2021-07-11 PROCEDURE — A9270 NON-COVERED ITEM OR SERVICE: HCPCS | Performed by: INTERNAL MEDICINE

## 2021-07-11 PROCEDURE — 92978 ENDOLUMINL IVUS OCT C 1ST: CPT | Mod: 26,LM | Performed by: INTERNAL MEDICINE

## 2021-07-11 PROCEDURE — 71045 X-RAY EXAM CHEST 1 VIEW: CPT

## 2021-07-11 PROCEDURE — 700105 HCHG RX REV CODE 258: Performed by: INTERNAL MEDICINE

## 2021-07-11 PROCEDURE — 80048 BASIC METABOLIC PNL TOTAL CA: CPT

## 2021-07-11 PROCEDURE — 99233 SBSQ HOSP IP/OBS HIGH 50: CPT | Mod: 25 | Performed by: INTERNAL MEDICINE

## 2021-07-11 PROCEDURE — 700117 HCHG RX CONTRAST REV CODE 255: Performed by: INTERNAL MEDICINE

## 2021-07-11 PROCEDURE — B2111ZZ FLUOROSCOPY OF MULTIPLE CORONARY ARTERIES USING LOW OSMOLAR CONTRAST: ICD-10-PCS | Performed by: INTERNAL MEDICINE

## 2021-07-11 PROCEDURE — 700102 HCHG RX REV CODE 250 W/ 637 OVERRIDE(OP): Performed by: NURSE PRACTITIONER

## 2021-07-11 RX ORDER — BIVALIRUDIN 250 MG/5ML
INJECTION, POWDER, LYOPHILIZED, FOR SOLUTION INTRAVENOUS
Status: COMPLETED
Start: 2021-07-11 | End: 2021-07-11

## 2021-07-11 RX ORDER — VERAPAMIL HYDROCHLORIDE 2.5 MG/ML
INJECTION, SOLUTION INTRAVENOUS
Status: COMPLETED
Start: 2021-07-11 | End: 2021-07-11

## 2021-07-11 RX ORDER — SODIUM CHLORIDE 9 MG/ML
INJECTION, SOLUTION INTRAVENOUS CONTINUOUS
Status: DISCONTINUED | OUTPATIENT
Start: 2021-07-11 | End: 2021-07-12

## 2021-07-11 RX ORDER — MIDAZOLAM HYDROCHLORIDE 1 MG/ML
INJECTION INTRAMUSCULAR; INTRAVENOUS
Status: COMPLETED
Start: 2021-07-11 | End: 2021-07-11

## 2021-07-11 RX ORDER — HEPARIN SODIUM 1000 [USP'U]/ML
INJECTION, SOLUTION INTRAVENOUS; SUBCUTANEOUS
Status: COMPLETED
Start: 2021-07-11 | End: 2021-07-11

## 2021-07-11 RX ORDER — LIDOCAINE HYDROCHLORIDE 20 MG/ML
INJECTION, SOLUTION INFILTRATION; PERINEURAL
Status: COMPLETED
Start: 2021-07-11 | End: 2021-07-11

## 2021-07-11 RX ORDER — HEPARIN SODIUM 200 [USP'U]/100ML
INJECTION, SOLUTION INTRAVENOUS
Status: COMPLETED
Start: 2021-07-11 | End: 2021-07-11

## 2021-07-11 RX ADMIN — DOCUSATE SODIUM 50 MG AND SENNOSIDES 8.6 MG 2 TABLET: 8.6; 5 TABLET, FILM COATED ORAL at 05:59

## 2021-07-11 RX ADMIN — HEPARIN SODIUM 12 UNITS/KG/HR: 5000 INJECTION, SOLUTION INTRAVENOUS at 18:37

## 2021-07-11 RX ADMIN — NITROGLYCERIN 10 ML: 20 INJECTION INTRAVENOUS at 13:21

## 2021-07-11 RX ADMIN — MIDAZOLAM HYDROCHLORIDE 0.5 MG: 1 INJECTION, SOLUTION INTRAMUSCULAR; INTRAVENOUS at 14:22

## 2021-07-11 RX ADMIN — SODIUM CHLORIDE: 9 INJECTION, SOLUTION INTRAVENOUS at 15:10

## 2021-07-11 RX ADMIN — ASPIRIN 81 MG: 81 TABLET, COATED ORAL at 06:00

## 2021-07-11 RX ADMIN — OMEPRAZOLE 20 MG: 20 CAPSULE, DELAYED RELEASE ORAL at 06:00

## 2021-07-11 RX ADMIN — ISOSORBIDE MONONITRATE 30 MG: 30 TABLET, EXTENDED RELEASE ORAL at 06:01

## 2021-07-11 RX ADMIN — IOHEXOL 35 ML: 350 INJECTION, SOLUTION INTRAVENOUS at 13:15

## 2021-07-11 RX ADMIN — FENTANYL CITRATE 25 MCG: 50 INJECTION, SOLUTION INTRAMUSCULAR; INTRAVENOUS at 14:22

## 2021-07-11 RX ADMIN — BIVALIRUDIN 250 MG: 250 INJECTION, POWDER, LYOPHILIZED, FOR SOLUTION INTRAVENOUS at 14:04

## 2021-07-11 RX ADMIN — VERAPAMIL HYDROCHLORIDE 2.5 MG: 2.5 INJECTION, SOLUTION INTRAVENOUS at 13:21

## 2021-07-11 RX ADMIN — DOCUSATE SODIUM 50 MG AND SENNOSIDES 8.6 MG 2 TABLET: 8.6; 5 TABLET, FILM COATED ORAL at 17:53

## 2021-07-11 RX ADMIN — ROSUVASTATIN CALCIUM 20 MG: 20 TABLET, FILM COATED ORAL at 18:00

## 2021-07-11 RX ADMIN — CLOPIDOGREL BISULFATE 75 MG: 75 TABLET ORAL at 06:00

## 2021-07-11 RX ADMIN — FUROSEMIDE 20 MG: 10 INJECTION, SOLUTION INTRAVENOUS at 05:59

## 2021-07-11 RX ADMIN — HEPARIN SODIUM: 1000 INJECTION, SOLUTION INTRAVENOUS; SUBCUTANEOUS at 13:21

## 2021-07-11 RX ADMIN — HEPARIN SODIUM: 200 INJECTION, SOLUTION INTRAVENOUS at 13:22

## 2021-07-11 RX ADMIN — LIDOCAINE HYDROCHLORIDE: 20 INJECTION, SOLUTION INFILTRATION; PERINEURAL at 13:21

## 2021-07-11 ASSESSMENT — ENCOUNTER SYMPTOMS
ABDOMINAL PAIN: 0
HEARTBURN: 0
VOMITING: 0
NECK PAIN: 0
FLANK PAIN: 0
BACK PAIN: 0
NAUSEA: 0
EYE PAIN: 0
DEPRESSION: 0
WEAKNESS: 0
TINGLING: 0
TREMORS: 0
BRUISES/BLEEDS EASILY: 0
CONSTIPATION: 0
CHEST TIGHTNESS: 0
PHOTOPHOBIA: 0
CHILLS: 0
DIARRHEA: 0
HEMOPTYSIS: 0
STRIDOR: 0
COUGH: 0
CHOKING: 0
SINUS PAIN: 0
DIZZINESS: 1
MYALGIAS: 0
FEVER: 0
BLOOD IN STOOL: 0
PALPITATIONS: 0
SPUTUM PRODUCTION: 0
POLYDIPSIA: 0
DIAPHORESIS: 0
PND: 0
SHORTNESS OF BREATH: 1
FALLS: 0
HALLUCINATIONS: 0
ORTHOPNEA: 0
DOUBLE VISION: 0
FATIGUE: 1
HEADACHES: 0
APNEA: 0
BLURRED VISION: 0
WHEEZING: 0
SORE THROAT: 0
CLAUDICATION: 0

## 2021-07-11 ASSESSMENT — LIFESTYLE VARIABLES: SUBSTANCE_ABUSE: 0

## 2021-07-11 ASSESSMENT — PAIN DESCRIPTION - PAIN TYPE
TYPE: ACUTE PAIN
TYPE: ACUTE PAIN

## 2021-07-11 NOTE — CARE PLAN
The patient is Watcher - Medium risk of patient condition declining or worsening    Shift Goals  Clinical Goals: monitor troponin levels and for new CP  Patient Goals: remain chest pain free    Progress made toward(s) clinical / shift goals:  Pt denies chest pain this shift. No significant change in patient heart rate and rhythm this shift. Continue to monitor heparin anit-xa. Upon mobilization to bathroom, patient's heart rate touched up to 72 BPM.     Patient is not progressing towards the following goals: NA       Problem: Knowledge Deficit - Standard  Goal: Patient and family/care givers will demonstrate understanding of plan of care, disease process/condition, diagnostic tests and medications  Outcome: Progressing

## 2021-07-11 NOTE — PROGRESS NOTES
Hospital Medicine Daily Progress Note    Date of Service  7/11/2021    Chief Complaint  Kobe Cyr is a 89 y.o. male admitted 7/9/2021 with past medical history of hypertension, hyperlipidemia, coronary disease status with 3 stents in 2005 who presents to the hospital with lightheadedness and shortness of breath.  It associated with orthopnea and shortness of breath gets worse when he bends down.  Patient was transferred from [facility] where he was found to have a troponin of 0.08.    Hospital Course  Upon arrival to the hospital patient was found to have normal sinus rhythm, right bundle branch block, left anterior fascicular block without significant ST segment changes.  He was started on IV heparin, metoprolol and losartan.    Interval Problem Update  7/10: Lightheaded and dizziness could possibly be from his trifascicular block and will continue to monitor this on telemetry.  We will continue heparin drip according to cardiac recommendations.  Cardiac echo still pending.  Blood pressures well controlled today.  7/11: Patient is having exertional chest pain and shortness of breath today.  Multiple etiology including mild aortic stenosis, COPD or coronary disease could cause similar symptoms.  Patient be taken for cardiac cath today.   I have personally seen and examined the patient at bedside. I discussed the plan of care with patient.    Consultants/Specialty  cardiology    Code Status  Full Code    Disposition  Patient is not medically cleared.   Anticipate discharge to to home with close outpatient follow-up.  I have placed the appropriate orders for post-discharge needs.    Review of Systems  Review of Systems   Constitutional: Negative for chills, diaphoresis, fever and malaise/fatigue.   HENT: Negative for congestion, ear discharge, ear pain, hearing loss, nosebleeds, sinus pain, sore throat and tinnitus.    Eyes: Negative for blurred vision, double vision, photophobia and pain.    Respiratory: Positive for shortness of breath. Negative for cough, hemoptysis, sputum production, wheezing and stridor.    Cardiovascular: Negative for chest pain, palpitations, orthopnea, claudication, leg swelling and PND.   Gastrointestinal: Negative for abdominal pain, blood in stool, constipation, diarrhea, heartburn, melena, nausea and vomiting.   Genitourinary: Negative for dysuria, flank pain, frequency, hematuria and urgency.   Musculoskeletal: Negative for back pain, falls, joint pain, myalgias and neck pain.   Skin: Negative for itching and rash.   Neurological: Positive for dizziness. Negative for tingling, tremors, weakness and headaches.   Endo/Heme/Allergies: Negative for environmental allergies and polydipsia. Does not bruise/bleed easily.   Psychiatric/Behavioral: Negative for depression, hallucinations, substance abuse and suicidal ideas.        Physical Exam  Temp:  [35.9 °C (96.6 °F)-37.1 °C (98.8 °F)] 36.2 °C (97.1 °F)  Pulse:  [57-66] 63  Resp:  [17-20] 18  BP: (101-131)/(42-59) 131/59  SpO2:  [91 %-97 %] 94 %    Physical Exam  Vitals and nursing note reviewed.   Constitutional:       General: He is not in acute distress.     Appearance: Normal appearance. He is not ill-appearing, toxic-appearing or diaphoretic.   HENT:      Head: Normocephalic and atraumatic.      Nose: No congestion or rhinorrhea.      Mouth/Throat:      Pharynx: No oropharyngeal exudate or posterior oropharyngeal erythema.   Eyes:      General: No scleral icterus.  Neck:      Vascular: No carotid bruit.   Cardiovascular:      Rate and Rhythm: Normal rate and regular rhythm.      Pulses: Normal pulses.      Heart sounds: Normal heart sounds. No murmur heard.   No friction rub. No gallop.    Pulmonary:      Effort: Pulmonary effort is normal. No respiratory distress.      Breath sounds: Normal breath sounds. No stridor. No wheezing or rhonchi.   Abdominal:      General: Abdomen is flat. There is no distension.       Palpations: There is no mass.      Tenderness: There is no abdominal tenderness. There is no left CVA tenderness, guarding or rebound.      Hernia: No hernia is present.   Musculoskeletal:         General: No swelling. Normal range of motion.      Cervical back: No rigidity. No muscular tenderness.      Right lower leg: No edema.      Left lower leg: No edema.   Lymphadenopathy:      Cervical: No cervical adenopathy.   Skin:     General: Skin is warm and dry.      Capillary Refill: Capillary refill takes more than 3 seconds.      Coloration: Skin is not jaundiced or pale.      Findings: No bruising or erythema.   Neurological:      Mental Status: He is alert.         Fluids    Intake/Output Summary (Last 24 hours) at 7/11/2021 1342  Last data filed at 7/11/2021 1014  Gross per 24 hour   Intake 300 ml   Output 1225 ml   Net -925 ml       Laboratory  Recent Labs     07/09/21 2207   WBC 6.2  6.2   RBC 3.37*  3.37*   HEMOGLOBIN 11.2*  11.2*   HEMATOCRIT 35.1*  35.1*   .2*  104.2*   MCH 33.2*  33.2*   MCHC 31.9*  31.9*   RDW 53.5*  53.5*   PLATELETCT 152*  152*   MPV 10.2  10.2     Recent Labs     07/09/21  2207 07/10/21  0529 07/11/21  0735   SODIUM 139 136 137   POTASSIUM 4.7 5.0 4.7   CHLORIDE 104 103 103   CO2 25 24 26   GLUCOSE 127* 110* 113*   BUN 28* 31* 33*   CREATININE 1.84* 1.97* 1.83*   CALCIUM 8.8 9.1 8.8     Recent Labs     07/09/21 2238   APTT 30.4   INR 1.13         Recent Labs     07/10/21  0529   TRIGLYCERIDE 55   HDL 52   LDL 75       Imaging  DX-CHEST-PORTABLE (1 VIEW)   Final Result      1.  Development of multifocal bilateral pulmonary airspace process somewhat the peripheral distribution      2.  This finding is most consistent with pneumonia and Covid pneumonia should be considered strongly in the differential diagnosis.      EC-ECHOCARDIOGRAM COMPLETE W/ CONT   Final Result      CL-LEFT HEART CATHETERIZATION WITH POSSIBLE INTERVENTION    (Results Pending)   CL-LEFT HEART  CATHETERIZATION WITH POSSIBLE INTERVENTION    (Results Pending)        Assessment/Plan  * NSTEMI (non-ST elevated myocardial infarction) ([facility])- (present on admission)  Assessment & Plan  History of CAD with 3 stents in 2005  Started on heparin infusion  Asa, statin, metoprolol, losartan  Cardiac echo EF of 75% mild AS no segmental hypokinesis  Cardiology ordered a cardiac cath     Mild aortic stenosis  Assessment & Plan  Follow up as outpatient     COPD (chronic obstructive pulmonary disease) ([facility])  Assessment & Plan  Not in exacerbation  Albuterol as needed    Chronic respiratory failure with hypoxia ([facility])  Assessment & Plan  Patient is at his baseline of 2 L of oxygen    CKD (chronic kidney disease), stage III ([facility])- (present on admission)  Assessment & Plan  Monitor BMP and assess response  Avoid IV contrast/nephrotoxins/NSAIDs  Dose adjust meds for decreased GFR        Essential hypertension- (present on admission)  Assessment & Plan  Continue outpatient meds       VTE prophylaxis: therapeutic anticoagulation with Heparin    I have performed a physical exam and reviewed and updated ROS and Plan today (7/11/2021). In review of yesterday's note (7/10/2021), there are no changes except as documented above.

## 2021-07-11 NOTE — PROGRESS NOTES
Pt. Back from cath lab. Telebox in place, monitor room notified. Pt. Right radial cath site CDI, no complaints of pain. Post op vitals in place.

## 2021-07-11 NOTE — CONSULTS
REFERRING PHYSICIAN: Derrick Davey MD.     CONSULTING PHYSICIAN: Zack Luis DO.    CHIEF COMPLAINT: dizziness    HISTORY OF PRESENT ILLNESS: The patient is a 89 y.o. male with a history of COPD with home oxygen 24/7, CAD requiring stents in 2005, CKD stage II, dyslipidemia, hypertension who presented to the ED at [facility] [place] with the complaint of dizziness.  He has been having dizzy spells for approximately the past several months.  The day of admission his symptoms were at their worst which prompted him to go to the ED.  He had a bump in his troponins so he was transferred to [facility] for a higher level of care.    He had no other associated signs or symptoms.  No alleviating or aggravating factors.  Dizzy spells were random.  He was cathed and found to have progression of his coronary artery disease.  Today, he is feeling better.  He is in good spirits, and very impressed with the care he is received thus far.  He denies any current chest pain.  He relates he is having worsening dizzy spells when he is out driving his car.  In order to ambulate, he often times has to increase his oxygen from 2 L to 5 L.  For the most part, he is quite sedentary and limited by his congestive heart failure and COPD.  He does admit to anginal type symptoms intermittently throughout the day normally.    PAST MEDICAL HISTORY:   Past Medical History:   Diagnosis Date   • Anemia 8/1/2016   • Arthritis    • Back pain    • CAD (coronary artery disease)    • Cancer (HCC)     skin   • Chronic kidney disease (CKD), stage II (mild) 8/1/2016   • Coronary heart disease    • DJD (degenerative joint disease) 8/1/2016   • Dyslipidemia 8/1/2016   • GERD (gastroesophageal reflux disease) 8/1/2016   • Arabic measles    • Hematochezia 8/1/2016   • High cholesterol    • Hyperglycemia 1/6/2010   • Hyperlipidemia 5/24/2011   • Hypertension    • Impaired fasting glucose 8/1/2016   • Left knee pain 8/1/2016   • Myocardial infarction  (HCC) 2 yrs ago       • Nasal drainage    • Obesity    • Osteoarthritis of knees, bilateral 2016   • Pulmonary embolism (HCC)    • Rheumatic fever    • Scarlet fever    • Smallpox    • Snoring      PAST SURGICAL HISTORY:   Past Surgical History:   Procedure Laterality Date   • KNEE UNICOMPARTMENTAL  10/13/2009    Performed by SAM LINDER at SURGERY [facility]   • ARTHROSCOPY, KNEE     • CORONARY STENT PROCEDURE LAD     • OTHER ORTHOPEDIC SURGERY      right shoulder rotator cuff surgery x2     FAMILY HISTORY:   Family History   Problem Relation Age of Onset   • No Known Problems Mother    • No Known Problems Father         SOCIAL HISTORY:   Social History     Socioeconomic History   • Marital status:      Spouse name: Not on file   • Number of children: Not on file   • Years of education: Not on file   • Highest education level: Not on file   Occupational History   • Not on file   Tobacco Use   • Smoking status: Former Smoker     Packs/day: 2.00     Years: 35.00     Pack years: 70.00     Types: Cigarettes     Quit date: 1973     Years since quittin.2   • Smokeless tobacco: Never Used   Vaping Use   • Vaping Use: Never used   Substance and Sexual Activity   • Alcohol use: Yes     Comment: cocktail every night   • Drug use: No   • Sexual activity: Not on file   Other Topics Concern   • Not on file   Social History Narrative   • Not on file     Social Determinants of Health     Financial Resource Strain:    • Difficulty of Paying Living Expenses:    Food Insecurity:    • Worried About Running Out of Food in the Last Year:    • Ran Out of Food in the Last Year:    Transportation Needs:    • Lack of Transportation (Medical):    • Lack of Transportation (Non-Medical):    Physical Activity:    • Days of Exercise per Week:    • Minutes of Exercise per Session:    Stress:    • Feeling of Stress :    Social Connections:    • Frequency of Communication with Friends and Family:    • Frequency of  Social Gatherings with Friends and Family:    • Attends Hinduism Services:    • Active Member of Clubs or Organizations:    • Attends Club or Organization Meetings:    • Marital Status:    Intimate Partner Violence:    • Fear of Current or Ex-Partner:    • Emotionally Abused:    • Physically Abused:    • Sexually Abused:        ALLERGIES:   Allergies   Allergen Reactions   • Atorvastatin Myalgia   • Simvastatin      ADVERSE REACTION: MYALGIA.        CURRENT MEDICATIONS:     Current Facility-Administered Medications:   •  NS infusion, , Intravenous, Continuous, Derrick Davey M.D., Last Rate: 200 mL/hr at 07/11/21 1510, New Bag at 07/11/21 1510  •  clopidogrel (PLAVIX) tablet 75 mg, 75 mg, Oral, DAILY, Heather Caldwell, A.P.N., 75 mg at 07/11/21 0600  •  isosorbide mononitrate SR (IMDUR) tablet 30 mg, 30 mg, Oral, Q DAY, Heather Caldwell, A.P.N., 30 mg at 07/11/21 0601  •  albuterol inhaler 2 Puff, 2 Puff, Inhalation, Q4HRS PRN, Mike Schafer M.D.  •  rosuvastatin (CRESTOR) tablet 20 mg, 20 mg, Oral, Q EVENING, Niecy Haile M.D., 20 mg at 07/10/21 2008  •  aspirin EC (ECOTRIN) tablet 81 mg, 81 mg, Oral, DAILY, Jerrod Graham M.D., 81 mg at 07/11/21 0600  •  nitroglycerin (NITROSTAT) tablet 0.4 mg, 0.4 mg, Sublingual, Q5 MIN PRN, Jerrod Graham M.D.  •  omeprazole (PRILOSEC) capsule 20 mg, 20 mg, Oral, DAILY, Jerrod Graham M.D., 20 mg at 07/11/21 0600  •  senna-docusate (PERICOLACE or SENOKOT S) 8.6-50 MG per tablet 2 tablet, 2 tablet, Oral, BID, 2 tablet at 07/11/21 0559 **AND** polyethylene glycol/lytes (MIRALAX) PACKET 1 Packet, 1 Packet, Oral, QDAY PRN **AND** magnesium hydroxide (MILK OF MAGNESIA) suspension 30 mL, 30 mL, Oral, QDAY PRN **AND** bisacodyl (DULCOLAX) suppository 10 mg, 10 mg, Rectal, QDAY PRN, Jerrod Graham M.D.  •  regadenoson (LEXISCAN) injection SOLN 0.4 mg, 0.4 mg, Intravenous, Once PRN, Jerrod Graham M.D.  •  aminophylline injection 100 mg, 100 mg, Intravenous, Q5 MIN PRN, Jerrod Graham M.D.  •   acetaminophen (Tylenol) tablet 650 mg, 650 mg, Oral, Q6HRS PRN, Jerrod Graham M.D.  •  enalaprilat (VASOTEC) injection 1.25 mg, 1.25 mg, Intravenous, Q6HRS PRN, Jerrod Graham M.D.  •  labetalol (NORMODYNE/TRANDATE) injection 10 mg, 10 mg, Intravenous, Q4HRS PRN, Jerrod Graham M.D.  •  ondansetron (ZOFRAN) syringe/vial injection 4 mg, 4 mg, Intravenous, Q4HRS PRN, Jerrod Graham M.D.  •  ondansetron (ZOFRAN ODT) dispertab 4 mg, 4 mg, Oral, Q4HRS PRN, Jerrod Graham M.D.  •  heparin infusion 25,000 units in 500 mL 0.45% NACL, 0-30 Units/kg/hr (Adjusted), Intravenous, Continuous, Luiz Lama M.D., Stopped. (Insulin or Heparin) at 07/11/21 1319  •  heparin injection 2,000 Units, 2,000 Units, Intravenous, PRN, Luiz Lama M.D.     LABS REVIEWED:  Lab Results   Component Value Date/Time    SODIUM 137 07/11/2021 07:35 AM    POTASSIUM 4.7 07/11/2021 07:35 AM    CHLORIDE 103 07/11/2021 07:35 AM    CO2 26 07/11/2021 07:35 AM    GLUCOSE 113 (H) 07/11/2021 07:35 AM    BUN 33 (H) 07/11/2021 07:35 AM    CREATININE 1.83 (H) 07/11/2021 07:35 AM    CREATININE 1.4 05/06/2008 11:42 AM      Lab Results   Component Value Date/Time    PROTHROMBTM 14.2 07/09/2021 10:38 PM    INR 1.13 07/09/2021 10:38 PM      Lab Results   Component Value Date/Time    WBC 6.2 07/09/2021 10:07 PM    WBC 6.2 07/09/2021 10:07 PM    RBC 3.37 (L) 07/09/2021 10:07 PM    RBC 3.37 (L) 07/09/2021 10:07 PM    HEMOGLOBIN 11.2 (L) 07/09/2021 10:07 PM    HEMOGLOBIN 11.2 (L) 07/09/2021 10:07 PM    HEMATOCRIT 35.1 (L) 07/09/2021 10:07 PM    HEMATOCRIT 35.1 (L) 07/09/2021 10:07 PM    .2 (H) 07/09/2021 10:07 PM    .2 (H) 07/09/2021 10:07 PM    MCH 33.2 (H) 07/09/2021 10:07 PM    MCH 33.2 (H) 07/09/2021 10:07 PM    MCHC 31.9 (L) 07/09/2021 10:07 PM    MCHC 31.9 (L) 07/09/2021 10:07 PM    MPV 10.2 07/09/2021 10:07 PM    MPV 10.2 07/09/2021 10:07 PM    NEUTSPOLYS 65.30 07/09/2021 10:07 PM    LYMPHOCYTES 20.70 (L) 07/09/2021 10:07 PM    MONOCYTES 10.60 07/09/2021  10:07 PM    EOSINOPHILS 1.50 07/09/2021 10:07 PM    BASOPHILS 1.20 07/09/2021 10:07 PM        IMAGING REVIEWED AND INTERPRETED:    ECHOCARDIOGRAM:   Normal left ventricular systolic function.  Left ventricular ejection fraction is visually estimated to be 75%.  Mild aortic stenosis.  Normal right ventricular size.  Reduced right ventricular systolic function.  Right heart pressures are consistent with moderate pulmonary   hypertension.    ANGIOGRAM:      Coronary Anatomy              Left Main: Ostial 60% stenosis, distal 30% stenosisThe MLA at the ostium is 6.5 to 7 mm² the IFR in the left main is 0.95 may be underestimated due to downstream disease.              LAD: 80% ostial stenosis patent stent in the proximal vessel the first diagonal has a proximal 70% stenosis.  The IFR of the mid LAD is 0.87              Ramus: 90% stenosis at the ostium, probable stent in the proximal portion of the vessel with 30% in-stent restenosis.              LCx: Small vessel, supplying a limited territory on the high lateral wall with a proximal 70% stenosis              RCA: Dominant, 70% stenosis at the ostium sequential, 99% stenosis in the mid segment, 95% stenosis at the origin of the PDA with competitive filling from left to right collaterals.  The posterior lateral branch is small    REVIEW OF SYSTEMS:   Review of Systems   Constitutional: Negative for chills, diaphoresis, fever and weight loss.   HENT: Negative for congestion, ear pain, hearing loss, nosebleeds, sore throat and tinnitus.    Eyes: Positive for blurred vision. Negative for double vision, pain and discharge.   Respiratory: Positive for cough, sputum production and shortness of breath. Negative for hemoptysis, wheezing and stridor.    Cardiovascular: Positive for chest pain. Negative for palpitations, orthopnea and leg swelling.   Gastrointestinal: Negative for abdominal pain, constipation, heartburn, melena, nausea and vomiting.   Genitourinary: Negative for  "dysuria, flank pain and hematuria.   Musculoskeletal: Positive for joint pain. Negative for myalgias and neck pain.   Skin: Negative for itching and rash.   Neurological: Negative for dizziness, speech change, focal weakness, seizures, weakness and headaches.   Endo/Heme/Allergies: Negative for environmental allergies and polydipsia. Does not bruise/bleed easily.   Psychiatric/Behavioral: Negative for depression, hallucinations, substance abuse and suicidal ideas.         PHYSICAL EXAMINATION:   Physical Exam  Constitutional:       General: He is not in acute distress.  HENT:      Head: Normocephalic and atraumatic.      Nose: Nose normal.   Eyes:      Conjunctiva/sclera: Conjunctivae normal.      Pupils: Pupils are equal, round, and reactive to light.   Neck:      Vascular: No JVD.      Trachea: No tracheal deviation.   Cardiovascular:      Rate and Rhythm: Normal rate and regular rhythm.   Pulmonary:      Effort: Pulmonary effort is normal. No respiratory distress.      Breath sounds: Normal breath sounds. No stridor.   Abdominal:      General: Bowel sounds are normal. There is no distension.      Palpations: Abdomen is soft.      Tenderness: There is no abdominal tenderness.   Musculoskeletal:         General: No tenderness. Normal range of motion.      Cervical back: Normal range of motion and neck supple.   Skin:     General: Skin is warm and dry.   Neurological:      Mental Status: He is alert and oriented to person, place, and time.      Coordination: Coordination normal.      Gait: Gait is intact.   Psychiatric:         Mood and Affect: Mood and affect normal.         Cognition and Memory: Memory normal.       /59   Pulse 63   Temp 36.2 °C (97.1 °F) (Temporal)   Resp 18   Ht 1.803 m (5' 11\")   Wt 110 kg (242 lb 8.1 oz)   SpO2 94%   BMI 33.82 kg/m²        IMPRESSION:  Non-STEMI, multivessel coronary artery disease, hypertension, dyslipidemia, COPD on 2 L nasal cannula chronically, chronic " respiratory failure with hypoxia, chronic kidney disease stage III, mild aortic stenosis      PLAN:  I recommend aníbal discussion with the patient of his options of hospice versus attempted stenting with medical optimization.  He is not a surgical candidate due to his multiple comorbidities and his age.  I believe attempted open surgical intervention on his coronary disease would likely hasten death, or result in significant morbidity and worsening of his quality of life.    The procedure, its risks, benefits, potential complications and alternative treatments were discussed with the patient in detail including the risks should he decide not to undergo my recommended treatment. All of his questions were answered to his satisfaction and he is willing to proceed with the operation. The risks include death, stroke,  infection: to include a rare bacterial infection related to the use of the heart/lung machine, judith-operative myocardial infarction, dysrhythmias, diaphragmatic paralysis, chest wall paresthesia, tracheostomy, kidney or other organ failure, possible return to the operating room for bleeding, bleeding requiring transfusion with its attendant risks including AIDS or hepatitis, dehiscence of surgical incisions, respiratory complications including the need for prolonged ventilator support, Protamine or other drug reaction, peripheral neuropathy, loss of limb, and miscount of surgical items. The operative mortality risk is approximately 25%.     The STS mortality risk score is 15% and the morbidity and mortality risk score is 45%. The scores were discussed with patient.    Sincerely,       DO LAW Vora Kimball Knackstedt, DO performed a substantial portion of the EM visit face-to-face with the same patient on the same date of service with SAMPSON Molina. I was personally involved in reviewing and interpreting the films and conducted elements of the history and physical exam. I performed all  of the medical decision making for the patient.  .

## 2021-07-11 NOTE — PROGRESS NOTES
Pt. Off the floor to cath lab. Placed on zoll monitor, escorted by CHLOÉ Dempsey. Monitor room notified.

## 2021-07-11 NOTE — PROCEDURES
"CARDIAC CATHETERIZATION REPORT    REFERRING: Niecy Haile M.D.    PROCEDURE PHYSICIAN: Derrick Davey MD, [facility]  ASSISTANT: None    IMPRESSIONS:  1.  Obstructive three-vessel coronary artery disease with moderately severe left main disease  2.  Moderate elevation LVEDP (22 mmHg)    Recommendations:  Resume heparin in 4 hours, CABG evaluation, VQ scan to exclude PE as a cause of symptoms.     Should the clinical picture favor percutaneous coronary intervention-I would pursue simultaneous kissing/V-stenting intervention of the left main bifurcation, with IFR post intervention with provisional intervention of the left main ostium and medical therapy of the RCA and circumflex.    Pre-procedure diagnosis: NSTEMI  Post-procedure diagnosis: Same    Procedure performed  Selective coronary angiography  Left heart catheterization  Intravascular Ultrasound (Left main)  Instantaneous wave free ratio (iFR) (Left main/LAD)    Conscious sedation was supervised by myself and administered by trained personnel using fentanyl and versed between 1341 and 1422. The patient tolerated sedation without complication.     Procedure Description  1. Access: 5/6 [place] right radial artery Micropuncture technique was utilized following local anesthesia with lidocaine.  A radial cocktail containing verapamil, heparin and saline was administered in the radial artery sheath    2. Diagnostic description: The catheter was passed to the central circulation with the aide of J tipped 0.35\" wire. A 5F TIG 4.0 and 6F EBU 3.0 were used to inject the coronary circulation enter the left ventricle during invasive hemodynamic monitoring.  There was ambiguous stenosis at the left main and given the need to limit contrast I elected to utilize IVUS early in the diagnostic assessment.  After the left main was engaged with a EBU 3.0 guide, there was substantial ventricularization of the pressure waveform.  Bivalirudin infusion and bolus were given and a " [employer] wire was passed into the LAD.  I performed IVUS of the left main ostium which revealed a minimal luminal area of 6.5 to 7 mm², compatible with nonflow limiting stenosis.  This was somewhat unexpected given the angiographic appearance and marked ventricularization and I also was not able to pass the catheter into the LAD due to an apparent obstructive lesion.  I then passed a [employer] Omni wire into the LAD and performed IFR per usual protocol.  The IFR in the LAD was measured at 0.87 with the value returned to 0.95 in the left main.  At this juncture, I did use 35 cc of contrast dye to complete the diagnostic assessment and elected to and the procedure.    3. Closing: At completion of the procedure the relevant equipment was removed from the body and hemostasis achieved by Radial band    Findings   Hemodynamics:   Aorta: 145/61 mmHg  LVEDP: 22 mmHg  Minimal pullback gradient across the aortic valve    Coronary Anatomy   Left Main: Ostial 60% stenosis, distal 30% stenosisThe MLA at the ostium is 6.5 to 7 mm² the IFR in the left main is 0.95 may be underestimated due to downstream disease.   LAD: 80% ostial stenosis patent stent in the proximal vessel the first diagonal has a proximal 70% stenosis.  The IFR of the mid LAD is 0.87   Ramus: 90% stenosis at the ostium, probable stent in the proximal portion of the vessel with 30% in-stent restenosis.   LCx: Small vessel, supplying a limited territory on the high lateral wall with a proximal 70% stenosis   RCA: Dominant, 70% stenosis at the ostium sequential, 99% stenosis in the mid segment, 95% stenosis at the origin of the PDA with competitive filling from left to right collaterals.  The posterior lateral branch is small     Technical Factors  1. Complications: None  2. Estimated Blood Loss: <50 cc  3. Specimens: None  4. Contrast Volume: 35 ml  5. Medications: Radial cocktail (Verapamil 2.5 mg, Nitroglycerin 100 mcg) Heparin 5000 Units Bivalirudin  6. Radiation  (air kerma): 536 mGy

## 2021-07-12 ENCOUNTER — APPOINTMENT (OUTPATIENT)
Dept: CARDIOLOGY | Facility: MEDICAL CENTER | Age: 86
DRG: 280 | End: 2021-07-12
Attending: INTERNAL MEDICINE
Payer: MEDICARE

## 2021-07-12 PROBLEM — R06.02 EXERTIONAL SHORTNESS OF BREATH: Status: ACTIVE | Noted: 2021-07-12

## 2021-07-12 PROBLEM — I27.20 PULMONARY HTN (HCC): Status: ACTIVE | Noted: 2021-07-12

## 2021-07-12 LAB
ANION GAP SERPL CALC-SCNC: 5 MMOL/L (ref 7–16)
APTT PPP: 90.1 SEC (ref 24.7–36)
APTT PPP: 93.6 SEC (ref 24.7–36)
BUN SERPL-MCNC: 32 MG/DL (ref 8–22)
CALCIUM SERPL-MCNC: 8.5 MG/DL (ref 8.5–10.5)
CHLORIDE SERPL-SCNC: 103 MMOL/L (ref 96–112)
CO2 SERPL-SCNC: 29 MMOL/L (ref 20–33)
CREAT SERPL-MCNC: 1.86 MG/DL (ref 0.5–1.4)
D DIMER PPP IA.FEU-MCNC: 0.6 UG/ML (FEU) (ref 0–0.5)
FLUAV RNA SPEC QL NAA+PROBE: NEGATIVE
FLUBV RNA SPEC QL NAA+PROBE: NEGATIVE
GLUCOSE SERPL-MCNC: 114 MG/DL (ref 65–99)
INR PPP: 1.13 (ref 0.87–1.13)
INR PPP: 1.17 (ref 0.87–1.13)
POTASSIUM SERPL-SCNC: 5 MMOL/L (ref 3.6–5.5)
PROTHROMBIN TIME: 14.2 SEC (ref 12–14.6)
PROTHROMBIN TIME: 14.5 SEC (ref 12–14.6)
RSV RNA SPEC QL NAA+PROBE: NEGATIVE
SARS-COV-2 RNA RESP QL NAA+PROBE: NOTDETECTED
SODIUM SERPL-SCNC: 137 MMOL/L (ref 135–145)
SPECIMEN SOURCE: NORMAL
UFH PPP CHRO-ACNC: 0.3 IU/ML
UFH PPP CHRO-ACNC: 0.36 IU/ML
UFH PPP CHRO-ACNC: 0.38 IU/ML
UFH PPP CHRO-ACNC: 0.4 IU/ML

## 2021-07-12 PROCEDURE — A9270 NON-COVERED ITEM OR SERVICE: HCPCS | Performed by: INTERNAL MEDICINE

## 2021-07-12 PROCEDURE — 700102 HCHG RX REV CODE 250 W/ 637 OVERRIDE(OP): Performed by: INTERNAL MEDICINE

## 2021-07-12 PROCEDURE — 99233 SBSQ HOSP IP/OBS HIGH 50: CPT | Performed by: INTERNAL MEDICINE

## 2021-07-12 PROCEDURE — 85610 PROTHROMBIN TIME: CPT

## 2021-07-12 PROCEDURE — 700102 HCHG RX REV CODE 250 W/ 637 OVERRIDE(OP): Performed by: HOSPITALIST

## 2021-07-12 PROCEDURE — 0241U HCHG SARS-COV-2 COVID-19 NFCT DS RESP RNA 4 TRGT MIC: CPT

## 2021-07-12 PROCEDURE — 85520 HEPARIN ASSAY: CPT

## 2021-07-12 PROCEDURE — C9803 HOPD COVID-19 SPEC COLLECT: HCPCS | Performed by: HOSPITALIST

## 2021-07-12 PROCEDURE — 770020 HCHG ROOM/CARE - TELE (206)

## 2021-07-12 PROCEDURE — 99232 SBSQ HOSP IP/OBS MODERATE 35: CPT | Performed by: HOSPITALIST

## 2021-07-12 PROCEDURE — 85379 FIBRIN DEGRADATION QUANT: CPT

## 2021-07-12 PROCEDURE — A9270 NON-COVERED ITEM OR SERVICE: HCPCS | Performed by: NURSE PRACTITIONER

## 2021-07-12 PROCEDURE — 85730 THROMBOPLASTIN TIME PARTIAL: CPT

## 2021-07-12 PROCEDURE — 80048 BASIC METABOLIC PNL TOTAL CA: CPT

## 2021-07-12 PROCEDURE — 99223 1ST HOSP IP/OBS HIGH 75: CPT | Performed by: THORACIC SURGERY (CARDIOTHORACIC VASCULAR SURGERY)

## 2021-07-12 PROCEDURE — 700102 HCHG RX REV CODE 250 W/ 637 OVERRIDE(OP): Performed by: NURSE PRACTITIONER

## 2021-07-12 PROCEDURE — 36415 COLL VENOUS BLD VENIPUNCTURE: CPT

## 2021-07-12 PROCEDURE — 700111 HCHG RX REV CODE 636 W/ 250 OVERRIDE (IP): Performed by: PHYSICIAN ASSISTANT

## 2021-07-12 PROCEDURE — A9270 NON-COVERED ITEM OR SERVICE: HCPCS | Performed by: HOSPITALIST

## 2021-07-12 PROCEDURE — 700111 HCHG RX REV CODE 636 W/ 250 OVERRIDE (IP): Performed by: NURSE PRACTITIONER

## 2021-07-12 RX ORDER — HEPARIN SODIUM 5000 [USP'U]/100ML
INJECTION, SOLUTION INTRAVENOUS CONTINUOUS
Status: DISCONTINUED | OUTPATIENT
Start: 2021-07-12 | End: 2021-07-12

## 2021-07-12 RX ORDER — HEPARIN SODIUM 1000 [USP'U]/ML
4000 INJECTION, SOLUTION INTRAVENOUS; SUBCUTANEOUS ONCE
Status: DISCONTINUED | OUTPATIENT
Start: 2021-07-12 | End: 2021-07-12

## 2021-07-12 RX ORDER — HEPARIN SODIUM 1000 [USP'U]/ML
2000 INJECTION, SOLUTION INTRAVENOUS; SUBCUTANEOUS PRN
Status: DISCONTINUED | OUTPATIENT
Start: 2021-07-12 | End: 2021-07-13

## 2021-07-12 RX ORDER — ISOSORBIDE MONONITRATE 30 MG/1
60 TABLET, EXTENDED RELEASE ORAL
Status: DISCONTINUED | OUTPATIENT
Start: 2021-07-13 | End: 2021-07-14 | Stop reason: HOSPADM

## 2021-07-12 RX ORDER — HEPARIN SODIUM 5000 [USP'U]/100ML
0-30 INJECTION, SOLUTION INTRAVENOUS CONTINUOUS
Status: DISCONTINUED | OUTPATIENT
Start: 2021-07-12 | End: 2021-07-13

## 2021-07-12 RX ORDER — HEPARIN SODIUM 5000 [USP'U]/100ML
0-30 INJECTION, SOLUTION INTRAVENOUS CONTINUOUS
Status: DISCONTINUED | OUTPATIENT
Start: 2021-07-12 | End: 2021-07-12

## 2021-07-12 RX ORDER — ROSUVASTATIN CALCIUM 20 MG/1
40 TABLET, COATED ORAL EVERY EVENING
Status: DISCONTINUED | OUTPATIENT
Start: 2021-07-12 | End: 2021-07-14 | Stop reason: HOSPADM

## 2021-07-12 RX ORDER — HEPARIN SODIUM 1000 [USP'U]/ML
2000 INJECTION, SOLUTION INTRAVENOUS; SUBCUTANEOUS PRN
Status: DISCONTINUED | OUTPATIENT
Start: 2021-07-12 | End: 2021-07-12

## 2021-07-12 RX ADMIN — CLOPIDOGREL BISULFATE 75 MG: 75 TABLET ORAL at 05:36

## 2021-07-12 RX ADMIN — ASPIRIN 81 MG: 81 TABLET, COATED ORAL at 05:36

## 2021-07-12 RX ADMIN — DOCUSATE SODIUM 50 MG AND SENNOSIDES 8.6 MG 2 TABLET: 8.6; 5 TABLET, FILM COATED ORAL at 05:36

## 2021-07-12 RX ADMIN — OMEPRAZOLE 20 MG: 20 CAPSULE, DELAYED RELEASE ORAL at 05:36

## 2021-07-12 RX ADMIN — HEPARIN SODIUM 12 UNITS/KG/HR: 5000 INJECTION, SOLUTION INTRAVENOUS at 19:33

## 2021-07-12 RX ADMIN — ROSUVASTATIN CALCIUM 40 MG: 20 TABLET, FILM COATED ORAL at 17:37

## 2021-07-12 RX ADMIN — ISOSORBIDE MONONITRATE 30 MG: 30 TABLET, EXTENDED RELEASE ORAL at 05:36

## 2021-07-12 ASSESSMENT — ENCOUNTER SYMPTOMS
DEPRESSION: 0
NAUSEA: 0
MYALGIAS: 0
FEVER: 0
DOUBLE VISION: 0
SEIZURES: 0
DIZZINESS: 0
VOMITING: 0
HEMOPTYSIS: 0
WEIGHT LOSS: 0
ORTHOPNEA: 0
PHOTOPHOBIA: 0
SINUS PAIN: 0
BLOOD IN STOOL: 0
WEAKNESS: 0
CHILLS: 0
WHEEZING: 0
NECK PAIN: 0
SPEECH CHANGE: 0
TREMORS: 0
DIZZINESS: 1
COUGH: 1
ABDOMINAL PAIN: 0
HEARTBURN: 0
SPUTUM PRODUCTION: 0
EYE DISCHARGE: 0
DIAPHORESIS: 0
CONSTIPATION: 0
EYE PAIN: 0
BRUISES/BLEEDS EASILY: 0
DIARRHEA: 0
SPUTUM PRODUCTION: 1
BLURRED VISION: 0
PALPITATIONS: 0
POLYDIPSIA: 0
FOCAL WEAKNESS: 0
SORE THROAT: 0
PND: 0
HEADACHES: 0
SHORTNESS OF BREATH: 1
STRIDOR: 0
CLAUDICATION: 0
BACK PAIN: 0
FLANK PAIN: 0
TINGLING: 0
HALLUCINATIONS: 0
FALLS: 0
BLURRED VISION: 1
COUGH: 0

## 2021-07-12 ASSESSMENT — PAIN DESCRIPTION - PAIN TYPE
TYPE: ACUTE PAIN
TYPE: ACUTE PAIN

## 2021-07-12 ASSESSMENT — LIFESTYLE VARIABLES: SUBSTANCE_ABUSE: 0

## 2021-07-12 NOTE — PROGRESS NOTES
Hospital Medicine Daily Progress Note    Date of Service  7/12/2021    Chief Complaint  Kobe Cyr is a 89 y.o. male admitted 7/9/2021 with past medical history of hypertension, hyperlipidemia, coronary disease status with 3 stents in 2005 who presents to the hospital with lightheadedness and shortness of breath.  It associated with orthopnea and shortness of breath gets worse when he bends down.  Patient was transferred from [facility] where he was found to have a troponin of 0.08.    Hospital Course  Upon arrival to the hospital patient was found to have normal sinus rhythm, right bundle branch block, left anterior fascicular block without significant ST segment changes.  He was started on IV heparin, metoprolol and losartan.    Interval Problem Update  7/10: Lightheaded and dizziness could possibly be from his trifascicular block and will continue to monitor this on telemetry.  We will continue heparin drip according to cardiac recommendations.  Cardiac echo still pending.  Blood pressures well controlled today.  7/11: Patient is having exertional chest pain and shortness of breath today.  Multiple etiology including mild aortic stenosis, COPD or coronary disease could cause similar symptoms.  Patient be taken for cardiac cath today.   7/12: Cardiac catheter revealed evidence of triple-vessel disease and left main occlusion.  Cardiology suggested to continue heparin aspirin and Plavix.  Cardiac surgery suggested that the patient is not a surgical candidate.  Cardiology to determine whether patient will undergo high risk cardiac cath.  VQ scan is pending to rule out pulmonary embolism.   I have personally seen and examined the patient at bedside. I discussed the plan of care with patient.    Consultants/Specialty  cardiology    Code Status  Full Code    Disposition  Patient is not medically cleared.   Anticipate discharge to to home with close outpatient follow-up.  I have placed the  appropriate orders for post-discharge needs.    Review of Systems  Review of Systems   Constitutional: Negative for chills, diaphoresis, fever and malaise/fatigue.   HENT: Negative for congestion, ear discharge, ear pain, hearing loss, nosebleeds, sinus pain, sore throat and tinnitus.    Eyes: Negative for blurred vision, double vision, photophobia and pain.   Respiratory: Positive for shortness of breath. Negative for cough, hemoptysis, sputum production, wheezing and stridor.    Cardiovascular: Negative for chest pain, palpitations, orthopnea, claudication, leg swelling and PND.   Gastrointestinal: Negative for abdominal pain, blood in stool, constipation, diarrhea, heartburn, melena, nausea and vomiting.   Genitourinary: Negative for dysuria, flank pain, frequency, hematuria and urgency.   Musculoskeletal: Negative for back pain, falls, joint pain, myalgias and neck pain.   Skin: Negative for itching and rash.   Neurological: Positive for dizziness. Negative for tingling, tremors, weakness and headaches.   Endo/Heme/Allergies: Negative for environmental allergies and polydipsia. Does not bruise/bleed easily.   Psychiatric/Behavioral: Negative for depression, hallucinations, substance abuse and suicidal ideas.        Physical Exam  Temp:  [35.8 °C (96.5 °F)-36.6 °C (97.9 °F)] 36.3 °C (97.3 °F)  Pulse:  [60-74] 66  Resp:  [16-18] 18  BP: ()/(42-63) 127/51  SpO2:  [91 %-98 %] 98 %    Physical Exam  Vitals and nursing note reviewed.   Constitutional:       General: He is not in acute distress.     Appearance: Normal appearance. He is not ill-appearing, toxic-appearing or diaphoretic.   HENT:      Head: Normocephalic and atraumatic.      Nose: No congestion or rhinorrhea.      Mouth/Throat:      Pharynx: No oropharyngeal exudate or posterior oropharyngeal erythema.   Eyes:      General: No scleral icterus.  Neck:      Vascular: No carotid bruit.   Cardiovascular:      Rate and Rhythm: Normal rate and regular  rhythm.      Pulses: Normal pulses.      Heart sounds: Normal heart sounds. No murmur heard.   No friction rub. No gallop.    Pulmonary:      Effort: Pulmonary effort is normal. No respiratory distress.      Breath sounds: Normal breath sounds. No stridor. No wheezing or rhonchi.   Abdominal:      General: Abdomen is flat. There is no distension.      Palpations: There is no mass.      Tenderness: There is no abdominal tenderness. There is no left CVA tenderness, guarding or rebound.      Hernia: No hernia is present.   Musculoskeletal:         General: No swelling. Normal range of motion.      Cervical back: No rigidity. No muscular tenderness.      Right lower leg: No edema.      Left lower leg: No edema.   Lymphadenopathy:      Cervical: No cervical adenopathy.   Skin:     General: Skin is warm and dry.      Capillary Refill: Capillary refill takes more than 3 seconds.      Coloration: Skin is not jaundiced or pale.      Findings: No bruising or erythema.   Neurological:      Mental Status: He is alert.         Fluids    Intake/Output Summary (Last 24 hours) at 7/12/2021 1306  Last data filed at 7/12/2021 0900  Gross per 24 hour   Intake 240 ml   Output 350 ml   Net -110 ml       Laboratory  Recent Labs     07/09/21  2207   WBC 6.2  6.2   RBC 3.37*  3.37*   HEMOGLOBIN 11.2*  11.2*   HEMATOCRIT 35.1*  35.1*   .2*  104.2*   MCH 33.2*  33.2*   MCHC 31.9*  31.9*   RDW 53.5*  53.5*   PLATELETCT 152*  152*   MPV 10.2  10.2     Recent Labs     07/10/21  0529 07/11/21  0735 07/12/21  0045   SODIUM 136 137 137   POTASSIUM 5.0 4.7 5.0   CHLORIDE 103 103 103   CO2 24 26 29   GLUCOSE 110* 113* 114*   BUN 31* 33* 32*   CREATININE 1.97* 1.83* 1.86*   CALCIUM 9.1 8.8 8.5     Recent Labs     07/09/21  2238   APTT 30.4   INR 1.13         Recent Labs     07/10/21  0529   TRIGLYCERIDE 55   HDL 52   LDL 75       Imaging  DX-CHEST-PORTABLE (1 VIEW)   Final Result      1.  Development of multifocal bilateral pulmonary  airspace process somewhat the peripheral distribution      2.  This finding is most consistent with pneumonia and Covid pneumonia should be considered strongly in the differential diagnosis.      EC-ECHOCARDIOGRAM COMPLETE W/ CONT   Final Result      CL-LEFT HEART CATHETERIZATION WITH POSSIBLE INTERVENTION    (Results Pending)   NM-LUNG PERFUSION IMAGING    (Results Pending)        Assessment/Plan  * NSTEMI (non-ST elevated myocardial infarction) ([facility])- (present on admission)  Assessment & Plan  History of CAD with 3 stents in 2005  Started on heparin infusion  Asa, statin, metoprolol, losartan  Cardiac echo EF of 75% mild AS no segmental hypokinesis  Cardiology ordered a cardiac cath     Pulmonary HTN ([facility])  Assessment & Plan  Likely secondary to underlying lung disease    Exertional shortness of breath  Assessment & Plan  Multifactorial possibly due to ischemic cardiomyopathy, COPD, mild aortic stenosis  Cardiology suggested to get a VQ scan.    Mild aortic stenosis  Assessment & Plan  Follow up as outpatient     COPD (chronic obstructive pulmonary disease) ([facility])  Assessment & Plan  Not in exacerbation  Albuterol as needed    Chronic respiratory failure with hypoxia ([facility])  Assessment & Plan  Patient is at his baseline of 2 L of oxygen    CKD (chronic kidney disease), stage III ([facility])- (present on admission)  Assessment & Plan  Monitor BMP and assess response  Avoid IV contrast/nephrotoxins/NSAIDs  Dose adjust meds for decreased GFR        Essential hypertension- (present on admission)  Assessment & Plan  Continue outpatient meds    CAD (coronary artery disease)- (present on admission)  Assessment & Plan  Cardiac cath found triple vessel disease and left main disease  Cardiac surgery states that he is not a surgical candidate     Left Main: Ostial 60% stenosis, distal 30% stenosisThe MLA at the ostium is 6.5 to 7 mm² the IFR in the left main is 0.95 may be underestimated due to downstream disease.              LAD:  80% ostial stenosis patent stent in the proximal vessel the first diagonal has a proximal 70% stenosis.  The IFR of the mid LAD is 0.87              Ramus: 90% stenosis at the ostium, probable stent in the proximal portion of the vessel with 30% in-stent restenosis.              LCx: Small vessel, supplying a limited territory on the high lateral wall with a proximal 70% stenosis              RCA: Dominant, 70% stenosis at the ostium sequential, 99% stenosis in the mid segment, 95% stenosis at the origin of the PDA with competitive filling from left to right collaterals.  The posterior lateral branch is small    Hyperlipidemia- (present on admission)  Assessment & Plan  Increase rosuvastatin to 40mg       VTE prophylaxis: therapeutic anticoagulation with Heparin    I have performed a physical exam and reviewed and updated ROS and Plan today (7/12/2021). In review of yesterday's note (7/11/2021), there are no changes except as documented above.

## 2021-07-12 NOTE — CARE PLAN
The patient is Watcher - Medium risk of patient condition declining or worsening    Shift Goals  Clinical Goals: monitor right radial cath site  Patient Goals: remain chest pain free    Progress made toward(s) clinical / shift goals:  Patient's right radial cath site remains clean, dry and soft. No signs of hematoma and patient denies pain at site. Pt denies new chest pain.     Patient is not progressing towards the following goals: Pt becomes short of breath with ambulation.      Problem: Knowledge Deficit - Standard  Goal: Patient and family/care givers will demonstrate understanding of plan of care, disease process/condition, diagnostic tests and medications  Outcome: Progressing     Problem: Acute Care of the Cardiac Cath Patient  Goal: Post Procedure Optimal Outcome for the Cardiac Cath Patient  Outcome: Progressing

## 2021-07-12 NOTE — PROGRESS NOTES
Report received from NOC Rn. Assumed pt care. Pt is sitting at edge of bed, on 4 L 02.. Pt A&O x 4. Fall precautions in place, call light and belongings within reach, bed in lowest position. No signs of distress.

## 2021-07-12 NOTE — ASSESSMENT & PLAN NOTE
Cardiac cath found triple vessel disease and left main disease  Cardiac surgery states that he is not a surgical candidate     Left Main: Ostial 60% stenosis, distal 30% stenosisThe MLA at the ostium is 6.5 to 7 mm² the IFR in the left main is 0.95 may be underestimated due to downstream disease.              LAD: 80% ostial stenosis patent stent in the proximal vessel the first diagonal has a proximal 70% stenosis.  The IFR of the mid LAD is 0.87              Ramus: 90% stenosis at the ostium, probable stent in the proximal portion of the vessel with 30% in-stent restenosis.              LCx: Small vessel, supplying a limited territory on the high lateral wall with a proximal 70% stenosis              RCA: Dominant, 70% stenosis at the ostium sequential, 99% stenosis in the mid segment, 95% stenosis at the origin of the PDA with competitive filling from left to right collaterals.  The posterior lateral branch is small

## 2021-07-12 NOTE — THERAPY
Physical Therapy Contact Note    PT consult received and acknowledged. Note patient underwent LHC and pending CTS consult for possible CABG. Will hold PT eval until medical POC established.    Janet Esquivel, PT, DPT  [phone]

## 2021-07-12 NOTE — CARE PLAN
The patient is Watcher - Medium risk of patient condition declining or worsening    Shift Goals  Clinical Goals: monitor troponin levels and for new CP  Patient Goals: remain chest pain free    Progress made toward(s) clinical / shift goals:     Problem: Knowledge Deficit - Standard  Goal: Patient and family/care givers will demonstrate understanding of plan of care, disease process/condition, diagnostic tests and medications  Outcome: Progressing     Problem: Acute Care of the Cardiac Cath Patient  Goal: Post Procedure Optimal Outcome for the Cardiac Cath Patient  Outcome: Progressing

## 2021-07-12 NOTE — PROGRESS NOTES
[facility] Cardiology Progress Note    Name:   Kobe Cyr   YOB: 1931  Age:   89 y.o.  male   MRN:   [id number]    Consulting Cardiologist: Dr. Haile  Primary Cardiologist: Dr Bradshaw      Chief Complaint: acute on chronic dyspnea    Past Medical History:  89yo male from [place] with chronic hypoxic respiratory failure on O2 24/7, chronic RUL granuloma, emphysema, CKD who presented with an acute episode of dyspnea, weakness, coughing while in the shower. He has noticed an increase his dyspnea with moderate.     History of Present Illness:  We had 2 long discussions today about his decline.  The last month he has had increased dyspnea to the point where he is extremely short of breath after walking 15 feet.  The episode that brought him in occurred the morning prior to his hospitalization he was in the shower and became weak, shortness of breath with coughing.  He was not able to recover and his wife and son brought him to the emergency department.  He has had chronic dyspnea for 1 to 2 years requiring oxygen.  He normally has a productive cough in the morning for 15 to 20 minutes.    He has had no change in his symptoms this hospital stay, he was very short of breath when getting up to use the restroom this morning.  With ambulation he desaturates to 89%.  At home he is on 2 L, today he is on 4 L.      ROS  Constitutional:  No fatigue.  No fevers, chills.   Respiratory:  + shortness of breath with 10-15ft. No SOB at rest. + productive cough.  Cardiovascular:  No chest pain.  No lower extremity edema, No orthopnea or PND. Denies palpitations.  : No polyuria, no dysuria. No hematuria.  GI:  Denies nausea/vomiting.  No abdominal distention. No melena/bloody stools.  Neuro:  Denies dizziness, syncope.  Hem/lymph: Denies easy bleeding/bruising.    MS: no arthralgias or myalgias.    All other review of systems reviewed and negative.    Past Surgical History:   Procedure Laterality Date   • KNEE  UNICOMPARTMENTAL  10/13/2009    Performed by SAM LINDER at SURGERY [facility] ORS   • ARTHROSCOPY, KNEE     • CORONARY STENT PROCEDURE LAD     • OTHER ORTHOPEDIC SURGERY      right shoulder rotator cuff surgery x2       Family History   Problem Relation Age of Onset   • No Known Problems Mother    • No Known Problems Father        Social History     Socioeconomic History   • Marital status:      Spouse name: Not on file   • Number of children: Not on file   • Years of education: Not on file   • Highest education level: Not on file   Occupational History   • Not on file   Tobacco Use   • Smoking status: Former Smoker     Packs/day: 2.00     Years: 35.00     Pack years: 70.00     Types: Cigarettes     Quit date: 1973     Years since quittin.2   • Smokeless tobacco: Never Used   Vaping Use   • Vaping Use: Never used   Substance and Sexual Activity   • Alcohol use: Yes     Comment: cocktail every night   • Drug use: No   • Sexual activity: Not on file   Other Topics Concern   • Not on file   Social History Narrative   • Not on file     Social Determinants of Health     Financial Resource Strain:    • Difficulty of Paying Living Expenses:    Food Insecurity:    • Worried About Running Out of Food in the Last Year:    • Ran Out of Food in the Last Year:    Transportation Needs:    • Lack of Transportation (Medical):    • Lack of Transportation (Non-Medical):    Physical Activity:    • Days of Exercise per Week:    • Minutes of Exercise per Session:    Stress:    • Feeling of Stress :    Social Connections:    • Frequency of Communication with Friends and Family:    • Frequency of Social Gatherings with Friends and Family:    • Attends Hoahaoism Services:    • Active Member of Clubs or Organizations:    • Attends Club or Organization Meetings:    • Marital Status:    Intimate Partner Violence:    • Fear of Current or Ex-Partner:    • Emotionally Abused:    • Physically Abused:    • Sexually Abused:   "      Medications / Drug list prior to admission:  No current facility-administered medications on file prior to encounter.     Current Outpatient Medications on File Prior to Encounter   Medication Sig Dispense Refill   • benzonatate (TESSALON) 200 MG capsule Take 1 capsule by mouth 2 times a day as needed for Cough. Do not chew. Swallow whole. 60 capsule 6   • lovastatin (MEVACOR) 40 MG tablet Take 1 Tab by mouth every day. One tablet at hs 180 Tab 2   • metoprolol (LOPRESSOR) 25 MG Tab Take 1 Tab by mouth 2 times a day. 180 Tab 2   • nitroglycerin (NITROSTAT) 0.4 MG SUBL Place 1 Tab under tongue as needed for Chest Pain. 25 Tab 1   • aspirin 81 MG tablet Take 81 mg by mouth every day.         Allergies   Allergen Reactions   • Atorvastatin Myalgia   • Simvastatin      ADVERSE REACTION: MYALGIA.       Physical Exam  Body mass index is 33.82 kg/m². BP (!) 98/42   Pulse 73   Temp 36.3 °C (97.3 °F) (Temporal)   Resp 18   Ht 1.803 m (5' 11\")   Wt 110 kg (242 lb 8.1 oz)   SpO2 91%    Vitals:    07/11/21 2040 07/11/21 2334 07/12/21 0503 07/12/21 0720   BP: 134/57 130/58 123/63 (!) 98/42   Pulse: 74 63 69 73   Resp: 18 18 18 18   Temp: 36.4 °C (97.6 °F) 36.6 °C (97.9 °F) 36.3 °C (97.4 °F) 36.3 °C (97.3 °F)   TempSrc: Temporal Temporal Temporal Temporal   SpO2: 94% 96% 92% 91%   Weight:       Height:        Oxygen Therapy:  Pulse Oximetry: 91 %, O2 (LPM): 2, O2 Delivery Device: Silicone Nasal Cannula    General: no apparent distress, appears younger than stated age  Eyes: normal conjunctiva  ENT: OP clear  Neck: No JVD at 90deg   Lungs: normal respiratory effort, inspiratory wheezes in bilateral lung fields, no rales or rhonchi.  Heart: normal rate, No regular rhythm, 2/6 systolic murmur, no rubs or gallops,   EXT: No edema bilateral lower extremities. + bilateral pedal pulses. no cyanosis  Abdomen: soft, non tender, non distended,  Neurological: No focal deficits, no facial asymmetry.  Normal speech.  Psychiatric: " Appropriate affect, alert and oriented x 3.   Skin: Warm extremities, no rash.    Labs (personally reviewed):     Lab Results   Component Value Date/Time    SODIUM 137 07/12/2021 12:45 AM    POTASSIUM 5.0 07/12/2021 12:45 AM    CHLORIDE 103 07/12/2021 12:45 AM    CO2 29 07/12/2021 12:45 AM    GLUCOSE 114 (H) 07/12/2021 12:45 AM    BUN 32 (H) 07/12/2021 12:45 AM    CREATININE 1.86 (H) 07/12/2021 12:45 AM    CREATININE 1.4 05/06/2008 11:42 AM     Lab Results   Component Value Date/Time    ALKPHOSPHAT 42 07/09/2021 10:07 PM    ASTSGOT 21 07/09/2021 10:07 PM    ALTSGPT 9 07/09/2021 10:07 PM    TBILIRUBIN 0.3 07/09/2021 10:07 PM      Lab Results   Component Value Date/Time    CHOLSTRLTOT 138 07/10/2021 05:29 AM    LDL 75 07/10/2021 05:29 AM    HDL 52 07/10/2021 05:29 AM    TRIGLYCERIDE 55 07/10/2021 05:29 AM     Lab Results   Component Value Date/Time    BNPBTYPENAT 170 (H) 03/08/2012 05:00 PM         Cardiac Imaging and Procedures Review:      Personal Telemetry Review: sinus rhythm with ectopy    Echo 7/10/21:  CONCLUSIONS  Normal left ventricular systolic function.  Left ventricular ejection fraction is visually estimated to be 75%.  Mild aortic stenosis.  Normal right ventricular size.  Reduced right ventricular systolic function.  Right heart pressures are consistent with moderate pulmonary hypertension.    [facility]  7/11/21:  Findings   Hemodynamics:   Aorta: 145/61 mmHg  LVEDP: 22 mmHg  Minimal pullback gradient across the aortic valve     Coronary Anatomy              Left Main: Ostial 60% stenosis, distal 30% stenosis. The MLA at the ostium is 6.5 to 7 mm² the IFR in the left main is 0.95 may be underestimated due to downstream disease.              LAD: 80% ostial stenosis patent stent in the proximal vessel the first diagonal has a proximal 70% stenosis.  The IFR of the mid LAD is 0.87              Ramus: 90% stenosis at the ostium, probable stent in the proximal portion of the vessel with 30% in-stent  restenosis.              LCx: Small vessel, supplying a limited territory on the high lateral wall with a proximal 70% stenosis              RCA: Dominant, 70% stenosis at the ostium sequential, 99% stenosis in the mid segment, 95% stenosis at the origin of the PDA with competitive filling from left to right collaterals.  The posterior lateral branch is small  IMPRESSIONS:  1.  Obstructive three-vessel coronary artery disease with moderately severe left main disease  2.  Moderate elevation LVEDP (22 mmHg)     Recommendations:  Resume heparin in 4 hours, CABG evaluation, VQ scan to exclude PE as a cause of symptoms.       Assessment and Medical Decision Making:    Dyspnea  Multivessel Coronary Artery Disease  COPD  RUL cavitary nodule, chronic  Acute on Chronic hypoxic respiratory failure  CKD stage 3b  - many factors at play including obstructive lung disease, possibly worsened, no recent imaging, triple vessel disease.   -Dr. Lewis and I had very long discussion with Mr. Cyr about his options which include attempting PCI with risk of renal injury, medical management with risk of physical decline.   - will discuss case with Dr. Davey tomorrow. 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    Wendi Bautista PA-C  [facility]

## 2021-07-12 NOTE — PROGRESS NOTES
Bedside report received and patient care assumed. Pt is resting in bed, A&O4, with no complaints of pain, and is on 2 L NC. Tele box on. Right radial site CDI, soft to touch. No signs of hematoma. Post op vitals in place. All fall precautions are in place, belongings at bedside table.  Pt was updated on POC, no questions or concerns. Pt educated on use of call light for assistance. Will continue to monitor.

## 2021-07-13 ENCOUNTER — APPOINTMENT (OUTPATIENT)
Dept: RADIOLOGY | Facility: MEDICAL CENTER | Age: 86
DRG: 280 | End: 2021-07-13
Attending: PHYSICIAN ASSISTANT
Payer: MEDICARE

## 2021-07-13 PROBLEM — J96.21 ACUTE ON CHRONIC RESPIRATORY FAILURE WITH HYPOXIA (HCC): Status: ACTIVE | Noted: 2021-07-10

## 2021-07-13 LAB
ALBUMIN SERPL BCP-MCNC: 3.4 G/DL (ref 3.2–4.9)
ALBUMIN/GLOB SERPL: 1.1 G/DL
ALP SERPL-CCNC: 45 U/L (ref 30–99)
ALT SERPL-CCNC: 8 U/L (ref 2–50)
ANION GAP SERPL CALC-SCNC: 8 MMOL/L (ref 7–16)
AST SERPL-CCNC: 16 U/L (ref 12–45)
BACTERIA SPEC RESP CULT: NORMAL
BILIRUB SERPL-MCNC: 0.3 MG/DL (ref 0.1–1.5)
BUN SERPL-MCNC: 25 MG/DL (ref 8–22)
CALCIUM SERPL-MCNC: 9.2 MG/DL (ref 8.5–10.5)
CHLORIDE SERPL-SCNC: 104 MMOL/L (ref 96–112)
CO2 SERPL-SCNC: 26 MMOL/L (ref 20–33)
CREAT SERPL-MCNC: 1.47 MG/DL (ref 0.5–1.4)
ERYTHROCYTE [DISTWIDTH] IN BLOOD BY AUTOMATED COUNT: 53.7 FL (ref 35.9–50)
GLOBULIN SER CALC-MCNC: 3.2 G/DL (ref 1.9–3.5)
GLUCOSE SERPL-MCNC: 105 MG/DL (ref 65–99)
GRAM STN SPEC: NORMAL
HCT VFR BLD AUTO: 33.3 % (ref 42–52)
HGB BLD-MCNC: 10.6 G/DL (ref 14–18)
MCH RBC QN AUTO: 33.4 PG (ref 27–33)
MCHC RBC AUTO-ENTMCNC: 31.8 G/DL (ref 33.7–35.3)
MCV RBC AUTO: 105 FL (ref 81.4–97.8)
PLATELET # BLD AUTO: 125 K/UL (ref 164–446)
PMV BLD AUTO: 10.5 FL (ref 9–12.9)
POTASSIUM SERPL-SCNC: 4.9 MMOL/L (ref 3.6–5.5)
PROT SERPL-MCNC: 6.6 G/DL (ref 6–8.2)
RBC # BLD AUTO: 3.17 M/UL (ref 4.7–6.1)
SIGNIFICANT IND 70042: NORMAL
SITE SITE: NORMAL
SODIUM SERPL-SCNC: 138 MMOL/L (ref 135–145)
SOURCE SOURCE: NORMAL
UFH PPP CHRO-ACNC: 0.25 IU/ML
WBC # BLD AUTO: 4.6 K/UL (ref 4.8–10.8)

## 2021-07-13 PROCEDURE — A9270 NON-COVERED ITEM OR SERVICE: HCPCS | Performed by: PHYSICIAN ASSISTANT

## 2021-07-13 PROCEDURE — 700102 HCHG RX REV CODE 250 W/ 637 OVERRIDE(OP): Performed by: HOSPITALIST

## 2021-07-13 PROCEDURE — 85027 COMPLETE CBC AUTOMATED: CPT

## 2021-07-13 PROCEDURE — 71045 X-RAY EXAM CHEST 1 VIEW: CPT

## 2021-07-13 PROCEDURE — 700102 HCHG RX REV CODE 250 W/ 637 OVERRIDE(OP): Performed by: NURSE PRACTITIONER

## 2021-07-13 PROCEDURE — 700102 HCHG RX REV CODE 250 W/ 637 OVERRIDE(OP): Performed by: INTERNAL MEDICINE

## 2021-07-13 PROCEDURE — A9270 NON-COVERED ITEM OR SERVICE: HCPCS | Performed by: HOSPITALIST

## 2021-07-13 PROCEDURE — 700111 HCHG RX REV CODE 636 W/ 250 OVERRIDE (IP): Performed by: PHYSICIAN ASSISTANT

## 2021-07-13 PROCEDURE — 97161 PT EVAL LOW COMPLEX 20 MIN: CPT

## 2021-07-13 PROCEDURE — 85520 HEPARIN ASSAY: CPT

## 2021-07-13 PROCEDURE — A9270 NON-COVERED ITEM OR SERVICE: HCPCS | Performed by: INTERNAL MEDICINE

## 2021-07-13 PROCEDURE — A9540 TC99M MAA: HCPCS

## 2021-07-13 PROCEDURE — 700102 HCHG RX REV CODE 250 W/ 637 OVERRIDE(OP): Performed by: PHYSICIAN ASSISTANT

## 2021-07-13 PROCEDURE — A9270 NON-COVERED ITEM OR SERVICE: HCPCS | Performed by: NURSE PRACTITIONER

## 2021-07-13 PROCEDURE — 99232 SBSQ HOSP IP/OBS MODERATE 35: CPT | Performed by: INTERNAL MEDICINE

## 2021-07-13 PROCEDURE — 99233 SBSQ HOSP IP/OBS HIGH 50: CPT | Performed by: STUDENT IN AN ORGANIZED HEALTH CARE EDUCATION/TRAINING PROGRAM

## 2021-07-13 PROCEDURE — 99223 1ST HOSP IP/OBS HIGH 75: CPT | Mod: GC | Performed by: INTERNAL MEDICINE

## 2021-07-13 PROCEDURE — A9270 NON-COVERED ITEM OR SERVICE: HCPCS | Performed by: STUDENT IN AN ORGANIZED HEALTH CARE EDUCATION/TRAINING PROGRAM

## 2021-07-13 PROCEDURE — 97165 OT EVAL LOW COMPLEX 30 MIN: CPT

## 2021-07-13 PROCEDURE — 36415 COLL VENOUS BLD VENIPUNCTURE: CPT

## 2021-07-13 PROCEDURE — 700102 HCHG RX REV CODE 250 W/ 637 OVERRIDE(OP): Performed by: STUDENT IN AN ORGANIZED HEALTH CARE EDUCATION/TRAINING PROGRAM

## 2021-07-13 PROCEDURE — 80053 COMPREHEN METABOLIC PANEL: CPT

## 2021-07-13 PROCEDURE — 770020 HCHG ROOM/CARE - TELE (206)

## 2021-07-13 RX ORDER — IPRATROPIUM BROMIDE AND ALBUTEROL SULFATE 2.5; .5 MG/3ML; MG/3ML
3 SOLUTION RESPIRATORY (INHALATION)
Status: DISCONTINUED | OUTPATIENT
Start: 2021-07-13 | End: 2021-07-14 | Stop reason: HOSPADM

## 2021-07-13 RX ORDER — BUDESONIDE AND FORMOTEROL FUMARATE DIHYDRATE 160; 4.5 UG/1; UG/1
2 AEROSOL RESPIRATORY (INHALATION)
Status: DISCONTINUED | OUTPATIENT
Start: 2021-07-13 | End: 2021-07-14 | Stop reason: HOSPADM

## 2021-07-13 RX ADMIN — CLOPIDOGREL BISULFATE 75 MG: 75 TABLET ORAL at 06:05

## 2021-07-13 RX ADMIN — ASPIRIN 81 MG: 81 TABLET, COATED ORAL at 06:04

## 2021-07-13 RX ADMIN — ROSUVASTATIN CALCIUM 40 MG: 20 TABLET, FILM COATED ORAL at 17:02

## 2021-07-13 RX ADMIN — HEPARIN SODIUM 2000 UNITS: 1000 INJECTION, SOLUTION INTRAVENOUS; SUBCUTANEOUS at 05:58

## 2021-07-13 RX ADMIN — ISOSORBIDE MONONITRATE 60 MG: 30 TABLET, EXTENDED RELEASE ORAL at 06:05

## 2021-07-13 ASSESSMENT — ENCOUNTER SYMPTOMS
DOUBLE VISION: 0
SPUTUM PRODUCTION: 0
ABDOMINAL PAIN: 0
TINGLING: 0
BACK PAIN: 0
DIZZINESS: 0
STRIDOR: 0
HEADACHES: 0
DIZZINESS: 1
NAUSEA: 0
FLANK PAIN: 0
POLYDIPSIA: 0
CLAUDICATION: 0
CHILLS: 0
MYALGIAS: 0
COUGH: 0
COUGH: 1
LOSS OF CONSCIOUSNESS: 0
DIARRHEA: 0
TREMORS: 0
SORE THROAT: 0
WHEEZING: 0
VOMITING: 0
SPUTUM PRODUCTION: 1
DEPRESSION: 0
DIAPHORESIS: 0
PND: 0
PALPITATIONS: 0
NECK PAIN: 0
SHORTNESS OF BREATH: 1
HEMOPTYSIS: 0
CONSTIPATION: 0
SINUS PAIN: 0
BLOOD IN STOOL: 0
WEAKNESS: 0
BLURRED VISION: 0
HEARTBURN: 0
EYE PAIN: 0
HALLUCINATIONS: 0
BRUISES/BLEEDS EASILY: 0
FALLS: 0
FEVER: 0
ORTHOPNEA: 0
PHOTOPHOBIA: 0

## 2021-07-13 ASSESSMENT — COGNITIVE AND FUNCTIONAL STATUS - GENERAL
DRESSING REGULAR LOWER BODY CLOTHING: A LITTLE
SUGGESTED CMS G CODE MODIFIER MOBILITY: CJ
SUGGESTED CMS G CODE MODIFIER DAILY ACTIVITY: CJ
CLIMB 3 TO 5 STEPS WITH RAILING: A LITTLE
MOBILITY SCORE: 21
HELP NEEDED FOR BATHING: A LITTLE
DAILY ACTIVITIY SCORE: 22
TURNING FROM BACK TO SIDE WHILE IN FLAT BAD: A LITTLE
MOVING TO AND FROM BED TO CHAIR: A LITTLE

## 2021-07-13 ASSESSMENT — GAIT ASSESSMENTS
GAIT LEVEL OF ASSIST: SUPERVISED
ASSISTIVE DEVICE: FRONT WHEEL WALKER
DISTANCE (FEET): 150

## 2021-07-13 ASSESSMENT — LIFESTYLE VARIABLES: SUBSTANCE_ABUSE: 0

## 2021-07-13 ASSESSMENT — ACTIVITIES OF DAILY LIVING (ADL): TOILETING: INDEPENDENT

## 2021-07-13 ASSESSMENT — PAIN DESCRIPTION - PAIN TYPE: TYPE: ACUTE PAIN

## 2021-07-13 NOTE — CONSULTS
Pulmonary Consultation    Date of consult: 7/13/2021    Referring Physician  Julia Garcia M.D.    Reason for Consultation  Further evaluation for dyspnea on exertion    History of Presenting Illness  89 y.o. male w/ PMH of HTN, COPD (FEV1/FVC ratio 66% 2019), CAD w/ 3 stent placement, CKD, and COPD (2-2.5L at home) who presented to the hospital with worsening lightheadedness and shortness of breath. Patient noted to have elevated troponin readings on initial evaluation. Patient evaluated by cardiology who will start patient on medical treatment with plan for outpatient cath. Patient reports worsening dyspnea on exertion. He reports being on 2-2.5L of oxygen over the past year. He was previously diagnosed with COPD, but stopped taking spiriva since he didn't feel like it helped him. Patient also reports history of snoring and was scheduled for sleep study before presentation to the hospital.    Code Status  Full Code    Review of Systems  Review of Systems   Constitutional: Negative for chills and fever.   HENT: Positive for hearing loss. Negative for tinnitus.    Eyes: Negative for blurred vision.   Respiratory: Positive for cough, sputum production and shortness of breath. Negative for hemoptysis and wheezing.    Cardiovascular: Positive for leg swelling. Negative for chest pain and palpitations.   Gastrointestinal: Negative for nausea and vomiting.   Skin: Positive for rash. Negative for itching.   Neurological: Negative for dizziness, loss of consciousness and headaches.       Past Medical History   has a past medical history of Anemia (8/1/2016), Arthritis, Back pain, CAD (coronary artery disease), Cancer (HCC), Chronic kidney disease (CKD), stage II (mild) (8/1/2016), Coronary heart disease, DJD (degenerative joint disease) (8/1/2016), Dyslipidemia (8/1/2016), GERD (gastroesophageal reflux disease) (8/1/2016), [place] measles, Hematochezia (8/1/2016), High cholesterol, Hyperglycemia (1/6/2010), Hyperlipidemia  (5/24/2011), Hypertension, Impaired fasting glucose (8/1/2016), Left knee pain (8/1/2016), Myocardial infarction (HCC) (2 yrs ago), Nasal drainage, Obesity, Osteoarthritis of knees, bilateral (8/1/2016), Pulmonary embolism (HCC), Rheumatic fever, Scarlet fever, Smallpox, and Snoring.    Surgical History   has a past surgical history that includes coronary stent procedure lad; knee unicompartmental (10/13/2009); other orthopedic surgery; and arthroscopy, knee.    Family History  family history includes No Known Problems in his father and mother.    Social History   reports that he quit smoking about 48 years ago. His smoking use included cigarettes. He has a 70.00 pack-year smoking history. He has never used smokeless tobacco. He reports current alcohol use. He reports that he does not use drugs.    Medications  Home Medications     Reviewed by Jenna Rojo (Pharmacy Tech) on 07/10/21 at 1212  Med List Status: Complete   Medication Last Dose Status   aspirin 81 MG tablet 7/9/2021 Active   benzonatate (TESSALON) 200 MG capsule 7/9/2021 Active   lovastatin (MEVACOR) 40 MG tablet 7/9/2021 Active   metoprolol (LOPRESSOR) 25 MG Tab 7/9/2021 Active   nitroglycerin (NITROSTAT) 0.4 MG SUBL unknown Active              Current Facility-Administered Medications   Medication Dose Route Frequency Provider Last Rate Last Admin   • isosorbide mononitrate SR (IMDUR) tablet 60 mg  60 mg Oral Q DAY Wendi Bautista P.A.-C.   60 mg at 07/13/21 0605   • rosuvastatin (CRESTOR) tablet 40 mg  40 mg Oral Q EVENING Mike Schafer M.D.   40 mg at 07/12/21 1737   • clopidogrel (PLAVIX) tablet 75 mg  75 mg Oral DAILY Heather Caldwell, A.P.N.   75 mg at 07/13/21 0605   • albuterol inhaler 2 Puff  2 Puff Inhalation Q4HRS PRN Mike Schafer M.D.       • aspirin EC (ECOTRIN) tablet 81 mg  81 mg Oral DAILY Jerrod Graham M.D.   81 mg at 07/13/21 0604   • nitroglycerin (NITROSTAT) tablet 0.4 mg  0.4 mg Sublingual Q5 MIN PRN Jerrod Graham M.D.        • omeprazole (PRILOSEC) capsule 20 mg  20 mg Oral DAILY Jerrod Graham M.D.   20 mg at 07/12/21 0536   • senna-docusate (PERICOLACE or SENOKOT S) 8.6-50 MG per tablet 2 tablet  2 tablet Oral BID Jerrod Graham M.D.   2 tablet at 07/12/21 0536    And   • polyethylene glycol/lytes (MIRALAX) PACKET 1 Packet  1 Packet Oral QDAY PRN Jerrod Graham M.D.        And   • magnesium hydroxide (MILK OF MAGNESIA) suspension 30 mL  30 mL Oral QDAY PRN Jerrod Graham M.D.        And   • bisacodyl (DULCOLAX) suppository 10 mg  10 mg Rectal QDAY PRN Jerrod Graham M.D.       • regadenoson (LEXISCAN) injection SOLN 0.4 mg  0.4 mg Intravenous Once PRN Jerrod Graham M.D.       • aminophylline injection 100 mg  100 mg Intravenous Q5 MIN PRN Jerrod Graham M.D.       • acetaminophen (Tylenol) tablet 650 mg  650 mg Oral Q6HRS PRN Jerrod Graham M.D.       • enalaprilat (VASOTEC) injection 1.25 mg  1.25 mg Intravenous Q6HRS PRN Jerrod Graham M.D.       • labetalol (NORMODYNE/TRANDATE) injection 10 mg  10 mg Intravenous Q4HRS PRN Jerrod Graham M.D.       • ondansetron (ZOFRAN) syringe/vial injection 4 mg  4 mg Intravenous Q4HRS PRN Jerrod Graham M.D.       • ondansetron (ZOFRAN ODT) dispertab 4 mg  4 mg Oral Q4HRS PRN Jerrod Graham M.D.           Allergies  Allergies   Allergen Reactions   • Atorvastatin Myalgia   • Simvastatin      ADVERSE REACTION: MYALGIA.       Vital Signs last 24 hours  Temp:  [36 °C (96.8 °F)-36.7 °C (98.1 °F)] 36.3 °C (97.3 °F)  Pulse:  [62-72] 65  Resp:  [18-20] 18  BP: (121-147)/(56-78) 147/71  SpO2:  [89 %-99 %] 99 %    Physical Exam  Physical Exam  Constitutional:       General: He is not in acute distress.     Appearance: He is not ill-appearing, toxic-appearing or diaphoretic.   HENT:      Head: Normocephalic and atraumatic.      Mouth/Throat:      Mouth: Mucous membranes are moist.      Pharynx: Oropharynx is clear. No oropharyngeal exudate or posterior oropharyngeal erythema.   Eyes:      General: No scleral icterus.        Right eye:  No discharge.         Left eye: No discharge.   Cardiovascular:      Rate and Rhythm: Normal rate and regular rhythm.      Pulses: Normal pulses.      Heart sounds: Normal heart sounds.   Pulmonary:      Effort: Pulmonary effort is normal.      Breath sounds: Rales present. No wheezing.   Chest:      Chest wall: No tenderness.   Abdominal:      General: Abdomen is flat. There is no distension.      Palpations: There is no mass.      Tenderness: There is no abdominal tenderness.      Hernia: No hernia is present.   Musculoskeletal:         General: Swelling and tenderness present.      Right lower leg: Edema present.      Left lower leg: Edema present.   Skin:     Findings: No bruising, erythema or rash.   Neurological:      Mental Status: He is alert and oriented to person, place, and time. Mental status is at baseline.   Psychiatric:         Mood and Affect: Mood normal.         Behavior: Behavior normal.         Thought Content: Thought content normal.         Fluids    Intake/Output Summary (Last 24 hours) at 7/13/2021 1651  Last data filed at 7/13/2021 0859  Gross per 24 hour   Intake --   Output 1000 ml   Net -1000 ml       Laboratory  Recent Results (from the past 48 hour(s))   Heparin Xa (Unfractionated)    Collection Time: 07/12/21 12:45 AM   Result Value Ref Range    Heparin Xa (UFH) 0.36 IU/mL   Basic Metabolic Panel (BMP) Tomorrow AM (LAB)    Collection Time: 07/12/21 12:45 AM   Result Value Ref Range    Sodium 137 135 - 145 mmol/L    Potassium 5.0 3.6 - 5.5 mmol/L    Chloride 103 96 - 112 mmol/L    Co2 29 20 - 33 mmol/L    Glucose 114 (H) 65 - 99 mg/dL    Bun 32 (H) 8 - 22 mg/dL    Creatinine 1.86 (H) 0.50 - 1.40 mg/dL    Calcium 8.5 8.5 - 10.5 mg/dL    Anion Gap 5.0 (L) 7.0 - 16.0   ESTIMATED GFR    Collection Time: 07/12/21 12:45 AM   Result Value Ref Range    GFR If  41 (A) >60 mL/min/1.73 m 2    GFR If Non  34 (A) >60 mL/min/1.73 m 2   Heparin Xa (Unfractionated)     Collection Time: 07/12/21  7:30 AM   Result Value Ref Range    Heparin Xa (UFH) 0.40 IU/mL   Heparin Xa (Unfractionated)    Collection Time: 07/12/21 12:50 PM   Result Value Ref Range    Heparin Xa (UFH) 0.38 IU/mL   Prothrombin Time    Collection Time: 07/12/21 12:50 PM   Result Value Ref Range    PT 14.5 12.0 - 14.6 sec    INR 1.17 (H) 0.87 - 1.13   APTT    Collection Time: 07/12/21 12:50 PM   Result Value Ref Range    APTT 93.6 (H) 24.7 - 36.0 sec   D-DIMER    Collection Time: 07/12/21  1:06 PM   Result Value Ref Range    D-Dimer Screen 0.60 (H) 0.00 - 0.50 ug/mL (FEU)   COV-2, FLU A/B, AND RSV BY PCR (2-4 HOURS CEPHEID): Collect NP swab in VTM    Collection Time: 07/12/21  2:45 PM    Specimen: Nasopharyngeal; Respirate   Result Value Ref Range    Influenza virus A RNA Negative Negative    Influenza virus B, PCR Negative Negative    RSV, PCR Negative Negative    SARS-CoV-2 by PCR NotDetected     SARS-CoV-2 Source NP Swab    aPTT    Collection Time: 07/12/21  7:21 PM   Result Value Ref Range    APTT 90.1 (H) 24.7 - 36.0 sec   Prothrombin Time    Collection Time: 07/12/21  7:21 PM   Result Value Ref Range    PT 14.2 12.0 - 14.6 sec    INR 1.13 0.87 - 1.13   Heparin Xa (Unfractionated)    Collection Time: 07/12/21  7:21 PM   Result Value Ref Range    Heparin Xa (UFH) 0.30 IU/mL   Heparin Xa (Unfractionated)    Collection Time: 07/13/21  4:24 AM   Result Value Ref Range    Heparin Xa (UFH) 0.25 IU/mL   CBC WITHOUT DIFFERENTIAL    Collection Time: 07/13/21  4:24 AM   Result Value Ref Range    WBC 4.6 (L) 4.8 - 10.8 K/uL    RBC 3.17 (L) 4.70 - 6.10 M/uL    Hemoglobin 10.6 (L) 14.0 - 18.0 g/dL    Hematocrit 33.3 (L) 42.0 - 52.0 %    .0 (H) 81.4 - 97.8 fL    MCH 33.4 (H) 27.0 - 33.0 pg    MCHC 31.8 (L) 33.7 - 35.3 g/dL    RDW 53.7 (H) 35.9 - 50.0 fL    Platelet Count 125 (L) 164 - 446 K/uL    MPV 10.5 9.0 - 12.9 fL   Comp Metabolic Panel    Collection Time: 07/13/21  4:24 AM   Result Value Ref Range    Sodium 138  135 - 145 mmol/L    Potassium 4.9 3.6 - 5.5 mmol/L    Chloride 104 96 - 112 mmol/L    Co2 26 20 - 33 mmol/L    Anion Gap 8.0 7.0 - 16.0    Glucose 105 (H) 65 - 99 mg/dL    Bun 25 (H) 8 - 22 mg/dL    Creatinine 1.47 (H) 0.50 - 1.40 mg/dL    Calcium 9.2 8.5 - 10.5 mg/dL    AST(SGOT) 16 12 - 45 U/L    ALT(SGPT) 8 2 - 50 U/L    Alkaline Phosphatase 45 30 - 99 U/L    Total Bilirubin 0.3 0.1 - 1.5 mg/dL    Albumin 3.4 3.2 - 4.9 g/dL    Total Protein 6.6 6.0 - 8.2 g/dL    Globulin 3.2 1.9 - 3.5 g/dL    A-G Ratio 1.1 g/dL   ESTIMATED GFR    Collection Time: 07/13/21  4:24 AM   Result Value Ref Range    GFR If  54 (A) >60 mL/min/1.73 m 2    GFR If Non African American 45 (A) >60 mL/min/1.73 m 2     Imaging  NM-LUNG VENT/PERF IMAGING   Final Result      1.  Low probability for pulmonary embolus.   2.  Findings consistent with airway disease.      DX-CHEST-PORTABLE (1 VIEW)   Final Result      1. Improvement in the multifocal bilateral peripheral pulmonary airspace opacities consistent with an incompletely resolved.   2. No large pleural effusion or pneumothorax.   3. The remainder is stable.      DX-CHEST-PORTABLE (1 VIEW)   Final Result      1.  Development of multifocal bilateral pulmonary airspace process somewhat the peripheral distribution      2.  This finding is most consistent with pneumonia and Covid pneumonia should be considered strongly in the differential diagnosis.      EC-ECHOCARDIOGRAM COMPLETE W/ CONT   Final Result      CL-LEFT HEART CATHETERIZATION WITH POSSIBLE INTERVENTION    (Results Pending)       Assessment/Plan  89 y.o. male w/ PMH of HTN, COPD (FEV1/FVC ratio 66% 2019), CAD w/ 3 stent placement, CKD, and COPD (2-2.5L at home) who presented to the hospital with worsening lightheadedness and shortness of breath. Patient likely has CORINA in addition to his COPD, which are likely contributing to his pulmonary hypertension, which was noted in the echocardiogram. Patient agreeable to try  inhalers after discharge and try pulmonary rehab. Patient is currently at baseline oxygen requirements.    #Dyspnea on exertion  #CORINA  #COPD  #[place] 3 pulmonary hypertension  -Start tripled therapy with symbicort and spiriva and duonebs Q4H while patient is awake.  -Started incentive spirometry for pulmonary toilet.  -Patient would benefit from pulmonary rehab once cleared by cardiology.  -Patient will need outpatient sleep study to evaluate for CORINA and CPAP use.  -Patient to follow-up with pulmonology clinic after discharge.    Sultan LAW Landaverde M.D.,  The patient was seen and plan discussed with my attending, Dr. Kim.

## 2021-07-13 NOTE — PROGRESS NOTES
[facility] Medicine Daily Progress Note    Date of Service  7/13/2021    Chief Complaint   lightheadedness and shortness of breath.     Hospital Course  Kobe Cyr is a 89 y.o. male admitted 7/9/2021 with past medical history of hypertension, hyperlipidemia, coronary disease status with 3 stents in 2005 who presents to the hospital with lightheadedness and shortness of breath.  It associated with orthopnea and shortness of breath gets worse when he bends down.  Patient was transferred from [facility] where he was found to have a troponin of 0.08. Upon arrival to the hospital patient was found to have normal sinus rhythm, right bundle branch block, left anterior fascicular block without significant ST segment changes.  He was started on IV heparin, metoprolol and losartan.  Cardiac catheter revealed evidence of triple-vessel disease and left main occlusion.  Cardiology suggested to continue heparin aspirin and Plavix.  Cardiac surgery suggested that the patient is not a surgical candidate.  Cardiology to determine whether patient will undergo high risk cardiac cath.  VQ scan is pending to rule out pulmonary embolism.     Interval Problem Update  - Oxygen needs decreased to 2.5 L, he use home oxygen 2 L  - He feels well, denies chest pain, shortness of breath, nausea vomiting  - VQ scan showed low probability for PE,   - Creatinine continue to improve  -Decrease EBM  - Pulmonary will discuss with the patient for further recommendations.  -Reported recent syncope episodes, CM will notify DMV    I have personally seen and examined the patient at bedside. I discussed the plan of care with patient.    Consultants/Specialty  cardiology   Pulmonary    Code Status  Full Code    Disposition  Patient is not medically cleared.   Anticipate discharge to to home with close outpatient follow-up.  I have placed the appropriate orders for post-discharge needs.    Review of Systems  Review of Systems    Constitutional: Negative for chills, diaphoresis, fever and malaise/fatigue.   HENT: Negative for congestion, ear discharge, ear pain, hearing loss, nosebleeds, sinus pain, sore throat and tinnitus.    Eyes: Negative for blurred vision, double vision, photophobia and pain.   Respiratory: Positive for shortness of breath. Negative for cough, hemoptysis, sputum production, wheezing and stridor.    Cardiovascular: Negative for chest pain, palpitations, orthopnea, claudication, leg swelling and PND.   Gastrointestinal: Negative for abdominal pain, blood in stool, constipation, diarrhea, heartburn, melena, nausea and vomiting.   Genitourinary: Negative for dysuria, flank pain, frequency, hematuria and urgency.   Musculoskeletal: Negative for back pain, falls, joint pain, myalgias and neck pain.   Skin: Negative for itching and rash.   Neurological: Positive for dizziness. Negative for tingling, tremors, weakness and headaches.   Endo/Heme/Allergies: Negative for environmental allergies and polydipsia. Does not bruise/bleed easily.   Psychiatric/Behavioral: Negative for depression, hallucinations, substance abuse and suicidal ideas.        Physical Exam  Temp:  [36 °C (96.8 °F)-36.7 °C (98.1 °F)] 36.1 °C (96.9 °F)  Pulse:  [62-72] 68  Resp:  [18-20] 18  BP: (121-136)/(56-78) 121/58  SpO2:  [92 %-96 %] 92 %    Physical Exam  Vitals and nursing note reviewed.   Constitutional:       General: He is not in acute distress.     Appearance: Normal appearance. He is not ill-appearing, toxic-appearing or diaphoretic.   HENT:      Head: Normocephalic and atraumatic.      Nose: No congestion or rhinorrhea.      Mouth/Throat:      Pharynx: No oropharyngeal exudate or posterior oropharyngeal erythema.   Eyes:      General: No scleral icterus.  Neck:      Vascular: No carotid bruit.   Cardiovascular:      Rate and Rhythm: Normal rate and regular rhythm.      Pulses: Normal pulses.      Heart sounds: Normal heart sounds. No murmur  heard.   No friction rub. No gallop.    Pulmonary:      Effort: Pulmonary effort is normal. No respiratory distress.      Breath sounds: Normal breath sounds. No stridor. No wheezing or rhonchi.   Abdominal:      General: Abdomen is flat. There is no distension.      Palpations: There is no mass.      Tenderness: There is no abdominal tenderness. There is no left CVA tenderness, guarding or rebound.      Hernia: No hernia is present.   Musculoskeletal:         General: No swelling. Normal range of motion.      Cervical back: No rigidity. No muscular tenderness.      Right lower leg: No edema.      Left lower leg: No edema.   Lymphadenopathy:      Cervical: No cervical adenopathy.   Skin:     General: Skin is warm and dry.      Capillary Refill: Capillary refill takes more than 3 seconds.      Coloration: Skin is not jaundiced or pale.      Findings: No bruising or erythema.   Neurological:      Mental Status: He is alert.         Fluids    Intake/Output Summary (Last 24 hours) at 7/13/2021 1422  Last data filed at 7/13/2021 0859  Gross per 24 hour   Intake --   Output 1000 ml   Net -1000 ml       Laboratory  Recent Labs     07/13/21  0424   WBC 4.6*   RBC 3.17*   HEMOGLOBIN 10.6*   HEMATOCRIT 33.3*   .0*   MCH 33.4*   MCHC 31.8*   RDW 53.7*   PLATELETCT 125*   MPV 10.5     Recent Labs     07/11/21  0735 07/12/21  0045 07/13/21  0424   SODIUM 137 137 138   POTASSIUM 4.7 5.0 4.9   CHLORIDE 103 103 104   CO2 26 29 26   GLUCOSE 113* 114* 105*   BUN 33* 32* 25*   CREATININE 1.83* 1.86* 1.47*   CALCIUM 8.8 8.5 9.2     Recent Labs     07/12/21  1250 07/12/21  1921   APTT 93.6* 90.1*   INR 1.17* 1.13               Imaging  NM-LUNG VENT/PERF IMAGING   Final Result      1.  Low probability for pulmonary embolus.   2.  Findings consistent with airway disease.      DX-CHEST-PORTABLE (1 VIEW)   Final Result      1. Improvement in the multifocal bilateral peripheral pulmonary airspace opacities consistent with an  incompletely resolved.   2. No large pleural effusion or pneumothorax.   3. The remainder is stable.      DX-CHEST-PORTABLE (1 VIEW)   Final Result      1.  Development of multifocal bilateral pulmonary airspace process somewhat the peripheral distribution      2.  This finding is most consistent with pneumonia and Covid pneumonia should be considered strongly in the differential diagnosis.      EC-ECHOCARDIOGRAM COMPLETE W/ CONT   Final Result      CL-LEFT HEART CATHETERIZATION WITH POSSIBLE INTERVENTION    (Results Pending)        Assessment/Plan  * NSTEMI (non-ST elevated myocardial infarction) ([facility])- (present on admission)  Assessment & Plan  History of CAD with 3 stents in 2005  Started on heparin infusion  Asa, statin, metoprolol, losartan  Cardiac echo EF of 75% mild AS no segmental hypokinesis  Triple-vessel disease    Cardiology discussed with the family, discontinued heparin drip, continue aspirin and Plavix, statin.  Started Imdur, may consider to start ACE/ARB if renal function continues to improve.  Plan for outpatient cath.  Appointment with Dr. Davey arranged.        Pulmonary HTN ([facility])  Assessment & Plan  Likely secondary to underlying lung disease    Exertional shortness of breath  Assessment & Plan  Multifactorial possibly due to ischemic cardiomyopathy, COPD, mild aortic stenosis  Cardiology suggested to get a VQ scan.    Mild aortic stenosis  Assessment & Plan  Follow up as outpatient     COPD (chronic obstructive pulmonary disease) ([facility])  Assessment & Plan  Not in exacerbation  Albuterol as needed    Acute on chronic respiratory failure with hypoxia ([facility])  Assessment & Plan  Patient is at his baseline of 2 L of oxygen  Requires 4 L oxygen after admission  Likely secondary to COPD and the triple-vessel disease    CKD (chronic kidney disease), stage III ([facility])- (present on admission)  Assessment & Plan  Monitor BMP and assess response  Avoid IV contrast/nephrotoxins/NSAIDs  Dose adjust meds for  decreased GFR        Essential hypertension- (present on admission)  Assessment & Plan  Continue outpatient meds    CAD (coronary artery disease)- (present on admission)  Assessment & Plan  Cardiac cath found triple vessel disease and left main disease  Cardiac surgery states that he is not a surgical candidate     Left Main: Ostial 60% stenosis, distal 30% stenosisThe MLA at the ostium is 6.5 to 7 mm² the IFR in the left main is 0.95 may be underestimated due to downstream disease.              LAD: 80% ostial stenosis patent stent in the proximal vessel the first diagonal has a proximal 70% stenosis.  The IFR of the mid LAD is 0.87              Ramus: 90% stenosis at the ostium, probable stent in the proximal portion of the vessel with 30% in-stent restenosis.              LCx: Small vessel, supplying a limited territory on the high lateral wall with a proximal 70% stenosis              RCA: Dominant, 70% stenosis at the ostium sequential, 99% stenosis in the mid segment, 95% stenosis at the origin of the PDA with competitive filling from left to right collaterals.  The posterior lateral branch is small    Hyperlipidemia- (present on admission)  Assessment & Plan  Increase rosuvastatin to 40mg       VTE prophylaxis: therapeutic anticoagulation with Heparin    I have performed a physical exam and reviewed and updated ROS and Plan today (7/13/2021). In review of yesterday's note (7/12/2021), there are no changes except as documented above.

## 2021-07-13 NOTE — PROGRESS NOTES
Patient back from [facility], [facility] notified rhythm visualized.

## 2021-07-13 NOTE — PROGRESS NOTES
Clarified with Wendi WORLEY PA-C, regarding heparin drip, per Wendi rolle to discontinue heparin drip.

## 2021-07-13 NOTE — PROGRESS NOTES
Bedside report received, patient awake sitting at edge of bed, tele monitor in place, on 4 L O2 via NC. RN assessed needs, no additional needs at this time. Call light within reach, patient verbalized the understanding on the need to call when needing assistance. Bed locked in lowest position, non skid socks on, bed rails up x2,  hourly rounding in place.

## 2021-07-13 NOTE — CARE PLAN
Problem: Knowledge Deficit - Standard  Goal: Patient and family/care givers will demonstrate understanding of plan of care, disease process/condition, diagnostic tests and medications  Outcome: Progressing     Problem: Mobility  Goal: Patient's capacity to carry out activities will improve  Outcome: Progressing  Patient able to ambualted x1 with FWW per NOC RN will assist patient with ambulation as needed, patient educated on the need to call when needing assistance with ambulation, call light placed within reach.     The patient is Stable - Low risk of patient condition declining or worsening    Shift Goals  Clinical Goals: lung scan  Patient Goals: lung scan    Progress made toward(s) clinical / shift goals:  scan scheduled for 1000 today     Patient is not progressing towards the following goals:

## 2021-07-13 NOTE — PROGRESS NOTES
Monitor Summary:    SR 62-83  1.6 sec pause  First Degree HB  Rare PVC, Occasional PAC  .34/.12/.38

## 2021-07-13 NOTE — PROGRESS NOTES
[facility] [place] Cardiology Progress Note    Name:   Kobe Cyr   YOB: 1931  Age:   89 y.o.  male   MRN:   [id number]    Consulting Cardiologist: Dr. Haile  Primary Cardiologist: Dr Bradshaw      Chief Complaint: acute on chronic dyspnea    Past Medical History:  89yo male from [place] with chronic hypoxic respiratory failure on O2 24/7, chronic RUL granuloma, emphysema, CKD who presented with an acute episode of dyspnea, weakness, coughing while in the shower. He has noticed an increase his dyspnea with moderate.     History of Present Illness:  Sitting up in bed after VQ scan, 2.5L O2. No dyspnea at rest. No chest pain this admission or prior to hospitalization. Wife and son at bedside.       ROS- unchanged from note 7/12/21 except for noted in HPI  Constitutional:  No fatigue.  No fevers, chills.   Respiratory:  + shortness of breath with 10-15ft. No SOB at rest. + productive cough.  Cardiovascular:  No chest pain.  No lower extremity edema, No orthopnea or PND. Denies palpitations.  : No polyuria, no dysuria. No hematuria.  GI:  Denies nausea/vomiting.  No abdominal distention. No melena/bloody stools.  Neuro:  Denies dizziness, syncope.  Hem/lymph: Denies easy bleeding/bruising.    MS: no arthralgias or myalgias.    All other review of systems reviewed and negative.    Past Surgical History:   Procedure Laterality Date   • KNEE UNICOMPARTMENTAL  10/13/2009    Performed by SAM LINDER at SURGERY [facility] ORS   • ARTHROSCOPY, KNEE     • CORONARY STENT PROCEDURE LAD     • OTHER ORTHOPEDIC SURGERY      right shoulder rotator cuff surgery x2       Family History   Problem Relation Age of Onset   • No Known Problems Mother    • No Known Problems Father        Social History     Socioeconomic History   • Marital status:      Spouse name: Not on file   • Number of children: Not on file   • Years of education: Not on file   • Highest education level: Not on file   Occupational History    • Not on file   Tobacco Use   • Smoking status: Former Smoker     Packs/day: 2.00     Years: 35.00     Pack years: 70.00     Types: Cigarettes     Quit date: 1973     Years since quittin.2   • Smokeless tobacco: Never Used   Vaping Use   • Vaping Use: Never used   Substance and Sexual Activity   • Alcohol use: Yes     Comment: cocktail every night   • Drug use: No   • Sexual activity: Not on file   Other Topics Concern   • Not on file   Social History Narrative   • Not on file     Social Determinants of Health     Financial Resource Strain:    • Difficulty of Paying Living Expenses:    Food Insecurity:    • Worried About Running Out of Food in the Last Year:    • Ran Out of Food in the Last Year:    Transportation Needs:    • Lack of Transportation (Medical):    • Lack of Transportation (Non-Medical):    Physical Activity:    • Days of Exercise per Week:    • Minutes of Exercise per Session:    Stress:    • Feeling of Stress :    Social Connections:    • Frequency of Communication with Friends and Family:    • Frequency of Social Gatherings with Friends and Family:    • Attends Jew Services:    • Active Member of Clubs or Organizations:    • Attends Club or Organization Meetings:    • Marital Status:    Intimate Partner Violence:    • Fear of Current or Ex-Partner:    • Emotionally Abused:    • Physically Abused:    • Sexually Abused:        Medications / Drug list prior to admission:  No current facility-administered medications on file prior to encounter.     Current Outpatient Medications on File Prior to Encounter   Medication Sig Dispense Refill   • benzonatate (TESSALON) 200 MG capsule Take 1 capsule by mouth 2 times a day as needed for Cough. Do not chew. Swallow whole. 60 capsule 6   • lovastatin (MEVACOR) 40 MG tablet Take 1 Tab by mouth every day. One tablet at hs 180 Tab 2   • metoprolol (LOPRESSOR) 25 MG Tab Take 1 Tab by mouth 2 times a day. 180 Tab 2   • nitroglycerin (NITROSTAT) 0.4  "MG SUBL Place 1 Tab under tongue as needed for Chest Pain. 25 Tab 1   • aspirin 81 MG tablet Take 81 mg by mouth every day.         Allergies   Allergen Reactions   • Atorvastatin Myalgia   • Simvastatin      ADVERSE REACTION: MYALGIA.       Physical Exam  Body mass index is 33.82 kg/m². /60   Pulse 67   Temp 36.7 °C (98.1 °F) (Temporal)   Resp 18   Ht 1.803 m (5' 11\")   Wt 110 kg (242 lb 8.1 oz)   SpO2 93%    Vitals:    07/12/21 2010 07/13/21 0003 07/13/21 0424 07/13/21 0605   BP: 136/61 132/78 123/63 124/60   Pulse: 67 72 67    Resp: 20  18    Temp: 36.3 °C (97.3 °F) 36.3 °C (97.4 °F) 36.7 °C (98.1 °F)    TempSrc: Temporal Temporal Temporal    SpO2: 95% 92% 93%    Weight:       Height:        Oxygen Therapy:  Pulse Oximetry: 93 %, O2 (LPM): 4, O2 Delivery Device: Silicone Nasal Cannula    General: no apparent distress, appears younger than stated age  Neck: No JVD at 90deg   Lungs: normal respiratory effort, inspiratory wheezes in bilateral lung fields, no rales or rhonchi.  Heart: normal rate, regular rhythm, 2/6 systolic murmur, no rubs or gallops,   EXT: clean bandage on right wrist, no hematoma or active bleeding. 1+ pitting edema bilateral lower extremities. + bilateral pedal pulses. no cyanosis  Psychiatric: Appropriate affect, alert and oriented x 3.   Skin: Warm extremities, no rash.    Labs (personally reviewed):     Lab Results   Component Value Date/Time    SODIUM 137 07/12/2021 12:45 AM    POTASSIUM 5.0 07/12/2021 12:45 AM    CHLORIDE 103 07/12/2021 12:45 AM    CO2 29 07/12/2021 12:45 AM    GLUCOSE 114 (H) 07/12/2021 12:45 AM    BUN 32 (H) 07/12/2021 12:45 AM    CREATININE 1.86 (H) 07/12/2021 12:45 AM    CREATININE 1.4 05/06/2008 11:42 AM     Lab Results   Component Value Date/Time    ALKPHOSPHAT 42 07/09/2021 10:07 PM    ASTSGOT 21 07/09/2021 10:07 PM    ALTSGPT 9 07/09/2021 10:07 PM    TBILIRUBIN 0.3 07/09/2021 10:07 PM      Lab Results   Component Value Date/Time    CHOLSTRLTOT 138 " 07/10/2021 05:29 AM    LDL 75 07/10/2021 05:29 AM    HDL 52 07/10/2021 05:29 AM    TRIGLYCERIDE 55 07/10/2021 05:29 AM     Lab Results   Component Value Date/Time    BNPBTYPENAT 170 (H) 03/08/2012 05:00 PM         Cardiac Imaging and Procedures Review:      Personal Telemetry Review: sinus rhythm with ectopy    Echo 7/10/21:  CONCLUSIONS  Normal left ventricular systolic function.  Left ventricular ejection fraction is visually estimated to be 75%.  Mild aortic stenosis.  Normal right ventricular size.  Reduced right ventricular systolic function.  Right heart pressures are consistent with moderate pulmonary hypertension.    [facility]  7/11/21:  Findings   Hemodynamics:   Aorta: 145/61 mmHg  LVEDP: 22 mmHg  Minimal pullback gradient across the aortic valve     Coronary Anatomy              Left Main: Ostial 60% stenosis, distal 30% stenosis. The MLA at the ostium is 6.5 to 7 mm² the IFR in the left main is 0.95 may be underestimated due to downstream disease.              LAD: 80% ostial stenosis patent stent in the proximal vessel the first diagonal has a proximal 70% stenosis.  The IFR of the mid LAD is 0.87              Ramus: 90% stenosis at the ostium, probable stent in the proximal portion of the vessel with 30% in-stent restenosis.              LCx: Small vessel, supplying a limited territory on the high lateral wall with a proximal 70% stenosis              RCA: Dominant, 70% stenosis at the ostium sequential, 99% stenosis in the mid segment, 95% stenosis at the origin of the PDA with competitive filling from left to right collaterals.  The posterior lateral branch is small  IMPRESSIONS:  1.  Obstructive three-vessel coronary artery disease with moderately severe left main disease  2.  Moderate elevation LVEDP (22 mmHg)       Assessment and Medical Decision Making:    Dyspnea  Multivessel Coronary Artery Disease  COPD  RUL cavitary nodule, chronic  Acute on Chronic hypoxic respiratory failure  CKD stage 3b  - many  factors at play including obstructive lung disease, triple vessel disease.   -Dr. eLwis and I had very long discussion with Mr. Cyr and his wife and son regarding his options for managing his triple vessel disease. We agreed that treatment with medications, follow up with our office as outpatient to discuss outpatient cath is the best at this point.   - I will set up appt with Dr. Davey as outpatient.   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   Please see Dr Lewis's attestation for additions and further recommendations.    Wendi Bautista PA-C  [facility]

## 2021-07-13 NOTE — THERAPY
"Occupational Therapy   Initial Evaluation     Patient Name: Kobe Cyr  Age:  89 y.o., Sex:  male  Medical Record #: [id number]  Today's Date: 7/13/2021       Precautions: Fall Risk  Comments: 2L O2 baseline     Assessment    Patient is 89 y.o. male with h/o chronic respiratory failure, CKD, COPD, HTN, who presents with dyspnea, weakness, cough. Pt dx with NSETMI. Underwent cardiac cath. Pt is not a surgical candidate due to being too high risk. Seen for OT eval. Pt able to mobilize with FWW and supv but requiring 6L NC to maintain SpO2 >89%. Pt required seated rest break following hallway ambulation with PT. Described compensatory approach to LB dressing at home, but declined demo due to different sitting surface. Pt reports he is having walk-in tub installed this week. Describes pacing practices he employs at home. OT provided additional education (including hand-out) on energy conservation techniques. Pt is limited by activity intolerance, but appears to be at baseline function from OT standpoint in this setting. Patient will not be actively followed for occupational therapy services at this time, however may be seen if requested by physician for 1 more visit within 30 days to address any discharge or equipment needs.     Plan    Recommend Occupational Therapy for Evaluation only.    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, a farmer and a .\"     Objective       07/13/21 1504   Prior Living Situation   Housing / Facility 1 [address]   Steps Into Home 2   Bathroom Set up Walk In Shower;Shower Chair   Equipment Owned Front-Wheel Walker;Tub / Shower Seat;Single Point Cane   Comments Pt lives with spouse who assists with I-ADL   Prior Level of ADL Function   Comments Pt was modified independent with BADL PTA   Prior Level of IADL " Function   Medication Management Independent   Laundry Requires Assist   Kitchen Mobility Independent   Finances Independent   Home Management Requires Assist   Shopping Requires Assist  (uses power cart )   Prior Level Of Mobility Independent Without Device in Home  (very limited community distances due to endurance )   Driving / Transportation Driving Independent   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)   Active ROM Upper Body   Comments B shoulders with long-standing limitations due to RTC injuries (~90 degrees B)   Strength Upper Body   Comments B shoulders NT due to RTCs; distal strength WNL    Balance Assessment   Sitting Balance (Static) Good   Sitting Balance (Dynamic) Fair +   Standing Balance (Static) Fair   Standing Balance (Dynamic) Fair   Weight Shift Sitting Fair   Weight Shift Standing Fair   Comments no AD, no LOB   Bed Mobility    Supine to Sit   (NT; EOB pre )   Sit to Supine   (NT; up to chair post )   Scooting Supervised  (seated)   ADL Assessment   Grooming   (NT; declined due to already completed )   Lower Body Dressing   (declined demo; describes compensatory technique used PTA)   Toileting   (NT; completed toilet transfer)   Functional Mobility   Sit to Stand Supervised   Bed, Chair, Wheelchair Transfer Supervised   Toilet Transfers Supervised   Transfer Method Stand Step  (FWW)   Mobility Short gait and transfers with FWW

## 2021-07-13 NOTE — CARE PLAN
The patient is Watcher - Medium risk of patient condition declining or worsening      Progress made toward(s) clinical / shift goals:     Problem: Knowledge Deficit - Standard  Goal: Patient and family/care givers will demonstrate understanding of plan of care, disease process/condition, diagnostic tests and medications  Outcome: Progressing     Problem: Mobility  Goal: Patient's capacity to carry out activities will improve  Outcome: Progressing

## 2021-07-13 NOTE — THERAPY
"Physical Therapy   Initial Evaluation     Patient Name: Kobe Cyr  Age:  89 y.o., Sex:  male  Medical Record #: [id number]  Today's Date: 7/13/2021     Precautions: Fall Risk    Assessment  Patient is 89 y.o. male that presented to acute 7/9 with complaints of lightheadedness, dyspnea. PMHx significant for HTN, HLD, CAD s/p PCI, MI, CKD, CA, DJD, GERD, COPD, PE. Found to have elevated troponin and underwent cardiac catheterization which revealed 3 vessel disease; patient is not a surgical candidate and chart indicates possible PCI in future. He presented to PT with decreased activity tolerance related to medical comorbidities. He otherwise appears to be near baseline functional mobility. Provided education regarding self monitoring of activity utilizing talk test and appropriate activity modification and progression. Patient was receptive to education and demonstrated understanding however may benefit from reinforcement on outpatient basis. Patient will not be actively followed for physical therapy services at this time, however may be seen if requested by physician for 1 more visit within 30 days to address any discharge or equipment needs.    Plan    Recommend Physical Therapy for Evaluation only    DC Equipment Recommendations: None  Discharge Recommendations: Other - (may benefit from outpatient cardiac rehab for continued edu)       Subjective    \"I take breaks at home, I have to stop and rest.\"     Objective       07/13/21 1452   Total Time Spent   Total Time Spent (Mins) 24   Charge Group   PT Evaluation PT Evaluation Low   Initial Contact Note    Initial Contact Note Order Received and Verified, Evaluation Only - Patient Does Not Require Further Acute Physical Therapy at this Time.  However, May Benefit from Post Acute Therapy for Higher Level Functional Deficits.   Precautions   Precautions Fall Risk   Comments 2L O2 baseline   Vitals   O2 Delivery Device Silicone Nasal Cannula   Vitals Comments RN " managing O2 during activity, required 6L O2 during ambulation to maintain SpO2 > 90%   Pain 0 - 10 Group   Therapist Pain Assessment   (no pain complaint during session)   Prior Living Situation   Prior Services Housekeeping / Homemaker Services   Housing / Facility 1 Story House   Steps Into Home 2   Steps In Home 0   Equipment Owned Front-Wheel Walker;Single Point Cane   Lives with - Patient's Self Care Capacity Spouse   Comments Patient reported he lives with spouse who assists with IADLs at baseline. Son also living with patient and spouse but works full time with limited ability to assist   Prior Level of Functional Mobility   Bed Mobility Independent   Transfer Status Independent   Ambulation Independent   Distance Ambulation (Feet)   (household)   Assistive Devices Used None   Stairs Independent   History of Falls   History of Falls Yes  (9 months ago per patient)   Cognition    Cognition / Consciousness WDL   Level of Consciousness Alert   Comments very pleasant and cooperative, verbalized understanding of education   Passive ROM Lower Body   Passive ROM Lower Body WDL   Comments not formally tested, WFL for mobility   Active ROM Lower Body    Active ROM Lower Body  WDL   Comments as above   Strength Lower Body   Lower Body Strength  WDL   Comments as above   Sensation Lower Body   Lower Extremity Sensation   Not Tested   Lower Body Muscle Tone   Lower Body Muscle Tone  WDL   Neurological Concerns   Neurological Concerns No   Coordination Lower Body    Coordination Lower Body  WDL   Balance Assessment   Sitting Balance (Static) Good   Sitting Balance (Dynamic) Fair +   Standing Balance (Static) Fair +   Standing Balance (Dynamic) Fair   Weight Shift Sitting Good   Weight Shift Standing Good   Comments FWW used in halls, no LOB. No AD in room, no LOB   Gait Analysis   Gait Level Of Assist Supervised   Assistive Device Front Wheel Walker   Distance (Feet) 150   # of Times Distance was Traveled 2   Deviation    (elevated shoulders, decreased darshana, forward flexed postur)   # of Stairs Climbed 0   Weight Bearing Status no restrictions   Vision Deficits Impacting Mobility NT   Comments increased WOB, required cueing from therapist for standing breaks. Note SpO2 dropped with activity, RN managing O2 titration   Bed Mobility    Supine to Sit   (NT, sitting EOB pre and post)   Scooting Supervised  (seated)   Functional Mobility   Sit to Stand Supervised   Bed, Chair, Wheelchair Transfer Supervised   Transfer Method Stand Step   How much difficulty does the patient currently have...   Turning over in bed (including adjusting bedclothes, sheets and blankets)? 3   Sitting down on and standing up from a chair with arms (e.g., wheelchair, bedside commode, etc.) 4   Moving from lying on back to sitting on the side of the bed? 3   How much help from another person does the patient currently need...   Moving to and from a bed to a chair (including a wheelchair)? 4   Need to walk in a hospital room? 4   Climbing 3-5 steps with a railing? 3   6 clicks Mobility Score 21   Activity Tolerance   Sitting in Chair NT   Sitting Edge of Bed 8 min   Standing 10 min   Comments see gait. Patient reported feeling SOB   Edema / Skin Assessment   Edema / Skin  Not Assessed   Education Group   Education Provided Role of Physical Therapist   Role of Physical Therapist Patient Response Patient;Acceptance;Explanation;Verbal Demonstration   Anticipated Discharge Equipment and Recommendations   DC Equipment Recommendations None   Discharge Recommendations Other -  (may benefit from outpatient cardiac rehab for continued edu)   Interdisciplinary Plan of Care Collaboration   IDT Collaboration with  Nursing;Occupational Therapist   Patient Position at End of Therapy Other (Comments)  (up with OT in room)   Collaboration Comments RN aware of visit, response   Session Information   Date / Session Number  7/13 - DC needs only   Priority 0

## 2021-07-13 NOTE — PROGRESS NOTES
Received bedside report from CHLOÉ Madera, pt care assumed. No signs of acute distress noted at this time. Bed in lowest position. Pt educated on fall risk and use of call light.

## 2021-07-14 ENCOUNTER — TELEPHONE (OUTPATIENT)
Dept: SLEEP MEDICINE | Facility: MEDICAL CENTER | Age: 86
End: 2021-07-14

## 2021-07-14 VITALS
DIASTOLIC BLOOD PRESSURE: 66 MMHG | RESPIRATION RATE: 18 BRPM | HEART RATE: 65 BPM | SYSTOLIC BLOOD PRESSURE: 142 MMHG | WEIGHT: 226.41 LBS | OXYGEN SATURATION: 92 % | BODY MASS INDEX: 31.7 KG/M2 | HEIGHT: 71 IN | TEMPERATURE: 97.2 F

## 2021-07-14 LAB
ANION GAP SERPL CALC-SCNC: 8 MMOL/L (ref 7–16)
BASOPHILS # BLD AUTO: 0.9 % (ref 0–1.8)
BASOPHILS # BLD: 0.04 K/UL (ref 0–0.12)
BUN SERPL-MCNC: 22 MG/DL (ref 8–22)
CALCIUM SERPL-MCNC: 9.3 MG/DL (ref 8.5–10.5)
CHLORIDE SERPL-SCNC: 102 MMOL/L (ref 96–112)
CO2 SERPL-SCNC: 29 MMOL/L (ref 20–33)
CREAT SERPL-MCNC: 1.36 MG/DL (ref 0.5–1.4)
EOSINOPHIL # BLD AUTO: 0.23 K/UL (ref 0–0.51)
EOSINOPHIL NFR BLD: 5.3 % (ref 0–6.9)
ERYTHROCYTE [DISTWIDTH] IN BLOOD BY AUTOMATED COUNT: 53.8 FL (ref 35.9–50)
GLUCOSE SERPL-MCNC: 104 MG/DL (ref 65–99)
HCT VFR BLD AUTO: 33.3 % (ref 42–52)
HGB BLD-MCNC: 10.7 G/DL (ref 14–18)
IMM GRANULOCYTES # BLD AUTO: 0.02 K/UL (ref 0–0.11)
IMM GRANULOCYTES NFR BLD AUTO: 0.5 % (ref 0–0.9)
LYMPHOCYTES # BLD AUTO: 0.91 K/UL (ref 1–4.8)
LYMPHOCYTES NFR BLD: 20.9 % (ref 22–41)
MCH RBC QN AUTO: 33.3 PG (ref 27–33)
MCHC RBC AUTO-ENTMCNC: 32.1 G/DL (ref 33.7–35.3)
MCV RBC AUTO: 103.7 FL (ref 81.4–97.8)
MONOCYTES # BLD AUTO: 0.51 K/UL (ref 0–0.85)
MONOCYTES NFR BLD AUTO: 11.7 % (ref 0–13.4)
NEUTROPHILS # BLD AUTO: 2.64 K/UL (ref 1.82–7.42)
NEUTROPHILS NFR BLD: 60.7 % (ref 44–72)
NRBC # BLD AUTO: 0 K/UL
NRBC BLD-RTO: 0 /100 WBC
PLATELET # BLD AUTO: 145 K/UL (ref 164–446)
PMV BLD AUTO: 10.1 FL (ref 9–12.9)
POTASSIUM SERPL-SCNC: 4.4 MMOL/L (ref 3.6–5.5)
RBC # BLD AUTO: 3.21 M/UL (ref 4.7–6.1)
SODIUM SERPL-SCNC: 139 MMOL/L (ref 135–145)
WBC # BLD AUTO: 4.4 K/UL (ref 4.8–10.8)

## 2021-07-14 PROCEDURE — 700102 HCHG RX REV CODE 250 W/ 637 OVERRIDE(OP): Performed by: PHYSICIAN ASSISTANT

## 2021-07-14 PROCEDURE — 99239 HOSP IP/OBS DSCHRG MGMT >30: CPT | Performed by: STUDENT IN AN ORGANIZED HEALTH CARE EDUCATION/TRAINING PROGRAM

## 2021-07-14 PROCEDURE — 80048 BASIC METABOLIC PNL TOTAL CA: CPT

## 2021-07-14 PROCEDURE — 700102 HCHG RX REV CODE 250 W/ 637 OVERRIDE(OP): Performed by: INTERNAL MEDICINE

## 2021-07-14 PROCEDURE — 94640 AIRWAY INHALATION TREATMENT: CPT

## 2021-07-14 PROCEDURE — 36415 COLL VENOUS BLD VENIPUNCTURE: CPT

## 2021-07-14 PROCEDURE — A9270 NON-COVERED ITEM OR SERVICE: HCPCS | Performed by: NURSE PRACTITIONER

## 2021-07-14 PROCEDURE — 99232 SBSQ HOSP IP/OBS MODERATE 35: CPT | Performed by: INTERNAL MEDICINE

## 2021-07-14 PROCEDURE — A9270 NON-COVERED ITEM OR SERVICE: HCPCS | Performed by: INTERNAL MEDICINE

## 2021-07-14 PROCEDURE — 85025 COMPLETE CBC W/AUTO DIFF WBC: CPT

## 2021-07-14 PROCEDURE — 94760 N-INVAS EAR/PLS OXIMETRY 1: CPT

## 2021-07-14 PROCEDURE — A9270 NON-COVERED ITEM OR SERVICE: HCPCS | Performed by: PHYSICIAN ASSISTANT

## 2021-07-14 PROCEDURE — 700101 HCHG RX REV CODE 250: Performed by: STUDENT IN AN ORGANIZED HEALTH CARE EDUCATION/TRAINING PROGRAM

## 2021-07-14 PROCEDURE — 700102 HCHG RX REV CODE 250 W/ 637 OVERRIDE(OP): Performed by: NURSE PRACTITIONER

## 2021-07-14 RX ORDER — OMEPRAZOLE 20 MG/1
20 CAPSULE, DELAYED RELEASE ORAL DAILY
Qty: 30 CAPSULE | Refills: 0 | Status: SHIPPED | OUTPATIENT
Start: 2021-07-15 | End: 2021-08-14

## 2021-07-14 RX ORDER — BUDESONIDE AND FORMOTEROL FUMARATE DIHYDRATE 160; 4.5 UG/1; UG/1
2 AEROSOL RESPIRATORY (INHALATION) 2 TIMES DAILY
Qty: 2 EACH | Refills: 0 | Status: SHIPPED | OUTPATIENT
Start: 2021-07-14 | End: 2021-08-23 | Stop reason: SDUPTHER

## 2021-07-14 RX ORDER — ISOSORBIDE MONONITRATE 60 MG/1
60 TABLET, EXTENDED RELEASE ORAL DAILY
Qty: 30 TABLET | Refills: 0 | Status: SHIPPED | OUTPATIENT
Start: 2021-07-15 | End: 2021-08-09 | Stop reason: SDUPTHER

## 2021-07-14 RX ORDER — ROSUVASTATIN CALCIUM 40 MG/1
40 TABLET, COATED ORAL EVERY EVENING
Qty: 30 TABLET | Refills: 11 | Status: SHIPPED | OUTPATIENT
Start: 2021-07-14 | End: 2021-08-09 | Stop reason: SDUPTHER

## 2021-07-14 RX ORDER — CLOPIDOGREL BISULFATE 75 MG/1
75 TABLET ORAL DAILY
Qty: 30 TABLET | Refills: 0 | Status: SHIPPED | OUTPATIENT
Start: 2021-07-15 | End: 2021-08-09 | Stop reason: SDUPTHER

## 2021-07-14 RX ADMIN — IPRATROPIUM BROMIDE AND ALBUTEROL SULFATE 3 ML: .5; 2.5 SOLUTION RESPIRATORY (INHALATION) at 07:01

## 2021-07-14 RX ADMIN — ISOSORBIDE MONONITRATE 60 MG: 30 TABLET, EXTENDED RELEASE ORAL at 06:20

## 2021-07-14 RX ADMIN — IPRATROPIUM BROMIDE AND ALBUTEROL SULFATE 3 ML: .5; 2.5 SOLUTION RESPIRATORY (INHALATION) at 10:25

## 2021-07-14 RX ADMIN — CLOPIDOGREL BISULFATE 75 MG: 75 TABLET ORAL at 06:19

## 2021-07-14 RX ADMIN — OMEPRAZOLE 20 MG: 20 CAPSULE, DELAYED RELEASE ORAL at 06:20

## 2021-07-14 RX ADMIN — METOPROLOL TARTRATE 12.5 MG: 25 TABLET, FILM COATED ORAL at 10:32

## 2021-07-14 RX ADMIN — ASPIRIN 81 MG: 81 TABLET, COATED ORAL at 06:19

## 2021-07-14 RX ADMIN — BUDESONIDE AND FORMOTEROL FUMARATE DIHYDRATE 2 PUFF: 160; 4.5 AEROSOL RESPIRATORY (INHALATION) at 07:05

## 2021-07-14 ASSESSMENT — ENCOUNTER SYMPTOMS
LOSS OF CONSCIOUSNESS: 0
PALPITATIONS: 0
FEVER: 0
SPUTUM PRODUCTION: 1
SHORTNESS OF BREATH: 1
CHILLS: 0
WHEEZING: 0
COUGH: 1
WEIGHT LOSS: 1
FALLS: 0
DIZZINESS: 0
BLOOD IN STOOL: 0
ORTHOPNEA: 0

## 2021-07-14 ASSESSMENT — COPD QUESTIONNAIRES
HAVE YOU SMOKED AT LEAST 100 CIGARETTES IN YOUR ENTIRE LIFE: YES
COPD SCREENING SCORE: 9
DURING THE PAST 4 WEEKS HOW MUCH DID YOU FEEL SHORT OF BREATH: MOST  OR ALL OF THE TIME
DO YOU EVER COUGH UP ANY MUCUS OR PHLEGM?: YES, A FEW DAYS A WEEK OR MONTH

## 2021-07-14 ASSESSMENT — FIBROSIS 4 INDEX: FIB4 SCORE: 3.51

## 2021-07-14 NOTE — PROGRESS NOTES
Received report and assumed care of patient. Patient is alert and oriented x4. Patient is in no signs of distress, denies pain at this time. Patient was updated on the plan of care for the day. Call light within reach, bed in low position, 2 side rails up. Educated on fall risk, verbalizes understanding. Will continue to monitor.

## 2021-07-14 NOTE — PROGRESS NOTES
Assumed care of pt. Bedside report received from day R.N. Pt denies chest pain or SOB. VSS. Pt resting comfortably sitting at edge of bed eating dinner. All needs met. Fall precautions in place. Bed low and locked, call light in reach. Discussed POC.

## 2021-07-14 NOTE — DISCHARGE SUMMARY
Discharge Summary    CHIEF COMPLAINT ON ADMISSION  No chief complaint on file.      Reason for Admission  MI     Admission Date  7/9/2021    CODE STATUS  Prior    HPI & HOSPITAL COURSE  Kobe Cyr is a 89 y.o. male admitted 7/9/2021 with past medical history of hypertension, hyperlipidemia, coronary disease status with 3 stents in 2005 who presents to the hospital with lightheadedness and shortness of breath.  It associated with orthopnea and shortness of breath gets worse when he bends down.  Patient was transferred from [facility] where he was found to have a troponin of 0.08. Upon arrival to the hospital patient was found to have normal sinus rhythm, right bundle branch block, left anterior fascicular block without significant ST segment changes.  He was started on IV heparin, metoprolol and losartan.  Cardiac catheter revealed evidence of triple-vessel disease and left main occlusion.  Cardiology suggested to continue heparin aspirin and Plavix.  Cardiac surgery suggested that the patient is not a surgical candidate.  VQ scan showed low probability for PE.  For non-STEMI, Cardiology discussed with the family, discontinued heparin drip, continue aspirin and Plavix, statin.  Started Imdur, lisinopril and spironolactone.  Plan for outpatient cath.  Appointment with Dr. Davey arranged    Pulmonary consulted.  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  Patient has a chronic respiratory failure on 2 L home oxygen as baseline. CT chest showed ill-defined lower lobe interstitial changes with bronchiectasis, chronic right upper lobe cavity and followed with Dr. Tobin in pulmonary clinic.  Pulmonary Dr. Kim had a long discussion with the family, will start  triple inhaler therapy.  Patient needs close follow-up with the pulmonary.    His MAKENZIE resolved after discharge.  Dr. Kim, discussed with the family regarding the  concerns of driving.  Per the family, the patient had transient episodes of loss of consciousness while driving.  However patient himself denied it and strangely against it.  DMV paper was filed     His other medical conditions including hypertension, COPD, pulmonary hypertension, hyperlipidemia, have also been monitored and/or treated    Therefore, he is discharged in fair and stable condition to home with close outpatient follow-up.    The patient met 2-midnight criteria for an inpatient stay at the time of discharge.    Discharge Date  7/14/2021    FOLLOW UP ITEMS POST DISCHARGE  Follow-up with cardiologist as instructed  Follow-up with the pulmonologist as instructed  Follow-up with your sleep medicine referral  Follow-up with PCP in 2 weeks  Continue taking the medications and inhalers as instructed      DISCHARGE DIAGNOSES  Principal Problem:    NSTEMI (non-ST elevated myocardial infarction) (HCC) POA: Yes      Overview: 2005  Active Problems:    Hyperlipidemia (Chronic) POA: Yes    CAD (coronary artery disease) POA: Yes      Overview: Anterior MI 2005, stent to proximal and mid LAD 2005.    Essential hypertension POA: Yes    CKD (chronic kidney disease), stage III ([facility]) POA: Yes    Acute on chronic respiratory failure with hypoxia ([facility]) POA: Unknown    COPD (chronic obstructive pulmonary disease) ([facility]) POA: Unknown    Mild aortic stenosis POA: Unknown    Exertional shortness of breath POA: Unknown    Pulmonary HTN ([facility]) POA: Unknown  Resolved Problems:    * No resolved hospital problems. *      FOLLOW UP  Future Appointments   Date Time Provider Department Center   7/30/2021  1:00 PM Tomas Nieto M.D. [facility] None   11/23/2021 10:00 AM Pati Morrow P.A.-C. [facility] None     [facility]  [address]  [address]  [facility] [address]  [phone]  Call  Your doctor has referred you to Cardiac Rehab, which is important to your recovery. Please call to make an appointment.    Follow  up with PCP    In 1 week  7 days after discharge.      MEDICATIONS ON DISCHARGE     Medication List      START taking these medications      Instructions   budesonide-formoterol 160-4.5 MCG/ACT Aero  Commonly known as: SYMBICORT   Inhale 2 Puffs 2 times a day for 30 days.  Dose: 2 Puff     clopidogrel 75 MG Tabs  Commonly known as: PLAVIX   Take 1 tablet by mouth every day for 30 days.  Dose: 75 mg     isosorbide mononitrate SR 60 MG Tb24  Commonly known as: IMDUR   Take 1 tablet by mouth every day for 30 days.  Dose: 60 mg     rosuvastatin 40 MG tablet  Commonly known as: CRESTOR   Take 1 tablet by mouth every evening for 30 days.  Dose: 40 mg     tiotropium 2.5 mcg/Act Aers  Commonly known as: Spiriva Respimat   Inhale 2 Inhalation every day for 30 days.  Dose: 5 mcg        CHANGE how you take these medications      Instructions   omeprazole 20 MG delayed-release capsule  What changed: See the new instructions.  Commonly known as: PRILOSEC   Take 1 capsule by mouth every day for 30 days.  Dose: 20 mg        CONTINUE taking these medications      Instructions   aspirin 81 MG tablet   Take 81 mg by mouth every day.  Dose: 81 mg     benzonatate 200 MG capsule  Commonly known as: TESSALON   Take 1 capsule by mouth 2 times a day as needed for Cough. Do not chew. Swallow whole.  Dose: 200 mg     metoprolol tartrate 25 MG Tabs  Commonly known as: LOPRESSOR   Take 1 Tab by mouth 2 times a day.  Dose: 25 mg     nitroglycerin 0.4 MG Subl  Commonly known as: NITROSTAT   Place 1 Tab under tongue as needed for Chest Pain.  Dose: 0.4 mg        STOP taking these medications    lovastatin 40 MG tablet  Commonly known as: MEVACOR            Allergies  Allergies   Allergen Reactions   • Atorvastatin Myalgia   • Simvastatin      ADVERSE REACTION: MYALGIA.       DIET  No orders of the defined types were placed in this encounter.      ACTIVITY  As tolerated.  Weight bearing as  tolerated    CONSULTATIONS  Pulmonary  Cardiology    PROCEDURES      LABORATORY  Lab Results   Component Value Date    SODIUM 139 07/14/2021    POTASSIUM 4.4 07/14/2021    CHLORIDE 102 07/14/2021    CO2 29 07/14/2021    GLUCOSE 104 (H) 07/14/2021    BUN 22 07/14/2021    CREATININE 1.36 07/14/2021    CREATININE 1.4 05/06/2008        Lab Results   Component Value Date    WBC 4.4 (L) 07/14/2021    HEMOGLOBIN 10.7 (L) 07/14/2021    HEMATOCRIT 33.3 (L) 07/14/2021    PLATELETCT 145 (L) 07/14/2021        Total time of the discharge process exceeds 31 minutes.

## 2021-07-14 NOTE — PROGRESS NOTES
"         [facility] Cardiology Progress Note    Name:   Kobe Cyr   YOB: 1931  Age:   89 y.o.  male   MRN:   [id number]    Consulting Cardiologist: Dr. Haile  Primary Cardiologist: Dr Bradshaw    Chief Complaint: acute on chronic dyspnea    Past Medical History:  89yo male from [place] with chronic hypoxic respiratory failure on O2 24/7, chronic RUL granuloma, emphysema, CKD who presented with an acute episode of dyspnea, weakness, coughing while in the shower. He has noticed an increase his dyspnea with moderate.     History of Present Illness:  Feels \"great\" this morning. He used the inhaler this morning with a spacer and does think it has helped. Yesterday he required 6L of O2 with ambulation to maintain sats of 90%.     He has no chest pressure, no dyspnea at rest, still dyspneic with walking >20ft.     Mild swelling in ankles today, right greater than left which is normal for him.    Review of Systems   Constitutional: Positive for weight loss (110kg to 102kg). Negative for chills and fever.   Respiratory: Positive for cough, sputum production and shortness of breath (on exertion). Negative for wheezing.    Cardiovascular: Positive for leg swelling. Negative for chest pain, palpitations and orthopnea.   Gastrointestinal: Negative for blood in stool and melena.   Genitourinary: Negative for urgency.   Musculoskeletal: Negative for falls.   Neurological: Negative for dizziness and loss of consciousness.         All other review of systems reviewed and negative.    Past Surgical History:   Procedure Laterality Date   • KNEE UNICOMPARTMENTAL  10/13/2009    Performed by SAM LINDER at [facility] ORS   • ARTHROSCOPY, KNEE     • CORONARY STENT PROCEDURE LAD     • OTHER ORTHOPEDIC SURGERY      right shoulder rotator cuff surgery x2       Family History   Problem Relation Age of Onset   • No Known Problems Mother    • No Known Problems Father        Social History     Socioeconomic History "   • Marital status:      Spouse name: Not on file   • Number of children: Not on file   • Years of education: Not on file   • Highest education level: Not on file   Occupational History   • Not on file   Tobacco Use   • Smoking status: Former Smoker     Packs/day: 2.00     Years: 35.00     Pack years: 70.00     Types: Cigarettes     Quit date: 1973     Years since quittin.2   • Smokeless tobacco: Never Used   Vaping Use   • Vaping Use: Never used   Substance and Sexual Activity   • Alcohol use: Yes     Comment: cocktail every night   • Drug use: No   • Sexual activity: Not on file   Other Topics Concern   • Not on file   Social History Narrative   • Not on file     Social Determinants of Health     Financial Resource Strain:    • Difficulty of Paying Living Expenses:    Food Insecurity:    • Worried About Running Out of Food in the Last Year:    • Ran Out of Food in the Last Year:    Transportation Needs:    • Lack of Transportation (Medical):    • Lack of Transportation (Non-Medical):    Physical Activity:    • Days of Exercise per Week:    • Minutes of Exercise per Session:    Stress:    • Feeling of Stress :    Social Connections:    • Frequency of Communication with Friends and Family:    • Frequency of Social Gatherings with Friends and Family:    • Attends [employer] Services:    • Active Member of Clubs or Organizations:    • Attends Club or Organization Meetings:    • Marital Status:    Intimate Partner Violence:    • Fear of Current or Ex-Partner:    • Emotionally Abused:    • Physically Abused:    • Sexually Abused:        Medications / Drug list prior to admission:  No current facility-administered medications on file prior to encounter.     Current Outpatient Medications on File Prior to Encounter   Medication Sig Dispense Refill   • benzonatate (TESSALON) 200 MG capsule Take 1 capsule by mouth 2 times a day as needed for Cough. Do not chew. Swallow whole. 60 capsule 6   • metoprolol  "(LOPRESSOR) 25 MG Tab Take 1 Tab by mouth 2 times a day. 180 Tab 2   • nitroglycerin (NITROSTAT) 0.4 MG SUBL Place 1 Tab under tongue as needed for Chest Pain. 25 Tab 1   • aspirin 81 MG tablet Take 81 mg by mouth every day.         Allergies   Allergen Reactions   • Atorvastatin Myalgia   • Simvastatin      ADVERSE REACTION: MYALGIA.       Physical Exam  Body mass index is 33.82 kg/m². /46   Pulse 85   Temp 36.2 °C (97.2 °F) (Temporal)   Resp 18   Ht 1.803 m (5' 11\")   Wt 110 kg (242 lb 8.1 oz)   SpO2 90%    Vitals:    07/13/21 2315 07/14/21 0500 07/14/21 0702 07/14/21 0747   BP: 117/56 124/45  104/46   Pulse: 61  62 85   Resp: 17 16 16 18   Temp: 36.3 °C (97.4 °F) 36.2 °C (97.2 °F)  36.2 °C (97.2 °F)   TempSrc: Temporal Temporal  Temporal   SpO2: 90% 93% 94% 90%   Weight:       Height:        Oxygen Therapy:  Pulse Oximetry: 90 %, O2 (LPM): 3, O2 Delivery Device: Silicone Nasal Cannula    General: Appears younger than stated age, in no distress  Eyes: nl conjunctiva, no scleral icterus  Mouth: moist oral mucosa  Lungs: normal respiratory effort, no rales, no wheezing or rhonchi.  Heart: RRR, 2/6 systolic murmur, no rubs or gallops, 1+ pitting edema in left ankle, 2+ bilateral radial pulses.  2+ bilateral dp pulses.   Abdomen: non distended   Extremities/MSK: Warm, well perfused, no clubbing, no cyanosis  Neurological: No focal sensory deficits, normal gait  Psychiatric: Appropriate affect, A/O x 3, intact judgement and insight  Skin: No rashes, warm extremities      Labs (personally reviewed):     Lab Results   Component Value Date/Time    SODIUM 139 07/14/2021 02:26 AM    POTASSIUM 4.4 07/14/2021 02:26 AM    CHLORIDE 102 07/14/2021 02:26 AM    CO2 29 07/14/2021 02:26 AM    GLUCOSE 104 (H) 07/14/2021 02:26 AM    BUN 22 07/14/2021 02:26 AM    CREATININE 1.36 07/14/2021 02:26 AM    CREATININE 1.4 05/06/2008 11:42 AM     Lab Results   Component Value Date/Time    ALKPHOSPHAT 45 07/13/2021 04:24 AM    " ASTSGOT 16 07/13/2021 04:24 AM    ALTSGPT 8 07/13/2021 04:24 AM    TBILIRUBIN 0.3 07/13/2021 04:24 AM      Lab Results   Component Value Date/Time    CHOLSTRLTOT 138 07/10/2021 05:29 AM    LDL 75 07/10/2021 05:29 AM    HDL 52 07/10/2021 05:29 AM    TRIGLYCERIDE 55 07/10/2021 05:29 AM     Lab Results   Component Value Date/Time    BNPBTYPENAT 170 (H) 03/08/2012 05:00 PM         Cardiac Imaging and Procedures Review:      Personal Telemetry Review: sinus rhythm with ectopy    Echo 7/10/21:  CONCLUSIONS  Normal left ventricular systolic function.  Left ventricular ejection fraction is visually estimated to be 75%.  Mild aortic stenosis.  Normal right ventricular size.  Reduced right ventricular systolic function.  Right heart pressures are consistent with moderate pulmonary hypertension.    [facility]  7/11/21:  Findings   Hemodynamics:   Aorta: 145/61 mmHg  LVEDP: 22 mmHg  Minimal pullback gradient across the aortic valve     Coronary Anatomy              Left Main: Ostial 60% stenosis, distal 30% stenosis. The MLA at the ostium is 6.5 to 7 mm² the IFR in the left main is 0.95 may be underestimated due to downstream disease.              LAD: 80% ostial stenosis patent stent in the proximal vessel the first diagonal has a proximal 70% stenosis.  The IFR of the mid LAD is 0.87              Ramus: 90% stenosis at the ostium, probable stent in the proximal portion of the vessel with 30% in-stent restenosis.              LCx: Small vessel, supplying a limited territory on the high lateral wall with a proximal 70% stenosis              RCA: Dominant, 70% stenosis at the ostium sequential, 99% stenosis in the mid segment, 95% stenosis at the origin of the PDA with competitive filling from left to right collaterals.  The posterior lateral branch is small  IMPRESSIONS:  1.  Obstructive three-vessel coronary artery disease with moderately severe left main disease  2.  Moderate elevation LVEDP (22 mmHg)       Assessment and Medical  Decision Making:    Dyspnea on exertion   COPD  RUL cavitary nodule, chronic  Acute Chronic hypoxic respiratory failure  - no wheezing today after inhalers started yesterday  - encourage IS and use of inhalers with spacers at home    Multivessel Coronary Artery Disease  - many factors at play including obstructive lung disease, triple vessel disease.   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     Discharge today with follow up with me in clinic in 2-3 weeks. Establish with Dr Davey  Please see Dr Lewis's attestation for additions and further recommendations.    Wendi Bautista PA-C  [facility]

## 2021-07-14 NOTE — CARE PLAN
Problem: Mobility  Goal: Patient's capacity to carry out activities will improve  Outcome: Progressing    Patient mobilizes well from EOB to laying down with no assistance. Strong and steady with FWW with assistance.      The patient is Stable - Low risk of patient condition declining or worsening    Shift Goals  Clinical Goals: utilize IS.   Patient Goals: N/A    Progress made toward(s) clinical / shift goals:  V/Q scan complete. IS volume 1500.     Patient is not progressing towards the following goals: N/A

## 2021-07-14 NOTE — TELEPHONE ENCOUNTER
Pt discharged by time I was unable to discuss Confidential Physicians Report regarding driving safety. I called him and we discussed the report. He was extremely upset, concerned of losing his independence and changed the details of his recollection of the events he described to me yesterday. He then went on to threaten legal action if it was submitted. I explained that I am obligated to submit it, and that any further action if necessary will be determined by the DMV. Completed form was given to unit clerk to send to DMV and scan into patients chart. Risk management was also notified of threats of litigation.     __________  Ronaldo Kim MD  Pulmonary and Critical Care Medicine  [facility]

## 2021-07-14 NOTE — DISCHARGE INSTRUCTIONS
Discharge Instructions    Discharged to home by car with self. Discharged via wheelchair, hospital escort: Yes.  Special equipment needed: Not Applicable    Be sure to schedule a follow-up appointment with your primary care doctor or any specialists as instructed.     Discharge Plan: Follow up out patient with cardiology       I understand that a diet low in cholesterol, fat, and sodium is recommended for good health. Unless I have been given specific instructions below for another diet, I accept this instruction as my diet prescription.     · Is patient discharged on Warfarin / Coumadin?   No     Depression / Suicide Risk    As you are discharged from this [facility] facility, it is important to learn how to keep safe from harming yourself.    Recognize the warning signs:  · Abrupt changes in personality, positive or negative- including increase in energy   · Giving away possessions  · Change in eating patterns- significant weight changes-  positive or negative  · Change in sleeping patterns- unable to sleep or sleeping all the time   · Unwillingness or inability to communicate  · Depression  · Unusual sadness, discouragement and loneliness  · Talk of wanting to die  · Neglect of personal appearance   · Rebelliousness- reckless behavior  · Withdrawal from people/activities they love  · Confusion- inability to concentrate     If you or a loved one observes any of these behaviors or has concerns about self-harm, here's what you can do:  · Talk about it- your feelings and reasons for harming yourself  · Remove any means that you might use to hurt yourself (examples: pills, rope, extension cords, firearm)  · Get professional help from the community (Mental Health, Substance Abuse, psychological counseling)  · Do not be alone:Call your Safe Contact- someone whom you trust who will be there for you.  · Call your local CRISIS HOTLINE 511-9263 or 662-699-7948  · Call your local Children's Mobile Crisis Response Team  [facility] (579) 341-1422 or www.[employer]  · Call the toll free National Suicide Prevention Hotlines   · National Suicide Prevention Lifeline 160-572-CZGV (4402)  · [place] [facility] Hope Line Network 800-SUICIDE (378-5892)      Heart Attack  A heart attack occurs when blood and oxygen supply to the heart is cut off. A heart attack causes damage to the heart that cannot be fixed. A heart attack is also called a myocardial infarction, or MI. If you think you are having a heart attack, do not wait to see if the symptoms will go away. Get medical help right away.  What are the causes?  This condition may be caused by:  · A fatty substance (plaque) in the blood vessels (arteries). This can block the flow of blood to the heart.  · A blood clot in the blood vessels that go to the heart. The blood clot blocks blood flow.  · Low blood pressure.  · An abnormal heartbeat.  · Some diseases, such as problems in red blood cells (anemia)orproblems in breathing (respiratory failure).  · Tightening (spasm) of a blood vessel that cuts off blood to the heart.  · A tear in a blood vessel of the heart.  · High blood pressure.  What increases the risk?  The following factors may make you more likely to develop this condition:  · Aging. The older you are, the higher your risk.  · Having a personal or family history of chest pain, heart attack, stroke, or narrowing of the arteries in the legs, arms, head, or stomach (peripheral artery disease).  · Being male.  · Smoking.  · Not getting regular exercise.  · Being overweight or obese.  · Having high blood pressure.  · Having high cholesterol.  · Having diabetes.  · Drinking too much alcohol.  · Using illegal drugs, such as cocaine or methamphetamine.  What are the signs or symptoms?  Symptoms of this condition include:  · Chest pain. It may feel like:  ? Crushing or squeezing.  ? Tightness, pressure, fullness, or heaviness.  · Pain in the arm, neck, jaw, back, or upper body.  · Shortness  of breath.  · Heartburn.  · Upset stomach (indigestion).  · Feeling like you may vomit (nauseous).  · Cold sweats.  · Feeling tired.  · Sudden light-headedness.  How is this treated?  A heart attack must be treated as soon as possible. Treatment may include:  · Medicines to:  ? Break up or dissolve blood clots.  ? Thin blood and help prevent blood clots.  ? Treat blood pressure.  ? Improve blood flow to the heart.  ? Reduce pain.  ? Reduce cholesterol.  · Procedures to widen a blocked artery and keep it open.  · Open heart surgery.  · Receiving oxygen.  · Making your heart strong again (cardiac rehabilitation) through exercise, education, and counseling.  Follow these instructions at home:  Medicines  · Take over-the-counter and prescription medicines only as told by your doctor. You may need to take medicine:  ? To keep your blood from clotting too easily.  ? To control blood pressure.  ? To lower cholesterol.  ? To control heart rhythms.  · Do not take these medicines unless your doctor says it is okay:  ? NSAIDs, such as ibuprofen.  ? Supplements that have vitamin A, vitamin E, or both.  ? Hormone replacement therapy that has estrogen with or without progestin.  Lifestyle         · Do not use any products that have nicotine or tobacco, such as cigarettes, e-cigarettes, and chewing tobacco. If you need help quitting, ask your doctor.  · Avoid secondhand smoke.  · Exercise regularly. Ask your doctor about a cardiac rehab program.  · Eat heart-healthy foods. Your doctor will tell you what foods to eat.  · Stay at a healthy weight.  · Lower your stress level.  · Do not use illegal drugs.  Alcohol use  · Do not drink alcohol if:  ? Your doctor tells you not to drink.  ? You are pregnant, may be pregnant, or are planning to become pregnant.  · If you drink alcohol:  ? Limit how much you use to:  § 0-1 drink a day for women.  § 0-2 drinks a day for men.  ? Know how much alcohol is in your drink. In the [place] one drink  equals one 12 oz bottle of beer (355 mL), one 5 oz glass of wine (148 mL), or one 1½ oz glass of hard liquor (44 mL).  General instructions  · Work with your doctor to treat other problems you may have, such as diabetes or high blood pressure.  · Get screened for depression. Get treatment if needed.  · Keep your vaccines up to date. Get the flu shot (influenza vaccine) every year.  · Keep all follow-up visits as told by your doctor. This is important.  Contact a doctor if:  · You feel very sad.  · You have trouble doing your daily activities.  Get help right away if:  · You have sudden, unexplained discomfort in your chest, arms, back, neck, jaw, or upper body.  · You have shortness of breath.  · You have sudden sweating or clammy skin.  · You feel like you may vomit.  · You vomit.  · You feel tired or weak.  · You get light-headed or dizzy.  · You feel your heart beating fast.  · You feel your heart skipping beats.  · You have blood pressure that is higher than 180/120.  These symptoms may be an emergency. Do not wait to see if the symptoms will go away. Get medical help right away. Call your local emergency services (911 in the [place] Do not drive yourself to the hospital.  Summary  · A heart attack occurs when blood and oxygen supply to the heart is cut off.  · Do not take NSAIDs unless your doctor says it is okay.  · Do not smoke. Avoid secondhand smoke.  · Exercise regularly. Ask your doctor about a cardiac rehab program.  This information is not intended to replace advice given to you by your health care provider. Make sure you discuss any questions you have with your health care provider.  Document Released: 06/18/2013 Document Revised: 03/30/2020 Document Reviewed: 03/30/2020  Elsevier Patient Education © 2020 [employer]    Clopidogrel tablets  What is this medicine?  CLOPIDOGREL (kloh PID oh grel) helps to prevent blood clots. This medicine is used to prevent heart attack, stroke, or other vascular  events in people who are at high risk.  This medicine may be used for other purposes; ask your health care provider or pharmacist if you have questions.  COMMON BRAND NAME(S): Plavix  What should I tell my health care provider before I take this medicine?  They need to know if you have any of the following conditions:  · bleeding disorders  · bleeding in the brain  · having surgery  · history of stomach bleeding  · an unusual or allergic reaction to clopidogrel, other medicines, foods, dyes, or preservatives  · pregnant or trying to get pregnant  · breast-feeding  How should I use this medicine?  Take this medicine by mouth with a glass of water. Follow the directions on the prescription label. You may take this medicine with or without food. If it upsets your stomach, take it with food. Take your medicine at regular intervals. Do not take it more often than directed. Do not stop taking except on your doctor's advice.  A special MedGuide will be given to you by the pharmacist with each prescription and refill. Be sure to read this information carefully each time.  Talk to your pediatrician regarding the use of this medicine in children. Special care may be needed.  Overdosage: If you think you have taken too much of this medicine contact a poison control center or emergency room at once.  NOTE: This medicine is only for you. Do not share this medicine with others.  What if I miss a dose?  If you miss a dose, take it as soon as you can. If it is almost time for your next dose, take only that dose. Do not take double or extra doses.  What may interact with this medicine?  Do not take this medicine with the following medications:  · dasabuvir; ombitasvir; paritaprevir; ritonavir  · defibrotide  · selexipag  This medicine may also interact with the following medications:  · certain medicines that treat or prevent blood clots like warfarin  · narcotic medicines for pain  · NSAIDs, medicines for pain and inflammation,  like ibuprofen or naproxen  · repaglinide  · SNRIs, medicines for depression, like desvenlafaxine, duloxetine, levomilnacipran, venlafaxine  · SSRIs, medicines for depression, like citalopram, escitalopram, fluoxetine, fluvoxamine, paroxetine, sertraline  · stomach acid blockers like cimetidine, esomeprazole, omeprazole  This list may not describe all possible interactions. Give your health care provider a list of all the medicines, herbs, non-prescription drugs, or dietary supplements you use. Also tell them if you smoke, drink alcohol, or use illegal drugs. Some items may interact with your medicine.  What should I watch for while using this medicine?  Visit your doctor or health care professional for regular check-ups. Do not stop taking your medicine unless your doctor tells you to.  Notify your doctor or health care professional and seek emergency treatment if you develop breathing problems; changes in vision; chest pain; severe, sudden headache; pain, swelling, warmth in the leg; trouble speaking; sudden numbness or weakness of the face, arm or leg. These can be signs that your condition has gotten worse.  If you are going to have surgery or dental work, tell your doctor or health care professional that you are taking this medicine.  Certain genetic factors may reduce the effect of this medicine. Your doctor may use genetic tests to determine treatment.  Only take aspirin if you are instructed to. Low doses of aspirin are used with this medicine to treat some conditions. Taking aspirin with this medicine can increase your risk of bleeding so you must be careful. Talk to your doctor or pharmacist if you have questions.  What side effects may I notice from receiving this medicine?  Side effects that you should report to your doctor or health care professional as soon as possible:  · allergic reactions like skin rash, itching or hives, swelling of the face, lips, or tongue  · signs and symptoms of bleeding such  as bloody or black, tarry stools; red or dark-brown urine; spitting up blood or brown material that looks like coffee grounds; red spots on the skin; unusual bruising or bleeding from the eye, gums, or nose  · signs and symptoms of a blood clot such as breathing problems; changes in vision; chest pain; severe, sudden headache; pain, swelling, warmth in the leg; trouble speaking; sudden numbness or weakness of the face, arm or leg  · signs and symptoms of low blood sugar such as feeling anxious; confusion; dizziness; increased hunger; unusually weak or tired; increased sweating; shakiness; cold, clammy skin; irritable; headache; blurred vision; fast heartbeat; loss of consciousness  Side effects that usually do not require medical attention (report to your doctor or health care professional if they continue or are bothersome):  · constipation  · diarrhea  · headache  · upset stomach  This list may not describe all possible side effects. Call your doctor for medical advice about side effects. You may report side effects to FDA at 6-907-FDA-2598.  Where should I keep my medicine?  Keep out of the reach of children.  Store at room temperature of 59 to 86 degrees F (15 to 30 degrees C). Throw away any unused medicine after the expiration date.  NOTE: This sheet is a summary. It may not cover all possible information. If you have questions about this medicine, talk to your doctor, pharmacist, or health care provider.  © 2020 Elsevier/Gold Standard (2019-05-20 15:03:38)    Isosorbide Mononitrate tablets  What is this medicine?  ISOSORBIDE MONONITRATE (eye elías SOR bide mon oh FENG trate) is a type of vasodilator. It relaxes blood vessels, increasing the blood and oxygen supply to your heart. This medicine is used to prevent chest pain caused by angina. It will not help to stop an episode of chest pain.  This medicine may be used for other purposes; ask your health care provider or pharmacist if you have questions.  COMMON  BRAND NAME(S): Ismo, Monoket  What should I tell my health care provider before I take this medicine?  They need to know if you have any of these conditions:  · previous heart attack or heart failure  · an unusual or allergic reaction to isosorbide mononitrate, nitrates, other medicines, foods, dyes, or preservatives  · pregnant or trying to get pregnant  · breast-feeding  How should I use this medicine?  Take this medicine by mouth with a glass of water. Follow the directions on the prescription label. Take your medicine at regular intervals. Do not take your medicine more often than directed. Do not stop taking this medicine suddenly or your symptoms may get worse. Ask your doctor or health care professional how to gradually reduce the dose.  Talk to your pediatrician regarding the use of this medicine in children. Special care may be needed.  Overdosage: If you think you have taken too much of this medicine contact a poison control center or emergency room at once.  NOTE: This medicine is only for you. Do not share this medicine with others.  What if I miss a dose?  If you miss a dose, take it as soon as you can. If it is almost time for your next dose, take only that dose. Do not take double or extra doses.  What may interact with this medicine?  Do not take this medicine with any of the following medications:  · medicines used to treat erectile dysfunction (ED) like avanafil, sildenafil, tadalafil, and vardenafil  · riociguat  This medicine may also interact with the following medications:  · medicines for high blood pressure  · other medicines for angina or heart failure  This list may not describe all possible interactions. Give your health care provider a list of all the medicines, herbs, non-prescription drugs, or dietary supplements you use. Also tell them if you smoke, drink alcohol, or use illegal drugs. Some items may interact with your medicine.  What should I watch for while using this  medicine?  Check your heart rate and blood pressure regularly while you are taking this medicine. Ask your doctor or health care professional what your heart rate and blood pressure should be and when you should contact him or her. Tell your doctor or health care professional if you feel your medicine is no longer working.  You may get dizzy. Do not drive, use machinery, or do anything that needs mental alertness until you know how this medicine affects you. To reduce the risk of dizzy or fainting spells, do not sit or stand up quickly, especially if you are an older patient. Alcohol can make you more dizzy, and increase flushing and rapid heartbeats. Avoid alcoholic drinks.  Do not treat yourself for coughs, colds, or pain while you are taking this medicine without asking your doctor or health care professional for advice. Some ingredients may increase your blood pressure.  What side effects may I notice from receiving this medicine?  Side effects that you should report to your doctor or health care professional as soon as possible:  · bluish discoloration of lips, fingernails, or palms of hands  · irregular heartbeat, palpitations  · low blood pressure  · nausea, vomiting  · persistent headache  · unusually weak or tired  Side effects that usually do not require medical attention (report to your doctor or health care professional if they continue or are bothersome):  · flushing of the face or neck  · rash  This list may not describe all possible side effects. Call your doctor for medical advice about side effects. You may report side effects to FDA at 7-016-FDA-3519.  Where should I keep my medicine?  Keep out of the reach of children.  Store between 15 and 30 degrees C (59 and 86 degrees F). Keep container tightly closed. Throw away any unused medicine after the expiration date.  NOTE: This sheet is a summary. It may not cover all possible information. If you have questions about this medicine, talk to your  doctor, pharmacist, or health care provider.  © 2020 Elsevier/Gold Standard (2014-10-17 14:48:55)    Rosuvastatin capsules  What is this medicine?  ROSUVASTATIN (francis peacock tin) is known as an HMG-CoA reductase inhibitor or 'statin'. It lowers cholesterol and triglycerides in the blood. Diet and lifestyle changes are often used with this drug.  This medicine may be used for other purposes; ask your health care provider or pharmacist if you have questions.  COMMON BRAND NAME(S): Shiv  What should I tell my health care provider before I take this medicine?  They need to know if you have any of these conditions:  · diabetes  · if you often drink alcohol  · history of stroke  · kidney disease  · liver disease  · muscle aches or weakness  · thyroid disease  · an unusual or allergic reaction to rosuvastatin, other medicines, foods, dyes, or preservatives  · pregnant or trying to get pregnant  · breast-feeding  How should I use this medicine?  Take this medicine by mouth with a glass of water. Follow the directions on the prescription label. Swallow the capsules whole. Do not crush or chew this medicine. You may open the capsule and put the contents in 1 teaspoon of applesauce, chocolate pudding, or vanilla pudding. Swallow the medicine with applesauce or pudding within 60 minutes (1 hour). Do not chew the medicine, applesauce, or pudding. You can take this medicine with or without food. Take your doses at regular intervals. Do not take your medicine more often than directed.  Talk to your pediatrician about the use of this medicine in children. Special care may be needed.  Overdosage: If you think you have taken too much of this medicine contact a poison control center or emergency room at once.  NOTE: This medicine is only for you. Do not share this medicine with others.  What if I miss a dose?  If you miss a dose, take it as soon as you can. If your next dose is to be taken in less than 12 hours, then do not take  the missed dose. Take the next dose at your regular time. Do not take double or extra doses.  What may interact with this medicine?  Do not take this medicine with any of the following medications:  · herbal medicines like red yeast rice  This medicine may also interact with the following medications:  · alcohol  · antacids containing aluminum hydroxide or magnesium hydroxide  · cyclosporine  · other medicines for high cholesterol  · some medicines for HIV infection  · warfarin  This list may not describe all possible interactions. Give your health care provider a list of all the medicines, herbs, non-prescription drugs, or dietary supplements you use. Also tell them if you smoke, drink alcohol, or use illegal drugs. Some items may interact with your medicine.  What should I watch for while using this medicine?  Visit your doctor or health care professional for regular check-ups. You may need regular tests to make sure your liver is working properly.  Your health care professional may tell you to stop taking this medicine if you develop muscle problems. If your muscle problems do not go away after stopping this medicine, contact your health care professional.  Do not become pregnant while taking this medicine. Women should inform their health care professional if they wish to become pregnant or think they might be pregnant. There is a potential for serious side effects to an unborn child. Talk to your health care professional or pharmacist for more information. Do not breast-feed an infant while taking this medicine.  This medicine may increase blood sugar. Ask your healthcare provider if changes in diet or medicines are needed if you have diabetes.  If you are going to need surgery or other procedure, tell your doctor that you are using this medicine.  This drug is only part of a total heart-health program. Your doctor or a dietician can suggest a low-cholesterol and low-fat diet to help. Avoid alcohol and smoking,  and keep a proper exercise schedule.  This medicine may cause a decrease in Co-Enzyme Q-10. You should make sure that you get enough Co-Enzyme Q-10 while you are taking this medicine. Discuss the foods you eat and the vitamins you take with your health care professional.  What side effects may I notice from receiving this medicine?  Side effects that you should report to your doctor or health care professional as soon as possible:  · allergic reactions like skin rash, itching or hives, swelling of the face, lips, or tongue  · dark urine  · fever  · joint pain  · muscle cramps, pain  · redness, blistering, peeling or loosening of the skin, including inside the mouth  · signs and symptoms of high blood sugar such as being more thirsty or hungry or having to urinate more than normal. You may also feel very tired or have blurry vision.  · trouble passing urine or change in the amount of urine  · unusually weak  · yellowing of the eyes or skin  Side effects that usually do not require medical attention (report these to your doctor or health care professional if they continue or are bothersome):  · constipation  · heartburn  · nausea  · stomach gas, pain, upset  This list may not describe all possible side effects. Call your doctor for medical advice about side effects. You may report side effects to FDA at 3-479-FDA-1006.  Where should I keep my medicine?  Keep out of the reach of children.  Store at room temperature between 20 and 25 degrees C (68 and 77 degrees F). Keep container tightly closed (protect from moisture). Throw away any unused medicine after the expiration date.  NOTE: This sheet is a summary. It may not cover all possible information. If you have questions about this medicine, talk to your doctor, pharmacist, or health care provider.  © 2020 Elsevier/Gold Standard (2020-04-21 10:34:28)      Budesonide; Formoterol Inhalation  What is this medicine?  BUDESONIDE; FORMOTEROL (byren blue; for MOH te  rol) inhalation is a combination of 2 medicines that decrease inflammation and help to open up the airways in your lungs. It is used to treat asthma. It is also used to treat chronic obstructive pulmonary disease (COPD), including chronic bronchitis or emphysema. Do NOT use for an acute asthma or COPD attack.  This medicine may be used for other purposes; ask your health care provider or pharmacist if you have questions.  COMMON BRAND NAME(S): Symbicort  What should I tell my health care provider before I take this medicine?  They need to know if you have any of these conditions:  · bone problems  · diabetes  · heart disease or irregular heartbeat  · high blood pressure  · immune system problems  · infection  · liver disease  · worsening asthma  · an unusual or allergic reaction to budesonide, formoterol, medicines, foods, dyes, or preservatives  · pregnant or trying to get pregnant  · breast-feeding  How should I use this medicine?  This medicine is inhaled through the mouth. Rinse your mouth with water after use. Make sure not to swallow the water. Follow the directions on your prescription label. Do not use more often than directed. Do not stop taking except on your doctor's advice. Make sure that you are using your inhaler correctly. Ask your doctor or health care provider if you have any questions.  A special MedGuide will be given to you by the pharmacist with each prescription and refill. Be sure to read this information carefully each time.  Talk to your pediatrician regarding the use of this medicine in children. While this drug may be prescribed for children as young as 6 years of age for selected conditions, precautions do apply.  Overdosage: If you think you have taken too much of this medicine contact a poison control center or emergency room at once.  NOTE: This medicine is only for you. Do not share this medicine with others.  What if I miss a dose?  If you miss a dose, use it as soon as you remember.  If it is almost time for your next dose, use only that dose and continue with your regular schedule, spacing doses evenly. Do not use double or extra doses.  What may interact with this medicine?  Do not take this medicine with any of the following medications:  · MAOIs like Carbex, Eldepryl, Marplan, Nardil, and Parnate  · mifepristone  · probucol  · procarbazine  · some other medicines for asthma like formoterol, salmeterol  This medicine may also interact with the following medications:  · antibiotics like clarithromycin, erythromycin  · cimetidine  · diuretics  · grapefruit juice  · itraconazole  · ketoconazole  · medicines for depression, anxiety, or psychotic disturbances  · medicines for irregular heartbeat  · methadone  · some heart medicines like atenolol, metoprolol  · some other medicines for breathing problems  · some vaccines  This list may not describe all possible interactions. Give your health care provider a list of all the medicines, herbs, non-prescription drugs, or dietary supplements you use. Also tell them if you smoke, drink alcohol, or use illegal drugs. Some items may interact with your medicine.  What should I watch for while using this medicine?  Tell your doctor or health care professional if your symptoms do not improve or get worse. Do not use this medicine more than every 12 hours.  NEVER use this medicine for an acute asthma or COPD attack. You should use your short-acting rescue inhalers for this purpose. If your symptoms get worse or if you need your short-acting inhalers more often, call your doctor right away.  This medicine may increase your risk of getting an infection. Tell your doctor or health care professional if you are around anyone with measles or chickenpox, or if you develop sores or blisters that do not heal properly.  What side effects may I notice from receiving this medicine?  Side effects that you should report to your doctor or health care professional as soon as  possible:  · allergic reactions such as skin rash or itching, hives, swelling of the face, lips or tongue  · breathing problems  · changes in vision  · chest pain  · fast, irregular heartbeat  · feeling faint or lightheaded, falls  · fever  · high blood pressure  · nervousness  · tremors  · white patches or sores in mouth  Side effects that usually do not require medical attention (report to your doctor or health care professional if they continue or are bothersome):  · cough  · different taste in mouth  · headache  · sore throat  · stuffy nose  · stomach upset  This list may not describe all possible side effects. Call your doctor for medical advice about side effects. You may report side effects to FDA at 5-564-HNC-5365.  Where should I keep my medicine?  Keep out of the reach of children.  Store in a dry place at room temperature between 20 and 25 degrees C (68 and 77 degrees F). Do not get the inhaler wet. Keep track of the number of doses used. Throw away the inhaler after using the marked number of inhalations or after the expiration date, whichever comes first. Do not burn or puncture canister.  NOTE: This sheet is a summary. It may not cover all possible information. If you have questions about this medicine, talk to your doctor, pharmacist, or health care provider.  © 2020 [employer] Standard (2017-12-21 15:25:18)        Tiotropium respiratory inhalation spray (Spiriva Respimat)  What is this medicine?  TIOTROPIUM (marina oh TRO pee um) is a bronchodilator. It helps open up the airways in your lungs to make it easier to breathe. This medicine is used to treat chronic obstructive pulmonary disease (COPD), including emphysema and chronic bronchitis. It is also used to treat asthma. Do not use this medicine for an acute attack.  This medicine may be used for other purposes; ask your health care provider or pharmacist if you have questions.  COMMON BRAND NAME(S): Spiriva Respimat  What should I tell my health  care provider before I take this medicine?  They need to know if you have any of these conditions:  · bladder problems or difficulty passing urine  · glaucoma  · kidney disease  · prostate disease  · an unusual or allergic reaction to tiotropium, ipratropium, atropine, other medicines, foods, dyes, or preservatives  · pregnant or trying to get pregnant  · breast-feeding  How should I use this medicine?  This medicine is inhaled through the mouth. It is used once per day. Follow the directions on the prescription label. Take your medicine at regular intervals. Do not take your medicine more often than directed. Do not stop taking except on your doctor's advice. Make sure that you are using your inhaler correctly. Ask you doctor or health care provider if you have any questions.  Talk to your pediatrician regarding the use of this medicine in children. While this drug may be prescribed for children as young as 6 years for selected conditions, precautions do apply.  Overdosage: If you think you have taken too much of this medicine contact a poison control center or emergency room at once.  NOTE: This medicine is only for you. Do not share this medicine with others.  What if I miss a dose?  If you miss a dose, use it as soon as you can. If it is almost time for your next dose, use only that dose and continue with your regular schedule. Do not use double or extra doses.  What may interact with this medicine?  This medicine may also interact with the following medications:  · atropine  · antihistamines for allergy, cough and cold  · certain medicines for bladder problems like oxybutynin, tolterodine  · certain medicines for stomach problems like dicyclomine, hyoscyamine  · certain medicines for travel sickness like scopolamine  · certain medicines for Parkinson's disease like benztropine, trihexyphenidyl  · ipratropium  This list may not describe all possible interactions. Give your health care provider a list of all the  medicines, herbs, non-prescription drugs, or dietary supplements you use. Also tell them if you smoke, drink alcohol, or use illegal drugs. Some items may interact with your medicine.  What should I watch for while using this medicine?  Visit your doctor or health care professional for regular checks on your progress. Tell your doctor if your symptoms do not improve. Do not use more medicine than directed. If your symptoms get worse while you are using this medicine, call your doctor right away.  Do not get the this medicine in your eyes. It can cause irritation, pain, or blurred vision.  You may get dizzy. Do not drive, use machinery, or do anything that needs mental alertness until you know how this medicine affects you. Do not stand or sit up quickly, especially if you are an older patient. This reduces the risk of dizzy or fainting spells.  Clean the inhaler as directed in the patient information sheet that comes with this medicine.  What side effects may I notice from receiving this medicine?  Side effects that you should report to your doctor or health care professional as soon as possible:  · allergic reactions like skin rash or hives, swelling of the face, lips, or tongue  · breathing problems right after inhaling your medicine  · changes in vision  · chest pain  · difficulty breathing or wheezing that increases or does not go away  · fast, irregular heartbeat  · general ill feeling or flu-like symptoms  · stomach pain  · trouble passing urine or change in the amount of urine  Side effects that usually do not require medical attention (report to your doctor or health care professional if they continue or are bothersome):  · constipation  · cough  · dizziness  · dry mouth  · sore throat  This list may not describe all possible side effects. Call your doctor for medical advice about side effects. You may report side effects to FDA at 6-785-FDA-1331.  Where should I keep my medicine?  Keep out of the reach of  children.  Store at a room temperature between 15 and 30 degrees C (59 and 86 degrees F). Do not freeze. The inhaler should be discarded after 3 months or when the inhaler becomes locked, whichever comes first. Throw away any unopened packages after the expiration date.  NOTE: This sheet is a summary. It may not cover all possible information. If you have questions about this medicine, talk to your doctor, pharmacist, or health care provider.  © 2020 [employer]/Gold Standard (2017-02-21 11:34:37)    Follow-up with cardiologist as instructed  Follow-up with the pulmonologist as instructed  Follow-up with your sleep medicine referral  Follow-up with PCP in 2 weeks  Continue taking the medications and inhalers as instructed    Acute Respiratory Failure, Adult    Acute respiratory failure occurs when there is not enough oxygen passing from your lungs to your body. When this happens, your lungs have trouble removing carbon dioxide from the blood. This causes your blood oxygen level to drop too low as carbon dioxide builds up.  Acute respiratory failure is a medical emergency. It can develop quickly, but it is temporary if treated promptly. Your lung capacity, or how much air your lungs can hold, may improve with time, exercise, and treatment.  What are the causes?  There are many possible causes of acute respiratory failure, including:  · Lung injury.  · Chest injury or damage to the ribs or tissues near the lungs.  · Lung conditions that affect the flow of air and blood into and out of the lungs, such as pneumonia, acute respiratory distress syndrome, and cystic fibrosis.  · Medical conditions, such as strokes or spinal cord injuries, that affect the muscles and nerves that control breathing.  · Blood infection (sepsis).  · Inflammation of the pancreas (pancreatitis).  · A blood clot in the lungs (pulmonary embolism).  · A large-volume blood transfusion.  · Burns.  · Near-drowning.  · Seizure.  · Smoke  inhalation.  · Reaction to medicines.  · Alcohol or drug overdose.  What increases the risk?  This condition is more likely to develop in people who have:  · A blocked airway.  · Asthma.  · A condition or disease that damages or weakens the muscles, nerves, bones, or tissues that are involved in breathing.  · A serious infection.  · A health problem that blocks the unconscious reflex that is involved in breathing, such as hypothyroidism or sleep apnea.  · A lung injury or trauma.  What are the signs or symptoms?  Trouble breathing is the main symptom of acute respiratory failure. Symptoms may also include:  · Rapid breathing.  · Restlessness or anxiety.  · Skin, lips, or fingernails that appear blue (cyanosis).  · Rapid heart rate.  · Abnormal heart rhythms (arrhythmias).  · Confusion or changes in behavior.  · Tiredness or loss of energy.  · Feeling sleepy or having a loss of consciousness.  How is this diagnosed?  Your health care provider can diagnose acute respiratory failure with a medical history and physical exam. During the exam, your health care provider will listen to your heart and check for crackling or wheezing sounds in your lungs. Your may also have tests to confirm the diagnosis and determine what is causing respiratory failure. These tests may include:  · Measuring the amount of oxygen in your blood (pulse oximetry). The measurement comes from a small device that is placed on your finger, earlobe, or toe.  · Other blood tests to measure blood gases and to look for signs of infection.  · Sampling your cerebral spinal fluid or tracheal fluid to check for infections.  · Chest X-ray to look for fluid in spaces that should be filled with air.  · Electrocardiogram (ECG) to look at the heart's electrical activity.  How is this treated?  Treatment for this condition usually takes places in a hospital intensive care unit (ICU). Treatment depends on what is causing the condition. It may include one or more  treatments until your symptoms improve. Treatment may include:  · Supplemental oxygen. Extra oxygen is given through a tube in the nose, a face mask, or a rubin.  · A device such as a continuous positive airway pressure (CPAP) or bi-level positive airway pressure (BiPAP or BPAP) machine. This treatment uses mild air pressure to keep the airways open. A mask or other device will be placed over your nose or mouth. A tube that is connected to a motor will deliver oxygen through the mask.  · Ventilator. This treatment helps move air into and out of the lungs. This may be done with a bag and mask or a machine. For this treatment, a tube is placed in your windpipe (trachea) so air and oxygen can flow to the lungs.  · Extracorporeal membrane oxygenation (ECMO). This treatment temporarily takes over the function of the heart and lungs, supplying oxygen and removing carbon dioxide. ECMO gives the lungs a chance to recover. It may be used if a ventilator is not effective.  · Tracheostomy. This is a procedure that creates a hole in the neck to insert a breathing tube.  · Receiving fluids and medicines.  · Rocking the bed to help breathing.  Follow these instructions at home:  · Take over-the-counter and prescription medicines only as told by your health care provider.  · Return to normal activities as told by your health care provider. Ask your health care provider what activities are safe for you.  · Keep all follow-up visits as told by your health care provider. This is important.  How is this prevented?  Treating infections and medical conditions that may lead to acute respiratory failure can help prevent the condition from developing.  Contact a health care provider if:  · You have a fever.  · Your symptoms do not improve or they get worse.  Get help right away if:  · You are having trouble breathing.  · You lose consciousness.  · Your have cyanosis or turn blue.  · You develop a rapid heart rate.  · You are  confused.  These symptoms may represent a serious problem that is an emergency. Do not wait to see if the symptoms will go away. Get medical help right away. Call your local emergency services (911 in the [place] Do not drive yourself to the hospital.  This information is not intended to replace advice given to you by your health care provider. Make sure you discuss any questions you have with your health care provider.  Document Released: 12/23/2014 Document Revised: 11/30/2018 Document Reviewed: 07/05/2017  Elsevier Patient Education © 2020 [employer]      Cardiac Rehabilitation  What is cardiac rehabilitation?  Cardiac rehabilitation is a treatment program that helps improve the health and well-being of people who have heart problems. Cardiac rehabilitation includes exercise training, education, and counseling to help you get stronger and return to an active lifestyle. This program can help you get better faster and reduce any future hospital stays.  Why might I need cardiac rehabilitation?  Cardiac rehabilitation programs can help when you have or have had:  · A heart attack.  · Heart failure.  · Peripheral artery disease.  · Coronary artery disease.  · Angina.  · Lung or breathing problems.  Cardiac rehabilitation programs are also used when you have had:  · Coronary artery bypass graft surgery.  · Heart valve replacement.  · Heart stent placement.  · Heart transplant.  · Aneurysm repair.  What are the benefits of cardiac rehabilitation?  Cardiac rehabilitation can help you:  · Reduce problems like chest pain and trouble breathing.  · Change risk factors that contribute to heart disease, such as:  ? Smoking.  ? High blood pressure.  ? High cholesterol.  ? Diabetes.  ? Being inactive.  ? Weighing over 30% more than your ideal weight.  ? Diet.  · Improve your emotional outlook so you feel:  ? More hopeful.  ? Better about yourself.  ? More confident about taking care of yourself.  · Get support from health  experts as well as other people with similar problems.  · Learn healthy ways to manage stress.  · Learn how to manage and understand your medicines.  · Teach your family about your condition and how to participate in your recovery.  What happens in cardiac rehabilitation?  You will be assessed by a cardiac rehabilitation team. They will check your health history and do a physical exam. You may need blood tests, exercise stress tests, and other evaluations to make sure that you are ready to start cardiac rehabilitation.  The cardiac rehabilitation team works with you to make a plan based on your health and goals. Your program will be tailored to fit you and your needs and may change as you progress. You may work with a health care team that includes:  · Doctors.  · Nurses.  · Dietitians.  · Psychologists.  · Exercise specialists.  · Physical and occupational therapists.  What are the phases of cardiac rehabilitation?  A cardiac rehabilitation program is often divided into phases. You advance from one phase to the next.  Phase 1  This phase starts while you are still in the hospital. You may:  · Start by walking in your room and then in the miller.  · Do some simple exercises with a therapist.    Phase 2  This phase begins when you go home or to another facility. You will travel to a cardiac rehabilitation center or another place where rehabilitation is offered. This phase may last 8-12 weeks. During this phase:  · You will slowly increase your activity level while being closely watched by a nurse or therapist.  · You will have medical tests and exams to monitor your progress.  · Your exercises may include strength or resistance training along with activities that cause your heart to beat faster (aerobic exercises), such as walking on a treadmill.  · Your condition will determine how often and how long these sessions last.  · You may learn how to:  ? Cook heart-healthy meals.  ? Control your blood sugar, if this  applies.  ? Stop smoking.  ? Manage your medicines. You may need help with scheduling or planning how and when to take your medicines. If you have questions about your medicines, it is very important that you talk with your health care provider.    Phase 3  This phase continues for the rest of your life. In this phase:  · There will be less supervision.  · You may continue to participate in cardiac rehabilitation activities or become part of a group in your community.  · You may benefit from talking about your experience with other people who are facing similar challenges.  Follow these instructions at home:  · Take over-the-counter and prescription medicines only as told by your health care provider.  · Keep all follow-up visits as told by your health care provider. This is important.  Get help right away if:  · You have severe chest discomfort, especially if the pain is crushing or pressure-like and spreads to your arms, back, neck, or jaw. Do not wait to see if the pain will go away.  · You have weakness or numbness in your face, arms, or legs, especially on one side of the body.  · Your speech is slurred.  · You are confused.  · You have a sudden, severe headache or loss of vision.  · You have shortness of breath.  · You are sweating and have nausea.  · You feel dizzy or faint.  · You are fatigued.  These symptoms may represent a serious problem that is an emergency. Do not wait to see if the symptoms will go away. Get medical help right away. Call your local emergency services (911 in the [place] Do not drive yourself to the hospital.  Summary  · Cardiac rehabilitation is a treatment program that helps improve the health and well-being of people who have heart problems.  · A cardiac rehabilitation program is often divided into phases. You advance from one phase to the next.  · The cardiac rehabilitation team works with you to make a plan based on your health and goals.  · Cardiac rehabilitation includes exercise  training, education, and counseling to help you get stronger and return to an active lifestyle.  This information is not intended to replace advice given to you by your health care provider. Make sure you discuss any questions you have with your health care provider.  Document Released: 09/26/2009 Document Revised: 04/08/2020 Document Reviewed: 10/17/2019  [employer] Patient Education © 2020 [employer]      Exercise Guidelines During Cardiac Rehabilitation  When you are recovering from a heart condition, such as from heart surgery, heart attack, or heart failure, it is important to have heart-healthy habits, including exercise routines. Discuss an appropriate exercise program with your heart specialist (cardiologist) and rehabilitation therapist.  It is important to design a program that is safe and effective for you. The program should meet your specific abilities and needs. Walking, biking, jogging, and swimming are all good aerobic activities. These take light to moderate effort. Adding some light resistance training is also important. Even simple lifestyle changes can help. These lifestyle changes may include parking farther from the store or taking the stairs instead of the elevator.  At first, you may begin exercising under supervision, such as at a hospital or clinic. Over time, you may begin exercising at home, with your health care provider's approval.  Types of exercise  Aerobic exercise  During cardiac rehabilitation, it is important to do aerobic activities. Aerobic exercise keeps joints and muscles moving. It involves large muscle groups. It is also rhythmic and must be done for a longer period of time. Doing these exercises improves circulation and endurance. Examples of aerobic exercise include:  · Swimming.  · Walking.  · Hiking.  · Jogging.  · Cross-country skiing.  · Biking.  · Dancing.    Static exercise  Static exercise (isometric exercise) uses muscles at high intensities without moving the  joints. Some examples of static exercise include pushing against a heavy couch that does not move, doing a wall sit, or holding a plank position. Static exercise improves strength but also quickly increases blood pressure. Follow these guidelines:  · If you have circulation problems or high blood pressure, talk with your health care provider before starting any static exercise routines. Do not do static exercises if your health care provider tells you not to.  · Do not hold your breath while doing static exercises. Holding your breath during static exercises can raise your blood pressure to a dangerously high level.    Weight-resistance exercise  Weight-resistance exercises are another important part of rehabilitation. These exercises strengthen your muscles by making them work against resistance. Resistance exercises may help you return to activities of daily living sooner and improve your quality of life. They also help reduce cardiac risk factors. Examples of weight-resistance exercise include using:  · Free weights.  · Weight-lifting machines.  · Large, specially designed rubber bands.  You will usually do weight-resistance exercises 2 times a week with a 2-day rest period between workouts.  Stretching  Stretching before you exercise warms up your muscles and prevents injury. Stretching also improves your flexibility, balance, coordination, and range of motion. Follow these guidelines:  · Stretch both before and after exercising.  · Do not force a muscle or joint into a painful angle. Stretching should be a relaxing part of your exercise routine.  · Once you feel resistance in your muscle, hold the stretch for a few seconds. Make sure you keep breathing while you hold the stretch.  · Go slowly when doing all stretches.  Setting a pace  · Choose a pace that is comfortable for you.  ? You should be able to talk while exercising. If you are short of breath or unable to speak while you exercise, slow down.  ? If you  "are able to sing while exercising, you are not exercising hard enough.  · Keep track of how hard you are working as you exercise (exertion level). Your rehabilitation therapist can teach you to use a mental scale to measure your level of exertion (perceived exertion). Using a mental scale, you will think about your exertion level and rate it in a range from 6 to 20.  ? A rating of 6 to 9. This means that you are doing \"very light\" exercise and are not exerting yourself enough. For a healthy person, this may be walking at a slow pace.  ? A rating of 11 to 15. This is exercise that is \"somewhat hard.\" For a healthy exercise session, you should aim for an exertion rate that is within this range.  ? A rating of 16 to 17. This is considered \"very hard\" or strenuous. For a healthy person, exercise at this rating may start to feel heavy and difficult.  ? A rating of 19 to 20. This means that you are working \"extremely hard.\" For most people, these numbers represent the hardest you've ever worked to exercise.  · Your health care provider or cardiac rehabilitation specialist may also recommend that you wear a heart rate monitor while you exercise. This will help you keep track of your heart rate zones and how hard your heart is working.  Frequency  As you are recovering, it is important to start exercising slowly and to gradually work up to your goal. Work with your health care provider to set up an exercise routine that works for you. Generally, cardiac rehabilitation exercise should include:  · 40 minutes of aerobic activity 3 - 4 days a week.  · Stretching and strength exercises 2 - 3 days a week.  Contact a doctor if:  · You have any of the following symptoms while exercising:  ? Pain, pressure, or burning in your chest, jaw, shoulder, or back (angina).  ? Lightheadedness.  ? Dizziness.  ? Irregular or fast heartbeat.  ? Shortness of breath.  · You are extremely tired after exercising.  Get help right away if:  · You " have angina that does not get better with medicine and lasts for more than 5 minutes.  · You have nausea or you vomit.  · You have excessive sweating that is not caused by exercise.  Summary  · When you are recovering from a heart condition, it is important to have heart-healthy habits, including exercise routines.  · At first, you may begin exercising under supervision, such as at a hospital or clinic. Over time, you may begin exercising at home, with your health care provider's approval.  · Aim for 40 minutes of aerobic exercises 3 - 4 days a week.  · Aim to do stretching and strength exercises 2 - 3 days a week.  · Choose a pace that is comfortable for you. You should be able to talk while exercising.  This information is not intended to replace advice given to you by your health care provider. Make sure you discuss any questions you have with your health care provider.  Document Released: 12/23/2014 Document Revised: 11/30/2018 Document Reviewed: 11/17/2017  [employer] Patient Education © 2020 [employer]      Chronic Respiratory Failure    Respiratory failure is a condition in which the lungs do not work well and the breathing (respiratory) system fails. When respiratory failure occurs, it becomes difficult for the lungs to get enough oxygen or to eliminate carbon dioxide or to do both duties. If the lungs do not work properly, the heart, brain, and other body systems do not get enough oxygen. Respiratory failure is life-threatening if it is not treated.  Respiratory failure can be acute or chronic. Acute respiratory failure is sudden and severe and requires emergency medical treatment. Chronic respiratory failure happens over time, usually due to a medical condition that gets worse.  What are the causes?  This condition may be caused by any problem that affects the heart or lungs. Causes include:  · Chronic bronchitis and emphysema (COPD).  · Pulmonary fibrosis.  · Water in the lungs due to heart failure, lung  injury, or infection (pulmonary edema).  · Asthma.  · Nerve or muscle diseases that make chest movements difficult, such as Viviana Gehrig disease or Guillain[place] syndrome.  · A collapsed lung (pneumothorax).  · Pulmonary hypertension.  · Chronic sleep apnea.  · Pneumonia.  · Obesity.  · A blood clot in a lung (pulmonary embolism).  · Trauma to the chest that makes breathing difficult.  What increases the risk?  You are more likely to develop this condition if:  · You are a smoker, or have a history of smoking.  · You have a weak immune system.  · You have a family history of breathing problems or lung disease.  · You have a long term lung disease such as COPD.  What are the signs or symptoms?  Symptoms of this condition include:  · Shortness of breath with or without activity.  · Difficulty breathing.  · Wheezing.  · A fast or irregular heartbeat (arrhythmia).  · Chest pain or tightness.  · A bluish color to the fingernail or toenail beds (cyanosis).  · Confusion.  · Drowsiness.  · Extreme fatigue, especially with minimal activity.  How is this diagnosed?  This condition may be diagnosed based on:  · Your medical history.  · A physical exam.  · Other tests, such as:  ? A chest X-ray.  ? A CT scan of your lungs.  ? Blood tests, such as an arterial blood gas test. This test is done to check if you have enough oxygen in your blood.  ? An electrocardiogram. This test records the electrical activity of your heart.  ? An echocardiogram. This test uses sound waves to produce an image of your heart.  · A check of your blood pressure, heart rate, breathing rate, and blood oxygen level.  How is this treated?  Treatment for this condition depends on the cause. Treatment can include the following:  · Getting oxygen through a nasal cannula. This is a tube that goes in your nose.  · Getting oxygen through a face mask.  · Receiving noninvasive positive pressure ventilation. This is a method of breathing support in which a machine  blows air into your lungs through a mask. The machine allows you to breathe on your own. It helps the body take in oxygen and eliminate carbon dioxide.  · Using a ventilator. This is a breathing machine that delivers oxygen to the lungs through a breathing tube that is put into the trachea. This machine is used when you can no longer breathe well enough on your own.  · Medicines to help with breathing, such as:  ? Medicines that open up and relax air passages, such as bronchodilators. These may be given through a device that turns liquid medicines into a mist you can breathe in (nebulizer). These medicines help with breathing.  ? Diuretics. These medicines get rid of extra fluid out of your lungs, which can help you breathe better.  ? Steroid medicines. These decrease inflammation in the lungs.  ? Antibiotic medicines. These may be given to treat a bacterial infection, such as pneumonia.  · Pulmonary rehabilitation. This is an exercise program that strengthens the muscles in your chest and helps you learn breathing techniques in order to manage your condition.  Follow these instructions at home:  Medicines  · Take over-the-counter and prescription medicines only as told by your health care provider.  · If you were prescribed an antibiotic medicine, take it as told by your health care provider. Do not stop taking the antibiotic even if you start to feel better.  General instructions  · Use oxygen therapy and pulmonary rehabilitation if directed to by your health care provider. If you require home oxygen therapy, ask your health care provider whether you should purchase a pulse oximeter to measure your oxygen level at home.  · Work with your health care provider to create a plan to help you deal with your condition. Follow this plan.  · Do not use any products that contain nicotine or tobacco, such as cigarettes and e-cigarettes. If you need help quitting, ask your health care provider.  · Avoid exposure to irritants  that make your breathing problems worse. These include smoke, chemicals, and fumes.  · Stay active, but balance activity with periods of rest. Exercise and physical activity will help you maintain your ability to do things you want to do.  · Stay up to date on all vaccines, especially yearly influenza and pneumonia vaccines.  · Avoid people who are sick as well as crowded places during the flu season.  · Keep all follow-up visits as told by your health care provider. This is important.  Contact a health care provider if:  · Your shortness of breath gets worse and you cannot do the things you used to do.  · You have increased mucus (sputum), wheezing, coughing, or loss of energy.  · You are on oxygen therapy and you are starting to need more.  · You need to use your medicines more often.  · You have a fever.  Get help right away if:  · Your shortness of breath becomes worse.  · You are unable to say more than a few words without having to catch your breath.  · You develop chest pain or tightness.  Summary  · Respiratory failure is a condition in which the lungs do not work well and the breathing system fails.  · This condition can be very serious and is often life-threatening.  · This condition is diagnosed with tests and can be treated with medicines or oxygen.  · Contact a health care provider if your shortness of breath gets worse or if you need to use your oxygen or medicines more often than before.  This information is not intended to replace advice given to you by your health care provider. Make sure you discuss any questions you have with your health care provider.  Document Released: 12/18/2006 Document Revised: 11/30/2018 Document Reviewed: 12/29/2017  [employer] Patient Education © 2020 [employer]    Critical Illness, Suggestions for Family and Friends  While your family member or friend is in the hospital, you may feel a lot of stress. One day the patient may seem to be recovering, and the next day things  may take a turn for the worse. It can be a scary experience to see someone you care about on a breathing machine.   Several caregivers work together to give care to your family member. These healthcare professionals are often specialists who are treating different concerns of the patient. For example, an infectious disease specialist might be involved to help make sure that the initial infection or an infectious complication is properly treated. A lung specialist may be adjusting the settings on the breathing machine and a kidney specialist may be required if kidney failure occurs. These caregivers talk to each other regularly to make sure that care is given in a coordinated manner.  Even though you may feel helpless, here are some things you can do to help:  · Talk to the caregivers, nurses, and other health care providers. Ask questions about the patient's condition and care, and ask how you can support your loved one.   · Talk to the patient, even if he or she is in a drug-induced sleep. Talk about fun things you did together and laugh with them. Many survivors say they were, at some level, aware of the people and things around them. They also recall dreams they had while in the sleep-induced state. The dreams can be calming or frightening. Talking to the patient about happy things may help make the dreams more positive.   · Ask the hospital staff if you can put family photos near the patient, play music at low volume, or rub lotion onto the patient. This may help to trigger their senses of hearing, touch, and seeing.   · Leave fears and worries at the door. Always go outside the patient's room to talk with the doctor or nurse about the patient's condition. Make sure everyone is encouraging and hopeful while with the patient. The patient may sense stress in their presence.   · Keep a journal or record of events for the patient to read after leaving the hospital. Some survivors want to know every detail of what  "happened while they were asleep.   · Remember to take care of yourself, too. Try to get rest and sleep, eat well, and get some exercise. Call on other family members and friends to sit with the patient so you can have a break. Your very sick loved one will need your strength and support over what may be a long recovery period.   · The family members and friends of people with a critical illness often are deeply affected by the experience. The \"roller-coaster\" ride of emotions while the patient is in the hospital can be exhausting and stressful. Caring for the survivor at home can also be stressful and tiring. Ask for help from others or from your health care providers if you need it. Most hospitals have individuals such as social workers who can help family member cope with these issues.   Document Released: 08/30/2005 Document Revised: 03/11/2013 Document Reviewed: 10/09/2009  [employer]® Patient Information ©2013 [employer].  "

## 2021-07-14 NOTE — DISCHARGE PLANNING
Anticipated Discharge Disposition: home     Action: pt discussed in IDT rounds.     MD He requested [facility] physicians confidential reports. Provided form.     Pt confirms that his oxygen provider is Teresa, requested tank to be brought up to pt.     Pt lives in [place] and will need ride home to [place].    Pt is able to get an uber via hospital care management. RN will need to call [phone] when pt is ready for discharge.    Update at 1330: faxed completed [facility] physicians confidential report to [phone]    Barriers to Discharge: transport home     Plan: LSW to assist as needed and monitor for barriers to discharge.

## 2021-07-14 NOTE — DIETARY
Nutrition Services:    Consult received for coronary artery disease diet education. RD spoke with patient yesterday morning prior to scan - patient requested wife be present for education, RD unable to follow up later in day.    RD will attempt to see patient and wife this morning prior to discharge.

## 2021-07-14 NOTE — PROGRESS NOTES
1155 - Cardiology team at bedside to have conversation with patient. Patient to take 12.5 mg of metoprolol twice a day. Otherwise patient is cleared to discharge per cards team.    1215 -Discharge instructions given to patient at bedside, verbalizes understanding and states plans for follow-up with PCP and has appointments scheduled. New and home medications reviewed, post-discharge activity level and worsening of symptoms needing follow-up care discussed. Telemetry monitor/IV cathlon removed. Patient left in Uber with new oxygen tank. patient updated wife, she is aware. Belongings and discharge paperwork with patient.

## 2021-07-14 NOTE — FACE TO FACE
"Face to Face Note  -  Durable Medical Equipment    Julia Garcia M.D. - NPI: [id number]  I certify that this patient is under my care and that they had a durable medical equipment(DME)face to face encounter by myself that meets the physician DME face-to-face encounter requirements with this patient on:    Date of encounter:   Patient:                    MRN:                       YOB: 2021  Kobe Cyr  [id number]  5/17/1931     The encounter with the patient was in whole, or in part, for the following medical condition, which is the primary reason for durable medical equipment:  CHF and COPD    I certify that, based on my findings, the following durable medical equipment is medically necessary:  Oxygen.    HOME O2 Saturation Measurements:(Values must be present for Home Oxygen orders)  Room air sat at rest: 88  Room air sat with amb: 88  With liters of O2: 2.5, O2 sat at rest with O2: 91  With Liters of O2: 6, O2 sat with amb with O2 : 90  Is the patient mobile?: Yes    My Clinical findings support the need for the above equipment due to:  Hypoxia    Supporting Symptoms: The patient requires supplemental oxygen, as the following interventions have been tried with limited or no improvement: \"Ambulation with oximetry    If patient feels more short of breath, they can go up to 6 liters per minute and contact healthcare provider.  "

## 2021-07-15 ENCOUNTER — TELEPHONE (OUTPATIENT)
Dept: CARDIOLOGY | Facility: MEDICAL CENTER | Age: 86
End: 2021-07-15

## 2021-07-15 NOTE — TELEPHONE ENCOUNTER
Message  Received: Yesterday  Wendi Bautista P.A.-C. - Susanne Cervantes R.N.; Sabine Cooper  Could you please schedule patient with me in 2-3 weeks and then at next available appt with Tamica.     Thank you!     Called and s/w pt and his wife. Pt already scheduled to see JA on 7/28, but they request this be changed to virtual. Scheduled for next available appt with BE on 9/7, which they request this be virtual as well.

## 2021-07-19 ENCOUNTER — TELEPHONE (OUTPATIENT)
Dept: CARDIOLOGY | Facility: MEDICAL CENTER | Age: 86
End: 2021-07-19

## 2021-07-19 NOTE — TELEPHONE ENCOUNTER
RANDELL    Pt called stating he was in the hospital a week and a half ago and received a notice in the mail that they are taking his 's license away. Pt would like a call back to discuss at [phone].    Thank you

## 2021-07-20 NOTE — TELEPHONE ENCOUNTER
Called pt back. He is very upset that his  license got taken away. He doesn't understand why a pulmonary doctor would have authority to take his license away when he was seen in the hospital for his heart. See telephone encounter from Dr. Ronaldo Kim 7/14. He requested to talk to Wendi Bautista personally however pt informed that she was not currently in the office and unavailable this week. Explained that a message would be sent to her but would need to be discussed at his upcoming visit 7/28. Pt states understanding.     To JA

## 2021-07-28 ENCOUNTER — TELEMEDICINE (OUTPATIENT)
Dept: CARDIOLOGY | Facility: MEDICAL CENTER | Age: 86
End: 2021-07-28
Payer: MEDICARE

## 2021-07-28 VITALS
DIASTOLIC BLOOD PRESSURE: 78 MMHG | BODY MASS INDEX: 32.48 KG/M2 | HEART RATE: 62 BPM | SYSTOLIC BLOOD PRESSURE: 150 MMHG | WEIGHT: 232 LBS | HEIGHT: 71 IN

## 2021-07-28 DIAGNOSIS — J96.21 ACUTE ON CHRONIC RESPIRATORY FAILURE WITH HYPOXIA (HCC): ICD-10-CM

## 2021-07-28 DIAGNOSIS — I25.10 CORONARY ARTERY DISEASE INVOLVING NATIVE CORONARY ARTERY OF NATIVE HEART WITHOUT ANGINA PECTORIS: ICD-10-CM

## 2021-07-28 DIAGNOSIS — I27.20 PULMONARY HTN (HCC): ICD-10-CM

## 2021-07-28 DIAGNOSIS — J44.9 CHRONIC OBSTRUCTIVE PULMONARY DISEASE, UNSPECIFIED COPD TYPE (HCC): ICD-10-CM

## 2021-07-28 DIAGNOSIS — I21.4 NSTEMI (NON-ST ELEVATED MYOCARDIAL INFARCTION) (HCC): ICD-10-CM

## 2021-07-28 DIAGNOSIS — N18.32 STAGE 3B CHRONIC KIDNEY DISEASE: ICD-10-CM

## 2021-07-28 PROCEDURE — 99214 OFFICE O/P EST MOD 30 MIN: CPT | Mod: 95 | Performed by: PHYSICIAN ASSISTANT

## 2021-07-28 RX ORDER — LISINOPRIL 2.5 MG/1
2.5 TABLET ORAL DAILY
Qty: 90 TABLET | Refills: 3 | Status: SHIPPED | OUTPATIENT
Start: 2021-07-28 | End: 2021-09-07

## 2021-07-28 ASSESSMENT — ENCOUNTER SYMPTOMS
PALPITATIONS: 0
NAUSEA: 0
DIZZINESS: 0
PND: 0
SPUTUM PRODUCTION: 1
WHEEZING: 0
ORTHOPNEA: 0
BLOOD IN STOOL: 0
SHORTNESS OF BREATH: 1
DEPRESSION: 0
FEVER: 0
CHILLS: 0
COUGH: 1

## 2021-07-28 ASSESSMENT — FIBROSIS 4 INDEX: FIB4 SCORE: 3.51
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
## 2021-07-29 ENCOUNTER — TELEPHONE (OUTPATIENT)
Dept: CARDIOLOGY | Facility: MEDICAL CENTER | Age: 86
End: 2021-07-29

## 2021-08-09 ENCOUNTER — TELEPHONE (OUTPATIENT)
Dept: CARDIOLOGY | Facility: MEDICAL CENTER | Age: 86
End: 2021-08-09

## 2021-08-09 RX ORDER — ROSUVASTATIN CALCIUM 40 MG/1
40 TABLET, COATED ORAL EVERY EVENING
Qty: 90 TABLET | Refills: 3 | Status: SHIPPED | OUTPATIENT
Start: 2021-08-09 | End: 2021-09-08

## 2021-08-09 RX ORDER — ISOSORBIDE MONONITRATE 60 MG/1
60 TABLET, EXTENDED RELEASE ORAL DAILY
Qty: 30 TABLET | Refills: 11 | Status: SHIPPED | OUTPATIENT
Start: 2021-08-09 | End: 2021-09-08

## 2021-08-09 RX ORDER — CLOPIDOGREL BISULFATE 75 MG/1
75 TABLET ORAL DAILY
Qty: 90 TABLET | Refills: 3 | Status: SHIPPED | OUTPATIENT
Start: 2021-08-09 | End: 2021-09-08

## 2021-08-16 DIAGNOSIS — N18.32 STAGE 3B CHRONIC KIDNEY DISEASE: ICD-10-CM

## 2021-08-16 DIAGNOSIS — I25.10 CORONARY ARTERY DISEASE INVOLVING NATIVE CORONARY ARTERY OF NATIVE HEART WITHOUT ANGINA PECTORIS: ICD-10-CM

## 2021-08-18 NOTE — RESULT ENCOUNTER NOTE
Mr. Cyr,   I reviewed your labs. Your kidney function is a little bit worse than when you were in the hospital. Please make sure you are drinking water throughout the day, try for 60oz and you are avoid advil/motrin, naproxen, etc.     We will see you in September.  Wendi

## 2021-08-23 ENCOUNTER — TELEMEDICINE (OUTPATIENT)
Dept: SLEEP MEDICINE | Facility: MEDICAL CENTER | Age: 86
End: 2021-08-23
Payer: MEDICARE

## 2021-08-23 VITALS — BODY MASS INDEX: 32.48 KG/M2 | WEIGHT: 232 LBS | HEIGHT: 71 IN

## 2021-08-23 DIAGNOSIS — R06.02 SOB (SHORTNESS OF BREATH): ICD-10-CM

## 2021-08-23 DIAGNOSIS — R09.02 HYPOXIA: ICD-10-CM

## 2021-08-23 PROCEDURE — 99214 OFFICE O/P EST MOD 30 MIN: CPT | Mod: 95 | Performed by: NURSE PRACTITIONER

## 2021-08-23 RX ORDER — BUDESONIDE AND FORMOTEROL FUMARATE DIHYDRATE 160; 4.5 UG/1; UG/1
2 AEROSOL RESPIRATORY (INHALATION) 2 TIMES DAILY
Qty: 1 EACH | Refills: 5 | Status: SHIPPED | OUTPATIENT
Start: 2021-08-23 | End: 2021-09-22

## 2021-08-23 RX ORDER — BUDESONIDE AND FORMOTEROL FUMARATE DIHYDRATE 160; 4.5 UG/1; UG/1
2 AEROSOL RESPIRATORY (INHALATION) 2 TIMES DAILY
Qty: 1 EACH | Refills: 5 | Status: SHIPPED | OUTPATIENT
Start: 2021-08-23 | End: 2021-08-23

## 2021-08-23 ASSESSMENT — FIBROSIS 4 INDEX: FIB4 SCORE: 3.51

## 2021-08-23 NOTE — PATIENT INSTRUCTIONS
Refilled and recommended Spiriva and Symbicort use daily.  Notified patient to monitor his SPO2 at home and titrate oxygen to keep O2 sats between 88% and 92%.  Patient will conduct a CNOX obtainable through [facility] for overnight oximetry.  Based on these results, we may go forward with an overnight sleep study or in lab sleep study.  2.  Unsure if this is related to pulmonary hypertension or current cardiac disease.  Patient awaiting follow-up with cardiology to assess whether he is a candidate for angiogram.  Patient states during hospitalization they halted the procedure due to elevated kidney enzymes.  As per above, I did recommend monitoring SPO2 and titrating oxygen saturations between 88 to 92%.  Also recommended daily inhaler use.  We will follow-up in approximately 3 months and update PFTs if tolerable.
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
## 2021-08-24 ENCOUNTER — TELEPHONE (OUTPATIENT)
Dept: SLEEP MEDICINE | Facility: MEDICAL CENTER | Age: 86
End: 2021-08-24

## 2021-08-24 ENCOUNTER — APPOINTMENT (RX ONLY)
Dept: URBAN - NONMETROPOLITAN AREA CLINIC 15 | Facility: CLINIC | Age: 86
Setting detail: DERMATOLOGY
End: 2021-08-24

## 2021-08-24 DIAGNOSIS — L82.1 OTHER SEBORRHEIC KERATOSIS: ICD-10-CM

## 2021-08-24 DIAGNOSIS — L57.0 ACTINIC KERATOSIS: ICD-10-CM

## 2021-08-24 DIAGNOSIS — L81.4 OTHER MELANIN HYPERPIGMENTATION: ICD-10-CM

## 2021-08-24 PROBLEM — D48.5 NEOPLASM OF UNCERTAIN BEHAVIOR OF SKIN: Status: ACTIVE | Noted: 2021-08-24

## 2021-08-24 PROCEDURE — 17004 DESTROY PREMAL LESIONS 15/>: CPT

## 2021-08-24 PROCEDURE — ? BIOPSY BY SHAVE METHOD

## 2021-08-24 PROCEDURE — 99202 OFFICE O/P NEW SF 15 MIN: CPT | Mod: 25

## 2021-08-24 PROCEDURE — ? COUNSELING

## 2021-08-24 PROCEDURE — 11102 TANGNTL BX SKIN SINGLE LES: CPT | Mod: 59

## 2021-08-24 PROCEDURE — ? LIQUID NITROGEN

## 2021-08-24 ASSESSMENT — LOCATION DETAILED DESCRIPTION DERM
LOCATION DETAILED: RIGHT INFERIOR HELIX
LOCATION DETAILED: LEFT RADIAL DORSAL HAND
LOCATION DETAILED: RIGHT LATERAL MALAR CHEEK
LOCATION DETAILED: LEFT INFERIOR FOREHEAD
LOCATION DETAILED: RIGHT TRAGUS
LOCATION DETAILED: RIGHT ULNAR DORSAL HAND
LOCATION DETAILED: LEFT DISTAL RADIAL DORSAL FOREARM
LOCATION DETAILED: RIGHT SUPERIOR HELIX
LOCATION DETAILED: RIGHT RADIAL DORSAL HAND
LOCATION DETAILED: LEFT CENTRAL ZYGOMA
LOCATION DETAILED: RIGHT DISTAL DORSAL FOREARM
LOCATION DETAILED: LEFT INFERIOR CENTRAL MALAR CHEEK
LOCATION DETAILED: RIGHT INFERIOR CENTRAL MALAR CHEEK
LOCATION DETAILED: LEFT DISTAL DORSAL FOREARM
LOCATION DETAILED: LEFT PROXIMAL DORSAL FOREARM
LOCATION DETAILED: LEFT INFERIOR HELIX
LOCATION DETAILED: LEFT PROXIMAL DORSAL THUMB
LOCATION DETAILED: LEFT MEDIAL ZYGOMA
LOCATION DETAILED: LEFT ULNAR DORSAL HAND
LOCATION DETAILED: LEFT SUPERIOR MEDIAL MALAR CHEEK
LOCATION DETAILED: RIGHT INFERIOR POSTAURICULAR SKIN
LOCATION DETAILED: LEFT SUPERIOR HELIX
LOCATION DETAILED: RIGHT INFERIOR PREAURICULAR CHEEK

## 2021-08-24 ASSESSMENT — LOCATION SIMPLE DESCRIPTION DERM
LOCATION SIMPLE: RIGHT HAND
LOCATION SIMPLE: LEFT ZYGOMA
LOCATION SIMPLE: RIGHT FOREARM
LOCATION SIMPLE: LEFT FOREARM
LOCATION SIMPLE: SCALP
LOCATION SIMPLE: LEFT HAND
LOCATION SIMPLE: LEFT FOREHEAD
LOCATION SIMPLE: LEFT CHEEK
LOCATION SIMPLE: LEFT THUMB
LOCATION SIMPLE: RIGHT CHEEK
LOCATION SIMPLE: LEFT EAR
LOCATION SIMPLE: RIGHT EAR

## 2021-08-24 ASSESSMENT — LOCATION ZONE DERM
LOCATION ZONE: SCALP
LOCATION ZONE: FACE
LOCATION ZONE: FINGER
LOCATION ZONE: EAR
LOCATION ZONE: ARM
LOCATION ZONE: HAND

## 2021-08-24 NOTE — PROCEDURE: LIQUID NITROGEN
Render Post-Care Instructions In Note?: no
Medical Necessity Information: It is in your best interest to select a reason for this procedure from the list below. All of these items fulfill various CMS LCD requirements except the new and changing color options.
Post-Care Instructions: I reviewed with the patient in detail post-care instructions. Patient is to wear sunprotection, and avoid picking at any of the treated lesions. Pt may apply Vaseline to crusted or scabbing areas.
Detail Level: Detailed
Medical Necessity Clause: This procedure was medically necessary because the lesions that were treated were:  If lesion does not resolve, bx is needed.
Consent: The patient's consent was obtained including but not limited to risks of crusting, scabbing, blistering, scarring, darker or lighter pigmentary change, recurrence, incomplete removal and infection.
Duration Of Freeze Thaw-Cycle (Seconds): 0
Show Aperture Variable?: Yes

## 2021-08-24 NOTE — TELEPHONE ENCOUNTER
MEDICATION PRIOR AUTHORIZATION NEEDED:    1. Name of Medication: Budesonide-Formoterol 160/4.5 mcg    2. Requested By (Name of Pharmacy): [facility]     3. Is insurance on file current? yes    4. What is the name & phone number of the 3rd party payor? [employer] [phone]

## 2021-08-24 NOTE — PROCEDURE: BIOPSY BY SHAVE METHOD
Detail Level: Detailed
Depth Of Biopsy: dermis
Was A Bandage Applied: Yes
Size Of Lesion In Cm: 2.5
X Size Of Lesion In Cm: 0
Biopsy Type: H and E
Biopsy Method: Personna blade
Anesthesia Type: 1% lidocaine with epinephrine
Anesthesia Volume In Cc: 1
Hemostasis: Electrocautery
Wound Care: Vaseline
Dressing: Band-Aid
Destruction After The Procedure: No
Type Of Destruction Used: Electrodesiccation
Curettage Text: The wound bed was treated with curettage after the biopsy was performed.
Cryotherapy Text: The wound bed was treated with cryotherapy after the biopsy was performed.
Electrodesiccation Text: The wound bed was treated with electrodesiccation after the biopsy was performed.
Electrodesiccation And Curettage Text: The wound bed was treated with electrodesiccation and curettage after the biopsy was performed.
Silver Nitrate Text: The wound bed was treated with silver nitrate after the biopsy was performed.
Lab: [id number]
Lab Facility: 
Consent: Verbal consent was obtained and risks were reviewed including but not limited to scarring, infection, bleeding, scabbing, incomplete removal, nerve damage and allergy to anesthesia.
Post-Care Instructions: I reviewed with the patient in detail post-care instructions. Patient is to keep the biopsy site dry overnight, and then apply bacitracin/petroleum  twice daily until healed. Patient may apply hydrogen peroxide soaks to remove any crusting.
Notification Instructions: Patient will be notified of biopsy results. However, patient instructed to call the office if not contacted within 2 weeks.
Billing Type: Third-Party Bill
Information: Selecting Yes will display possible errors in your note based on the variables you have selected. This validation is only offered as a suggestion for you. PLEASE NOTE THAT THE VALIDATION TEXT WILL BE REMOVED WHEN YOU FINALIZE YOUR NOTE. IF YOU WANT TO FAX A PRELIMINARY NOTE YOU WILL NEED TO TOGGLE THIS TO 'NO' IF YOU DO NOT WANT IT IN YOUR FAXED NOTE.

## 2021-09-07 ENCOUNTER — TELEMEDICINE (OUTPATIENT)
Dept: CARDIOLOGY | Facility: MEDICAL CENTER | Age: 86
End: 2021-09-07
Payer: MEDICARE

## 2021-09-07 VITALS
WEIGHT: 242 LBS | BODY MASS INDEX: 33.88 KG/M2 | OXYGEN SATURATION: 92 % | HEIGHT: 71 IN | HEART RATE: 60 BPM | TEMPERATURE: 97.8 F | DIASTOLIC BLOOD PRESSURE: 56 MMHG | SYSTOLIC BLOOD PRESSURE: 124 MMHG

## 2021-09-07 DIAGNOSIS — I25.10 CORONARY ARTERY DISEASE DUE TO CALCIFIED CORONARY LESION: ICD-10-CM

## 2021-09-07 DIAGNOSIS — I27.20 PULMONARY HTN (HCC): ICD-10-CM

## 2021-09-07 DIAGNOSIS — I10 HYPERTENSION, ESSENTIAL: ICD-10-CM

## 2021-09-07 DIAGNOSIS — I25.84 CORONARY ARTERY DISEASE DUE TO CALCIFIED CORONARY LESION: ICD-10-CM

## 2021-09-07 DIAGNOSIS — R05.8 COUGH DUE TO ACE INHIBITOR: ICD-10-CM

## 2021-09-07 DIAGNOSIS — J44.9 CHRONIC OBSTRUCTIVE PULMONARY DISEASE, UNSPECIFIED COPD TYPE (HCC): ICD-10-CM

## 2021-09-07 DIAGNOSIS — T46.4X5A COUGH DUE TO ACE INHIBITOR: ICD-10-CM

## 2021-09-07 DIAGNOSIS — I35.0 MILD AORTIC STENOSIS: ICD-10-CM

## 2021-09-07 DIAGNOSIS — E78.5 DYSLIPIDEMIA: ICD-10-CM

## 2021-09-07 PROCEDURE — 99214 OFFICE O/P EST MOD 30 MIN: CPT | Mod: 95 | Performed by: INTERNAL MEDICINE

## 2021-09-07 RX ORDER — LOSARTAN POTASSIUM 25 MG/1
12.5 TABLET ORAL DAILY
Qty: 45 TABLET | Refills: 3 | Status: SHIPPED | OUTPATIENT
Start: 2021-09-07 | End: 2021-09-07 | Stop reason: SDUPTHER

## 2021-09-07 RX ORDER — OMEPRAZOLE 20 MG/1
20 CAPSULE, DELAYED RELEASE ORAL DAILY
Qty: 90 CAPSULE | Refills: 3 | Status: SHIPPED | OUTPATIENT
Start: 2021-09-07 | End: 2022-06-19

## 2021-09-07 RX ORDER — OMEPRAZOLE 20 MG/1
20 CAPSULE, DELAYED RELEASE ORAL DAILY
COMMUNITY
End: 2021-09-07 | Stop reason: SDUPTHER

## 2021-09-07 RX ORDER — OMEPRAZOLE 20 MG/1
20 CAPSULE, DELAYED RELEASE ORAL DAILY
Qty: 90 CAPSULE | Refills: 3 | Status: SHIPPED | OUTPATIENT
Start: 2021-09-07 | End: 2021-09-07 | Stop reason: SDUPTHER

## 2021-09-07 RX ORDER — LOSARTAN POTASSIUM 25 MG/1
12.5 TABLET ORAL DAILY
Qty: 45 TABLET | Refills: 3 | Status: SHIPPED | OUTPATIENT
Start: 2021-09-07 | End: 2022-06-02 | Stop reason: SDUPTHER

## 2021-09-07 ASSESSMENT — FIBROSIS 4 INDEX: FIB4 SCORE: 3.51

## 2021-09-08 NOTE — PROGRESS NOTES
Cardiology Telemedicine Visit: Established Patient   This encounter was conducted via Zoom.   Verbal consent was obtained. Patient's identity was verified.    Assessment and Plan:   PCP: Randall Farley M.D.  The following treatment plan was discussed:     1. Coronary artery disease due to calcified coronary lesion    2. Cough due to ACE inhibitor    3. Mild aortic stenosis    4. Chronic obstructive pulmonary disease, unspecified COPD type (HCC)    5. Pulmonary HTN (HCC)    6. Hypertension, essential    7. Dyslipidemia      Kobe Cyr is stable but having ongoing shortness of breath class IV in severity and corresponds to hypoxia and probable COPD- though I cannot exclude a possible contribution from CAD.  Unfortunately, revascularization options are limited by complex anatomy, chronic kidney disease and advanced age.  I did offer to perform PCI, though asked that he think on this carefully.     He is also experiencing cough which may be due to ACE inhibitor and high-dose changed back to losartan.    Follow up: 3 months    Subjective:     Chief Complaint   Patient presents with   • Hyperlipidemia   • Coronary Artery Disease     F/V Dx: Coronary artery disease involving native coronary artery of native heart without angina pectoris   • Hypertension     History: Kobe Cyr is a 89 y.o. male with history of oxygen dependent COPD, CKD, mild aortic stenosis, pulmonary hypertension presenting for follow-up of coronary artery disease.  In early July he was hospitalized for shortness of breath and mild troponin elevation without significant rise or fall.  He was found to have normal ejection fraction and multivessel coronary artery disease including left main bifurcation stenosis and multisegment subtotal occlusion of the right coronary artery.      He was managed medically and has been experiencing stable class IV shortness of breath since discharge.  This is a departure from where he was 6 months  "previous.  He does report hypoxia down to the 75% range when being physically active and he adjusts oxygen.  He is also had his inhaler therapy adjusted as well.  Despite this he still is severely limited and can do no more than simple activities around the home.  He does find enjoyment spending time with his wife and enjoys their evening cocktail lower.    He has also developed a cough over the past few months which is productive.  Curiously, he also started lisinopril around this time.      ROS   Denies any recent fevers or chills. No nausea or vomiting. No chest pains or shortness of breath. All other systems reviewed and negative except as per the HPI       Objective:   Vitals obtained by patient:  Respirations through observation: 15  /56 (BP Location: Left arm, Patient Position: Sitting, BP Cuff Size: Adult)   Pulse 60   Temp 36.6 °C (97.8 °F)   Ht 1.803 m (5' 11\")   Wt 110 kg (242 lb)   SpO2 92%   BMI 33.75 kg/m²     Physical Exam:  Constitutional: Alert, no distress, well-groomed.  Skin: No rashes in visible areas.  Eye: Round. Conjunctiva clear, lids normal. No icterus.   ENMT: Lips pink without lesions, good dentition, moist mucous membranes. Phonation normal.  Neck: No masses, no thyromegaly. Moves freely without pain.  CV: Pulse as reported by patient  Respiratory: Unlabored respiratory effort, no cough or audible wheeze  Psych: Alert and oriented x3, normal affect and mood.     The ASCVD Risk score (Lashawn DC Jr, et al., 2013) failed to calculate.  Allergies   Allergen Reactions   • Atorvastatin Myalgia   • Simvastatin      ADVERSE REACTION: MYALGIA.     Current medicines (including changes today)  Current Outpatient Medications   Medication Sig Dispense Refill   • losartan (COZAAR) 25 MG Tab Take 0.5 Tablets by mouth every day. 45 Tablet 3   • omeprazole (PRILOSEC) 20 MG delayed-release capsule Take 1 Capsule by mouth every day. 90 Capsule 3   • budesonide-formoterol (SYMBICORT) 160-4.5 " MCG/ACT Aerosol Inhale 2 Puffs 2 times a day for 30 days. 1 Each 5   • tiotropium (SPIRIVA RESPIMAT) 2.5 mcg/Act Aero Soln Inhale 2 Inhalation every day for 30 days. 4 g 5   • clopidogrel (PLAVIX) 75 MG Tab Take 1 tablet by mouth every day for 30 days. 90 tablet 3   • isosorbide mononitrate SR (IMDUR) 60 MG TABLET SR 24 HR Take 1 tablet by mouth every day for 30 days. 30 tablet 11   • rosuvastatin (CRESTOR) 40 MG tablet Take 1 tablet by mouth every evening for 30 days. 90 tablet 3   • metoprolol tartrate (LOPRESSOR) 25 MG Tab Take 0.5 Tablets by mouth 2 times a day. 90 tablet 3   • benzonatate (TESSALON) 200 MG capsule Take 1 capsule by mouth 2 times a day as needed for Cough. Do not chew. Swallow whole. 60 capsule 6   • nitroglycerin (NITROSTAT) 0.4 MG SUBL Place 1 Tab under tongue as needed for Chest Pain. 25 Tab 1   • aspirin 81 MG tablet Take 81 mg by mouth every day.       No current facility-administered medications for this visit.     Patient Active Problem List    Diagnosis Date Noted   • Essential hypertension 04/20/2016     Priority: High   • Stented coronary artery 08/29/2013     Priority: High   • CAD (coronary artery disease) 03/15/2012     Priority: High   • Hyperlipidemia 05/24/2011     Priority: High   • Exertional shortness of breath 07/12/2021   • Pulmonary HTN ([facility]) 07/12/2021   • Mild aortic stenosis 07/11/2021   • Acute on chronic respiratory failure with hypoxia ([facility]) 07/10/2021   • COPD (chronic obstructive pulmonary disease) ([facility]) 07/10/2021   • Acute gout of foot 05/10/2018   • Other fatigue 10/25/2017   • Chronic left shoulder pain 08/24/2017   • Abnormal chest x-ray 02/24/2017   • Bilateral carotid artery stenosis 02/24/2017   • Vitamin D deficiency disease 01/19/2017   • Nasal bleeding 11/21/2016   • Cough 09/23/2016   • CKD (chronic kidney disease), stage III ([facility]) 08/24/2016   • Bilateral low back pain with left-sided sciatica 08/18/2016   • Leg pain, bilateral 08/01/2016   • Impaired  fasting glucose 08/01/2016   • Hemoptysis 08/01/2016   • Osteoarthritis of knees, bilateral 08/01/2016   • GERD (gastroesophageal reflux disease) 08/01/2016   • Anemia 08/01/2016   • NSTEMI (non-ST elevated myocardial infarction) (HCC)    • Hyperglycemia 01/06/2010     Family History   Problem Relation Age of Onset   • No Known Problems Mother    • No Known Problems Father      He  has a past medical history of Anemia (8/1/2016), Arthritis, Back pain, CAD (coronary artery disease), Cancer (HCC), Chronic kidney disease (CKD), stage II (mild) (8/1/2016), Coronary heart disease, DJD (degenerative joint disease) (8/1/2016), Dyslipidemia (8/1/2016), GERD (gastroesophageal reflux disease) (8/1/2016), Upper sorbian measles, Hematochezia (8/1/2016), High cholesterol, Hyperglycemia (1/6/2010), Hyperlipidemia (5/24/2011), Hypertension, Impaired fasting glucose (8/1/2016), Left knee pain (8/1/2016), Myocardial infarction (HCC) (2 yrs ago), Nasal drainage, Obesity, Osteoarthritis of knees, bilateral (8/1/2016), Pulmonary embolism (HCC), Rheumatic fever, Scarlet fever, Smallpox, and Snoring.  He  has a past surgical history that includes coronary stent procedure lad; knee unicompartmental (10/13/2009); other orthopedic surgery; and arthroscopy, knee.  Past Medical History:   Diagnosis Date   • Anemia 8/1/2016   • Arthritis    • Back pain    • CAD (coronary artery disease)    • Cancer (HCC)     skin   • Chronic kidney disease (CKD), stage II (mild) 8/1/2016   • Coronary heart disease    • DJD (degenerative joint disease) 8/1/2016   • Dyslipidemia 8/1/2016   • GERD (gastroesophageal reflux disease) 8/1/2016   • Upper sorbian measles    • Hematochezia 8/1/2016   • High cholesterol    • Hyperglycemia 1/6/2010   • Hyperlipidemia 5/24/2011   • Hypertension    • Impaired fasting glucose 8/1/2016   • Left knee pain 8/1/2016   • Myocardial infarction (HCC) 2 yrs ago    2005   • Nasal drainage    • Obesity    • Osteoarthritis of knees, bilateral  8/1/2016   • Pulmonary embolism (HCC)    • Rheumatic fever    • Scarlet fever    • Smallpox    • Snoring      Allergies   Allergen Reactions   • Atorvastatin Myalgia   • Simvastatin      ADVERSE REACTION: MYALGIA.     Outpatient Encounter Medications as of 9/7/2021   Medication Sig Dispense Refill   • losartan (COZAAR) 25 MG Tab Take 0.5 Tablets by mouth every day. 45 Tablet 3   • omeprazole (PRILOSEC) 20 MG delayed-release capsule Take 1 Capsule by mouth every day. 90 Capsule 3   • budesonide-formoterol (SYMBICORT) 160-4.5 MCG/ACT Aerosol Inhale 2 Puffs 2 times a day for 30 days. 1 Each 5   • tiotropium (SPIRIVA RESPIMAT) 2.5 mcg/Act Aero Soln Inhale 2 Inhalation every day for 30 days. 4 g 5   • clopidogrel (PLAVIX) 75 MG Tab Take 1 tablet by mouth every day for 30 days. 90 tablet 3   • isosorbide mononitrate SR (IMDUR) 60 MG TABLET SR 24 HR Take 1 tablet by mouth every day for 30 days. 30 tablet 11   • rosuvastatin (CRESTOR) 40 MG tablet Take 1 tablet by mouth every evening for 30 days. 90 tablet 3   • metoprolol tartrate (LOPRESSOR) 25 MG Tab Take 0.5 Tablets by mouth 2 times a day. 90 tablet 3   • benzonatate (TESSALON) 200 MG capsule Take 1 capsule by mouth 2 times a day as needed for Cough. Do not chew. Swallow whole. 60 capsule 6   • nitroglycerin (NITROSTAT) 0.4 MG SUBL Place 1 Tab under tongue as needed for Chest Pain. 25 Tab 1   • aspirin 81 MG tablet Take 81 mg by mouth every day.     • [DISCONTINUED] omeprazole (PRILOSEC) 20 MG delayed-release capsule Take 20 mg by mouth every day.     • [DISCONTINUED] losartan (COZAAR) 25 MG Tab Take 0.5 Tablets by mouth every day. 45 Tablet 3   • [DISCONTINUED] omeprazole (PRILOSEC) 20 MG delayed-release capsule Take 1 Capsule by mouth every day. 90 Capsule 3   • [DISCONTINUED] lisinopril (PRINIVIL) 2.5 MG Tab Take 1 tablet by mouth every day. 90 tablet 3     No facility-administered encounter medications on file as of 9/7/2021.     Social History     Socioeconomic  History   • Marital status:      Spouse name: Not on file   • Number of children: Not on file   • Years of education: Not on file   • Highest education level: Not on file   Occupational History   • Not on file   Tobacco Use   • Smoking status: Former Smoker     Packs/day: 2.00     Years: 35.00     Pack years: 70.00     Types: Cigarettes     Quit date: 1973     Years since quittin.4   • Smokeless tobacco: Never Used   Vaping Use   • Vaping Use: Never used   Substance and Sexual Activity   • Alcohol use: Yes     Comment: cocktail every night   • Drug use: No   • Sexual activity: Not on file   Other Topics Concern   • Not on file   Social History Narrative   • Not on file     Social Determinants of Health     Financial Resource Strain:    • Difficulty of Paying Living Expenses:    Food Insecurity:    • Worried About Running Out of Food in the Last Year:    • Ran Out of Food in the Last Year:    Transportation Needs:    • Lack of Transportation (Medical):    • Lack of Transportation (Non-Medical):    Physical Activity:    • Days of Exercise per Week:    • Minutes of Exercise per Session:    Stress:    • Feeling of Stress :    Social Connections:    • Frequency of Communication with Friends and Family:    • Frequency of Social Gatherings with Friends and Family:    • Attends Voodoo Services:    • Active Member of Clubs or Organizations:    • Attends Club or Organization Meetings:    • Marital Status:    Intimate Partner Violence:    • Fear of Current or Ex-Partner:    • Emotionally Abused:    • Physically Abused:    • Sexually Abused:        Studies  Lab Results   Component Value Date/Time    CHOLSTRLTOT 138 07/10/2021 05:29 AM    LDL 75 07/10/2021 05:29 AM    HDL 52 07/10/2021 05:29 AM    TRIGLYCERIDE 55 07/10/2021 05:29 AM       Lab Results   Component Value Date/Time    SODIUM 139 2021 02:26 AM    POTASSIUM 4.4 2021 02:26 AM    CHLORIDE 102 2021 02:26 AM    CO2 29 2021 02:26  AM    GLUCOSE 104 (H) 07/14/2021 02:26 AM    BUN 22 07/14/2021 02:26 AM    CREATININE 1.36 07/14/2021 02:26 AM    CREATININE 1.4 05/06/2008 11:42 AM     Lab Results   Component Value Date/Time    ALKPHOSPHAT 45 07/13/2021 04:24 AM    ASTSGOT 16 07/13/2021 04:24 AM    ALTSGPT 8 07/13/2021 04:24 AM    TBILIRUBIN 0.3 07/13/2021 04:24 AM        For this encounter I reviewed the following medical records BMP, Lipid profile and CBC   For this encounter I directly reviewed catherization films. I agree with the interpretations in the electronic health record.

## 2021-09-21 ENCOUNTER — TELEPHONE (OUTPATIENT)
Dept: CARDIOLOGY | Facility: MEDICAL CENTER | Age: 86
End: 2021-09-21

## 2021-09-21 NOTE — TELEPHONE ENCOUNTER
Returned call. Pt reports that he switched from lisinopril to losartan, but his cough has not improved. He says he is coughing about every 45 seconds which significantly affects his life and his sleep. He says he is miserable because he can't sleep, he has a decreased appetite, and he has no energy. He also feels wobbly when walking, though not necessarily dizzy. He doesn't know who to ask for advice because his PCP left, and he is wondering if BE has any recommendations.

## 2021-09-21 NOTE — TELEPHONE ENCOUNTER
BE    Pt called stating his medications are not helping and he has a chronic cough. Pt states he also has a headache, is feeling unstable but not dizzy, and is unable to sleep. Please call Pt back at [phone].    Thank you

## 2021-09-22 NOTE — TELEPHONE ENCOUNTER
From: Derrick Davey M.D.   Sent: 9/21/2021   5:36 PM PDT   To: Susanne Cervantes R.N.     Change losartan back to lisinopril prior dosage.     Thanks  BE   ----------------------------------------------------  Called patient to discuss MD recommendations. He states he might have some congestion/cold that he got from his wife because he says his nose is stuffed up and he's been breathing out of his mouth. He reports coughing up sputum but denies fever, chills. He also states he stopped using his inhalers because they were burning his throat. Discussed trying OTC medications such as saline nose spray or non-decongestant cold medicines to relieve his symptoms.  Encouraged patient to establish with a new PCP to discuss COPD treatment/alternative inhalers if warranted. Patient would like to stay on losartan for now as he doesn't think his cough is worse.

## 2021-10-12 ENCOUNTER — TELEMEDICINE (OUTPATIENT)
Dept: SLEEP MEDICINE | Facility: MEDICAL CENTER | Age: 86
End: 2021-10-12
Payer: MEDICARE

## 2021-11-24 ENCOUNTER — TELEPHONE (OUTPATIENT)
Dept: INTERNAL MEDICINE | Facility: OTHER | Age: 86
End: 2021-11-24

## 2021-11-24 DIAGNOSIS — I21.4 NSTEMI (NON-ST ELEVATED MYOCARDIAL INFARCTION) (HCC): ICD-10-CM

## 2021-11-24 DIAGNOSIS — J96.21 ACUTE ON CHRONIC RESPIRATORY FAILURE WITH HYPOXIA (HCC): ICD-10-CM

## 2021-11-24 DIAGNOSIS — R06.02 EXERTIONAL SHORTNESS OF BREATH: ICD-10-CM

## 2021-11-24 DIAGNOSIS — I25.10 CORONARY ARTERY DISEASE INVOLVING NATIVE CORONARY ARTERY OF NATIVE HEART WITHOUT ANGINA PECTORIS: ICD-10-CM

## 2021-11-25 NOTE — TELEPHONE ENCOUNTER
Pt called and stated that in his telemed visit with you, you discussed a [facility] clinic that he can be assessed to get his DL back, and he states that he has not had that appointment yet, and hasnt heard anything, and he needs that appointment to get that back  But it will also have to be a telemed for that. He states he cant walk far etc but is okay if he is sitting, please advise?

## 2021-11-27 NOTE — TELEPHONE ENCOUNTER
Called patient to discuss questions on behalf of Dr. Garcia. Patient states that he has lost the notes and name of Dr. Slaughter that he received at the last visit. He wanted to discuss being assessed for driving.   Discussed with Dr. Foy prior and he stated that patient would ultimately need to be assessed by the DMV, to see if he was safe to drive. He also mentioned that patient had been referred to occupational therapy.   Patient was unaware of this referral. Also encouraged patient to make an appointment with the clinic at his convenience.     I will discuss with Dr. Garcia what has been discussed and have him follow up with Mr. Kobe Cyr when he returns from vacation.     Thank you for allowing me to participate in the care of this patient.    -Taty Mcgregor MD

## 2021-11-30 NOTE — TELEPHONE ENCOUNTER
Discussed completing a driving evaluation with an occupational therapist. An option for this would be at [facility] [address] [place] [address]. Call them at [phone] to discuss further.      Placed new referral since switch to [facility].     Discussed patient concerns following continued sinus congestion which waxes and wains over the past several months discussed symptomatic management with saline nasal spray, patient still on baseline O2 and doing well with occasional rest, encouraged follow up with Cardiology.     Please email or fax the information about driving instruction above to patient

## 2021-12-07 ENCOUNTER — TELEMEDICINE (OUTPATIENT)
Dept: SLEEP MEDICINE | Facility: MEDICAL CENTER | Age: 86
End: 2021-12-07
Payer: MEDICARE

## 2021-12-07 ENCOUNTER — TELEPHONE (OUTPATIENT)
Dept: SCHEDULING | Facility: IMAGING CENTER | Age: 86
End: 2021-12-07

## 2021-12-07 VITALS — HEIGHT: 71 IN | WEIGHT: 228 LBS | BODY MASS INDEX: 31.92 KG/M2

## 2021-12-07 ASSESSMENT — FIBROSIS 4 INDEX: FIB4 SCORE: 3.51

## 2021-12-07 NOTE — TELEPHONE ENCOUNTER
BE      Pt called and has some questions regarding the medication Doxycycline.    Best contact for pt:  PH:[phone]    Thank you,  Lisa YI

## 2021-12-08 ENCOUNTER — TELEMEDICINE (OUTPATIENT)
Dept: CARDIOLOGY | Facility: MEDICAL CENTER | Age: 86
End: 2021-12-08
Payer: MEDICARE

## 2021-12-08 VITALS — HEIGHT: 71 IN | WEIGHT: 228 LBS | BODY MASS INDEX: 31.92 KG/M2

## 2021-12-08 DIAGNOSIS — J44.9 CHRONIC OBSTRUCTIVE PULMONARY DISEASE, UNSPECIFIED COPD TYPE (HCC): ICD-10-CM

## 2021-12-08 DIAGNOSIS — I35.0 MILD AORTIC STENOSIS: ICD-10-CM

## 2021-12-08 DIAGNOSIS — N18.32 STAGE 3B CHRONIC KIDNEY DISEASE: ICD-10-CM

## 2021-12-08 DIAGNOSIS — I25.10 CORONARY ARTERY DISEASE INVOLVING NATIVE CORONARY ARTERY OF NATIVE HEART WITHOUT ANGINA PECTORIS: ICD-10-CM

## 2021-12-08 DIAGNOSIS — I10 ESSENTIAL HYPERTENSION: ICD-10-CM

## 2021-12-08 PROCEDURE — 99214 OFFICE O/P EST MOD 30 MIN: CPT | Mod: 95 | Performed by: INTERNAL MEDICINE

## 2021-12-08 RX ORDER — IPRATROPIUM BROMIDE 21 UG/1
SPRAY, METERED NASAL
COMMUNITY
Start: 2021-06-24 | End: 2021-12-08

## 2021-12-08 RX ORDER — ROSUVASTATIN CALCIUM 40 MG/1
40 TABLET, COATED ORAL DAILY
COMMUNITY
Start: 2021-08-11 | End: 2022-06-02 | Stop reason: SDUPTHER

## 2021-12-08 RX ORDER — ISOSORBIDE MONONITRATE 60 MG/1
TABLET, EXTENDED RELEASE ORAL
COMMUNITY
Start: 2021-08-11 | End: 2022-06-02 | Stop reason: SDUPTHER

## 2021-12-08 RX ORDER — OMEPRAZOLE 20 MG/1
TABLET, DELAYED RELEASE ORAL
COMMUNITY
Start: 2021-08-11 | End: 2021-12-08

## 2021-12-08 RX ORDER — LISINOPRIL 2.5 MG/1
TABLET ORAL
COMMUNITY
Start: 2021-08-11 | End: 2021-12-08

## 2021-12-08 RX ORDER — TIOTROPIUM BROMIDE INHALATION SPRAY 1.56 UG/1
SPRAY, METERED RESPIRATORY (INHALATION)
COMMUNITY
Start: 2021-08-11 | End: 2022-01-03

## 2021-12-08 RX ORDER — BUDESONIDE AND FORMOTEROL FUMARATE DIHYDRATE 160; 4.5 UG/1; UG/1
AEROSOL RESPIRATORY (INHALATION)
COMMUNITY
Start: 2021-08-11 | End: 2021-12-08

## 2021-12-08 RX ORDER — CLOPIDOGREL BISULFATE 75 MG/1
TABLET ORAL
COMMUNITY
Start: 2021-08-11 | End: 2021-12-08

## 2021-12-08 ASSESSMENT — FIBROSIS 4 INDEX: FIB4 SCORE: 3.51

## 2021-12-08 NOTE — TELEPHONE ENCOUNTER
You  Derrick Davey M.D. 18 hours ago (4:58 PM)       I think doxycycline just fell off his list during 7/9 admission, but pt wants to be sure you're ok with him taking.   Thanks!      Derrick Davey M.D.  You 34 minutes ago (11:14 AM)       Yes- ok to add back doxy     Thx   BE      LVM with pt at [phone] notifying that BE is ok with him restarting doxycycline.

## 2021-12-08 NOTE — TELEPHONE ENCOUNTER
Called pt. He had a virtual appt with his pulmonologist today, who had recommended that he restart doxycycline. Pt thought that cardiology had taken him off of this, so he wanted to double check. Informed pt that it doesn't appear this was ever stopped by cardiology, but it may have dropped off his med list after a 7/9/21 hospital admission. He just wants to be sure that BE is ok with him taking.

## 2021-12-08 NOTE — PROGRESS NOTES
Cardiology Telemedicine Visit: Established Patient   This encounter was conducted via Zoom.   Verbal consent was obtained. Patient's identity was verified.    Assessment and Plan:   PCP: No primary care provider on file.  The following treatment plan was discussed:     1. Coronary artery disease involving native coronary artery of native heart without angina pectoris    2. Chronic obstructive pulmonary disease, unspecified COPD type (HCC)    3. Stage 3b chronic kidney disease (HCC)    4. Essential hypertension    5. Mild aortic stenosis        Kobe Cyr is stable from a cardiovascular standpoint but with ongoing class III-IV shortness of breath with exertion.  This may be pulmonary or cardiac in origin.  I continue to recommend medical therapy of coronary artery disease.  No changes to the medication regimen were advised.    Follow up: 6 months    Subjective:     Chief Complaint   Patient presents with   • Coronary Artery Disease     F/V Dx: Coronary artery disease involving native coronary artery of native heart without angina pectoris   • Hyperlipidemia     F/V Dx: Mixed hyperlipidemia   • Aortic Stenosis     F/V Dx: Mild aortic stenosis     History: Kobe Cyr is a 89 y.o. male with history of oxygen dependent COPD,, mild aortic stenosis, pulmonary hypertension presenting for follow-up of coronary artery disease.  July 2021 he suffered non-STEMI and was found to have severe multivessel coronary artery disease.    He continues to experience shortness of breath on exertion with any level of activity and intermittent hypoxia with exertion.  He is also bothered by a nagging cough that did not improve with discontinuation of ACE inhibitor.  He is going to be taking doxycycline.    He and his wife remain independent in their own home though he did have his 's license suspended letter of medical concerns and he is in the process of reinstating this.      ROS   Denies any recent fevers or chills. No  "nausea or vomiting. No chest pains or shortness of breath. All other systems reviewed and negative except as per the HPI       Objective:   Vitals obtained by patient:  Respirations through observation: 12  Ht 1.803 m (5' 11\")   Wt 103 kg (228 lb)   BMI 31.80 kg/m²     Physical Exam:  Constitutional: Alert, no distress, well-groomed.  Skin: No rashes in visible areas.  Eye: Round. Conjunctiva clear, lids normal. No icterus.   ENMT: Lips pink without lesions, good dentition, moist mucous membranes. Phonation normal.  Neck: No masses, no thyromegaly. Moves freely without pain.  CV: Pulse as reported by patient  Respiratory: Unlabored respiratory effort, no cough or audible wheeze  Psych: Alert and oriented x3, normal affect and mood.     The ASCVD Risk score ([place] ISAIAS Jr, et al., 2013) failed to calculate.  Allergies   Allergen Reactions   • Atorvastatin Myalgia   • Simvastatin      ADVERSE REACTION: MYALGIA.     Current medicines (including changes today)  Current Outpatient Medications   Medication Sig Dispense Refill   • isosorbide mononitrate SR (IMDUR) 60 MG TABLET SR 24 HR ISOSORBIDE MONONITRATE ER 60 MG XR24H-TAB     • rosuvastatin (CRESTOR) 40 MG tablet CRESTOR 40 MG TABS     • Tiotropium Bromide Monohydrate (SPIRIVA RESPIMAT) 1.25 MCG/ACT Aero Soln SPIRIVA RESPIMAT 1.25 MCG/ACT AERS     • losartan (COZAAR) 25 MG Tab Take 0.5 Tablets by mouth every day. 45 Tablet 3   • omeprazole (PRILOSEC) 20 MG delayed-release capsule Take 1 Capsule by mouth every day. 90 Capsule 3   • metoprolol tartrate (LOPRESSOR) 25 MG Tab Take 0.5 Tablets by mouth 2 times a day. 90 tablet 3   • aspirin 81 MG tablet Take 81 mg by mouth every day.       No current facility-administered medications for this visit.     Patient Active Problem List    Diagnosis Date Noted   • Essential hypertension 04/20/2016     Priority: High   • Stented coronary artery 08/29/2013     Priority: High   • CAD (coronary artery disease) 03/15/2012     " Priority: High   • Hyperlipidemia 05/24/2011     Priority: High   • Exertional shortness of breath 07/12/2021   • Pulmonary HTN ([facility]) 07/12/2021   • Mild aortic stenosis 07/11/2021   • Acute on chronic respiratory failure with hypoxia ([facility]) 07/10/2021   • COPD (chronic obstructive pulmonary disease) ([facility]) 07/10/2021   • Acute gout of foot 05/10/2018   • Other fatigue 10/25/2017   • Chronic left shoulder pain 08/24/2017   • Abnormal chest x-ray 02/24/2017   • Bilateral carotid artery stenosis 02/24/2017   • Vitamin D deficiency disease 01/19/2017   • Nasal bleeding 11/21/2016   • Cough 09/23/2016   • CKD (chronic kidney disease), stage III ([facility]) 08/24/2016   • Bilateral low back pain with left-sided sciatica 08/18/2016   • Leg pain, bilateral 08/01/2016   • Impaired fasting glucose 08/01/2016   • Hemoptysis 08/01/2016   • Osteoarthritis of knees, bilateral 08/01/2016   • GERD (gastroesophageal reflux disease) 08/01/2016   • Anemia 08/01/2016   • NSTEMI (non-ST elevated myocardial infarction) ([facility])    • Hyperglycemia 01/06/2010     Family History   Problem Relation Age of Onset   • No Known Problems Mother    • No Known Problems Father      He  has a past medical history of Anemia (8/1/2016), Arthritis, Back pain, CAD (coronary artery disease), Cancer ([facility]), Chronic kidney disease (CKD), stage II (mild) (8/1/2016), Coronary heart disease, DJD (degenerative joint disease) (8/1/2016), Dyslipidemia (8/1/2016), GERD (gastroesophageal reflux disease) (8/1/2016), Arabic measles, Hematochezia (8/1/2016), High cholesterol, Hyperglycemia (1/6/2010), Hyperlipidemia (5/24/2011), Hypertension, Impaired fasting glucose (8/1/2016), Left knee pain (8/1/2016), Myocardial infarction ([facility]) (2 yrs ago), Nasal drainage, Obesity, Osteoarthritis of knees, bilateral (8/1/2016), Pulmonary embolism ([facility]), Rheumatic fever, Scarlet fever, Smallpox, and Snoring.  He  has a past surgical history that includes coronary stent procedure lad; knee  unicompartmental (10/13/2009); other orthopedic surgery; and arthroscopy, knee.  Past Medical History:   Diagnosis Date   • Anemia 8/1/2016   • Arthritis    • Back pain    • CAD (coronary artery disease)    • Cancer (HCC)     skin   • Chronic kidney disease (CKD), stage II (mild) 8/1/2016   • Coronary heart disease    • DJD (degenerative joint disease) 8/1/2016   • Dyslipidemia 8/1/2016   • GERD (gastroesophageal reflux disease) 8/1/2016   • Maori measles    • Hematochezia 8/1/2016   • High cholesterol    • Hyperglycemia 1/6/2010   • Hyperlipidemia 5/24/2011   • Hypertension    • Impaired fasting glucose 8/1/2016   • Left knee pain 8/1/2016   • Myocardial infarction (HCC) 2 yrs ago    2005   • Nasal drainage    • Obesity    • Osteoarthritis of knees, bilateral 8/1/2016   • Pulmonary embolism (HCC)    • Rheumatic fever    • Scarlet fever    • Smallpox    • Snoring      Allergies   Allergen Reactions   • Atorvastatin Myalgia   • Simvastatin      ADVERSE REACTION: MYALGIA.     Outpatient Encounter Medications as of 12/8/2021   Medication Sig Dispense Refill   • isosorbide mononitrate SR (IMDUR) 60 MG TABLET SR 24 HR ISOSORBIDE MONONITRATE ER 60 MG XR24H-TAB     • rosuvastatin (CRESTOR) 40 MG tablet CRESTOR 40 MG TABS     • Tiotropium Bromide Monohydrate (SPIRIVA RESPIMAT) 1.25 MCG/ACT Aero Soln SPIRIVA RESPIMAT 1.25 MCG/ACT AERS     • losartan (COZAAR) 25 MG Tab Take 0.5 Tablets by mouth every day. 45 Tablet 3   • omeprazole (PRILOSEC) 20 MG delayed-release capsule Take 1 Capsule by mouth every day. 90 Capsule 3   • metoprolol tartrate (LOPRESSOR) 25 MG Tab Take 0.5 Tablets by mouth 2 times a day. 90 tablet 3   • aspirin 81 MG tablet Take 81 mg by mouth every day.     • [DISCONTINUED] ASPIRIN 81 PO ASPIRIN 81 MG ORAL TABLET (Patient not taking: Reported on 12/8/2021)     • [DISCONTINUED] budesonide-formoterol (SYMBICORT) 160-4.5 MCG/ACT Aerosol SYMBICORT 160-4.5 MCG/ACT AERO (Patient not taking: Reported on 12/8/2021)      • [DISCONTINUED] clopidogrel (PLAVIX) 75 MG Tab PLAVIX 75 MG TABS (Patient not taking: Reported on 2021)     • [DISCONTINUED] ipratropium (ATROVENT) 0.03 % Solution IPRATROPIUM BROMIDE 0.03 % SOLN (Patient not taking: Reported on 2021)     • [DISCONTINUED] lisinopril (PRINIVIL) 2.5 MG Tab LISINOPRIL 2.5 MG TABS (Patient not taking: Reported on 2021)     • [DISCONTINUED] omeprazole (PRILOSEC OTC) 20 MG tablet PRILOSEC OTC 20 MG TBEC (Patient not taking: Reported on 2021)     • [DISCONTINUED] benzonatate (TESSALON) 200 MG capsule Take 1 capsule by mouth 2 times a day as needed for Cough. Do not chew. Swallow whole. (Patient not taking: Reported on 2021) 60 capsule 6   • [DISCONTINUED] nitroglycerin (NITROSTAT) 0.4 MG SUBL Place 1 Tab under tongue as needed for Chest Pain. (Patient not taking: Reported on 2021) 25 Tab 1     No facility-administered encounter medications on file as of 2021.     Social History     Socioeconomic History   • Marital status:      Spouse name: Not on file   • Number of children: Not on file   • Years of education: Not on file   • Highest education level: Not on file   Occupational History   • Not on file   Tobacco Use   • Smoking status: Former Smoker     Packs/day: 2.00     Years: 35.00     Pack years: 70.00     Types: Cigarettes     Quit date: 1973     Years since quittin.6   • Smokeless tobacco: Never Used   Vaping Use   • Vaping Use: Never used   Substance and Sexual Activity   • Alcohol use: Yes     Comment: cocktail every night   • Drug use: No   • Sexual activity: Not on file   Other Topics Concern   • Not on file   Social History Narrative   • Not on file     Social Determinants of Health     Financial Resource Strain:    • Difficulty of Paying Living Expenses: Not on file   Food Insecurity:    • Worried About Running Out of Food in the Last Year: Not on file   • Ran Out of Food in the Last Year: Not on file   Transportation  Needs:    • Lack of Transportation (Medical): Not on file   • Lack of Transportation (Non-Medical): Not on file   Physical Activity:    • Days of Exercise per Week: Not on file   • Minutes of Exercise per Session: Not on file   Stress:    • Feeling of Stress : Not on file   Social Connections:    • Frequency of Communication with Friends and Family: Not on file   • Frequency of Social Gatherings with Friends and Family: Not on file   • Attends Orthodoxy Services: Not on file   • Active Member of Clubs or Organizations: Not on file   • Attends Club or Organization Meetings: Not on file   • Marital Status: Not on file   Intimate Partner Violence:    • Fear of Current or Ex-Partner: Not on file   • Emotionally Abused: Not on file   • Physically Abused: Not on file   • Sexually Abused: Not on file   Housing Stability:    • Unable to Pay for Housing in the Last Year: Not on file   • Number of Places Lived in the Last Year: Not on file   • Unstable Housing in the Last Year: Not on file       Studies  Lab Results   Component Value Date/Time    CHOLSTRLTOT 138 07/10/2021 05:29 AM    LDL 75 07/10/2021 05:29 AM    HDL 52 07/10/2021 05:29 AM    TRIGLYCERIDE 55 07/10/2021 05:29 AM       Lab Results   Component Value Date/Time    SODIUM 139 07/14/2021 02:26 AM    POTASSIUM 4.4 07/14/2021 02:26 AM    CHLORIDE 102 07/14/2021 02:26 AM    CO2 29 07/14/2021 02:26 AM    GLUCOSE 104 (H) 07/14/2021 02:26 AM    BUN 22 07/14/2021 02:26 AM    CREATININE 1.36 07/14/2021 02:26 AM    CREATININE 1.4 05/06/2008 11:42 AM     Lab Results   Component Value Date/Time    ALKPHOSPHAT 45 07/13/2021 04:24 AM    ASTSGOT 16 07/13/2021 04:24 AM    ALTSGPT 8 07/13/2021 04:24 AM    TBILIRUBIN 0.3 07/13/2021 04:24 AM        For this encounter I reviewed the following medical records BMP, Lipid profile and CBC

## 2021-12-13 ENCOUNTER — TELEPHONE (OUTPATIENT)
Dept: SCHEDULING | Facility: IMAGING CENTER | Age: 86
End: 2021-12-13

## 2021-12-13 NOTE — TELEPHONE ENCOUNTER
BE    PT called to advise they are confused on prescription. Ot was a blue pill. They would like a call back to discuss this furter at [phone].      Thank you,  Joselyn HINES

## 2021-12-13 NOTE — TELEPHONE ENCOUNTER
Called pt. He is confused as to whether he is supposed to be taking losartan or lisinopril. He knows he was taking a half a tab daily, but he thought it was blue. The meds his wife recently picked up from the pharmacy did not include a blue pill, so he was wondering if there was a mistake. Called [facility] pharmacy at [phone] and s/w Mildred. She actually s/w pt earlier about the same issue. She confirms that he has recently filled losartan, metoprolol, clopidogrel, rosuvastatin, isosorbide, and omeprazole on 11/14. She already checked the color of all of them and none of them are blue. Called pt again. He confirms that whatever his wife picked up from the pharmacy he is currently taking. Advised that sometimes the color of a pill may be different based on what supplier the pharmacy is currently using.

## 2021-12-28 ENCOUNTER — TELEPHONE (OUTPATIENT)
Dept: SLEEP MEDICINE | Facility: MEDICAL CENTER | Age: 86
End: 2021-12-28

## 2021-12-28 DIAGNOSIS — Z91.89 DRIVING SAFETY ISSUE: ICD-10-CM

## 2021-12-28 NOTE — PROGRESS NOTES
Discussed with patient over the phone and Dr Tobin, agreed to pursue driving assessment to determine safety for returning to drive    Orders Placed This Encounter   • Referral to Other     __________  Ronaldo Kim MD  Pulmonary and Critical Care Medicine  [facility]

## 2021-12-28 NOTE — TELEPHONE ENCOUNTER
I called and spoke with patient. We have placed and also Dr Foy for driving evaluation with occupational therapy at [address] [place] [address]. I provided their phone number and patient states he will call to schedule appointment. I instructed patient to notify our office once the evaluation is completed so we CAN retrieve results.    Per Dr Kim. We need to keep this patient on our radar because results are for patient to get his drivers license re-instated. Dr Kim's wonderful Medical Assistant was copied on this message. We will watch for results.

## 2022-01-03 ENCOUNTER — TELEMEDICINE (OUTPATIENT)
Dept: INTERNAL MEDICINE | Facility: OTHER | Age: 87
End: 2022-01-03
Payer: MEDICARE

## 2022-01-03 ENCOUNTER — PATIENT MESSAGE (OUTPATIENT)
Dept: SLEEP MEDICINE | Facility: MEDICAL CENTER | Age: 87
End: 2022-01-03

## 2022-01-03 ENCOUNTER — TELEPHONE (OUTPATIENT)
Dept: CARDIOLOGY | Facility: MEDICAL CENTER | Age: 87
End: 2022-01-03

## 2022-01-03 ENCOUNTER — TELEPHONE (OUTPATIENT)
Dept: INTERNAL MEDICINE | Facility: OTHER | Age: 87
End: 2022-01-03

## 2022-01-03 VITALS — OXYGEN SATURATION: 93 % | DIASTOLIC BLOOD PRESSURE: 78 MMHG | SYSTOLIC BLOOD PRESSURE: 153 MMHG | HEART RATE: 74 BPM

## 2022-01-03 DIAGNOSIS — D64.9 ANEMIA, UNSPECIFIED TYPE: ICD-10-CM

## 2022-01-03 DIAGNOSIS — I25.10 CORONARY ARTERY DISEASE INVOLVING NATIVE CORONARY ARTERY OF NATIVE HEART WITHOUT ANGINA PECTORIS: ICD-10-CM

## 2022-01-03 DIAGNOSIS — R04.2 HEMOPTYSIS: ICD-10-CM

## 2022-01-03 DIAGNOSIS — R05.9 COUGH: ICD-10-CM

## 2022-01-03 DIAGNOSIS — R06.02 EXERTIONAL SHORTNESS OF BREATH: ICD-10-CM

## 2022-01-03 DIAGNOSIS — J96.21 ACUTE ON CHRONIC RESPIRATORY FAILURE WITH HYPOXIA (HCC): ICD-10-CM

## 2022-01-03 PROCEDURE — 99214 OFFICE O/P EST MOD 30 MIN: CPT | Mod: 95,GC | Performed by: STUDENT IN AN ORGANIZED HEALTH CARE EDUCATION/TRAINING PROGRAM

## 2022-01-03 NOTE — TELEPHONE ENCOUNTER
Per pt in the last 10 days he has been coughing blood. Per pt this morning he coughed up clots. Lisinopril was switched to losartan and was told one of these medications might make him cough blood. I offered pt an appt to come in. He stated he does not have a drivers license and does not want to get stuck in traffic if he gets a ride here. Pt requested a call from Dr. Foy. Scheduled him a virtual appt today so he can further discuss

## 2022-01-03 NOTE — TELEPHONE ENCOUNTER
Derrick Davey M.D.  You 2 hours ago (12:48 PM)       I would discuss with pulmonary. Ideally he would continue with low dose aspirin but if there is substantial bleeding then we will have to stop. Perhaps he would like to make another appt to discuss further     Thx   BE      Unable to reach patient on home phone (busy signal, no option to LVM). LVM on patient's mobile number with recommendations.

## 2022-01-03 NOTE — LETTER
formerly [facility]  Stacey Foy M.D.  [address] [place] [address]  Ph: [phone]  Fax: [phone]   Authorization for Release/Disclosure of   Protected Health Information   Name: CARRIE CAMPBELL : 1931 SSN: [id number]   Address: [address] [place] [id number] Phone:    [phone] (home) [phone] (work)   I authorize the entity listed below to release/disclose the PHI below to:   formerly [facility]/Stacey Foy M.D. and Stacey Foy M.D.   Provider or Entity Name:  [facility]   Address   [facility], [facility], [facility]   Phone:  [phone]    Fax:  [phone]   Reason for request: continuity of care   Information to be released:    [  ] LAST COLONOSCOPY,  including any PATH REPORT and follow-up  [  ] LAST FIT/COLOGUARD RESULT [  ] LAST DEXA  [  ] LAST MAMMOGRAM  [  ] LAST PAP  [  ] LAST LABS [  ] RETINA EXAM REPORT  [  ] IMMUNIZATION RECORDS  [ X ] Release all info      [  ] Check here and initial the line next to each item to release ALL health information INCLUDING  _____ Care and treatment for drug and / or alcohol abuse  _____ HIV testing, infection status, or AIDS  _____ Genetic Testing    DATES OF SERVICE OR TIME PERIOD TO BE DISCLOSED: _____________  I understand and acknowledge that:  * This Authorization may be revoked at any time by you in writing, except if your health information has already been used or disclosed.  * Your health information that will be used or disclosed as a result of you signing this authorization could be re-disclosed by the recipient. If this occurs, your re-disclosed health information may no longer be protected by State or Federal laws.  * You may refuse to sign this Authorization. Your refusal will not affect your ability to obtain treatment.  * This Authorization becomes effective upon signing and will  on (date) __________.      If no date is indicated, this Authorization will  one (1) year from the signature  date.    Name: Kobe Cyr    Signature:continuity of care   Date:     1/3/2022       PLEASE FAX REQUESTED RECORDS BACK TO: [phone]

## 2022-01-03 NOTE — TELEPHONE ENCOUNTER
Called patient back to discuss. He states he has had this cough for a while but about 2 weeks ago he has noticed pink tinged streaks in his sputum that has progressed to dark blood clots about the size of an eraser. He coughs every morning for 1-1.5hrs. The coughing episodes are relieved with rest and ice water. He uses humidification with his oxygen.     VS this AM  SpO2 93  HR 63  SBP 120s    He states he was on a 10 day prescription of doxycycline in November, prescribed by pulmonology, that stopped these coughing episodes, but has since returned. Encouraged patient to also reach out to pulmonology for guidance in which he states he has but cannot get through and doesn't receive MyChart responses for 2+ weeks. He also has a call out to his PCP to discuss.

## 2022-01-03 NOTE — TELEPHONE ENCOUNTER
BE    Pt called stating he was told that one of the prescriptions he is taking could cause him to cough up blood. Pt states he has been taking it for a couple of months and has been coughing up blood for the last week. Tried reaching nurse, but unavailable. Please call Pt back at [phone].    Thank you

## 2022-01-04 NOTE — ASSESSMENT & PLAN NOTE
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  Sputum as described above, concern for etiologies as described under hemoptysis.  -Plan as above

## 2022-01-04 NOTE — ASSESSMENT & PLAN NOTE
Multifactorial likely due to ischemic cardiomyopathy, COPD, ILD, and mild aortic stenosis, VQ scan in July without PE.  -Cannot rule out MI, worsening heart failure, worsening lungs overall, with COPD, or worsening heart failure (though patient denies any increased lower extremity swelling)

## 2022-01-04 NOTE — ASSESSMENT & PLAN NOTE
Patient with aníbal mopped assist, known interstitial lung disease as well as cavity and stable pulmonary nodule.  Patient did not receive chest x-ray or CT chest at outside hospital.  Given worsening hypoxia, and overall frail state of patient with multiple comorbid conditions including predisposing lung disease as well as cardiac history, would recommend in person evaluation at emergency department.  Differential is broad and includes but is not limited to: Pneumonia (though no infective symptoms), PE, malignancy.   -Patient and wife agreeable to emergency department room visit for further evaluation as well as likely CT chest without contrast (given chronic kidney disease stage IIIb) for more imaging  -If patient does not wish to go to the emergency room, discussed the need either way for in person pulmonology follow-up within the week.  -Discussed that longer course of doxycycline was not an appropriate long-term therapy for this problem, and patient needs further evaluation, patient agreeable  -We will send message to pulmonology and note, for inpatient visit.  -Discussed with patient the risk versus benefits of holding aspirin given he has multiple stents and coronary artery disease, but has aníbal bleeding and a history of anemia.  Patient agreeable to trial off aspirin for 1 to 2 days, knowing the risk of MI is higher, given acute bleeding, or at least until ED has evaluated.  -Follow-up with pulmonology, if problem not resolved, follow-up within 1 to 2 weeks.

## 2022-01-04 NOTE — PROGRESS NOTES
"    Established Patient    Patient Care Team:  Stacey Foy M.D. as PCP - General (Internal Medicine)    Kobe Cyr is a 89 y.o. male who presents today with the following Chief Complaint(s): Follow up for Diagnoses of Hemoptysis, Coronary artery disease involving native coronary artery of native heart without angina pectoris, Cough, Acute on chronic respiratory failure with hypoxia (HCC), Chronic obstructive pulmonary disease, unspecified COPD type (HCC), Anemia, unspecified type, and Exertional shortness of breath were pertinent to this visit.   This evaluation was conducted via Zoom using secure and encrypted videoconferencing technology. The patient was physically located at [facility]  in [place], [place].   The patient's identity was confirmed and verbal consent was obtained for this telemedicine encounter.    HPI:  1. Hemoptysis  Patient states that approximately 14 days ago he was started on doxycycline per pulmonology, for hemoptysis, over the last week patient states that hemoptysis is grown, failing at least half an inch of a cup with blood plus clots about the size of a pencil eraser, patient states that hemoptysis resolved on the antibiotic course, but when patient took for 10 days and stop medications hemoptysis promptly returned with at least half an inch feeling a cup of blood plus blood clots every morning with sputum tinged with blood throughout the day.  Patient denies any chest pain, denies any new medication changes, though as stated \"could the losartan be causing me to cough blood, discussed with the patient that the lisinopril was stopped because it can cause cough.\"  Losartan should not cause cough, and it is concerning that his hemoptysis has continued, discussed with both patient and his wife the need for in person evaluation, has not been seen in person by myself or pulmonology in quite some time (patient only virtual visits by choice with me).  Patient has contacted pulmonology office, was " told to go to the emergency department for evaluation, has previously been told that if the hemoptysis does not stop he might require further more invasive intervention including bronchoscopy.    2. Coronary artery disease involving native coronary artery of native heart without angina pectoris  Patient endorses taking his current medication regimen, and patient on aspirin for antiplatelet agent, discussed with patient the risk versus benefits of aspirin while bleeding, patient agreeable to stop taking for approximately 2 days, knowing that the risk is there that he might have MI and lessen protection at that time.  Patient agreeable to risk.    3. Cough  See above, chronic cough, clear sputum tinged with blood    4. Acute on chronic respiratory failure with hypoxia (HCC)  Patient has been anywhere from 3 L to 5 L on ambulation, since his hospitalization in July.  However patient states before the hemoptysis he was down to 2-1/2 L at rest.  Today patient states that he is saturated starting to drop, and he turned up his oxygen to sat 93 to 94% at approximately 4 L up from 2-1/2.  Patient also with slight increased work of breathing secondary to hemoptysis, and some dyspnea on exertion that is worse than baseline this morning, though patient states has resolved.    5. Chronic obstructive pulmonary disease, unspecified COPD type (HCC)  See per history above, no recent fevers, chills, no worsening wheezing as per patient.    6. Anemia, unspecified type  Patient with anemia likely multifactorial after hospitalization, patient also with chronic kidney disease.  Discussed the concern for hemoptysis, though hemoglobin relatively stable in the tens to 11's per outside hospital records.    7. Exertional shortness of breath  Patient stating that this morning he had difficulty catching his breath when walking to the bathroom, normally eats about 15 steps and he can do it with only a few minutes to recover, but this time he had  "to take 6 steps and take several minutes to recover before continuing to the bathroom.  This is not the patient's baseline.    8.  Patient evaluated for altered mental status/loss of consciousness at outside hospital at the end of December, obtaining records, CT head negative, lab work unremarkable.  Patient states that he \"just stopped talking to his wife and she wanted to make sure I did not have a seizure, but they just sent me home after doing some tests.\"  Patient denies any further loss of consciousness, loss of continence both urinary or bowel, tongue biting, or irregular movements.  Patient denies any lightheadedness, dizziness, or palpitations.      ROS:     General: No fevers, chills, night sweats, weight loss or gain  HEENT: No hearing changes, vision changes, eye pain, ear pain, nasal discharge, sore throat  Neck: No swelling in neck  Pulmonary: As per HPI  Cardiovascular: No chest pain, palpitations, or LE swelling  GI: No nausea, vomiting, diarrhea, constipation, abdominal pain, hematochezia or melena  : No dysuria or frequency  Neuro: No focal weakness, no general weakness, no headaches, no lightheadedness, no dizziness  Psych: No anxiety or depression    Past Medical History:   Diagnosis Date   • Anemia 8/1/2016   • Arthritis    • Back pain    • CAD (coronary artery disease)    • Cancer (HCC)     skin   • Chronic kidney disease (CKD), stage II (mild) 8/1/2016   • Coronary heart disease    • DJD (degenerative joint disease) 8/1/2016   • Dyslipidemia 8/1/2016   • GERD (gastroesophageal reflux disease) 8/1/2016   • Green[place]ic measles    • Hematochezia 8/1/2016   • High cholesterol    • Hyperglycemia 1/6/2010   • Hyperlipidemia 5/24/2011   • Hypertension    • Impaired fasting glucose 8/1/2016   • Left knee pain 8/1/2016   • Myocardial infarction (HCC) 2 yrs ago    2005   • Nasal drainage    • Obesity    • Osteoarthritis of knees, bilateral 8/1/2016   • Pulmonary embolism (HCC)    • Rheumatic fever    • " Scarlet fever    • Smallpox    • Snoring      Social History     Tobacco Use   • Smoking status: Former Smoker     Packs/day: 2.00     Years: 35.00     Pack years: 70.00     Types: Cigarettes     Quit date: 1973     Years since quittin.7   • Smokeless tobacco: Never Used   Vaping Use   • Vaping Use: Never used   Substance Use Topics   • Alcohol use: Yes     Comment: cocktail every night   • Drug use: No     Current Outpatient Medications   Medication Sig Dispense Refill   • isosorbide mononitrate SR (IMDUR) 60 MG TABLET SR 24 HR ISOSORBIDE MONONITRATE ER 60 MG XR24H-TAB     • rosuvastatin (CRESTOR) 40 MG tablet CRESTOR 40 MG TABS     • losartan (COZAAR) 25 MG Tab Take 0.5 Tablets by mouth every day. (Patient taking differently: Take 12.5 mg by mouth 2 times a day.) 45 Tablet 3   • omeprazole (PRILOSEC) 20 MG delayed-release capsule Take 1 Capsule by mouth every day. 90 Capsule 3   • metoprolol tartrate (LOPRESSOR) 25 MG Tab Take 0.5 Tablets by mouth 2 times a day. 90 tablet 3   • aspirin 81 MG tablet Take 81 mg by mouth every day.       No current facility-administered medications for this visit.       Physical Exam:  /78   Pulse 74   SpO2 93%   Vitals obtained by patient:    Physical Exam:  Constitutional: Alert, mild distress, well-groomed.  Skin: No rashes in visible areas.  Eye: Round. Conjunctiva clear, lids normal. No icterus.   ENMT: Lips pink without lesions, good dentition, moist mucous membranes. Phonation normal.  Neck: No masses, no thyromegaly. Moves freely without pain.  Respiratory: more than previous labored respiratory effort, No AC muscle use. no cough or audible wheeze  Psych: Alert and oriented x3, normal affect and mood.       Assessment and Plan:   Mr. Cyr is a very pleasant 89-year-old male with a extremely complicated past medical history significant for severe triple-vessel disease with MI status post 3 stents in the past, currently patient does not wish for further  catheterization, chronic kidney disease stage IIIb, COPD, chronic hypoxic respiratory failure, known lung nodule (stable) and known lung cavity as well as a host of other issues seen today for worsenig hypoxia and hemoptysis.   -Discussed with both patient and wife my recommendation to go to the ER for further evaluation likely including CT chest for home hemoptysis and worsening acute hypoxic respiratory failure on chronic hypoxic respiratory failure.  Given the patient's multiple comorbidities and overall poor health state, his current condition requires in person assessment, the patient is unwilling to have done here at this time.    Hemoptysis  Patient with aníbal mopped assist, known interstitial lung disease as well as cavity and stable pulmonary nodule.  Patient did not receive chest x-ray or CT chest at outside hospital.  Given worsening hypoxia, and overall frail state of patient with multiple comorbid conditions including predisposing lung disease as well as cardiac history, would recommend in person evaluation at emergency department.  Differential is broad and includes but is not limited to: Pneumonia (though no infective symptoms), PE, malignancy.   -Patient and wife agreeable to emergency department room visit for further evaluation as well as likely CT chest without contrast (given chronic kidney disease stage IIIb) for more imaging  -If patient does not wish to go to the emergency room, discussed the need either way for in person pulmonology follow-up within the week.  -Discussed that longer course of doxycycline was not an appropriate long-term therapy for this problem, and patient needs further evaluation, patient agreeable  -We will send message to pulmonology and note, for inpatient visit.  -Discussed with patient the risk versus benefits of holding aspirin given he has multiple stents and coronary artery disease, but has aníbal bleeding and a history of anemia.  Patient agreeable to trial off  aspirin for 1 to 2 days, knowing the risk of MI is higher, given acute bleeding, or at least until ED has evaluated.  -Follow-up with pulmonology, if problem not resolved, follow-up within 1 to 2 weeks.      CAD (coronary artery disease)  Patient with coronary artery disease, multiple vessel disease, poor candidate for cath per cardiology, not wishing for cath at this time, medically maximized follows with cardiology.  -Plan per as above    Cough  Patient with chronic cough, likely secondary to interstitial lung disease, underlying lung cavity from valley fever as young man.  Sputum as described above, concern for etiologies as described under hemoptysis.  -Plan as above    Exertional shortness of breath  Multifactorial likely due to ischemic cardiomyopathy, COPD, ILD, and mild aortic stenosis, VQ scan in July without PE.  -Cannot rule out MI, worsening heart failure, worsening lungs overall, with COPD, or worsening heart failure (though patient denies any increased lower extremity swelling)    Acute on chronic respiratory failure with hypoxia (HCC)  Patient requiring approximately 2-1/2 L of oxygen at rest and on ambulation, before hemoptysis, now with increasing demand, likely secondary to worsening lung function secondary to hemoptysis as described above, versus worsening heart failure though without history, MI and coronary disease is strong, echo previously with preserved ejection fraction.  Patient also describing that oxygen company has not visited and he is using his home concentrators  -Plan per as above, concern for worsening lung pathology, needs in person evaluation.      Return in about 2 weeks (around 1/17/2022), or if symptoms worsen or fail to improve, for Hemoptysis .    Patient Instructions   Thank you for your virtual visit!  Please be evaluated in emergency department for worsening hemoptysis, I would recommend a non-contrast CT of the chest  Follow up with pulmonology in 1-4 days if possible you  could have worsening,  pneumonia, blood clots or cancer causing you to bleed from your lungs.   -I have called your pulmonologist office and let them know that you needed urgent appointment, please schedule with them.  You could require a bronchoscopy   Do not hesitate to call us if you need anything.       Stacey Foy M.D. PGY I  [facility]    This note was created using voice recognition software.  While every attempt is made to ensure accuracy of transcription, occasionally errors occur.

## 2022-01-04 NOTE — ASSESSMENT & PLAN NOTE
Patient requiring approximately 2-1/2 L of oxygen at rest and on ambulation, before hemoptysis, now with increasing demand, likely secondary to worsening lung function secondary to hemoptysis as described above, versus worsening heart failure though without history, MI and coronary disease is strong, echo previously with preserved ejection fraction.  Patient also describing that oxygen company has not visited and he is using his home concentrators  -Plan per as above, concern for worsening lung pathology, needs in person evaluation.

## 2022-01-04 NOTE — ASSESSMENT & PLAN NOTE
Patient with coronary artery disease, multiple vessel disease, poor candidate for cath per cardiology, not wishing for cath at this time, medically maximized follows with cardiology.  -Plan per as above

## 2022-01-05 ENCOUNTER — TELEPHONE (OUTPATIENT)
Dept: INTERNAL MEDICINE | Facility: OTHER | Age: 87
End: 2022-01-05

## 2022-01-05 NOTE — TELEPHONE ENCOUNTER
Pt called stating he had CT scan an [facility] ED. They could not find Dr. Foy in the system to send results so they gave him a printed copy. Pt has faxed copy to us and would like Dr. Foy to review when we get them.

## 2022-01-05 NOTE — TELEPHONE ENCOUNTER
Patient called back. He wanted to update us that his PCP recommended he go to the ED with his symptoms. He states he had labs and imaging done which were unremarkable; he will fax this to us to review. He does not report any further blood in his sputum.     In the meantime, he is wondering if Dr. Davey has any recommendations for a pulmonologist outside of [facility].

## 2022-01-05 NOTE — TELEPHONE ENCOUNTER
Derrick Davey M.D.  You 25 minutes ago (2:10 PM)       If he doesn't care for the [facility] he could try [facility] in [place]     Thx   BE      Sent patient MyChart message

## 2022-01-06 NOTE — TELEPHONE ENCOUNTER
Left VM with patient to call back to discuss results.   Called pt problem with O2 machine discussed CT results, encouraged follow up with pulmonology, no more hemoptysis, back to baseline 02, doing well.   Consider ENT for polyps as source if further hemoptysis.

## 2022-01-11 NOTE — TELEPHONE ENCOUNTER
Derrick Davey M.D.  You 57 minutes ago (8:35 AM)       Findings reviewed. No new recommendations. Did he have a specific question?     Thx   BE

## 2022-02-21 ENCOUNTER — APPOINTMENT (OUTPATIENT)
Dept: OCCUPATIONAL THERAPY | Facility: REHABILITATION | Age: 87
End: 2022-02-21
Attending: INTERNAL MEDICINE
Payer: MEDICARE

## 2022-02-22 ENCOUNTER — TELEPHONE (OUTPATIENT)
Dept: CARDIOLOGY | Facility: MEDICAL CENTER | Age: 87
End: 2022-02-22
Payer: MEDICARE

## 2022-02-22 ENCOUNTER — TELEPHONE (OUTPATIENT)
Dept: INTERNAL MEDICINE | Facility: OTHER | Age: 87
End: 2022-02-22
Payer: MEDICARE

## 2022-02-22 NOTE — TELEPHONE ENCOUNTER
Caller Name: Kobe  Call Back Number: [phone] (home)     Pt called requesting order for wheelchair. Stated he is having a hard time getting around. Informed him he should have a office visit to discuss wheelchair need and so we can send visit note with wheelchair order to DME company. Insurance will not cover wheelchair without note explaining the need of the wheelchair. Per pt he can not get here. He does not have a drivers license and his wife is handicap also. Would like me to send message to Dr. Foy to see if he can place DME order without visit or if he can do a virtual visit. pcp please advise

## 2022-02-22 NOTE — TELEPHONE ENCOUNTER
Spoke to patient's spouse, Mook, over phone. Recommended patient reach out to PCP office or OT provider. Phone number provided. Mook verbalizes understanding.

## 2022-02-22 NOTE — TELEPHONE ENCOUNTER
BE      Kj Dee,    This patient was calling around to all his providers wanting to know who he spoke to in regards to getting him a wheelchair ordered for personal use. He left a message with his PCP this morning as well and wanted to have a message left for BE. Is this something he has to contact [facility] about or if something different can you call this patient back at [phone].      Thank you,    TENA

## 2022-02-22 NOTE — TELEPHONE ENCOUNTER
Hello, can we get him scheduled for a virtual visit with someone at the clinic this week, since insurance will need a face to face for the DME order, he certainly has comorbid conditions that could necessitate the use.

## 2022-02-22 NOTE — TELEPHONE ENCOUNTER
Brooke spoke to [employer] today and was told visit must be in person in order for Medicare to cover wheelchair order. Can you please call pt and inform him of this. I tried to explain but he still wanted a response from you. Thank you

## 2022-02-25 ENCOUNTER — TELEPHONE (OUTPATIENT)
Dept: CARDIOLOGY | Facility: MEDICAL CENTER | Age: 87
End: 2022-02-25
Payer: MEDICARE

## 2022-02-26 NOTE — TELEPHONE ENCOUNTER
"BE    Kj Dee,    This patient called and wants to speak to a nurse about his medications. He states, \"They're all messed up, and needs to be [facility] and the [facility].\" That is all he would state and is asking for a call back at [phone].      Thank you,    TENA  "

## 2022-02-26 NOTE — TELEPHONE ENCOUNTER
Spoke to patient over phone. Medication list verified with patient. Patient would like us know he is taking the following:    Plavix 75 mg daily  Imdur 60 mg daily   Crestor 40 mg every evening  Metoprolol 12.5 mg BID   Losartan 12.5 mg    Aspirin 81 mg daily

## 2022-03-14 ENCOUNTER — TELEPHONE (OUTPATIENT)
Dept: CARDIOLOGY | Facility: MEDICAL CENTER | Age: 87
End: 2022-03-14
Payer: MEDICARE

## 2022-03-14 NOTE — TELEPHONE ENCOUNTER
BE      Pt called in and wanted to inform BE that he will be faxing over his lab work results and a [facility] letter today to the fax: [phone].      Best contact for pt:  PH:[phone]      Thank you,  Lisa YI

## 2022-03-15 ENCOUNTER — TELEMEDICINE (OUTPATIENT)
Dept: INTERNAL MEDICINE | Facility: OTHER | Age: 87
End: 2022-03-15
Payer: MEDICARE

## 2022-03-15 DIAGNOSIS — J96.21 ACUTE ON CHRONIC RESPIRATORY FAILURE WITH HYPOXIA (HCC): ICD-10-CM

## 2022-03-15 DIAGNOSIS — N18.32 STAGE 3B CHRONIC KIDNEY DISEASE: ICD-10-CM

## 2022-03-15 DIAGNOSIS — D53.9 MACROCYTIC ANEMIA: ICD-10-CM

## 2022-03-15 PROCEDURE — 99213 OFFICE O/P EST LOW 20 MIN: CPT | Mod: GE,95 | Performed by: STUDENT IN AN ORGANIZED HEALTH CARE EDUCATION/TRAINING PROGRAM

## 2022-03-15 ASSESSMENT — PATIENT HEALTH QUESTIONNAIRE - PHQ9: CLINICAL INTERPRETATION OF PHQ2 SCORE: 0

## 2022-03-15 NOTE — PROGRESS NOTES
Teaching Physician Attestation      Level of Participation    I discussed with Dr. Foy the patient's history, exam, assessment and plan in detail.  Topics listed in my addendum were the focus of the visit.  Healthcare maintenance was not addressed this visit unless listed as a topic in my addendum.  I agree with plan as written along with the following additions/modifications:    Please note this note was inadvertantly initially signed by Dr. Goss, although I was the supervising physician for this case.  Please see below.    Telehealth Visit    Medically complex    Reports feeling better than he did at last visit overall.    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  Patient does report daily alcohol use, which could cause macrocytosis.    -recommend workup for hemolysis with retics, haptoglobin, and ldh, also repeat b12 and folate since not in our system.  Discussed with Dr. Foy    ckd  -Given creatinine increase and is on ARB, due to progression will refer to nephrology for further evaluation and management, appreciate support    pulm following for hypoxemia, apppreciate support.    Patient is taking blood pressure at home, reports well controlled at118/62.    Return to clinic in 5 weeks given medical complexity.  Emphasized importance of being seen in person for verification of blood pressure readings and management of other medical issues.

## 2022-03-15 NOTE — PROGRESS NOTES
"    Established Patient    Patient Care Team:  Stacey Foy M.D. as PCP - General (Internal Medicine)    Kobe Cyr is a 89 y.o. male who presents today with the following Chief Complaint(s): Follow up for labs. This evaluation was conducted via Zoom using secure and encrypted videoconferencing technology. The patient was physically located at [facility]  in [place], [place].   The patient's identity was confirmed and verbal consent was obtained for this telemedicine encounter.    HPI:  Mr. Cyr is a very pleasant 89-year-old male with a extremely complicated past medical history significant for severe triple-vessel disease with MI status post 3 stents in the past, currently patient does not wish for further catheterization, chronic kidney disease stage IIIb, COPD, chronic hypoxic respiratory failure, known lung nodule (stable) and known lung cavity as well as a host of other issues seen today for follow-up from labs drawn by [facility] provider?  Patient states he does not have a [facility] however several labs including CBC, CMP, vitamin B12 and folate were drawn as well as lipid panel.  Patient wished to discussed results as per letter he [facility] from the [facility].  Overall patient states that he has been feeling \"better than the last time you saw me.\"  Denying any overt hemoptysis, with stable hypoxia in the 3 to 4 L range that is chronic.  Patient denies any chronic cough, and states that he is sleeping much easier at night without an overproduction of mucus that is plagued him in the past.  Patient denies any worsening chest pain, dizziness or lightheadedness as well.  [place] and utility vehicles [place], and is scheduled soon to have a driving test with occupational therapy in an effort to get his license back.  Patient also states that his decreased mobility for which we have previously talked about on the phone he has the [facility] " that will provide him with a wheelchair.  Discussed laboratory results with the patient, including his creatinine which seems to have increased with losartan on board, EGFR still greater than 30 but creatinine now around 2 baseline was 1.5-1.8.  Patient denies any urinary symptoms on review of systems.  Patient does state that he only drinks about 2 glasses of water and maybe 1 shot of alcohol per day.  Patient also stating that he has a cardiology appointment in June that he is just scheduled as well.  Per the chart and patient had had some issues with his prescriptions not prescribed by this office, but patient states those have now been rectified.    ROS:      General: No fevers, chills, night sweats, weight loss or gain  HEENT: No hearing changes, vision changes, eye pain, ear pain, nasal discharge, sore throat  Neck: No swelling in neck  Pulmonary: As per HPI  Cardiovascular: No chest pain, palpitations, or LE swelling  GI: Patient did endorse some constipation, but had a bowel movement today after taking Colace.  No nausea, vomiting, diarrhea, abdominal pain, hematochezia or melena  : No dysuria or frequency  Neuro: No focal weakness, no general weakness, no headaches, no lightheadedness, no dizziness  Psych: No anxiety or depression    Past Medical History:   Diagnosis Date   • Anemia 8/1/2016   • Arthritis    • Back pain    • CAD (coronary artery disease)    • Cancer (HCC)     skin   • Chronic kidney disease (CKD), stage II (mild) 8/1/2016   • Coronary heart disease    • DJD (degenerative joint disease) 8/1/2016   • Dyslipidemia 8/1/2016   • GERD (gastroesophageal reflux disease) 8/1/2016   • Bengali measles    • Hematochezia 8/1/2016   • High cholesterol    • Hyperglycemia 1/6/2010   • Hyperlipidemia 5/24/2011   • Hypertension    • Impaired fasting glucose 8/1/2016   • Left knee pain 8/1/2016   • Myocardial infarction (HCC) 2 yrs ago    2005   • Nasal drainage    • Obesity    • Osteoarthritis of knees,  bilateral 2016   • Pulmonary embolism (HCC)    • Rheumatic fever    • Scarlet fever    • Smallpox    • Snoring      Social History     Tobacco Use   • Smoking status: Former Smoker     Packs/day: 2.00     Years: 35.00     Pack years: 70.00     Types: Cigarettes     Quit date: 1973     Years since quittin.9   • Smokeless tobacco: Never Used   Vaping Use   • Vaping Use: Never used   Substance Use Topics   • Alcohol use: Yes     Comment: cocktail every night   • Drug use: No     Current Outpatient Medications   Medication Sig Dispense Refill   • isosorbide mononitrate SR (IMDUR) 60 MG TABLET SR 24 HR ISOSORBIDE MONONITRATE ER 60 MG XR24H-TAB     • rosuvastatin (CRESTOR) 40 MG tablet CRESTOR 40 MG TABS     • losartan (COZAAR) 25 MG Tab Take 0.5 Tablets by mouth every day. (Patient taking differently: Take 12.5 mg by mouth 2 times a day.) 45 Tablet 3   • omeprazole (PRILOSEC) 20 MG delayed-release capsule Take 1 Capsule by mouth every day. 90 Capsule 3   • metoprolol tartrate (LOPRESSOR) 25 MG Tab Take 0.5 Tablets by mouth 2 times a day. 90 tablet 3   • aspirin 81 MG tablet Take 81 mg by mouth every day.       No current facility-administered medications for this visit.       Physical Exam:  Constitutional: Alert, no apparent distress, well-groomed.  Skin: No rashes in visible areas.  Eye: Round. Conjunctiva clear, lids normal. No icterus.   ENMT: Lips pink without lesions, good dentition, moist mucous membranes. Phonation normal.  Neck: No masses, no thyromegaly. Moves freely without pain.  Respiratory:  No labored respiratory effort, no AC muscle use. no cough or audible wheeze  Psych: Alert and oriented x3, normal affect and mood.     Assessment and Plan:     1. Stage 3b chronic kidney disease (HCC)  Patient with a history of stage IIIb chronic kidney disease, baseline creatinine around 1.5-1.8 within the last several years, no urinary symptoms concerning for acute obstruction.  Patient has been started  on losartan since his hospitalization in July 2021, could be bumped secondary to use versus worsening baseline kidney function.  This will be important to elucidate given the cardio and renal protective of an ARB versus possible worsening kidney function.  -Refer to nephrology    2. Coronary artery disease involving native coronary artery of native heart without angina pectoris  Patient with a history of multivessel disease, severe, does not want intervention.  Patient denies any chest pain, any dizziness, lightheadednes, chest pain or palpitations.  -Continue to follow with cardiology  -Patient currently medically maximized    3.  Macrocytic anemia  Patient with a history of macrocytic anemia, B12 and folate within normal limits, hemoglobin stable around 10.2 could be either anemia of chronic kidney disease or just anemia of chronic disease given underlying cardiac and pulmonary issues.  -Continue to monitor for acute blood loss clinically and symptomatically    4. Chronic respiratory failure with hypoxia (HCC)  Acute on chronic respiratory failure seems to have resolved from last visit, mechanical issue with patient's oxygen concentrator and worsening bronchitis/possible hemoptysis with severe underlying lung disease multifactorial.  -Patient on 3 L at rest will bump up to 4 as needed, without any overt shortness of breath change from baseline.  -Continue to follow with pulmonology       Return in about 5 years (around 3/15/2027).    Patient Instructions   Thank you for visiting today!  Please follow-up in 5 weeks  Please follow-up with cardiology  Please follow up with the Nephrologist, if you do not hear anything about the referral in the next 5-10 days please call our office.  Try and stay well hydrated.   If you have any questions or concerns please feel free to contact us at [phone].  If you feel like you are experiencing a medical emergency please seek immediate medical attention at an urgent care or in  the emergency department.        Stacey Foy M.D. PGY I  [facility]    This note was created using voice recognition software.  While every attempt is made to ensure accuracy of transcription, occasionally errors occur.

## 2022-03-15 NOTE — PATIENT INSTRUCTIONS
Thank you for visiting today!  Please follow-up in 5 weeks  Please follow-up with cardiology  Please follow up with the Nephrologist, if you do not hear anything about the referral in the next 5-10 days please call our office.  Try and stay well hydrated.   If you have any questions or concerns please feel free to contact us at [phone].  If you feel like you are experiencing a medical emergency please seek immediate medical attention at an urgent care or in the emergency department.

## 2022-03-17 NOTE — TELEPHONE ENCOUNTER
Message  Received: Today  Derrick Davey M.D.  Leila Keyes R.N.  Caller: Unspecified (3 days ago,  1:39 PM)  Labs look good. Continue plan of care      Previous Messages     ----- Message -----   From: Leila Keyes R.N.   Sent: 3/17/2022  10:20 AM PDT   To: Derrick Davey M.D.     ----- Message from Leila Keyes R.N. sent at 3/17/2022 10:20 AM PDT -----   Hi! Patient's recent [facility] labs in media (dated 03/17/22) for you to review per their request. Thanks!   _________________________________________________________________________    Spoke to patient over phone. Results reviewed. Patient verbalizes understanding.

## 2022-03-22 ENCOUNTER — APPOINTMENT (RX ONLY)
Dept: URBAN - NONMETROPOLITAN AREA CLINIC 15 | Facility: CLINIC | Age: 87
Setting detail: DERMATOLOGY
End: 2022-03-22

## 2022-03-22 DIAGNOSIS — L57.0 ACTINIC KERATOSIS: ICD-10-CM

## 2022-03-22 DIAGNOSIS — L82.1 OTHER SEBORRHEIC KERATOSIS: ICD-10-CM

## 2022-03-22 PROBLEM — C44.529 SQUAMOUS CELL CARCINOMA OF SKIN OF OTHER PART OF TRUNK: Status: ACTIVE | Noted: 2022-03-22

## 2022-03-22 PROCEDURE — ? COUNSELING

## 2022-03-22 PROCEDURE — ? CURETTAGE AND DESTRUCTION

## 2022-03-22 PROCEDURE — 17000 DESTRUCT PREMALG LESION: CPT | Mod: 59

## 2022-03-22 PROCEDURE — ? LIQUID NITROGEN

## 2022-03-22 PROCEDURE — 17263 DSTRJ MAL LES T/A/L 2.1-3.0: CPT

## 2022-03-22 PROCEDURE — 17003 DESTRUCT PREMALG LES 2-14: CPT

## 2022-03-22 ASSESSMENT — LOCATION SIMPLE DESCRIPTION DERM
LOCATION SIMPLE: RIGHT EAR
LOCATION SIMPLE: POSTERIOR NECK
LOCATION SIMPLE: LEFT EAR
LOCATION SIMPLE: RIGHT PRETIBIAL REGION
LOCATION SIMPLE: LEFT LOWER BACK
LOCATION SIMPLE: POSTERIOR SCALP

## 2022-03-22 ASSESSMENT — LOCATION DETAILED DESCRIPTION DERM
LOCATION DETAILED: LEFT TRIANGULAR FOSSA
LOCATION DETAILED: LEFT SUPERIOR LATERAL MIDBACK
LOCATION DETAILED: RIGHT SUPERIOR CRUS OF ANTIHELIX
LOCATION DETAILED: LEFT SUPERIOR MEDIAL MIDBACK
LOCATION DETAILED: RIGHT SUPERIOR HELIX
LOCATION DETAILED: LEFT POSTERIOR EAR
LOCATION DETAILED: MID POSTERIOR NECK
LOCATION DETAILED: LEFT POSTAURICULAR SKIN
LOCATION DETAILED: LEFT SUPERIOR HELIX
LOCATION DETAILED: RIGHT LATERAL DISTAL PRETIBIAL REGION
LOCATION DETAILED: LEFT SUPERIOR POSTERIOR HELIX

## 2022-03-22 ASSESSMENT — LOCATION ZONE DERM
LOCATION ZONE: LEG
LOCATION ZONE: SCALP
LOCATION ZONE: EAR
LOCATION ZONE: NECK
LOCATION ZONE: TRUNK

## 2022-03-22 NOTE — PROCEDURE: CURETTAGE AND DESTRUCTION
Detail Level: Detailed
Number Of Curettages: 4
Size Of Lesion In Cm: 2.5
Size Of Lesion After Curettage: 3
Add Intralesional Injection: No
Concentration (Mg/Ml Or Millions Of Plaque Forming Units/Cc): 0.01
Total Volume (Ccs): 1
Anesthesia Type: 1% lidocaine with epinephrine
Cautery Type: electrodesiccation
What Was Performed First?: Curettage
Final Size Statement: The size of the lesion after curettage was
Additional Information: (Optional): The wound was cleaned, and a pressure dressing was applied.  The patient received detailed post-op instructions.  Lesion was shaved prior to c&d.
Consent was obtained from the patient. The risks, benefits and alternatives to therapy were discussed in detail. Specifically, the risks of infection, scarring, bleeding, prolonged wound healing, nerve injury, incomplete removal, allergy to anesthesia and recurrence were addressed. Alternatives to ED&C, such as: surgical removal and XRT were also discussed.  Prior to the procedure, the treatment site was clearly identified and confirmed by the patient. All components of Universal Protocol/PAUSE Rule completed.
Post-Care Instructions: I reviewed with the patient in detail post-care instructions. Patient is to keep the area dry for 48 hours, and not to engage in any swimming until the area is healed. Should the patient develop any fevers, chills, bleeding, severe pain patient will contact the office immediately.
Bill As A Line Item Or As Units: Line Item

## 2022-03-22 NOTE — PROCEDURE: LIQUID NITROGEN
Render Note In Bullet Format When Appropriate: No
Consent: The patient's consent was obtained including but not limited to risks of crusting, scabbing, blistering, scarring, darker or lighter pigmentary change, recurrence, incomplete removal and infection.
Show Aperture Variable?: Yes
Detail Level: Detailed
Post-Care Instructions: I reviewed with the patient in detail post-care instructions. Patient is to wear sunprotection, and avoid picking at any of the treated lesions. Pt may apply Vaseline to crusted or scabbing areas.
Duration Of Freeze Thaw-Cycle (Seconds): 0
Medical Necessity Information: It is in your best interest to select a reason for this procedure from the list below. All of these items fulfill various CMS LCD requirements except the new and changing color options.
Medical Necessity Clause: This procedure was medically necessary because the lesions that were treated were:  If lesion does not resolve, bx is needed.
Spray Paint Text: The liquid nitrogen was applied to the skin utilizing a spray paint frosting technique.

## 2022-04-07 ENCOUNTER — TELEPHONE (OUTPATIENT)
Dept: INTERNAL MEDICINE | Facility: OTHER | Age: 87
End: 2022-04-07
Payer: MEDICARE

## 2022-04-08 ENCOUNTER — OCCUPATIONAL THERAPY (OUTPATIENT)
Dept: OCCUPATIONAL THERAPY | Facility: REHABILITATION | Age: 87
End: 2022-04-08
Attending: INTERNAL MEDICINE
Payer: MEDICARE

## 2022-04-08 DIAGNOSIS — Z91.89 DRIVING SAFETY ISSUE: ICD-10-CM

## 2022-04-08 PROCEDURE — 97750 PHYSICAL PERFORMANCE TEST: CPT

## 2022-04-08 ASSESSMENT — BALANCE ASSESSMENTS
BALANCE - SITTING DYNAMIC: FAIR +
BALANCE - SITTING STATIC: GOOD
BALANCE - STANDING DYNAMIC: FAIR -
BALANCE - STANDING STATIC: FAIR

## 2022-04-08 NOTE — TELEPHONE ENCOUNTER
Spoke to pt he stated he does not know which medication is causing the constipation. Has been having this issue for 3-4 months and thought it would go away but it is not. Taking over the counter [facility] stool softener and it is not helping. Would like Dr. Foy to review his meds and advise what can be con

## 2022-04-08 NOTE — TELEPHONE ENCOUNTER
VOICEMAIL  1. Caller Name: Milad Cyr                      Call Back Number: [phone]     2. Message: pt called stating he is having issues with constipation with his medications. He is sick of it and would like to know if Dr. Foy can change his medication
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
## 2022-04-13 ENCOUNTER — PATIENT MESSAGE (OUTPATIENT)
Dept: SLEEP MEDICINE | Facility: MEDICAL CENTER | Age: 87
End: 2022-04-13
Payer: MEDICARE

## 2022-04-18 NOTE — PATIENT COMMUNICATION
"Pt called requesting we \"release him to DMV to he can drive\"    I advised him the his he needs us to sign off on something then he will need to provide the form to us.  "

## 2022-04-21 ENCOUNTER — TELEMEDICINE (OUTPATIENT)
Dept: INTERNAL MEDICINE | Facility: OTHER | Age: 87
End: 2022-04-21
Payer: MEDICARE

## 2022-04-21 VITALS
DIASTOLIC BLOOD PRESSURE: 51 MMHG | SYSTOLIC BLOOD PRESSURE: 126 MMHG | WEIGHT: 218 LBS | BODY MASS INDEX: 30.4 KG/M2 | OXYGEN SATURATION: 92 % | TEMPERATURE: 97.1 F | HEART RATE: 60 BPM

## 2022-04-21 DIAGNOSIS — K59.04 CHRONIC IDIOPATHIC CONSTIPATION: ICD-10-CM

## 2022-04-21 DIAGNOSIS — D53.9 MACROCYTIC ANEMIA: ICD-10-CM

## 2022-04-21 DIAGNOSIS — R05.9 COUGH: ICD-10-CM

## 2022-04-21 DIAGNOSIS — N18.31 STAGE 3A CHRONIC KIDNEY DISEASE: ICD-10-CM

## 2022-04-21 PROCEDURE — 99213 OFFICE O/P EST LOW 20 MIN: CPT | Mod: 95,GE | Performed by: STUDENT IN AN ORGANIZED HEALTH CARE EDUCATION/TRAINING PROGRAM

## 2022-04-21 RX ORDER — FLUTICASONE PROPIONATE 50 MCG
1 SPRAY, SUSPENSION (ML) NASAL DAILY
Qty: 16 G | Refills: 2 | Status: SHIPPED | OUTPATIENT
Start: 2022-04-21 | End: 2022-09-12

## 2022-04-21 RX ORDER — CLOPIDOGREL BISULFATE 75 MG/1
75 TABLET ORAL DAILY
COMMUNITY
End: 2022-06-02 | Stop reason: SDUPTHER

## 2022-04-21 ASSESSMENT — FIBROSIS 4 INDEX: FIB4 SCORE: 3.51

## 2022-04-21 NOTE — PATIENT INSTRUCTIONS
Thank you for visiting today!  Please follow-up in 5-10 weeks  Please follow-up with nephrology and blood work   If you notice a change in your sputum color or production, fevers, chills, or night sweats, please seek medical attention immediately   If you have any questions or concerns please feel free to contact us at [phone].  If you feel like you are experiencing a medical emergency please seek immediate medical attention at an urgent care or in the emergency department.

## 2022-04-21 NOTE — PROGRESS NOTES
"        Established Patient    Patient Care Team:  Stacey Foy M.D. as PCP - General (Internal Medicine)    Kobe Cyr is a 89 y.o. male who presents today with the following Chief Complaint(s): Follow up for Diagnoses of Stage 3a chronic kidney disease (HCC), Cough, Macrocytic anemia, and Chronic idiopathic constipation were pertinent to this visit.    HPI:  1. Stage 3a chronic kidney disease (HCC)  Patient with no changes in urination frequency, dysuria, or volume, patient to see nephrology on 4/29/2022, patient did not want to travel to [place], explained the importance in necessity for nephrology to be on board in patient's care given complex medical status.  Patient agreeable for follow-up.    2. Cough  Patient with a chronic cough, greater than 6 months, previously hemoptysis which has resolved, patient with symptoms of worsening cough in morning, whitish sputum chronically expectorated without new change in production or quality.  Patient states better with food, though unclear significance.  Patient denies any recent fevers, chills, night sweats, chest pain, worsening shortness of breath abdominal baseline oxygen need.  Patient with omeprazole on his history however patient on sure if he is taking, not taking anything over-the-counter's for his allergy medications or Flonase currently.  Patient does have complex pulm neurological history, currently follows with pulmonology.    3. Macrocytic anemia  Patient with a chronic macrocytic anemia, stable last hemoglobin 11.7 MCV of 102, has been chronic longer than 2 years, no recent changes, no recent dizziness lightheadedness beyond baseline.  Patient has not noticed any pallor.  Patient continues to drink alcoholic beverages, \"I like my gamut in the afternoon.\"    4. Chronic idiopathic constipation  Patient with complaints on telephone note of constipation approximately 1 month ago, patient is started taking [facility] stool softener at my discretion, " "eating a varied diet with fiber, \"I eat a lot of salads.\"  Patient now regular with stool softener, discussed that none of his medications were likely, patient does on review of systems no day decreased appetite secondary to poor movement, which he hopes to rectify when he gets his license back, after recent past 's test and contacting pulmonology.  Patient denies any B symptoms, no hematochezia, melena, or abdominal pain      ROS:     General: No fevers, chills, night sweats, weight loss or gain  HEENT: No hearing changes, vision changes, eye pain, ear pain, nasal discharge, sore throat  Neck: No swelling in neck  Pulmonary: as per hpi  Cardiovascular: No chest pain, palpitations, or LE swelling  GI: No nausea, vomiting, diarrhea, constipation, abdominal pain, hematochezia or melena  : No dysuria or frequency  Neuro: No focal weakness, no general weakness, no headaches, no lightheadedness, no dizziness  Psych: No anxiety or depression    Past Medical History:   Diagnosis Date   • Anemia 8/1/2016   • Arthritis    • Back pain    • CAD (coronary artery disease)    • Cancer (HCC)     skin   • Chronic kidney disease (CKD), stage II (mild) 8/1/2016   • Coronary heart disease    • DJD (degenerative joint disease) 8/1/2016   • Dyslipidemia 8/1/2016   • GERD (gastroesophageal reflux disease) 8/1/2016   • Syriac measles    • Hematochezia 8/1/2016   • High cholesterol    • Hyperglycemia 1/6/2010   • Hyperlipidemia 5/24/2011   • Hypertension    • Impaired fasting glucose 8/1/2016   • Left knee pain 8/1/2016   • Myocardial infarction (HCC) 2 yrs ago    2005   • Nasal drainage    • Obesity    • Osteoarthritis of knees, bilateral 8/1/2016   • Pulmonary embolism (HCC)    • Rheumatic fever    • Scarlet fever    • Smallpox    • Snoring      Social History     Tobacco Use   • Smoking status: Former Smoker     Packs/day: 2.00     Years: 35.00     Pack years: 70.00     Types: Cigarettes     Quit date: 4/17/1973     Years since " quittin.0   • Smokeless tobacco: Never Used   Vaping Use   • Vaping Use: Never used   Substance Use Topics   • Alcohol use: Yes     Comment: cocktail every night   • Drug use: No     Current Outpatient Medications   Medication Sig Dispense Refill   • clopidogrel (PLAVIX) 75 MG Tab Take 75 mg by mouth every day.     • fluticasone (FLONASE) 50 MCG/ACT nasal spray Administer 1 Spray into affected nostril(S) every day. 16 g 2   • isosorbide mononitrate SR (IMDUR) 60 MG TABLET SR 24 HR ISOSORBIDE MONONITRATE ER 60 MG XR24H-TAB     • rosuvastatin (CRESTOR) 40 MG tablet Take 40 mg by mouth every day.     • losartan (COZAAR) 25 MG Tab Take 0.5 Tablets by mouth every day. (Patient taking differently: Take 12.5 mg by mouth 2 times a day.) 45 Tablet 3   • omeprazole (PRILOSEC) 20 MG delayed-release capsule Take 1 Capsule by mouth every day. 90 Capsule 3   • metoprolol tartrate (LOPRESSOR) 25 MG Tab Take 0.5 Tablets by mouth 2 times a day. 90 tablet 3   • aspirin 81 MG tablet Take 81 mg by mouth every day.       No current facility-administered medications for this visit.       Physical Exam:  /51   Pulse 60   Temp 36.2 °C (97.1 °F) (Temporal)   Wt 98.9 kg (218 lb)   SpO2 92%   BMI 30.40 kg/m²   General: Well developed, well nourished male, in no distress.  HEENT: NC/AT, PERRL, EOMI, no scleral icterus or conjunctival pallor, fair dentition/denture in, no nasal discharge or oral erythema or exudates.   Neck: Supple, No cervical or supraclavicular LAD  CV:RRR, no murmurs gallops or Rubs, no JVD  Pulm: LCAB, no crackles, rales, rhonchi, or wheezing  GI: Normal bowel sounds, abdomen soft, nontender, nondistended to deep or light palpation in all 4 quadrants, no HSM.  MSK: Radial and dorsalis pedis pulses 2+ and equal bilaterally, respectively.  Strength 5 out of 5 in upper and lower extremities.  No lower extremity edema  Neuro: Patient is alert and oriented x3, no focal deficits  Psych: Appropriate mood and affect        Assessment and Plan:   1. Stage 3a chronic kidney disease (HCC)  -Encourage nephrology follow-up given patient on losartan, weigh in on cardiovascular versus renal protection on medications and help guide future treatment.    2. Cough  Patient with cough, low concern for infectious etiology is chronic in nature, has known high oxygen use of greater than 4 L, will wear humidifier, does not appear to be related to GERD given omeprazole use, though compliance questionable, will try Flonase.  - fluticasone (FLONASE) 50 MCG/ACT nasal spray; Administer 1 Spray into affected nostril(S) every day.  Dispense: 16 g; Refill: 2  -Continue humidification, nasal humidification on oxygen  3. Macrocytic anemia  Likely secondary to alcohol use and history of chronic kidney disease on top of chronic cardiovascular and pulmonic disease.  - RETICULOCYTES COUNT; Future  - LDH; Future  - HAPTOGLOBIN; Future    4. Chronic idiopathic constipation  Constipation appears resolved at this point  - Continue OTC stool softeners, patient now having regular bowel movements without pain.       Return in about 5 weeks (around 5/26/2022) for Long.    Patient Instructions   Thank you for visiting today!  Please follow-up in 5-10 weeks  Please follow-up with nephrology and blood work   If you notice a change in your sputum color or production, fevers, chills, or night sweats, please seek medical attention immediately   If you have any questions or concerns please feel free to contact us at [phone].  If you feel like you are experiencing a medical emergency please seek immediate medical attention at an urgent care or in the emergency department.        Stacey Foy M.D. PGY I  [facility]    This note was created using voice recognition software.  While every attempt is made to ensure accuracy of transcription, occasionally errors occur.

## 2022-06-02 ENCOUNTER — TELEMEDICINE (OUTPATIENT)
Dept: CARDIOLOGY | Facility: MEDICAL CENTER | Age: 87
End: 2022-06-02
Payer: MEDICARE

## 2022-06-02 ENCOUNTER — TELEPHONE (OUTPATIENT)
Dept: CARDIOLOGY | Facility: MEDICAL CENTER | Age: 87
End: 2022-06-02
Payer: MEDICARE

## 2022-06-02 VITALS
HEIGHT: 71 IN | SYSTOLIC BLOOD PRESSURE: 118 MMHG | DIASTOLIC BLOOD PRESSURE: 66 MMHG | HEART RATE: 89 BPM | BODY MASS INDEX: 30.52 KG/M2 | WEIGHT: 218 LBS

## 2022-06-02 DIAGNOSIS — I25.10 CORONARY ARTERY DISEASE INVOLVING NATIVE CORONARY ARTERY OF NATIVE HEART WITHOUT ANGINA PECTORIS: ICD-10-CM

## 2022-06-02 DIAGNOSIS — I10 ESSENTIAL HYPERTENSION: Chronic | ICD-10-CM

## 2022-06-02 DIAGNOSIS — J96.21 ACUTE ON CHRONIC RESPIRATORY FAILURE WITH HYPOXIA (HCC): ICD-10-CM

## 2022-06-02 DIAGNOSIS — I35.0 MILD AORTIC STENOSIS: ICD-10-CM

## 2022-06-02 DIAGNOSIS — J44.9 CHRONIC OBSTRUCTIVE PULMONARY DISEASE, UNSPECIFIED COPD TYPE (HCC): ICD-10-CM

## 2022-06-02 PROCEDURE — 99214 OFFICE O/P EST MOD 30 MIN: CPT | Performed by: NURSE PRACTITIONER

## 2022-06-02 RX ORDER — ROSUVASTATIN CALCIUM 40 MG/1
40 TABLET, COATED ORAL EVERY EVENING
Qty: 90 TABLET | Refills: 3 | Status: SHIPPED | OUTPATIENT
Start: 2022-06-02

## 2022-06-02 RX ORDER — CLOPIDOGREL BISULFATE 75 MG/1
75 TABLET ORAL DAILY
Qty: 90 TABLET | Refills: 3 | Status: ON HOLD | OUTPATIENT
Start: 2022-06-02 | End: 2022-06-27

## 2022-06-02 RX ORDER — LOSARTAN POTASSIUM 25 MG/1
12.5 TABLET ORAL 2 TIMES DAILY
Qty: 90 TABLET | Refills: 3 | Status: ON HOLD | OUTPATIENT
Start: 2022-06-02 | End: 2022-06-27

## 2022-06-02 RX ORDER — ISOSORBIDE MONONITRATE 60 MG/1
60 TABLET, EXTENDED RELEASE ORAL EVERY MORNING
Qty: 90 TABLET | Refills: 3 | Status: ON HOLD | OUTPATIENT
Start: 2022-06-02 | End: 2022-06-27

## 2022-06-02 ASSESSMENT — ENCOUNTER SYMPTOMS
ORTHOPNEA: 0
PALPITATIONS: 0
FEVER: 0
SHORTNESS OF BREATH: 1
MYALGIAS: 0
DIZZINESS: 0
PND: 0
ABDOMINAL PAIN: 0
SPUTUM PRODUCTION: 1
COUGH: 1
CLAUDICATION: 0

## 2022-06-02 ASSESSMENT — FIBROSIS 4 INDEX: FIB4 SCORE: 3.55

## 2022-06-02 NOTE — PATIENT INSTRUCTIONS
We will get labs and see you back in 3 months.    We will want to get you a pulmonary appointment to review your symptoms further.    Please call us if you start to develop any heart symptoms such as chest pain, jaw pain, or left arm pain with exertional activity.

## 2022-06-02 NOTE — PROGRESS NOTES
Telemedicine: Established Patient   This evaluation was conducted via Zoom using secure and encrypted videoconferencing technology. The patient was in their home in the [facility].    The patient's identity was confirmed and verbal consent was obtained for this virtual visit.    Subjective:   CC:   Chief Complaint   Patient presents with   • Coronary Artery Disease     F/V Dx: Coronary artery disease involving native coronary artery of native heart without angina pectoris   • Hyperlipidemia   • MI (Non ST Segment Elevation MI)     Kobe Cyr is a 89 y.o. male presenting for evaluation and management of:    To review shortness of breath and follow up on CAD.    He is a patient of Dr. Davey in our office. Hx of CAD (L main stenosis with NSTEMI in '21-medical management), HLD, HTN, COPD with chronic respiratory failure now oxygen dependent, and mild aortic stenosis.    He presents today virtually from [place]. He overall is doing well but in his usual shortness of breath state from his lungs is limited by this. Short of breath with minimal exertion.    He has a daily cough with congestion.    He has not seen pulmonary in some time.    He has no chest pain, edema, dizziness/lightheadedness, or palpitations.      Review of Systems   Constitutional: Positive for malaise/fatigue. Negative for fever.        Exertional   Respiratory: Positive for cough, sputum production and shortness of breath.    Cardiovascular: Negative for chest pain, palpitations, orthopnea, claudication, leg swelling and PND.   Gastrointestinal: Negative for abdominal pain.   Musculoskeletal: Negative for myalgias.   Neurological: Negative for dizziness.         Allergies   Allergen Reactions   • Atorvastatin Myalgia   • Simvastatin      ADVERSE REACTION: MYALGIA.       Current medicines (including changes today)  Current Outpatient Medications   Medication Sig Dispense Refill   • metoprolol tartrate (LOPRESSOR) 25 MG Tab Take 0.5 Tablets by  mouth 2 times a day. 180 Tablet 3   • isosorbide mononitrate SR (IMDUR) 60 MG TABLET SR 24 HR Take 1 Tablet by mouth every morning. 90 Tablet 3   • clopidogrel (PLAVIX) 75 MG Tab Take 1 Tablet by mouth every day. 90 Tablet 3   • rosuvastatin (CRESTOR) 40 MG tablet Take 1 Tablet by mouth every evening. 90 Tablet 3   • losartan (COZAAR) 25 MG Tab Take 0.5 Tablets by mouth 2 times a day. 90 Tablet 3   • fluticasone (FLONASE) 50 MCG/ACT nasal spray Administer 1 Spray into affected nostril(S) every day. 16 g 2   • omeprazole (PRILOSEC) 20 MG delayed-release capsule Take 1 Capsule by mouth every day. 90 Capsule 3   • aspirin 81 MG tablet Take 81 mg by mouth every day.       No current facility-administered medications for this visit.       Patient Active Problem List    Diagnosis Date Noted   • Exertional shortness of breath 07/12/2021     Priority: Medium   • Pulmonary HTN ([facility]) 07/12/2021     Priority: Medium   • Mild aortic stenosis 07/11/2021     Priority: Medium   • Acute on chronic respiratory failure with hypoxia ([facility]) 07/10/2021     Priority: Medium   • COPD (chronic obstructive pulmonary disease) ([facility]) 07/10/2021     Priority: Medium   • Essential hypertension 04/20/2016     Priority: Medium   • Stented coronary artery 08/29/2013     Priority: Medium   • NSTEMI (non-ST elevated myocardial infarction) ([facility])      Priority: Medium   • CAD (coronary artery disease) 03/15/2012     Priority: Medium   • Hyperlipidemia 05/24/2011     Priority: Medium   • Acute gout of foot 05/10/2018   • Other fatigue 10/25/2017   • Chronic left shoulder pain 08/24/2017   • Abnormal chest x-ray 02/24/2017   • Bilateral carotid artery stenosis 02/24/2017   • Vitamin D deficiency disease 01/19/2017   • Cough 09/23/2016   • Stage 3b chronic kidney disease (HCC) 08/24/2016   • Bilateral low back pain with left-sided sciatica 08/18/2016   • Leg pain, bilateral 08/01/2016   • Impaired fasting glucose 08/01/2016   • Hemoptysis 08/01/2016   •  "Osteoarthritis of knees, bilateral 08/01/2016   • GERD (gastroesophageal reflux disease) 08/01/2016   • Anemia 08/01/2016       Family History   Problem Relation Age of Onset   • No Known Problems Mother    • No Known Problems Father        He  has a past medical history of Anemia (8/1/2016), Arthritis, Back pain, CAD (coronary artery disease), Cancer (HCC), Chronic kidney disease (CKD), stage II (mild) (8/1/2016), Coronary heart disease, DJD (degenerative joint disease) (8/1/2016), Dyslipidemia (8/1/2016), GERD (gastroesophageal reflux disease) (8/1/2016), Malay measles, Hematochezia (8/1/2016), High cholesterol, Hyperglycemia (1/6/2010), Hyperlipidemia (5/24/2011), Hypertension, Impaired fasting glucose (8/1/2016), Left knee pain (8/1/2016), Myocardial infarction (HCC) (2 yrs ago), Nasal drainage, Obesity, Osteoarthritis of knees, bilateral (8/1/2016), Pulmonary embolism (HCC), Rheumatic fever, Scarlet fever, Smallpox, and Snoring.  He  has a past surgical history that includes coronary stent procedure lad; knee unicompartmental (10/13/2009); other orthopedic surgery; and arthroscopy, knee.       Objective:   /66 (BP Location: Left arm, Patient Position: Sitting, BP Cuff Size: Adult)   Pulse 89   Ht 1.803 m (5' 11\")   Wt 98.9 kg (218 lb)   BMI 30.40 kg/m²     Physical Exam  Vitals and nursing note reviewed.   Constitutional:       Appearance: Normal appearance.   HENT:      Head: Normocephalic and atraumatic.   Neurological:      General: No focal deficit present.      Mental Status: He is alert and oriented to person, place, and time. Mental status is at baseline.   Psychiatric:         Mood and Affect: Mood normal.         Behavior: Behavior normal.         Thought Content: Thought content normal.         Judgment: Judgment normal.         Assessment and Plan:   The following treatment plan was discussed:     1. Essential hypertension  - CBC WITHOUT DIFFERENTIAL; Future    2. Mild aortic " stenosis    3. Chronic obstructive pulmonary disease, unspecified COPD type (HCC)    4. Coronary artery disease involving native coronary artery of native heart without angina pectoris  - Lipid Profile; Future  - Comp Metabolic Panel; Future  - CBC WITHOUT DIFFERENTIAL; Future    5. Acute on chronic respiratory failure with hypoxia (HCC)  - CBC WITHOUT DIFFERENTIAL; Future    Other orders  - metoprolol tartrate (LOPRESSOR) 25 MG Tab; Take 0.5 Tablets by mouth 2 times a day.  Dispense: 180 Tablet; Refill: 3  - isosorbide mononitrate SR (IMDUR) 60 MG TABLET SR 24 HR; Take 1 Tablet by mouth every morning.  Dispense: 90 Tablet; Refill: 3  - clopidogrel (PLAVIX) 75 MG Tab; Take 1 Tablet by mouth every day.  Dispense: 90 Tablet; Refill: 3  - rosuvastatin (CRESTOR) 40 MG tablet; Take 1 Tablet by mouth every evening.  Dispense: 90 Tablet; Refill: 3  - losartan (COZAAR) 25 MG Tab; Take 0.5 Tablets by mouth 2 times a day.  Dispense: 90 Tablet; Refill: 3        Follow-up: Return in about 3 months (around 9/2/2022) for Follow up with Theodora BRADEN in 3 months with labs .         1. HLD with CAD  -medical management due to co-morbidities and complexity of PCI per last discussion with Dr. Davey  -no anginal symptoms but is limited by his COPD  -recommend cont anti-anginal imdur, bb, arb, asa, plavix, and statin  -follow symptoms, call for concerns  -ordered labs for review    2. COPD  -recommend apt with pulmonary for review of symptoms  -he will reach out to their office for apt    3. HTN  -good control on metoprolol, losartan, and imdur  -BP goal <130/80  -follow at home    Patient is to follow up with Theodora BRADEN or Dr. Davey in 3 months with review of symptoms and labs.

## 2022-06-02 NOTE — TELEPHONE ENCOUNTER
Per [place],      Patient provided fax number to fax over lab orders from todays [facility]. Labs faxed to [phone]

## 2022-06-19 ENCOUNTER — HOSPITAL ENCOUNTER (INPATIENT)
Facility: MEDICAL CENTER | Age: 87
LOS: 8 days | DRG: 242 | End: 2022-06-28
Attending: EMERGENCY MEDICINE | Admitting: STUDENT IN AN ORGANIZED HEALTH CARE EDUCATION/TRAINING PROGRAM
Payer: MEDICARE

## 2022-06-19 ENCOUNTER — APPOINTMENT (OUTPATIENT)
Dept: RADIOLOGY | Facility: MEDICAL CENTER | Age: 87
DRG: 242 | End: 2022-06-19
Attending: EMERGENCY MEDICINE
Payer: MEDICARE

## 2022-06-19 DIAGNOSIS — R06.02 EXERTIONAL SHORTNESS OF BREATH: ICD-10-CM

## 2022-06-19 DIAGNOSIS — M54.42 CHRONIC BILATERAL LOW BACK PAIN WITH LEFT-SIDED SCIATICA: ICD-10-CM

## 2022-06-19 DIAGNOSIS — I25.10 CORONARY ARTERY DISEASE DUE TO LIPID RICH PLAQUE: ICD-10-CM

## 2022-06-19 DIAGNOSIS — I21.4 NSTEMI (NON-ST ELEVATED MYOCARDIAL INFARCTION) (HCC): ICD-10-CM

## 2022-06-19 DIAGNOSIS — G89.29 CHRONIC BILATERAL LOW BACK PAIN WITH LEFT-SIDED SCIATICA: ICD-10-CM

## 2022-06-19 DIAGNOSIS — R55 SYNCOPE AND COLLAPSE: ICD-10-CM

## 2022-06-19 DIAGNOSIS — I25.83 CORONARY ARTERY DISEASE DUE TO LIPID RICH PLAQUE: ICD-10-CM

## 2022-06-19 DIAGNOSIS — K59.00 CONSTIPATION, UNSPECIFIED CONSTIPATION TYPE: ICD-10-CM

## 2022-06-19 PROBLEM — R60.0 LEG EDEMA: Status: RESOLVED | Noted: 2022-06-19 | Resolved: 2022-06-19

## 2022-06-19 PROBLEM — N17.9 ACUTE KIDNEY INJURY SUPERIMPOSED ON CHRONIC KIDNEY DISEASE (HCC): Status: ACTIVE | Noted: 2022-06-19

## 2022-06-19 PROBLEM — N18.9 ACUTE KIDNEY INJURY SUPERIMPOSED ON CHRONIC KIDNEY DISEASE (HCC): Status: ACTIVE | Noted: 2022-06-19

## 2022-06-19 PROBLEM — R79.89 ELEVATED TROPONIN: Status: ACTIVE | Noted: 2022-06-19

## 2022-06-19 PROBLEM — R60.0 LEG EDEMA: Status: ACTIVE | Noted: 2022-06-19

## 2022-06-19 LAB
ALBUMIN SERPL BCP-MCNC: 3.5 G/DL (ref 3.2–4.9)
ALBUMIN/GLOB SERPL: 1 G/DL
ALP SERPL-CCNC: 58 U/L (ref 30–99)
ALT SERPL-CCNC: 15 U/L (ref 2–50)
ANION GAP SERPL CALC-SCNC: 9 MMOL/L (ref 7–16)
AST SERPL-CCNC: 22 U/L (ref 12–45)
BASOPHILS # BLD AUTO: 0.8 % (ref 0–1.8)
BASOPHILS # BLD: 0.05 K/UL (ref 0–0.12)
BILIRUB SERPL-MCNC: 0.4 MG/DL (ref 0.1–1.5)
BUN SERPL-MCNC: 30 MG/DL (ref 8–22)
CALCIUM SERPL-MCNC: 9 MG/DL (ref 8.5–10.5)
CHLORIDE SERPL-SCNC: 103 MMOL/L (ref 96–112)
CK SERPL-CCNC: 121 U/L (ref 0–154)
CK SERPL-CCNC: 81 U/L (ref 0–154)
CO2 SERPL-SCNC: 25 MMOL/L (ref 20–33)
CREAT SERPL-MCNC: 2.06 MG/DL (ref 0.5–1.4)
D DIMER PPP IA.FEU-MCNC: 3.27 UG/ML (FEU) (ref 0–0.5)
EKG IMPRESSION: NORMAL
EOSINOPHIL # BLD AUTO: 0.07 K/UL (ref 0–0.51)
EOSINOPHIL NFR BLD: 1.1 % (ref 0–6.9)
ERYTHROCYTE [DISTWIDTH] IN BLOOD BY AUTOMATED COUNT: 58.2 FL (ref 35.9–50)
GFR SERPLBLD CREATININE-BSD FMLA CKD-EPI: 30 ML/MIN/1.73 M 2
GLOBULIN SER CALC-MCNC: 3.5 G/DL (ref 1.9–3.5)
GLUCOSE SERPL-MCNC: 113 MG/DL (ref 65–99)
HCT VFR BLD AUTO: 29.6 % (ref 42–52)
HGB BLD-MCNC: 9.1 G/DL (ref 14–18)
IMM GRANULOCYTES # BLD AUTO: 0.06 K/UL (ref 0–0.11)
IMM GRANULOCYTES NFR BLD AUTO: 1 % (ref 0–0.9)
LYMPHOCYTES # BLD AUTO: 0.66 K/UL (ref 1–4.8)
LYMPHOCYTES NFR BLD: 10.7 % (ref 22–41)
MCH RBC QN AUTO: 34.3 PG (ref 27–33)
MCHC RBC AUTO-ENTMCNC: 30.7 G/DL (ref 33.7–35.3)
MCV RBC AUTO: 111.7 FL (ref 81.4–97.8)
MONOCYTES # BLD AUTO: 0.6 K/UL (ref 0–0.85)
MONOCYTES NFR BLD AUTO: 9.7 % (ref 0–13.4)
NEUTROPHILS # BLD AUTO: 4.75 K/UL (ref 1.82–7.42)
NEUTROPHILS NFR BLD: 76.7 % (ref 44–72)
NRBC # BLD AUTO: 0 K/UL
NRBC BLD-RTO: 0 /100 WBC
PLATELET # BLD AUTO: 148 K/UL (ref 164–446)
PMV BLD AUTO: 10.1 FL (ref 9–12.9)
POTASSIUM SERPL-SCNC: 4.4 MMOL/L (ref 3.6–5.5)
PROT SERPL-MCNC: 7 G/DL (ref 6–8.2)
RBC # BLD AUTO: 2.65 M/UL (ref 4.7–6.1)
SODIUM SERPL-SCNC: 137 MMOL/L (ref 135–145)
TROPONIN T SERPL-MCNC: 190 NG/L (ref 6–19)
TROPONIN T SERPL-MCNC: 204 NG/L (ref 6–19)
TSH SERPL DL<=0.005 MIU/L-ACNC: 5.17 UIU/ML (ref 0.38–5.33)
WBC # BLD AUTO: 6.2 K/UL (ref 4.8–10.8)

## 2022-06-19 PROCEDURE — 72125 CT NECK SPINE W/O DYE: CPT | Mod: ME

## 2022-06-19 PROCEDURE — G0378 HOSPITAL OBSERVATION PER HR: HCPCS

## 2022-06-19 PROCEDURE — 93005 ELECTROCARDIOGRAM TRACING: CPT | Performed by: EMERGENCY MEDICINE

## 2022-06-19 PROCEDURE — 80053 COMPREHEN METABOLIC PANEL: CPT

## 2022-06-19 PROCEDURE — 99285 EMERGENCY DEPT VISIT HI MDM: CPT

## 2022-06-19 PROCEDURE — 700102 HCHG RX REV CODE 250 W/ 637 OVERRIDE(OP): Performed by: STUDENT IN AN ORGANIZED HEALTH CARE EDUCATION/TRAINING PROGRAM

## 2022-06-19 PROCEDURE — 99220 PR INITIAL OBSERVATION CARE,LEVL III: CPT | Performed by: STUDENT IN AN ORGANIZED HEALTH CARE EDUCATION/TRAINING PROGRAM

## 2022-06-19 PROCEDURE — 96365 THER/PROPH/DIAG IV INF INIT: CPT

## 2022-06-19 PROCEDURE — 700105 HCHG RX REV CODE 258: Performed by: EMERGENCY MEDICINE

## 2022-06-19 PROCEDURE — 84443 ASSAY THYROID STIM HORMONE: CPT

## 2022-06-19 PROCEDURE — 85379 FIBRIN DEGRADATION QUANT: CPT

## 2022-06-19 PROCEDURE — 70450 CT HEAD/BRAIN W/O DYE: CPT | Mod: ME

## 2022-06-19 PROCEDURE — 700105 HCHG RX REV CODE 258: Performed by: STUDENT IN AN ORGANIZED HEALTH CARE EDUCATION/TRAINING PROGRAM

## 2022-06-19 PROCEDURE — 84484 ASSAY OF TROPONIN QUANT: CPT

## 2022-06-19 PROCEDURE — 82550 ASSAY OF CK (CPK): CPT

## 2022-06-19 PROCEDURE — 85025 COMPLETE CBC W/AUTO DIFF WBC: CPT

## 2022-06-19 PROCEDURE — 36415 COLL VENOUS BLD VENIPUNCTURE: CPT

## 2022-06-19 PROCEDURE — 700111 HCHG RX REV CODE 636 W/ 250 OVERRIDE (IP): Performed by: STUDENT IN AN ORGANIZED HEALTH CARE EDUCATION/TRAINING PROGRAM

## 2022-06-19 PROCEDURE — A9270 NON-COVERED ITEM OR SERVICE: HCPCS | Performed by: STUDENT IN AN ORGANIZED HEALTH CARE EDUCATION/TRAINING PROGRAM

## 2022-06-19 RX ORDER — ONDANSETRON 2 MG/ML
4 INJECTION INTRAMUSCULAR; INTRAVENOUS EVERY 4 HOURS PRN
Status: DISCONTINUED | OUTPATIENT
Start: 2022-06-19 | End: 2022-06-28 | Stop reason: HOSPADM

## 2022-06-19 RX ORDER — POLYETHYLENE GLYCOL 3350 17 G/17G
1 POWDER, FOR SOLUTION ORAL
Status: DISCONTINUED | OUTPATIENT
Start: 2022-06-19 | End: 2022-06-28 | Stop reason: HOSPADM

## 2022-06-19 RX ORDER — SODIUM CHLORIDE 9 MG/ML
INJECTION, SOLUTION INTRAVENOUS CONTINUOUS
Status: ACTIVE | OUTPATIENT
Start: 2022-06-19 | End: 2022-06-20

## 2022-06-19 RX ORDER — SODIUM CHLORIDE 9 MG/ML
1000 INJECTION, SOLUTION INTRAVENOUS ONCE
Status: DISCONTINUED | OUTPATIENT
Start: 2022-06-19 | End: 2022-06-19

## 2022-06-19 RX ORDER — ONDANSETRON 4 MG/1
4 TABLET, ORALLY DISINTEGRATING ORAL EVERY 4 HOURS PRN
Status: DISCONTINUED | OUTPATIENT
Start: 2022-06-19 | End: 2022-06-28 | Stop reason: HOSPADM

## 2022-06-19 RX ORDER — MAGNESIUM SULFATE 1 G/100ML
1 INJECTION INTRAVENOUS ONCE
Status: COMPLETED | OUTPATIENT
Start: 2022-06-19 | End: 2022-06-19

## 2022-06-19 RX ORDER — FLUTICASONE PROPIONATE 50 MCG
1 SPRAY, SUSPENSION (ML) NASAL DAILY
Status: DISCONTINUED | OUTPATIENT
Start: 2022-06-20 | End: 2022-06-19

## 2022-06-19 RX ORDER — NITROGLYCERIN 0.4 MG/1
0.4 TABLET SUBLINGUAL
Status: DISCONTINUED | OUTPATIENT
Start: 2022-06-19 | End: 2022-06-28 | Stop reason: HOSPADM

## 2022-06-19 RX ORDER — SODIUM CHLORIDE 9 MG/ML
1000 INJECTION, SOLUTION INTRAVENOUS ONCE
Status: COMPLETED | OUTPATIENT
Start: 2022-06-19 | End: 2022-06-19

## 2022-06-19 RX ORDER — HYDRALAZINE HYDROCHLORIDE 20 MG/ML
10 INJECTION INTRAMUSCULAR; INTRAVENOUS EVERY 4 HOURS PRN
Status: DISCONTINUED | OUTPATIENT
Start: 2022-06-19 | End: 2022-06-28 | Stop reason: HOSPADM

## 2022-06-19 RX ORDER — ISOSORBIDE MONONITRATE 30 MG/1
60 TABLET, EXTENDED RELEASE ORAL EVERY MORNING
Status: DISCONTINUED | OUTPATIENT
Start: 2022-06-20 | End: 2022-06-20

## 2022-06-19 RX ORDER — CLOPIDOGREL BISULFATE 75 MG/1
75 TABLET ORAL DAILY
Status: DISCONTINUED | OUTPATIENT
Start: 2022-06-20 | End: 2022-06-27

## 2022-06-19 RX ORDER — BISACODYL 10 MG
10 SUPPOSITORY, RECTAL RECTAL
Status: DISCONTINUED | OUTPATIENT
Start: 2022-06-19 | End: 2022-06-28 | Stop reason: HOSPADM

## 2022-06-19 RX ORDER — AMOXICILLIN 250 MG
2 CAPSULE ORAL 2 TIMES DAILY
Status: DISCONTINUED | OUTPATIENT
Start: 2022-06-19 | End: 2022-06-28 | Stop reason: HOSPADM

## 2022-06-19 RX ORDER — ACETAMINOPHEN 325 MG/1
650 TABLET ORAL EVERY 6 HOURS PRN
Status: DISCONTINUED | OUTPATIENT
Start: 2022-06-19 | End: 2022-06-28 | Stop reason: HOSPADM

## 2022-06-19 RX ORDER — MORPHINE SULFATE 4 MG/ML
2-4 INJECTION INTRAVENOUS
Status: DISCONTINUED | OUTPATIENT
Start: 2022-06-19 | End: 2022-06-28 | Stop reason: HOSPADM

## 2022-06-19 RX ORDER — GUAIFENESIN/DEXTROMETHORPHAN 100-10MG/5
10 SYRUP ORAL EVERY 6 HOURS PRN
Status: DISCONTINUED | OUTPATIENT
Start: 2022-06-19 | End: 2022-06-28 | Stop reason: HOSPADM

## 2022-06-19 RX ORDER — ROSUVASTATIN CALCIUM 20 MG/1
40 TABLET, COATED ORAL EVERY EVENING
Status: DISCONTINUED | OUTPATIENT
Start: 2022-06-19 | End: 2022-06-28 | Stop reason: HOSPADM

## 2022-06-19 RX ORDER — HEPARIN SODIUM 5000 [USP'U]/ML
5000 INJECTION, SOLUTION INTRAVENOUS; SUBCUTANEOUS EVERY 8 HOURS
Status: DISCONTINUED | OUTPATIENT
Start: 2022-06-19 | End: 2022-06-22

## 2022-06-19 RX ADMIN — SODIUM CHLORIDE 1000 ML: 9 INJECTION, SOLUTION INTRAVENOUS at 17:13

## 2022-06-19 RX ADMIN — DOCUSATE SODIUM 50 MG AND SENNOSIDES 8.6 MG 2 TABLET: 8.6; 5 TABLET, FILM COATED ORAL at 19:41

## 2022-06-19 RX ADMIN — ROSUVASTATIN CALCIUM 40 MG: 20 TABLET, FILM COATED ORAL at 19:41

## 2022-06-19 RX ADMIN — MAGNESIUM SULFATE HEPTAHYDRATE 1 G: 1 INJECTION, SOLUTION INTRAVENOUS at 20:52

## 2022-06-19 RX ADMIN — SODIUM CHLORIDE: 9 INJECTION, SOLUTION INTRAVENOUS at 20:52

## 2022-06-19 ASSESSMENT — ENCOUNTER SYMPTOMS
ORTHOPNEA: 0
SHORTNESS OF BREATH: 1
SEIZURES: 0
SPEECH CHANGE: 0
LOSS OF CONSCIOUSNESS: 1
HEARTBURN: 0
ABDOMINAL PAIN: 0
FOCAL WEAKNESS: 0
MYALGIAS: 0
DEPRESSION: 0
DIZZINESS: 1
NECK PAIN: 0
VOMITING: 0
HEADACHES: 0
PALPITATIONS: 0
DOUBLE VISION: 0
FLANK PAIN: 0
CHILLS: 0
WEAKNESS: 1
BLURRED VISION: 0
NAUSEA: 0
SPUTUM PRODUCTION: 0
FALLS: 1
FEVER: 0

## 2022-06-19 ASSESSMENT — PAIN DESCRIPTION - PAIN TYPE: TYPE: ACUTE PAIN

## 2022-06-19 ASSESSMENT — FIBROSIS 4 INDEX
FIB4 SCORE: 3.49

## 2022-06-19 ASSESSMENT — PATIENT HEALTH QUESTIONNAIRE - PHQ9
SUM OF ALL RESPONSES TO PHQ9 QUESTIONS 1 AND 2: 0
1. LITTLE INTEREST OR PLEASURE IN DOING THINGS: NOT AT ALL
2. FEELING DOWN, DEPRESSED, IRRITABLE, OR HOPELESS: NOT AT ALL
1. LITTLE INTEREST OR PLEASURE IN DOING THINGS: NOT AT ALL
2. FEELING DOWN, DEPRESSED, IRRITABLE, OR HOPELESS: NOT AT ALL
SUM OF ALL RESPONSES TO PHQ9 QUESTIONS 1 AND 2: 0

## 2022-06-19 ASSESSMENT — LIFESTYLE VARIABLES
SUBSTANCE_ABUSE: 0
TOTAL SCORE: 0
EVER FELT BAD OR GUILTY ABOUT YOUR DRINKING: NO
HAVE YOU EVER FELT YOU SHOULD CUT DOWN ON YOUR DRINKING: NO
TOTAL SCORE: 0
HOW MANY TIMES IN THE PAST YEAR HAVE YOU HAD 5 OR MORE DRINKS IN A DAY: 0
TOTAL SCORE: 0
ALCOHOL_USE: YES
ON A TYPICAL DAY WHEN YOU DRINK ALCOHOL HOW MANY DRINKS DO YOU HAVE: 1
HAVE PEOPLE ANNOYED YOU BY CRITICIZING YOUR DRINKING: NO
CONSUMPTION TOTAL: NEGATIVE
EVER HAD A DRINK FIRST THING IN THE MORNING TO STEADY YOUR NERVES TO GET RID OF A HANGOVER: NO
AVERAGE NUMBER OF DAYS PER WEEK YOU HAVE A DRINK CONTAINING ALCOHOL: 7

## 2022-06-20 ENCOUNTER — APPOINTMENT (OUTPATIENT)
Dept: CARDIOLOGY | Facility: MEDICAL CENTER | Age: 87
DRG: 242 | End: 2022-06-20
Attending: STUDENT IN AN ORGANIZED HEALTH CARE EDUCATION/TRAINING PROGRAM
Payer: MEDICARE

## 2022-06-20 ENCOUNTER — APPOINTMENT (OUTPATIENT)
Dept: RADIOLOGY | Facility: MEDICAL CENTER | Age: 87
DRG: 242 | End: 2022-06-20
Attending: HOSPITALIST
Payer: MEDICARE

## 2022-06-20 ENCOUNTER — APPOINTMENT (OUTPATIENT)
Dept: RADIOLOGY | Facility: MEDICAL CENTER | Age: 87
DRG: 242 | End: 2022-06-20
Attending: STUDENT IN AN ORGANIZED HEALTH CARE EDUCATION/TRAINING PROGRAM
Payer: MEDICARE

## 2022-06-20 PROBLEM — I95.9 HYPOTENSION: Status: ACTIVE | Noted: 2022-06-20

## 2022-06-20 PROBLEM — I95.2 HYPOTENSION DUE TO DRUGS: Status: ACTIVE | Noted: 2022-06-20

## 2022-06-20 LAB
ANION GAP SERPL CALC-SCNC: 10 MMOL/L (ref 7–16)
APPEARANCE UR: CLEAR
BACTERIA #/AREA URNS HPF: NEGATIVE /HPF
BILIRUB UR QL STRIP.AUTO: NEGATIVE
BUN SERPL-MCNC: 30 MG/DL (ref 8–22)
CALCIUM SERPL-MCNC: 8.8 MG/DL (ref 8.5–10.5)
CHLORIDE SERPL-SCNC: 105 MMOL/L (ref 96–112)
CHOLEST SERPL-MCNC: 77 MG/DL (ref 100–199)
CO2 SERPL-SCNC: 22 MMOL/L (ref 20–33)
COLOR UR: YELLOW
CORTIS SERPL-MCNC: 14.3 UG/DL (ref 0–23)
CREAT SERPL-MCNC: 2.11 MG/DL (ref 0.5–1.4)
CREAT UR-MCNC: 113.98 MG/DL
EKG IMPRESSION: NORMAL
EPI CELLS #/AREA URNS HPF: NEGATIVE /HPF
GFR SERPLBLD CREATININE-BSD FMLA CKD-EPI: 29 ML/MIN/1.73 M 2
GLUCOSE SERPL-MCNC: 151 MG/DL (ref 65–99)
GLUCOSE UR STRIP.AUTO-MCNC: NEGATIVE MG/DL
HDLC SERPL-MCNC: 39 MG/DL
HYALINE CASTS #/AREA URNS LPF: ABNORMAL /LPF
KETONES UR STRIP.AUTO-MCNC: NEGATIVE MG/DL
LDLC SERPL CALC-MCNC: 25 MG/DL
LEUKOCYTE ESTERASE UR QL STRIP.AUTO: NEGATIVE
MICRO URNS: ABNORMAL
NITRITE UR QL STRIP.AUTO: NEGATIVE
NT-PROBNP SERPL IA-MCNC: 8588 PG/ML (ref 0–125)
NT-PROBNP SERPL IA-MCNC: 8664 PG/ML (ref 0–125)
PH UR STRIP.AUTO: 5 [PH] (ref 5–8)
POTASSIUM SERPL-SCNC: 4.6 MMOL/L (ref 3.6–5.5)
PROCALCITONIN SERPL-MCNC: 0.14 NG/ML
PROT UR QL STRIP: 30 MG/DL
RBC # URNS HPF: ABNORMAL /HPF
RBC UR QL AUTO: ABNORMAL
SODIUM SERPL-SCNC: 137 MMOL/L (ref 135–145)
SODIUM UR-SCNC: 100 MMOL/L
SP GR UR STRIP.AUTO: 1.02
TRIGL SERPL-MCNC: 63 MG/DL (ref 0–149)
TROPONIN T SERPL-MCNC: 198 NG/L (ref 6–19)
TROPONIN T SERPL-MCNC: 216 NG/L (ref 6–19)
UROBILINOGEN UR STRIP.AUTO-MCNC: 0.2 MG/DL
WBC #/AREA URNS HPF: ABNORMAL /HPF

## 2022-06-20 PROCEDURE — 80061 LIPID PANEL: CPT

## 2022-06-20 PROCEDURE — A9270 NON-COVERED ITEM OR SERVICE: HCPCS | Performed by: HOSPITALIST

## 2022-06-20 PROCEDURE — 99233 SBSQ HOSP IP/OBS HIGH 50: CPT | Performed by: HOSPITALIST

## 2022-06-20 PROCEDURE — 81001 URINALYSIS AUTO W/SCOPE: CPT

## 2022-06-20 PROCEDURE — 93010 ELECTROCARDIOGRAM REPORT: CPT | Performed by: INTERNAL MEDICINE

## 2022-06-20 PROCEDURE — 93005 ELECTROCARDIOGRAM TRACING: CPT | Performed by: STUDENT IN AN ORGANIZED HEALTH CARE EDUCATION/TRAINING PROGRAM

## 2022-06-20 PROCEDURE — 700102 HCHG RX REV CODE 250 W/ 637 OVERRIDE(OP): Performed by: STUDENT IN AN ORGANIZED HEALTH CARE EDUCATION/TRAINING PROGRAM

## 2022-06-20 PROCEDURE — 700102 HCHG RX REV CODE 250 W/ 637 OVERRIDE(OP): Performed by: HOSPITALIST

## 2022-06-20 PROCEDURE — 71045 X-RAY EXAM CHEST 1 VIEW: CPT

## 2022-06-20 PROCEDURE — 700111 HCHG RX REV CODE 636 W/ 250 OVERRIDE (IP): Performed by: STUDENT IN AN ORGANIZED HEALTH CARE EDUCATION/TRAINING PROGRAM

## 2022-06-20 PROCEDURE — 96372 THER/PROPH/DIAG INJ SC/IM: CPT

## 2022-06-20 PROCEDURE — 84484 ASSAY OF TROPONIN QUANT: CPT

## 2022-06-20 PROCEDURE — 84300 ASSAY OF URINE SODIUM: CPT

## 2022-06-20 PROCEDURE — 84145 PROCALCITONIN (PCT): CPT

## 2022-06-20 PROCEDURE — 36415 COLL VENOUS BLD VENIPUNCTURE: CPT

## 2022-06-20 PROCEDURE — 76775 US EXAM ABDO BACK WALL LIM: CPT

## 2022-06-20 PROCEDURE — 83880 ASSAY OF NATRIURETIC PEPTIDE: CPT

## 2022-06-20 PROCEDURE — 82570 ASSAY OF URINE CREATININE: CPT

## 2022-06-20 PROCEDURE — 80048 BASIC METABOLIC PNL TOTAL CA: CPT

## 2022-06-20 PROCEDURE — A9270 NON-COVERED ITEM OR SERVICE: HCPCS | Performed by: STUDENT IN AN ORGANIZED HEALTH CARE EDUCATION/TRAINING PROGRAM

## 2022-06-20 PROCEDURE — 770020 HCHG ROOM/CARE - TELE (206)

## 2022-06-20 PROCEDURE — 93306 TTE W/DOPPLER COMPLETE: CPT

## 2022-06-20 PROCEDURE — 82533 TOTAL CORTISOL: CPT

## 2022-06-20 RX ORDER — MIDODRINE HYDROCHLORIDE 5 MG/1
5 TABLET ORAL
Status: DISCONTINUED | OUTPATIENT
Start: 2022-06-20 | End: 2022-06-21

## 2022-06-20 RX ORDER — ISOSORBIDE MONONITRATE 30 MG/1
30 TABLET, EXTENDED RELEASE ORAL EVERY MORNING
Status: DISCONTINUED | OUTPATIENT
Start: 2022-06-21 | End: 2022-06-20

## 2022-06-20 RX ADMIN — HEPARIN SODIUM 5000 UNITS: 5000 INJECTION, SOLUTION INTRAVENOUS; SUBCUTANEOUS at 15:40

## 2022-06-20 RX ADMIN — HEPARIN SODIUM 5000 UNITS: 5000 INJECTION, SOLUTION INTRAVENOUS; SUBCUTANEOUS at 05:14

## 2022-06-20 RX ADMIN — METOPROLOL TARTRATE 12.5 MG: 25 TABLET, FILM COATED ORAL at 18:20

## 2022-06-20 RX ADMIN — DOCUSATE SODIUM 50 MG AND SENNOSIDES 8.6 MG 2 TABLET: 8.6; 5 TABLET, FILM COATED ORAL at 05:13

## 2022-06-20 RX ADMIN — CLOPIDOGREL BISULFATE 75 MG: 75 TABLET ORAL at 05:15

## 2022-06-20 RX ADMIN — HEPARIN SODIUM 5000 UNITS: 5000 INJECTION, SOLUTION INTRAVENOUS; SUBCUTANEOUS at 20:49

## 2022-06-20 RX ADMIN — ROSUVASTATIN CALCIUM 40 MG: 20 TABLET, FILM COATED ORAL at 18:20

## 2022-06-20 RX ADMIN — ASPIRIN 81 MG: 81 TABLET, COATED ORAL at 05:13

## 2022-06-20 RX ADMIN — MIDODRINE HYDROCHLORIDE 5 MG: 5 TABLET ORAL at 18:20

## 2022-06-20 RX ADMIN — MIDODRINE HYDROCHLORIDE 5 MG: 5 TABLET ORAL at 09:54

## 2022-06-20 ASSESSMENT — PATIENT HEALTH QUESTIONNAIRE - PHQ9
2. FEELING DOWN, DEPRESSED, IRRITABLE, OR HOPELESS: NOT AT ALL
SUM OF ALL RESPONSES TO PHQ9 QUESTIONS 1 AND 2: 0
1. LITTLE INTEREST OR PLEASURE IN DOING THINGS: NOT AT ALL

## 2022-06-20 ASSESSMENT — ENCOUNTER SYMPTOMS
CARDIOVASCULAR NEGATIVE: 1
COUGH: 0
NECK PAIN: 0
WEIGHT LOSS: 0
NAUSEA: 0
EYES NEGATIVE: 1
HEMOPTYSIS: 0
CHILLS: 0
VOMITING: 0
DIZZINESS: 1
ABDOMINAL PAIN: 0
MYALGIAS: 0
SPUTUM PRODUCTION: 0
SHORTNESS OF BREATH: 1
PSYCHIATRIC NEGATIVE: 1
HEARTBURN: 0
FEVER: 0

## 2022-06-20 ASSESSMENT — PAIN DESCRIPTION - PAIN TYPE: TYPE: ACUTE PAIN

## 2022-06-20 NOTE — H&P
Hospital Medicine History & Physical Note    Date of Service  6/19/2022    Primary Care Physician  Stacey Foy M.D.    Consultants  None    Code Status  Full Code    Chief Complaint  Chief Complaint   Patient presents with   • T-5000 GLF   • Head Injury       History of Presenting Illness  Kobe Cyr is a 89 y.o. male with history of multivessel CAD not CABG candidate per CTS on medical management, HTN, HLD who presented 6/19/2022 with shortness of breath and syncopal episode.  Patient reported falling out of his wheelchair while wife was out of house.  He reported shortness of breath, dizziness, vertigo prior to fall and syncopal event.  Denies palpitation, chest pain, tongue bite, loss of bladder/bowel incontinence.  In ER, CT head noted left parietal scalp hematoma with no fracture or intracranial hemorrhage.  No acute cervical spine fracture seen on CT c-spine. Non specific ST changes on EKG but in afib, troponin T 204>190.  I discussed peripherally with on-call cardiology, no heparin drip as no chest pain, trend CE and echo.  Admitted to medicine service.    I discussed the plan of care with patient and bedside RN.    Review of Systems  Review of Systems   Constitutional: Positive for malaise/fatigue. Negative for chills and fever.   HENT: Positive for hearing loss. Negative for congestion.    Eyes: Negative for blurred vision and double vision.   Respiratory: Positive for shortness of breath. Negative for sputum production.    Cardiovascular: Positive for leg swelling. Negative for chest pain, palpitations and orthopnea.   Gastrointestinal: Negative for abdominal pain, heartburn, nausea and vomiting.   Genitourinary: Negative for dysuria, flank pain and urgency.   Musculoskeletal: Positive for falls. Negative for myalgias and neck pain.   Skin: Negative for itching and rash.   Neurological: Positive for dizziness, loss of consciousness and weakness. Negative for speech change, focal weakness,  seizures and headaches.   Psychiatric/Behavioral: Negative for depression and substance abuse.   All other systems reviewed and are negative.      Past Medical History   has a past medical history of Anemia (8/1/2016), Arthritis, Back pain, CAD (coronary artery disease), Cancer (HCC), Chronic kidney disease (CKD), stage II (mild) (8/1/2016), Coronary heart disease, DJD (degenerative joint disease) (8/1/2016), Dyslipidemia (8/1/2016), GERD (gastroesophageal reflux disease) (8/1/2016), [place] measles, Hematochezia (8/1/2016), High cholesterol, Hyperglycemia (1/6/2010), Hyperlipidemia (5/24/2011), Hypertension, Impaired fasting glucose (8/1/2016), Left knee pain (8/1/2016), Myocardial infarction (HCC) (2 yrs ago), Nasal drainage, Obesity, Osteoarthritis of knees, bilateral (8/1/2016), Pulmonary embolism (HCC), Rheumatic fever, Scarlet fever, Smallpox, and Snoring.    Surgical History   has a past surgical history that includes coronary stent procedure lad; knee unicompartmental (10/13/2009); other orthopedic surgery; and arthroscopy, knee.     Family History  family history includes No Known Problems in his father and mother.   Family history reviewed with patient. There is family history that is pertinent to the chief complaint.   FH of CAD    Social History   reports that he quit smoking about 49 years ago. His smoking use included cigarettes. He has a 70.00 pack-year smoking history. He has never used smokeless tobacco. He reports current alcohol use. He reports that he does not use drugs.    Allergies  Allergies   Allergen Reactions   • Atorvastatin Myalgia   • Simvastatin Myalgia       Medications  Prior to Admission Medications   Prescriptions Last Dose Informant Patient Reported? Taking?   aspirin 81 MG tablet   Yes No   Sig: Take 81 mg by mouth every day.   clopidogrel (PLAVIX) 75 MG Tab   No No   Sig: Take 1 Tablet by mouth every day.   fluticasone (FLONASE) 50 MCG/ACT nasal spray   No No   Sig: Administer 1  Spray into affected nostril(S) every day.   isosorbide mononitrate SR (IMDUR) 60 MG TABLET SR 24 HR   No No   Sig: Take 1 Tablet by mouth every morning.   losartan (COZAAR) 25 MG Tab   No No   Sig: Take 0.5 Tablets by mouth 2 times a day.   metoprolol tartrate (LOPRESSOR) 25 MG Tab   No No   Sig: Take 0.5 Tablets by mouth 2 times a day.   omeprazole (PRILOSEC) 20 MG delayed-release capsule   No No   Sig: Take 1 Capsule by mouth every day.   rosuvastatin (CRESTOR) 40 MG tablet   No No   Sig: Take 1 Tablet by mouth every evening.      Facility-Administered Medications: None       Physical Exam  Pulse:  [85-88] 86  Resp:  [14-18] 18  BP: (102-120)/(50-56) 120/56  SpO2:  [93 %-96 %] 96 %  Blood Pressure : 102/50       Pulse: 85   Respiration: 14   Pulse Oximetry: 95 %       Physical Exam  Vitals and nursing note reviewed.   Constitutional:       General: He is not in acute distress.     Appearance: He is ill-appearing.   HENT:      Head:      Comments: Small left scalp contusion     Ears:      Comments: Presbycusis     Nose: Nose normal.      Mouth/Throat:      Mouth: Mucous membranes are dry.      Pharynx: Oropharynx is clear.   Eyes:      General: No scleral icterus.     Extraocular Movements: Extraocular movements intact.   Cardiovascular:      Rate and Rhythm: Normal rate. Rhythm irregular.      Pulses: Normal pulses.      Heart sounds:     No friction rub.   Pulmonary:      Effort: Pulmonary effort is normal. No respiratory distress.      Breath sounds: No stridor. No wheezing or rales.   Abdominal:      General: Bowel sounds are normal. There is no distension.      Palpations: Abdomen is soft.      Tenderness: There is no abdominal tenderness. There is no guarding or rebound.   Musculoskeletal:         General: No tenderness or signs of injury. Normal range of motion.      Cervical back: Neck supple. No tenderness.      Right lower leg: Edema present.      Left lower leg: Edema present.   Skin:     General: Skin  is warm and dry.      Capillary Refill: Capillary refill takes less than 2 seconds.   Neurological:      General: No focal deficit present.      Mental Status: He is alert and oriented to person, place, and time.   Psychiatric:         Mood and Affect: Mood normal.         Laboratory:  Recent Labs     06/19/22  1712   WBC 6.2   RBC 2.65*   HEMOGLOBIN 9.1*   HEMATOCRIT 29.6*   .7*   MCH 34.3*   MCHC 30.7*   RDW 58.2*   PLATELETCT 148*   MPV 10.1     Recent Labs     06/19/22  1712   SODIUM 137   POTASSIUM 4.4   CHLORIDE 103   CO2 25   GLUCOSE 113*   BUN 30*   CREATININE 2.06*   CALCIUM 9.0     Recent Labs     06/19/22  1712   ALTSGPT 15   ASTSGOT 22   ALKPHOSPHAT 58   TBILIRUBIN 0.4   GLUCOSE 113*         No results for input(s): NTPROBNP in the last 72 hours.      Recent Labs     06/19/22  1712 06/19/22  1815   TROPONINT 204* 190*       Imaging:  CT-HEAD W/O   Final Result      1.  Left parietal scalp hematoma without evidence of skull fracture or intracranial hemorrhage.         CT-CSPINE WITHOUT PLUS RECONS   Final Result      No CT evidence of acute cervical spine abnormality.      Moderate spondylosis with multilevel degenerative change      Pulmonary scarring and small right pleural fluid with right mediastinal adenopathy. This could be reactive or malignant      EC-ECHOCARDIOGRAM COMPLETE W/O CONT    (Results Pending)   US-RENAL    (Results Pending)       X-Ray:  I have personally reviewed the images and compared with prior images.  EKG:  I have personally reviewed the images and compared with prior images.    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

## 2022-06-20 NOTE — PROGRESS NOTES
[facility] Medicine Daily Progress Note    Date of Service  6/20/2022    Chief Complaint  Kobe Cyr is a 89 y.o. male admitted 6/19/2022 with syncope    Hospital Course  Kobe Cyr is a 89 y.o. male with history of multivessel CAD not CABG candidate per CTS on medical management, HTN, HLD who presented 6/19/2022 with shortness of breath and syncopal episode.  Patient reported falling out of his wheelchair while wife was out of house.  He reported shortness of breath, dizziness, vertigo prior to fall and syncopal event.  Denies palpitation, chest pain, tongue bite, loss of bladder/bowel incontinence.  In ER, CT head noted left parietal scalp hematoma with no fracture or intracranial hemorrhage.  No acute cervical spine fracture seen on CT c-spine. Non specific ST changes on EKG but in afib, troponin T 204>190.  I discussed peripherally with on-call cardiology, no heparin drip as no chest pain, trend CE and echo.  Admitted to medicine service.      Interval Problem Update    Patient is creatinine at 2.06 with a baseline that appears to be close to 1.4-1.5    Patient receiving IV fluids but is complaining of shortness of breath    BNP elevated greater than 8000    I ordered and reviewed an x-ray of the chest that showed interstitial edema and small pleural effusion    Patient was hypotensive this morning with a systolic blood pressure in the 80s and patient was complaining of dizziness.  Midodrine was initiated    Cardiology consulted Dr. Hutton    Troponin in the range of 198-216      I have discussed this patient's plan of care and discharge plan at IDT rounds today with Case Management, Nursing, Nursing leadership, and other members of the IDT team.    Consultants/Specialty  cardiology    Code Status  Full Code    Disposition  Patient is not medically cleared for discharge.   Anticipate discharge to to home with close outpatient follow-up.  I have placed the appropriate orders for post-discharge  needs.    Review of Systems  Review of Systems   Constitutional: Positive for malaise/fatigue. Negative for chills, fever and weight loss.   HENT: Negative for ear discharge, ear pain, hearing loss and tinnitus.    Eyes: Negative.    Respiratory: Positive for shortness of breath. Negative for cough, hemoptysis and sputum production.    Cardiovascular: Negative.    Gastrointestinal: Negative for abdominal pain, heartburn, nausea and vomiting.   Genitourinary: Negative for dysuria and urgency.   Musculoskeletal: Negative for myalgias and neck pain.   Neurological: Positive for dizziness.   Psychiatric/Behavioral: Negative.         Physical Exam  Temp:  [36.3 °C (97.3 °F)-36.6 °C (97.8 °F)] 36.6 °C (97.8 °F)  Pulse:  [64-95] 95  Resp:  [14-20] 18  BP: ()/(50-69) 133/69  SpO2:  [92 %-100 %] 97 %    Physical Exam  Constitutional:       General: He is not in acute distress.     Appearance: He is not ill-appearing, toxic-appearing or diaphoretic.   HENT:      Head: Normocephalic.      Nose: Nose normal.      Mouth/Throat:      Mouth: Mucous membranes are moist.   Eyes:      General: No scleral icterus.     Extraocular Movements: Extraocular movements intact.      Pupils: Pupils are equal, round, and reactive to light.   Cardiovascular:      Rate and Rhythm: Normal rate and regular rhythm.   Pulmonary:      Breath sounds: Rhonchi (Mild bilateral) present.   Abdominal:      General: Abdomen is flat. There is distension.      Palpations: There is no mass.      Tenderness: There is no abdominal tenderness. There is no guarding.   Musculoskeletal:         General: No swelling or deformity. Normal range of motion.      Cervical back: Normal range of motion and neck supple.      Right lower leg: No edema.   Skin:     General: Skin is warm.      Capillary Refill: Capillary refill takes 2 to 3 seconds.      Coloration: Skin is not jaundiced.      Findings: No bruising, erythema or lesion.   Neurological:      General: No  focal deficit present.      Mental Status: He is alert and oriented to person, place, and time.   Psychiatric:         Mood and Affect: Mood normal.         Fluids  No intake or output data in the 24 hours ending 06/20/22 1341    Laboratory  Recent Labs     06/19/22  1712   WBC 6.2   RBC 2.65*   HEMOGLOBIN 9.1*   HEMATOCRIT 29.6*   .7*   MCH 34.3*   MCHC 30.7*   RDW 58.2*   PLATELETCT 148*   MPV 10.1     Recent Labs     06/19/22  1712 06/20/22  1053   SODIUM 137 137   POTASSIUM 4.4 4.6   CHLORIDE 103 105   CO2 25 22   GLUCOSE 113* 151*   BUN 30* 30*   CREATININE 2.06* 2.11*   CALCIUM 9.0 8.8             Recent Labs     06/20/22  0222   TRIGLYCERIDE 63   HDL 39*   LDL 25       Imaging  DX-CHEST-PORTABLE (1 VIEW)   Final Result      1.  Mild diffuse interstitial edema and patchy bilateral airspace opacities. Findings could be seen in setting edema and/or infection.   2.  Stable enlargement of the cardiomediastinal silhouette.   3.  Possible small right pleural effusion.      US-RENAL   Final Result      Unremarkable renal ultrasound.      CT-HEAD W/O   Final Result      1.  Left parietal scalp hematoma without evidence of skull fracture or intracranial hemorrhage.         CT-CSPINE WITHOUT PLUS RECONS   Final Result      No CT evidence of acute cervical spine abnormality.      Moderate spondylosis with multilevel degenerative change      Pulmonary scarring and small right pleural fluid with right mediastinal adenopathy. This could be reactive or malignant      EC-ECHOCARDIOGRAM COMPLETE W/O CONT    (Results Pending)        Assessment/Plan  * Syncope and collapse- (present on admission)  Assessment & Plan  CT head noted left parietal scalp hematoma with no fracture or intracranial hemorrhage  No acute cervical spine fracture seen on CT c-spine    In afib, no RVR    Patients syncope may have been related to hypotension related to his cardiac medications     I have stopped p.o. 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 fluid intake    We will try to minimize IV fluids    Elevated troponin  Assessment & Plan  Patient is having shortness of breath    Monitor on telemetry    Serial troponins    Check echocardiogram    Cardiology evaluation    GERD (gastroesophageal reflux disease)- (present on admission)  Assessment & Plan  PPI    CAD (coronary artery disease)- (present on admission)  Assessment & Plan  Prior MI and PCI  Found to have multivessel CAD -- not CABG surgical candidate in 07/2021    Continue ASA/Plavix/statin/BB    Imdur held due to hypotension    Hyperlipidemia- (present on admission)  Assessment & Plan  Statin       VTE prophylaxis: SCDs/TEDs    I have performed a physical exam and reviewed and updated ROS and Plan today (6/20/2022). In review of yesterday's note (6/19/2022), there are no changes except as documented above.

## 2022-06-20 NOTE — CARE PLAN
The patient is Stable - Low risk of patient condition declining or worsening    Shift Goals: Manage O2  Clinical Goals: Echo in AM  Patient Goals: Rest, home  Family Goals: NA    Progress made toward(s) clinical / shift goals: Pt arrived on the unit via gurney. Awake alert and oriented. No complaints of pain or distress at this time. Respirations are even and unlabored. Initial assessment complete. Patient is unable to ambulate however can turn really well with minimal assistance. On 5L NC. O2 sat above 95%. Patient has skin tear on the posterior head. NS running at 75cc. All vitals are within normal limits. Bed wheels locked. Rails up x2. Patient resting in bed with no signs of distress at this time. Call light within reach. Will continue to monitor    Patient is not progressing towards the following goals: NA      Problem: Knowledge Deficit - Standard  Goal: Patient and family/care givers will demonstrate understanding of plan of care, disease process/condition, diagnostic tests and medications  Outcome: Progressing     Problem: Skin Integrity  Goal: Skin integrity is maintained or improved  Outcome: Progressing     Problem: Communication  Goal: The ability to communicate needs accurately and effectively will improve  Outcome: Progressing     Problem: Hemodynamics  Goal: Patient's hemodynamics, fluid balance and neurologic status will be stable or improve  Outcome: Progressing     Problem: Mobility  Goal: Patient's capacity to carry out activities will improve  Outcome: Progressing     Problem: Self Care  Goal: Patient will have the ability to perform ADLs independently or with assistance (bathe, groom, dress, toilet and feed)  Outcome: Progressing

## 2022-06-20 NOTE — RESPIRATORY CARE
COPD EDUCATION by COPD CLINICAL EDUCATOR  6/20/2022 at 6:40 AM by Carmen Waldrop, RRT     Patient reviewed by COPD education team. Patient has a mild restrictive /obstructive pattern on  6/2019 PFT in EMR . Currently no medications or needs.

## 2022-06-20 NOTE — ED NOTES
3/21/2022         RE: Tiffanie Mcdermott  [id number] [address] [place] [id number]      Tiffanie is a 58 year old who is being evaluated via a billable video visit.    How would you like to obtain your AVS? MyChart  If the video visit is dropped, the invitation should be resent by: Send to e-mail at: [email or web address]  Will anyone else be joining your video visit? No  Type of service:  Video Visit  Video Start Time: 8:00am  Video End Time:8:35 AM  Originating Location (pt. Location): Home  Distant Location (provider location):  [facility]   Platform used for Video Visit: [employer]   --Bertrand Espinoza    Hematology/Oncology Video Visit  Care Team:  - Oncologist: Dr. Kim  - PCP: Physician No Ref-Primary    Reason for visit: visit prior to weekly paclitaxel    Diagnosis: stage IV breast cancer, HR-, HER2+    Cancer and Treatment Hx:  Feb 2022: presented with several weeks of abdominal pain and nausea, found to have elevated liver enzymes, abdominal US noted hepatomegaly with innumerable hepatic metastasis. 2/21 biopsy of R breast mass demonstrated invasive ductal carcinoma, Anahi grade 3, HR-, HER2+. 2/23 punch biopsy of right breast skin nodule identified cutaneous involvement of breast cancer.  March 2022: weekly paclitaxel + trazimera and pertuzumab, complications include neutropenic fever    Interval History:  Tiffanie has been doing well since being discharged from the hospital for neutropenic fever. She continues on oral antibiotics for treatment of infection of unclear source (thought possibly to be UTI). She is eating meals throughout the day, slightly smaller than baseline but denies any nausea or abdominal pain. Mouth sores are nearly resolved, 1 remaining on her inner lip. She is using Biotene but not medicated mouth rinses anymore. Loose to soft BM daily. She typically gets seasonal allergies and started taking her claritin. She denies any pain. Notes that she felt  Med rec updated and complete. Allergies reviewed.  Pt confirmed name and date of birth.   Pt requested that I call and speak to his wife.   Wife confirmed currently medications and last doses taken.  No antibiotic use in last 30 days.  [place] [facility], [place] [phone]   better over the weekend than she has in > 6wks. Feels ready for chemotherapy today. Expressed some frustrations about communication with hospital staff including whether or not her PET scan was going to be delayed and also dietary staff not knowing she needed to be NPO for that scan. Also had issues with port access and pain with infusion while hospitalized. No questions or concerns today.    Medications:  Discussed pertinent medications.    Physical Exam:  GEN: pleasantly conversant female no acute distress  SKIN: generally intact, no visible rash, bruising, or sores   ENT: eyes non-icteric, EOMI  RESP: breathing easily on room air, no cough  MSK: appears to have normal range of motion based on visualized movements  NEURO:  no notable tremors, facial movements symmetric, alert and oriented without obvious focal deficit  The rest of a comprehensive physical examination is deferred due to PHE (public health emergency) video restrictions    Wt Readings from Last 4 Encounters:   03/16/22 109.5 kg (241 lb 4.8 oz)   03/14/22 108 kg (238 lb 3.2 oz)   03/09/22 115.2 kg (254 lb)   03/07/22 111.4 kg (245 lb 8 oz)     Labs:  Will have labs drawn later today prior to chemo infusion appt.    Imaging:  Reviewed read of recent MRI brain, CT C/A/P, and mammogram. Read of PET currently pending.    Assessment and Plan:  Metastatic breast cancer, HR-, HER2+  Neutropenia secondary to chemotherapy  Thrombocytopenia secondary to chemotherapy  - Continues with paclitaxel, pertuzumab, and trazimera               - Received 1 dose of Neupogen on 3/15               - If labs appropriate, ok to receive day 8 paclitaxel (deferred 1wk d/t neutropenia)  - Baseline echo 3/8 demonstrated EF 60-65%, normal strain, repeat every 3 months  - Baseline PET completed 3/18, read pending  - Baseline CA 27-29 is 406  - Follow with Oncology weekly for monitoring while initiating treatment     Recent neutropenic fever  - Urine culture 3/14 positive for  E.coli, repeat 3/15 negative  - No fevers since 3/16, blood cultures negative, CXR WNL  - Continues on course of Bactrim and keflex through 3/28  - Appears to be fully recovered, will not delay treatment any further given disease burden     Transaminitis, improved  Indirect Hyperbilirubinemia, improved  - Liver US 2/15 with innumerable hyperechoic lesions throughout liver concerning for metastasis  - Bilirubin (peak 13.6) and LFTs continue to improve after initiation of chemo  - Avoid hepatotoxic medications as able     Mucositis secondary to chemotherapy  - Nearly resolved, only requiring Biotene rinses current but has salt/soda rinses, viscous lidocaine, and MAGIC mouthwash available as needed  - Dietician following     Nausea  - Improved with use of zyprexa at bedtime, zofran PRN      The total time of this encounter amounted to 30 minutes today. This time includes video time spent with the patient, prep work, ordering tests, and performing post-visit documentation.  - Gina Dudley, CLAYTON Dudley, APRN CNP

## 2022-06-20 NOTE — ASSESSMENT & PLAN NOTE
Prior MI and PCI  Found to have multivessel CAD -- not CABG surgical candidate in 07/2021    Continue ASA/Plavix/statin.   Continue this lower dose of beta blocker if he continues to tolerate  Attempted to restart imdur several times but unable to tolerate due to symptomatic hypotension  Discussed with cardiology - okay to stop plavix as nearly a year out and high risk for further falls  Continue supportive care

## 2022-06-20 NOTE — ASSESSMENT & PLAN NOTE
CT head noted left parietal scalp hematoma with no fracture or intracranial hemorrhage  No acute cervical spine fracture seen on CT c-spine      Patients syncope may have been related to hypotension and bradycardia  Vagal stimulation and hypoxia may also be factors  following

## 2022-06-20 NOTE — DISCHARGE PLANNING
Care Transition Team Assessment    Spoke with patient at bedside and verified all information. Lives in single story house with spouse with 3 steps to enter in front also has a ramp at the front. Has home O2 3-3.5 L. Takes meds daily. Uses [facility] in [place] for meds. Spouse will be ride @ D/C. CM will follow as needed.     Information Source  Orientation Level: Oriented X4  Information Given By: Patient    Readmission Evaluation  Is this a readmission?: No    Interdisciplinary Discharge Planning  Primary Care Physician: Stacey Foy  Lives with - Patient's Self Care Capacity: Spouse  Patient or legal guardian wants to designate a caregiver: Yes  Caregiver name: Mook Cyr  Caregiver contact info: [phone]  ([facility] [place]) Authorization for Release of Health Information has been completed: Yes  Support Systems: Spouse / Significant Other  Housing / Facility: 1 Story House  Do You Take your Prescribed Medications Regularly: Yes  Able to Return to Previous ADL's: Yes  Mobility Issues: Yes  Prior Services: Home-Independent  Patient Prefers to be Discharged to:: Home  Assistance Needed: Unknown at this Time  Durable Medical Equipment: Home Oxygen, Other - Specify (Electric scooter x2.)    Discharge Preparedness  What are your discharge supports?: Spouse  Prior Functional Level: Other (Comments) (Uses electric scooter)    Functional Assesment  Prior Functional Level: Other (Comments) (Uses electric scooter)    Finances  Prescription Coverage: Yes    Anticipated Discharge Information  Discharge Disposition: Discharged to home/self care (01)  Discharge Address: [address] [place] [address]  Discharge Contact Phone Number: [phone]

## 2022-06-20 NOTE — PROGRESS NOTES
Patient complaining of sob and lightheadedness. Oxygen level and bp decreased. Placed on 6L oxymask. Oxygen went up to 94-95% and is staying there. Patient given midodrine for bp. MD Milan is aware. Will continue to monitor.

## 2022-06-20 NOTE — PROGRESS NOTES
4 Eyes Skin Assessment Completed by Kevyn RN and Dion RN.    Head Skin Tear posterior   Ears WDL  Nose WDL  Mouth WDL  Neck WDL  Breast/Chest WDL  Shoulder Blades WDL  Spine WDL  (R) Arm/Elbow/Hand WDL  (L) Arm/Elbow/Hand: Skin Tear  Abdomen WDL  Groin WDL  Scrotum/Coccyx/Buttocks WDL  (R) Leg WDL  (L) Leg WDL  (R) Heel/Foot/Toe WDL  (L) Heel/Foot/Toe WDL          Devices In Places Tele Box      Interventions In Place N/A    Possible Skin Injury No    Pictures Uploaded Into Epic N/A  Wound Consult Placed N/A  RN Wound Prevention Protocol Ordered No

## 2022-06-20 NOTE — PROGRESS NOTES
Pt brought to T728. Pt A&Ox4, on 6L oxymask @ 1235. Tele box placed, monitors notified. Updated on plan of care. Family updated on plan of care as well.

## 2022-06-20 NOTE — ASSESSMENT & PLAN NOTE
Suspect cardiac meds contributing, see above  Imdur and bb now stopped  Resolved - following closely  Now stopping diuresis

## 2022-06-20 NOTE — ED PROVIDER NOTES
ED Provider Note    CHIEF COMPLAINT  No chief complaint on file.      HPI  Kobe Cyr is a 89 y.o. male who presents after falling out of his wheelchair.  Patient reports that his wife is out running errands, he felt unwell, he had been drinking much fluids.  He reports that he tried to lean over in his wheelchair but lost consciousness falling onto the ground.  Patient does not recall the incident.  911 was called and patient was transferred to our facility.  Patient denies any associated chest pain or palpitations nausea or diaphoresis prior to the episode of loss of consciousness.  Patient does report some mild shortness of breath prior to his loss of consciousness.  Patient reports some mild associated neck pain.  He denies any back pain.    REVIEW OF SYSTEMS  ROS    See HPI for further details. All other systems are negative.     PAST MEDICAL HISTORY   has a past medical history of Anemia (2016), Arthritis, Back pain, CAD (coronary artery disease), Cancer (HCC), Chronic kidney disease (CKD), stage II (mild) (2016), Coronary heart disease, DJD (degenerative joint disease) (2016), Dyslipidemia (2016), GERD (gastroesophageal reflux disease) (2016), Khmer measles, Hematochezia (2016), High cholesterol, Hyperglycemia (2010), Hyperlipidemia (2011), Hypertension, Impaired fasting glucose (2016), Left knee pain (2016), Myocardial infarction (HCC) (2 yrs ago), Nasal drainage, Obesity, Osteoarthritis of knees, bilateral (2016), Pulmonary embolism (HCC), Rheumatic fever, Scarlet fever, Smallpox, and Snoring.    SOCIAL HISTORY  Social History     Tobacco Use   • Smoking status: Former Smoker     Packs/day: 2.00     Years: 35.00     Pack years: 70.00     Types: Cigarettes     Quit date: 1973     Years since quittin.2   • Smokeless tobacco: Never Used   Vaping Use   • Vaping Use: Never used   Substance and Sexual Activity   • Alcohol use: Yes     Comment: felicity  every night   • Drug use: No   • Sexual activity: Not on file       SURGICAL HISTORY   has a past surgical history that includes coronary stent procedure lad; knee unicompartmental (10/13/2009); other orthopedic surgery; and arthroscopy, knee.    CURRENT MEDICATIONS  Home Medications    **Home medications have not yet been reviewed for this encounter**         ALLERGIES  Allergies   Allergen Reactions   • Atorvastatin Myalgia   • Simvastatin      ADVERSE REACTION: MYALGIA.       PHYSICAL EXAM  Vitals:    06/19/22 1704   BP:    Pulse:    Resp: 14   SpO2:        Physical Exam  Constitutional:       Appearance: He is well-developed.   HENT:      Head:      Comments: Small contusion on left side of patient's occiput without any associated laceration  Eyes:      Conjunctiva/sclera: Conjunctivae normal.      Pupils: Pupils are equal, round, and reactive to light.   Cardiovascular:      Rate and Rhythm: Rhythm irregular.      Heart sounds: No murmur heard.    No friction rub. No gallop.      Comments: Irregular rhythm  Pulmonary:      Effort: Pulmonary effort is normal. No respiratory distress.      Breath sounds: Normal breath sounds. No wheezing.   Abdominal:      General: Bowel sounds are normal. There is no distension.      Palpations: Abdomen is soft.      Tenderness: There is no abdominal tenderness. There is no rebound.   Musculoskeletal:      Cervical back: Normal range of motion and neck supple.   Skin:     General: Skin is warm and dry.   Neurological:      Mental Status: He is alert and oriented to person, place, and time.   Psychiatric:         Behavior: Behavior normal.           DIAGNOSTIC STUDIES / PROCEDURES    EKG  EKG is atrial fibrillation, right bundle branch block present, no ST elevation or depression consistent with acute regional ischemia    LABS  Results for orders placed or performed during the hospital encounter of 06/19/22   CBC WITH DIFFERENTIAL   Result Value Ref Range    WBC 6.2 4.8 - 10.8  K/uL    RBC 2.65 (L) 4.70 - 6.10 M/uL    Hemoglobin 9.1 (L) 14.0 - 18.0 g/dL    Hematocrit 29.6 (L) 42.0 - 52.0 %    .7 (H) 81.4 - 97.8 fL    MCH 34.3 (H) 27.0 - 33.0 pg    MCHC 30.7 (L) 33.7 - 35.3 g/dL    RDW 58.2 (H) 35.9 - 50.0 fL    Platelet Count 148 (L) 164 - 446 K/uL    MPV 10.1 9.0 - 12.9 fL    Neutrophils-Polys 76.70 (H) 44.00 - 72.00 %    Lymphocytes 10.70 (L) 22.00 - 41.00 %    Monocytes 9.70 0.00 - 13.40 %    Eosinophils 1.10 0.00 - 6.90 %    Basophils 0.80 0.00 - 1.80 %    Immature Granulocytes 1.00 (H) 0.00 - 0.90 %    Nucleated RBC 0.00 /100 WBC    Neutrophils (Absolute) 4.75 1.82 - 7.42 K/uL    Lymphs (Absolute) 0.66 (L) 1.00 - 4.80 K/uL    Monos (Absolute) 0.60 0.00 - 0.85 K/uL    Eos (Absolute) 0.07 0.00 - 0.51 K/uL    Baso (Absolute) 0.05 0.00 - 0.12 K/uL    Immature Granulocytes (abs) 0.06 0.00 - 0.11 K/uL    NRBC (Absolute) 0.00 K/uL   CMP   Result Value Ref Range    Sodium 137 135 - 145 mmol/L    Potassium 4.4 3.6 - 5.5 mmol/L    Chloride 103 96 - 112 mmol/L    Co2 25 20 - 33 mmol/L    Anion Gap 9.0 7.0 - 16.0    Glucose 113 (H) 65 - 99 mg/dL    Bun 30 (H) 8 - 22 mg/dL    Creatinine 2.06 (H) 0.50 - 1.40 mg/dL    Calcium 9.0 8.5 - 10.5 mg/dL    AST(SGOT) 22 12 - 45 U/L    ALT(SGPT) 15 2 - 50 U/L    Alkaline Phosphatase 58 30 - 99 U/L    Total Bilirubin 0.4 0.1 - 1.5 mg/dL    Albumin 3.5 3.2 - 4.9 g/dL    Total Protein 7.0 6.0 - 8.2 g/dL    Globulin 3.5 1.9 - 3.5 g/dL    A-G Ratio 1.0 g/dL   TROPONIN   Result Value Ref Range    Troponin T 204 (H) 6 - 19 ng/L   ESTIMATED GFR   Result Value Ref Range    GFR (CKD-EPI) 30 (A) >60 mL/min/1.73 m 2   TROPONIN   Result Value Ref Range    Troponin T 190 (H) 6 - 19 ng/L   D-Dimer   Result Value Ref Range    D-Dimer Screen 3.27 (H) 0.00 - 0.50 ug/mL (FEU)   EKG   Result Value Ref Range    Report       [facility] Emergency Dept[facility]    Test Date:  2022-06-19  Pt Name:    CARRIE CAMPBELL                  Department: ER  MRN:        [id number]                       Room:       [facility]  Gender:     Male                         Technician: [id number]  :        [id number]                   Requested By:VIRAL CALVO  Order #:    [id number]                    Reading MD:    Measurements  Intervals                                Axis  Rate:       85                           P:  SC:                                      QRS:        -70  QRSD:       140                          T:          -33  QT:         440  QTc:        524    Interpretive Statements  ATRIAL FIBRILLATION  RBBB AND LPFB  Compared to ECG 07/10/2021 03:32:36  Left posterior fascicular block now present  Sinus bradycardia no longer present  First degree AV block no longer present  Left anterior fascicular block no longer present           RADIOLOGY  CT-HEAD W/O   Final Result      1.  Left parietal scalp hematoma without evidence of skull fracture or intracranial hemorrhage.         CT-CSPINE WITHOUT PLUS RECONS   Final Result      No CT evidence of acute cervical spine abnormality.      Moderate spondylosis with multilevel degenerative change      Pulmonary scarring and small right pleural fluid with right mediastinal adenopathy. This could be reactive or malignant      EC-ECHOCARDIOGRAM COMPLETE W/O CONT    (Results Pending)           COURSE & MEDICAL DECISION MAKING  Pertinent Labs & Imaging studies reviewed. (See chart for details)    89-year-old here with syncopal event, certainly symptoms could be from dehydration though his associated shortness of breath is concerning.  Furthermore patient with new atrial fibrillation.  Given patient's advanced age and syncopal episode I do believe he will likely need admission for ongoing telemetry and cardiology follow-up.  He has known CAD.  CT head and CT neck was checked which failed to reveal any evidence of acute traumatic findings.  Patient exam is otherwise very reassuring.  Patient case discussed with hospitalist who is agreed to admit.  Patient  without any associated tachycardia, or hypoxia.  His shortness of breath is resolved.  Given the paroxysm rather than constant symptoms I believe that a fixed lesion like pneumonia or pulmonary embolus is highly unlikely.      FINAL IMPRESSION  1.  Syncope, head trauma, new onset A. fib         Electronically signed by: Brando Crooks M.D., 6/19/2022 5:17 PM

## 2022-06-20 NOTE — CARE PLAN
Problem: Skin Integrity  Goal: Skin integrity is maintained or improved  Outcome: Progressing     Problem: Self Care  Goal: Patient will have the ability to perform ADLs independently or with assistance (bathe, groom, dress, toilet and feed)  Outcome: Progressing     The patient is Stable - Low risk of patient condition declining or worsening    Shift Goals  Clinical Goals: echo, chest xray, PT/OT  Patient Goals: rest  Family Goals: NA    Progress made toward(s) clinical / shift goals:  echo, chest xray, monitor labs    Patient is not progressing towards the following goals:

## 2022-06-20 NOTE — HOSPITAL COURSE
Kobe Cyr is a 89 y.o. male with history of multivessel CAD not CABG candidate per CTS on medical management, HTN, HLD who presented 6/19/2022 with shortness of breath and syncopal episode.  Patient reported falling out of his wheelchair while wife was out of house.  He reported shortness of breath, dizziness, vertigo prior to fall and syncopal event.  Denies palpitation, chest pain, tongue bite, loss of bladder/bowel incontinence.  In ER, CT head noted left parietal scalp hematoma with no fracture or intracranial hemorrhage.  No acute cervical spine fracture seen on CT c-spine. Non specific ST changes on EKG but in afib, troponin T 204>190.  I discussed peripherally with on-call cardiology, no heparin drip as no chest pain, trend CE and echo.  Admitted to medicine service.

## 2022-06-21 PROBLEM — R79.89 POSITIVE D DIMER: Status: ACTIVE | Noted: 2022-06-21

## 2022-06-21 LAB
ANION GAP SERPL CALC-SCNC: 11 MMOL/L (ref 7–16)
BUN SERPL-MCNC: 31 MG/DL (ref 8–22)
CALCIUM SERPL-MCNC: 8.8 MG/DL (ref 8.5–10.5)
CHLORIDE SERPL-SCNC: 106 MMOL/L (ref 96–112)
CO2 SERPL-SCNC: 22 MMOL/L (ref 20–33)
CREAT SERPL-MCNC: 2.41 MG/DL (ref 0.5–1.4)
ERYTHROCYTE [DISTWIDTH] IN BLOOD BY AUTOMATED COUNT: 59 FL (ref 35.9–50)
GFR SERPLBLD CREATININE-BSD FMLA CKD-EPI: 25 ML/MIN/1.73 M 2
GLUCOSE SERPL-MCNC: 149 MG/DL (ref 65–99)
HCT VFR BLD AUTO: 29.7 % (ref 42–52)
HGB BLD-MCNC: 9 G/DL (ref 14–18)
LV EJECT FRACT MOD 2C 99903: 56.71
LV EJECT FRACT MOD 4C 99902: 77.69
LV EJECT FRACT MOD BP 99901: 67.96
MCH RBC QN AUTO: 34.1 PG (ref 27–33)
MCHC RBC AUTO-ENTMCNC: 30.3 G/DL (ref 33.7–35.3)
MCV RBC AUTO: 112.5 FL (ref 81.4–97.8)
NT-PROBNP SERPL IA-MCNC: ABNORMAL PG/ML (ref 0–125)
PLATELET # BLD AUTO: 150 K/UL (ref 164–446)
PMV BLD AUTO: 10.3 FL (ref 9–12.9)
POTASSIUM SERPL-SCNC: 5.2 MMOL/L (ref 3.6–5.5)
RBC # BLD AUTO: 2.64 M/UL (ref 4.7–6.1)
SODIUM SERPL-SCNC: 139 MMOL/L (ref 135–145)
WBC # BLD AUTO: 6.7 K/UL (ref 4.8–10.8)

## 2022-06-21 PROCEDURE — 97162 PT EVAL MOD COMPLEX 30 MIN: CPT

## 2022-06-21 PROCEDURE — 700102 HCHG RX REV CODE 250 W/ 637 OVERRIDE(OP): Performed by: STUDENT IN AN ORGANIZED HEALTH CARE EDUCATION/TRAINING PROGRAM

## 2022-06-21 PROCEDURE — 99233 SBSQ HOSP IP/OBS HIGH 50: CPT | Performed by: HOSPITALIST

## 2022-06-21 PROCEDURE — A9270 NON-COVERED ITEM OR SERVICE: HCPCS | Performed by: HOSPITALIST

## 2022-06-21 PROCEDURE — 99222 1ST HOSP IP/OBS MODERATE 55: CPT | Performed by: INTERNAL MEDICINE

## 2022-06-21 PROCEDURE — A9270 NON-COVERED ITEM OR SERVICE: HCPCS | Performed by: STUDENT IN AN ORGANIZED HEALTH CARE EDUCATION/TRAINING PROGRAM

## 2022-06-21 PROCEDURE — 80048 BASIC METABOLIC PNL TOTAL CA: CPT

## 2022-06-21 PROCEDURE — A9270 NON-COVERED ITEM OR SERVICE: HCPCS | Performed by: INTERNAL MEDICINE

## 2022-06-21 PROCEDURE — 700102 HCHG RX REV CODE 250 W/ 637 OVERRIDE(OP): Performed by: HOSPITALIST

## 2022-06-21 PROCEDURE — 700102 HCHG RX REV CODE 250 W/ 637 OVERRIDE(OP): Performed by: INTERNAL MEDICINE

## 2022-06-21 PROCEDURE — 83880 ASSAY OF NATRIURETIC PEPTIDE: CPT

## 2022-06-21 PROCEDURE — 700111 HCHG RX REV CODE 636 W/ 250 OVERRIDE (IP): Performed by: STUDENT IN AN ORGANIZED HEALTH CARE EDUCATION/TRAINING PROGRAM

## 2022-06-21 PROCEDURE — 93306 TTE W/DOPPLER COMPLETE: CPT | Mod: 26 | Performed by: INTERNAL MEDICINE

## 2022-06-21 PROCEDURE — 36415 COLL VENOUS BLD VENIPUNCTURE: CPT

## 2022-06-21 PROCEDURE — 85027 COMPLETE CBC AUTOMATED: CPT

## 2022-06-21 PROCEDURE — 770020 HCHG ROOM/CARE - TELE (206)

## 2022-06-21 RX ORDER — ISOSORBIDE MONONITRATE 30 MG/1
30 TABLET, EXTENDED RELEASE ORAL
Status: DISCONTINUED | OUTPATIENT
Start: 2022-06-21 | End: 2022-06-22

## 2022-06-21 RX ORDER — RANOLAZINE 500 MG/1
500 TABLET, EXTENDED RELEASE ORAL DAILY
Status: DISCONTINUED | OUTPATIENT
Start: 2022-06-21 | End: 2022-06-28 | Stop reason: HOSPADM

## 2022-06-21 RX ADMIN — DOCUSATE SODIUM 50 MG AND SENNOSIDES 8.6 MG 2 TABLET: 8.6; 5 TABLET, FILM COATED ORAL at 18:26

## 2022-06-21 RX ADMIN — DOCUSATE SODIUM 50 MG AND SENNOSIDES 8.6 MG 2 TABLET: 8.6; 5 TABLET, FILM COATED ORAL at 06:13

## 2022-06-21 RX ADMIN — HEPARIN SODIUM 5000 UNITS: 5000 INJECTION, SOLUTION INTRAVENOUS; SUBCUTANEOUS at 15:49

## 2022-06-21 RX ADMIN — ISOSORBIDE MONONITRATE 30 MG: 30 TABLET, EXTENDED RELEASE ORAL at 12:36

## 2022-06-21 RX ADMIN — RANOLAZINE 500 MG: 500 TABLET, FILM COATED, EXTENDED RELEASE ORAL at 12:36

## 2022-06-21 RX ADMIN — ROSUVASTATIN CALCIUM 40 MG: 20 TABLET, FILM COATED ORAL at 18:25

## 2022-06-21 RX ADMIN — MIDODRINE HYDROCHLORIDE 5 MG: 5 TABLET ORAL at 08:53

## 2022-06-21 RX ADMIN — HEPARIN SODIUM 5000 UNITS: 5000 INJECTION, SOLUTION INTRAVENOUS; SUBCUTANEOUS at 06:13

## 2022-06-21 RX ADMIN — ASPIRIN 81 MG: 81 TABLET, COATED ORAL at 06:13

## 2022-06-21 RX ADMIN — METOPROLOL TARTRATE 12.5 MG: 25 TABLET, FILM COATED ORAL at 18:25

## 2022-06-21 RX ADMIN — CLOPIDOGREL BISULFATE 75 MG: 75 TABLET ORAL at 06:13

## 2022-06-21 RX ADMIN — HEPARIN SODIUM 5000 UNITS: 5000 INJECTION, SOLUTION INTRAVENOUS; SUBCUTANEOUS at 21:37

## 2022-06-21 ASSESSMENT — COGNITIVE AND FUNCTIONAL STATUS - GENERAL
TURNING FROM BACK TO SIDE WHILE IN FLAT BAD: A LOT
STANDING UP FROM CHAIR USING ARMS: A LITTLE
WALKING IN HOSPITAL ROOM: A LITTLE
MOBILITY SCORE: 14
MOVING FROM LYING ON BACK TO SITTING ON SIDE OF FLAT BED: A LITTLE
SUGGESTED CMS G CODE MODIFIER MOBILITY: CL
MOVING TO AND FROM BED TO CHAIR: A LOT
CLIMB 3 TO 5 STEPS WITH RAILING: TOTAL

## 2022-06-21 ASSESSMENT — GAIT ASSESSMENTS
DISTANCE (FEET): 10
GAIT LEVEL OF ASSIST: STANDBY ASSIST

## 2022-06-21 ASSESSMENT — ENCOUNTER SYMPTOMS
FOCAL WEAKNESS: 0
WEAKNESS: 0
DEPRESSION: 0
CARDIOVASCULAR NEGATIVE: 1
FEVER: 0
CHILLS: 0
SPUTUM PRODUCTION: 0
HEMOPTYSIS: 0
BRUISES/BLEEDS EASILY: 0
GASTROINTESTINAL NEGATIVE: 1
NECK PAIN: 0
NAUSEA: 0
BLURRED VISION: 0
SORE THROAT: 0
SHORTNESS OF BREATH: 1
PSYCHIATRIC NEGATIVE: 1
MUSCULOSKELETAL NEGATIVE: 1
DIAPHORESIS: 0
HEADACHES: 0
WEIGHT LOSS: 0
ABDOMINAL PAIN: 0
MYALGIAS: 0
DIZZINESS: 1
HEARTBURN: 0
VOMITING: 0
COUGH: 0
PALPITATIONS: 0
EYES NEGATIVE: 1

## 2022-06-21 ASSESSMENT — PAIN DESCRIPTION - PAIN TYPE: TYPE: ACUTE PAIN

## 2022-06-21 ASSESSMENT — LIFESTYLE VARIABLES: SUBSTANCE_ABUSE: 0

## 2022-06-21 NOTE — PROGRESS NOTES
4 Eyes Skin Assessment Completed by Britney RN and Rod RN.    Head Scratch  Ears WDL  Nose WDL  Mouth WDL  Neck WDL  Breast/Chest WDL  Shoulder Blades WDL  Spine WDL  (R) Arm/Elbow/Hand Bruising, Abrasion, Scab and Discoloration  (L) Arm/Elbow/Hand Bruising, Abrasion, Scab and Discoloration  Abdomen Redness and Blanching  Groin WDL  Scrotum/Coccyx/Buttocks WDL  (R) Leg Shiny  (L) Leg Shiny  (R) Heel/Foot/Toe Redness and Boggy  (L) Heel/Foot/Toe Redness and Boggy          Devices In Places Tele Box, Blood Pressure Cuff, Pulse Ox and SCD's      Interventions In Place Gray Ear Foams, InterDry, Heel Mepilex, Sacral Mepilex, Waffle Overlay and Pillows    Possible Skin Injury No    Pictures Uploaded Into Epic No, needs to be completed  Wound Consult Placed N/A  RN Wound Prevention Protocol Ordered Yes

## 2022-06-21 NOTE — PROGRESS NOTES
[facility] Medicine Daily Progress Note    Date of Service  6/21/2022    Chief Complaint  Kobe Cyr is a 89 y.o. male admitted 6/19/2022 with syncope    Hospital Course  Kobe Cyr is a 89 y.o. male with history of multivessel CAD not CABG candidate per CTS on medical management, HTN, and HLD. He [facility] to [facility]22 with shortness of breath and syncopal episode.  Patient reported falling out of his wheelchair while wife was out of house.  He reported shortness of breath, dizziness, vertigo prior to fall and syncopal event.  Denies palpitation, chest pain, tongue bite, loss of bladder/bowel incontinence.  In ER, CT head noted left parietal scalp hematoma with no fracture or intracranial hemorrhage.  No acute cervical spine fracture seen on CT c-spine. Non specific ST changes on EKG but in afib, troponin T 204>190.  Cardiology was consulted      Interval Problem Update  He is axox3. He reports he is feeling better but has been getting dizzy when getting out of bed and walking to the bathroom. He was also mildly hypotensive this am with sbp of 97. He does report h/o intermittent dizziness but states that his current symptoms are new. He denies cp, no cough or sob. He is at his baseline of 3L. He does report his exercise capacity has decreased by over 50% in the past two years. ROS otherwise negative. Discussed with nursing who will check orthostatics today.    I have discussed this patient's plan of care and discharge plan at IDT rounds today with Case Management, Nursing, Nursing leadership, and other members of the IDT team.    Consultants/Specialty  cardiology    Code Status  Full Code    Disposition  Patient is not medically cleared for discharge.   Anticipate discharge to to home with close outpatient follow-up.  I have placed the appropriate orders for post-discharge needs.    Review of Systems  Review of Systems   Constitutional: Positive for malaise/fatigue. Negative for chills,  diaphoresis, fever and weight loss.   HENT: Negative.  Negative for ear discharge, ear pain, hearing loss, sore throat and tinnitus.    Eyes: Negative.  Negative for blurred vision.   Respiratory: Positive for shortness of breath. Negative for cough, hemoptysis and sputum production.    Cardiovascular: Negative.  Negative for chest pain, palpitations and leg swelling.   Gastrointestinal: Negative.  Negative for abdominal pain, heartburn, nausea and vomiting.   Genitourinary: Negative.  Negative for dysuria and urgency.   Musculoskeletal: Negative.  Negative for myalgias and neck pain.   Skin: Negative.  Negative for itching and rash.   Neurological: Positive for dizziness. Negative for focal weakness, weakness and headaches.   Endo/Heme/Allergies: Negative.  Does not bruise/bleed easily.   Psychiatric/Behavioral: Negative.  Negative for depression, substance abuse and suicidal ideas.   All other systems reviewed and are negative.       Physical Exam  Temp:  [36.1 °C (96.9 °F)-36.9 °C (98.4 °F)] 36.8 °C (98.3 °F)  Pulse:  [62-94] 82  Resp:  [18] 18  BP: ()/(45-72) 106/62  SpO2:  [91 %-97 %] 92 %    Physical Exam  Constitutional:       General: He is not in acute distress.     Appearance: He is not ill-appearing, toxic-appearing or diaphoretic.   HENT:      Head: Normocephalic.      Nose: Nose normal.      Mouth/Throat:      Mouth: Mucous membranes are moist.   Eyes:      General: No scleral icterus.     Extraocular Movements: Extraocular movements intact.      Pupils: Pupils are equal, round, and reactive to light.   Cardiovascular:      Rate and Rhythm: Normal rate and regular rhythm.   Pulmonary:      Breath sounds: No rhonchi.   Abdominal:      General: Abdomen is flat. There is no distension.      Palpations: There is no mass.      Tenderness: There is no abdominal tenderness. There is no guarding.   Musculoskeletal:         General: No swelling or deformity. Normal range of motion.      Cervical back:  Normal range of motion and neck supple.      Right lower leg: No edema.   Skin:     General: Skin is warm.      Capillary Refill: Capillary refill takes 2 to 3 seconds.      Coloration: Skin is not jaundiced.      Findings: No bruising, erythema or lesion.   Neurological:      General: No focal deficit present.      Mental Status: He is alert and oriented to person, place, and time.   Psychiatric:         Mood and Affect: Mood normal.         Fluids    Intake/Output Summary (Last 24 hours) at 6/21/2022 1536  Last data filed at 6/21/2022 1258  Gross per 24 hour   Intake 360 ml   Output 325 ml   Net 35 ml       Laboratory  Recent Labs     06/19/22 1712 06/21/22 0229   WBC 6.2 6.7   RBC 2.65* 2.64*   HEMOGLOBIN 9.1* 9.0*   HEMATOCRIT 29.6* 29.7*   .7* 112.5*   MCH 34.3* 34.1*   MCHC 30.7* 30.3*   RDW 58.2* 59.0*   PLATELETCT 148* 150*   MPV 10.1 10.3     Recent Labs     06/19/22  1712 06/20/22  1053 06/21/22  0229   SODIUM 137 137 139   POTASSIUM 4.4 4.6 5.2   CHLORIDE 103 105 106   CO2 25 22 22   GLUCOSE 113* 151* 149*   BUN 30* 30* 31*   CREATININE 2.06* 2.11* 2.41*   CALCIUM 9.0 8.8 8.8             Recent Labs     06/20/22 0222   TRIGLYCERIDE 63   HDL 39*   LDL 25       Imaging  EC-ECHOCARDIOGRAM COMPLETE W/O CONT   Final Result      DX-CHEST-PORTABLE (1 VIEW)   Final Result      1.  Mild diffuse interstitial edema and patchy bilateral airspace opacities. Findings could be seen in setting edema and/or infection.   2.  Stable enlargement of the cardiomediastinal silhouette.   3.  Possible small right pleural effusion.      US-RENAL   Final Result      Unremarkable renal ultrasound.      CT-HEAD W/O   Final Result      1.  Left parietal scalp hematoma without evidence of skull fracture or intracranial hemorrhage.         CT-CSPINE WITHOUT PLUS RECONS   Final Result      No CT evidence of acute cervical spine abnormality.      Moderate spondylosis with multilevel degenerative change      Pulmonary scarring  and small right pleural fluid with right mediastinal adenopathy. This could be reactive or malignant           Assessment/Plan  * Syncope and collapse- (present on admission)  Assessment & Plan  CT head noted left parietal scalp hematoma with no fracture or intracranial hemorrhage  No acute cervical spine fracture seen on CT c-spine    In afib, no RVR    Patients syncope may have been related to hypotension related to his cardiac medications, see above  Following closely     Hypotension due to drugs- (present on admission)  Assessment & Plan  Suspect cardiac meds contributing, cardiology wants to continue them - following closely       Acute kidney injury superimposed on chronic kidney disease (HCC)  Assessment & Plan  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    Renal ultrasound is unremarkable    Continue to follow    Elevated troponin  Assessment & Plan  Echo pending  Cardiology following  trops decreasing    GERD (gastroesophageal reflux disease)- (present on admission)  Assessment & Plan  PPI    CAD (coronary artery disease)- (present on admission)  Assessment & Plan  Prior MI and PCI  Found to have multivessel CAD -- not CABG surgical candidate in 07/2021    Continue ASA/Plavix/statin/BB (as tolerated)    Imdur restarted by cardiology - may be contributing to his dizziness with standing and hypotension - following closely  Orthostatics pending    Hyperlipidemia- (present on admission)  Assessment & Plan  Statin       VTE prophylaxis: SCDs/TEDs    I have performed a physical exam and reviewed and updated ROS and Plan today (6/21/2022). In review of yesterday's note (6/20/2022), there are no changes except as documented above.

## 2022-06-21 NOTE — PROGRESS NOTES
Pt had episode of dizziness when sitting on the edge of the bed. Assisted patient to sit up and lay back. Dizziness lasted for about 10 mins. VSS, /50, no ectopy on tele monitoring. Bed alarm on, will continue to monitor.

## 2022-06-21 NOTE — CONSULTS
Reason of Consult: Dyspnea and transient loss of consciousness    Consulting Physician: Dr. Yates    HPI:  89-year-old gentleman with severe CAD without targets for revascularization presents with a fall out of his wheelchair which was unwitnessed.  Adamant he has not had loss of consciousness.  Did have a scalp hematoma but no other intracranial or extracranial abnormalities.  Incidentally mildly elevated troponin, atrial fibrillation with controlled ventricular response and no cardiac symptoms outside of his baseline dyspnea.  Dizziness this morning with position change without hypotension or rhythm disturbance suggesting vertigo.  Non-smoker does not drink excessively or do drugs or have family history precocious CAD. His dizziness has been chronic and unchanged for over one year.    Past Medical History:   Diagnosis Date   • Anemia 8/1/2016   • Arthritis    • Back pain    • CAD (coronary artery disease)    • Cancer (HCC)     skin   • Chronic kidney disease (CKD), stage II (mild) 8/1/2016   • Coronary heart disease    • DJD (degenerative joint disease) 8/1/2016   • Dyslipidemia 8/1/2016   • GERD (gastroesophageal reflux disease) 8/1/2016   • Latv[place] measles    • Hematochezia 8/1/2016   • High cholesterol    • Hyperglycemia 1/6/2010   • Hyperlipidemia 5/24/2011   • Hypertension    • Impaired fasting glucose 8/1/2016   • Left knee pain 8/1/2016   • Myocardial infarction (HCC) 2 yrs ago    2005   • Nasal drainage    • Obesity    • Osteoarthritis of knees, bilateral 8/1/2016   • Pulmonary embolism (HCC)    • Rheumatic fever    • Scarlet fever    • Smallpox    • Snoring        Social History     Socioeconomic History   • Marital status:      Spouse name: Not on file   • Number of children: Not on file   • Years of education: Not on file   • Highest education level: Not on file   Occupational History   • Not on file   Tobacco Use   • Smoking status: Former Smoker     Packs/day: 2.00     Years: 35.00     Pack  years: 70.00     Types: Cigarettes     Quit date: 1973     Years since quittin.2   • Smokeless tobacco: Never Used   Vaping Use   • Vaping Use: Never used   Substance and Sexual Activity   • Alcohol use: Yes     Comment: cocktail every night   • Drug use: No   • Sexual activity: Not on file   Other Topics Concern   • Not on file   Social History Narrative   • Not on file     Social Determinants of Health     Financial Resource Strain: Not on file   Food Insecurity: Not on file   Transportation Needs: Not on file   Physical Activity: Not on file   Stress: Not on file   Social Connections: Not on file   Intimate Partner Violence: Not on file   Housing Stability: Not on file       No current facility-administered medications on file prior to encounter.     Current Outpatient Medications on File Prior to Encounter   Medication Sig Dispense Refill   • aspirin EC (ECOTRIN) 81 MG Tablet Delayed Response Take 81 mg by mouth every day.     • metoprolol tartrate (LOPRESSOR) 25 MG Tab Take 0.5 Tablets by mouth 2 times a day. 180 Tablet 3   • isosorbide mononitrate SR (IMDUR) 60 MG TABLET SR 24 HR Take 1 Tablet by mouth every morning. 90 Tablet 3   • clopidogrel (PLAVIX) 75 MG Tab Take 1 Tablet by mouth every day. 90 Tablet 3   • rosuvastatin (CRESTOR) 40 MG tablet Take 1 Tablet by mouth every evening. 90 Tablet 3   • losartan (COZAAR) 25 MG Tab Take 0.5 Tablets by mouth 2 times a day. (Patient taking differently: Take 12.5 mg by mouth at bedtime.) 90 Tablet 3   • fluticasone (FLONASE) 50 MCG/ACT nasal spray Administer 1 Spray into affected nostril(S) every day. 16 g 2       Current Facility-Administered Medications   Medication Dose Frequency Provider Last Rate Last Admin   • midodrine (PROAMATINE) tablet 5 mg  5 mg TID WITH MEALS Jonathon Yates M.D.   5 mg at 22 0853   • senna-docusate (PERICOLACE or SENOKOT S) 8.6-50 MG per tablet 2 Tablet  2 Tablet BID Avni Jernigan M.D.   2 Tablet at 22 0613    And  "  • polyethylene glycol/lytes (MIRALAX) PACKET 1 Packet  1 Packet QDAY PRN Avni Jernigan M.D.        And   • magnesium hydroxide (MILK OF MAGNESIA) suspension 30 mL  30 mL QDAY PRN Avni Jernigan M.D.        And   • bisacodyl (DULCOLAX) suppository 10 mg  10 mg QDAY PRN Avni Jernigan M.D.       • heparin injection 5,000 Units  5,000 Units Q8HRS Avni Jernigan M.D.   5,000 Units at 06/21/22 0613   • acetaminophen (Tylenol) tablet 650 mg  650 mg Q6HRS PRN Avni Jernigan M.D.       • hydrALAZINE (APRESOLINE) injection 10 mg  10 mg Q4HRS PRN Avni Jernigan M.D.       • nitroglycerin (NITROSTAT) tablet 0.4 mg  0.4 mg Q5 MIN PRN Avni Jernigan M.D.       • morphine 4 MG/ML injection 2-4 mg  2-4 mg Q5 MIN PRN Avni Jernigan M.D.       • ondansetron (ZOFRAN) syringe/vial injection 4 mg  4 mg Q4HRS PRN Avni Jernigan M.D.       • ondansetron (ZOFRAN ODT) dispertab 4 mg  4 mg Q4HRS PRN Avni Jernigan M.D.       • guaiFENesin dextromethorphan (ROBITUSSIN DM) 100-10 MG/5ML syrup 10 mL  10 mL Q6HRS PRN Avni Jernigan M.D.       • aspirin EC (ECOTRIN) tablet 81 mg  81 mg DAILY Avni Jernigan M.D.   81 mg at 06/21/22 0613   • clopidogrel (PLAVIX) tablet 75 mg  75 mg DAILY Avni Jernigan M.D.   75 mg at 06/21/22 0613   • rosuvastatin (CRESTOR) tablet 40 mg  40 mg Q EVENING Avni Jernigan M.D.   40 mg at 06/20/22 1820   • metoprolol tartrate (LOPRESSOR) tablet 12.5 mg  12.5 mg BID Jonathon Yates M.D.   12.5 mg at 06/20/22 1820   Last reviewed on 6/19/2022  6:27 PM by Ekaterina Gaspar, JoyT      Atorvastatin and Simvastatin    Family History   Problem Relation Age of Onset   • No Known Problems Mother    • No Known Problems Father        ROS: As per HPI all other systems reviewed and negative     Physical Exam   Blood pressure (!) 97/55, pulse 93, temperature 36.7 °C (98 °F), temperature source Temporal, resp. rate 18, height 1.803 m (5' 10.98\"), weight 98.9 kg (218 lb 0.6 oz), SpO2 94 %.    Constitutional: Elderly, frail.  Appears well-developed. "   HENT: Normocephalic and atraumatic. No scleral icterus.   Neck: Unable to assess for JVD  Cardiovascular: Normal rate.  Regular rhythm, exam reveals no gallop and no friction rub.  Systolic ejection murmur heard.   Pulmonary/Chest: Coarse   Abdominal: S/NT/ND BS+   Musculoskeletal:  Pulses present. No atrophy. Strength normal.  Extremities: Exhibits edema. No clubbing or cyanosis.   Skin: Skin is warm and dry.   Neuro: Non-focal, CN 2-12 intact grossly      Intake/Output Summary (Last 24 hours) at 6/21/2022 0905  Last data filed at 6/20/2022 2300  Gross per 24 hour   Intake --   Output 150 ml   Net -150 ml       Recent Labs     06/19/22 1712 06/21/22 0229   WBC 6.2 6.7   RBC 2.65* 2.64*   HEMOGLOBIN 9.1* 9.0*   HEMATOCRIT 29.6* 29.7*   .7* 112.5*   MCH 34.3* 34.1*   MCHC 30.7* 30.3*   RDW 58.2* 59.0*   PLATELETCT 148* 150*   MPV 10.1 10.3     Recent Labs     06/19/22  1712 06/20/22  1053 06/21/22 0229   SODIUM 137 137 139   POTASSIUM 4.4 4.6 5.2   CHLORIDE 103 105 106   CO2 25 22 22   GLUCOSE 113* 151* 149*   BUN 30* 30* 31*   CREATININE 2.06* 2.11* 2.41*   CALCIUM 9.0 8.8 8.8             Recent Labs     06/19/22  1815 06/19/22  2115   CPKTOTAL 81 121     Recent Labs     06/20/22 0222   TRIGLYCERIDE 63   HDL 39*   LDL 25       Imaging reviewed    ECHO CONCLUSIONS (6/20/2022):  The left ventricular ejection fraction is visually estimated to be 75%.  Right ventricle is mildly dilated.  Severe biatrial enlargement.  Mild aortic stenosis: V-max 2.5 m/s, MG 14 mmHg, DENEEN 1.5 cm2, DI 0.4.  Mild to moderate tricuspid regurgitation.  Right ventricular systolic pressure is estimated to be 51 mmHg,   moderate secondary pulmonary hypertension.     Compared to the images of the prior study 07/10/2021, no significant   change.    Impressions:  1.  Fall, unclear loss of consciousness  2.  Dizziness, intermittent  3.  Indeterminate troponin not compatible with acute coronary syndrome  4.  Acutely decompensated heart  failure with preserved ejection fraction  5.  Severe multivessel CAD, no targets for revascularization  6.  Acute renal failure  7.  Mild aortic stenosis  8. Pulmonary hypertension    Recommendations:  His symptoms are most consistent with a fall related to dizziness/vertigo rather than hypotension/orthostasis/syncope. Recurrent symptoms here with position change including bending over. He did have an episode of dizziness which was witnessed and had no associated hypotension or rhythm disturbances.  No targets for meaningful [percutaneous CAD intervention without excessive risk and he is not a surgical candidate after being seen by the surgeons previously.  This is due to frailty and anatomical complexity.  He is not having any chest pain.  Recommend continuing his medical therapy.  Recommend diuresis to achieve euvolemia.  Telemetry monitoring while in hospital would be reasonable to rule out any ventricular events or pauses.        1.  Continue outpatient medical therapy for coronary artery disease, will titrate  2.  Gentle diuresis to achieve euvolemia  3.  Telemetry monitoring while hospitalized  4.  LE compression garments while awake  5.  Midodrine initiated in hospital contraindicated with severe ischemic heart disease. Will DC.    Discussed with the patient and bedside nursing.    Thank you for this interesting consultation. It was my pleasure to see Kobe Cyr today.    Cisco Lyon MD, FACC, [facility]  Division of Interventional Cardiology  [facility]

## 2022-06-21 NOTE — CONSULTS
"Reason for PC Consult: Advance Care Planning    Consulted by: Dr. Jernigan    Assessment:  General: Mr. Cyr is a 89 year old male BIB EMS for syncope and collapse. Patient was feeling unwell and his wife was not home at the time. He stated that he felt dizzy and short of breath, and \"passed out\". He expressed denial falling to the ground and added that that he just \"slumped over\", but still in his wheel chair. He reveals that he injured his left hand and bumped his head on the pantry door in the kitchen. His PMH includes: anemia, arthritis, back pain, CAD, cancer, CKD-Stage II, DJD, dyslipidemia, GERD, HTN, and MI in 2020. EF 75% (06/2022). Troponin and BNP are elevated, and patient is hypotensive with symptoms of dizziness/and lightheaded with positional changes. CT (6/19) revealed left parietal scalp hematoma, without evidence of skull fracture. CT spine revealed. \"Moderate spondylosis with multilevel degenerative change and pulmonary scarring and small right pleural fluid with right mediastinal adenopathy. This could be reactive or malignant.\" EKG revealed with new onset of atrial fibrillation, and RBB with no ST elevation.     Consults: Interventional Cardiology  Prior PC Consult: No    Dyspnea: Yes- on 4 LPM  Last BM: 06/19/22  Pain: No   Depression: No   Dementia: No       Spiritual:  Is Druze or spirituality important for coping with this illness? No   Has a  or spiritual provider visit been requested? No    Palliative Performance Scale: 50%    Advance Directive: None on file  DPOA: None on file  POLST: None on file    Code Status: Full Code    Social: Mr. Cyr revealed that he has been  to his wife, Mook  for 35 years. 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 He lives with Mook,  on 40 acres in [place] and has horses and cattle. He also has 2 poodles at home, and he expressed that Opal, the oldest dog, has been looking all over the house " "for him since being hospitalized. 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 Patient is in a blended family and reveals that Mook has a Son, Nitin who lives in [place] and is a retired Colonel of the [employer]. Patient expressed that his wife is his HC-POA,  but Nitin is \"in charge of his Trust\". Mook also has 2 daughters. Mr. Cyr has 3 sons (Sarabjit,Jonny and Jase) and 1 daughter (Patricia) all of them were adopted as infants and they live in [place], [place]. He reveals he has great family support. Patient uses a motorized scooter to \"get around in\" as he is no longer able to walk. Sometimes he goes outside to sit in the sun, but not for long as he does have sun damage to his arms from being in the rodeo.     Outcome:  Consult received and EMR reviewed; case discussed with Dr. Elizabeth.     Introduced self and role of Palliative Care.  Assessed pt's understanding of their current medical status, overall health picture, and options for future care. Pt expressed good understanding of acute situation and chronic issues. Patient expressed that the dizziness started about a year ago and had an angiograph in July of 2021. He was informed he needed \"a couple of stents\" but was not a surgical candidate due to his advanced age. He expressed the reason why he is here is because of his \"fainting spell.\"    Provided overview of hospital course, current update and general education related to atrial fibrillation. Discussed atrial fibrillation as an irregular heart beat which the heart is unable to efficiently pump, therefore,  blood pools in the heart, and there is an increased risk of clot traveling to the brain and causing a stroke. Patient verbalizes understanding but reveals that he is \"not too worried\" about that.      Explored pt's values, beliefs, and preferences in order " "to identify GOC. Patient stated his wish is to be discharged today and to go home. He expressed that he is unable to participate in activities due to being confined to the scooter. He did reveal that he does not like to eat chicken or pasta and he is looking for someone to cook for him 3 times per week as his wife doesn't cook and only buys \"pre-made\" meals at the grocery store. He declined revealing GOC only that he is over 89 years old and  \"wants to be kept alive as long as possible.\"     PC RN addressed at this encounter with patient. Described the measures employed in a full code including CPR, medications,intubation and possibly defibrillation of his heart. Explained to patient that completed POLST form is to take home, so if his heart stops and he stops breathing than wife and medical personnel will know his wishes. He revealed that his wife \"knows his wishes.\" Further explained that a DNR would not preclude any treatments/interventions offered to patient.  Expressed concern that due to patient's acute situation, progressive disease, age, and co-morbidities, he is unlikely to endure invasive code measures well, and even if he was successfully resuscitated, he may come out of it with significantly reduced quality of life from even his current state. Pt verbalized understanding and he wishes to remain Full Code at this time and unwilling to complete POLST at this encounter. He further explained that his wife knows what to do because she knows what he would want. He expressed it was \"none of your business.\" Patient revealed if he was found dead, than he would not want to be rescusitated, but stated that is was ok for his wife to call 911 and patient expessed that he would want all measures employed in a full code.     Addressed Advance Directive for healthcare. Patient expressed he has a Trust at home and everything is included in it. RN clarified that financial status is of no concern, and only interested in " "DPOA-. PC RN requested a copy to scan to patient EMR and patient declined as well, stating it was \"none of your business.\" PC RN explained to patient the reasons for for having an Advance Directive scanned into his EMR as it would be an extra copy with his expressed wishes for Mook,  if he was unable to make decisions for himself. Again, patient expressed that it's \"none of your business\", and reveals that his \"wife would know what to do.\"  PC RN asked permission to call his wife, and he said \"No, she will tell you it is none of your business as well.\"     Active listening, reflection, reminiscing, validation & normalization, empathic support and therapeutic touch utilized throughout this encounter.  All questions answered.  PC contact information given.     Discussed with/Updated: Dr. Elizabeth    Plan: Palliative care to continue to follow, provide support, and help facilitate decision-making as clinical picture evolves.    Thank you for allowing Palliative Care to participate in this patient's care. Please feel free to call [phone] with any questions or concerns.  "

## 2022-06-21 NOTE — PROGRESS NOTES
Received patient from dayshift RN. Patient stable , O2 @ 5L , VSS, A&O x4 . Call light and personal items within reach. Bed locked and in lowest position. Fall precautions in place. Bed alarm on. Hourly rounding in place. Tele box verified in place. Will continue to monitor.

## 2022-06-21 NOTE — THERAPY
Physical Therapy   Initial Evaluation     Patient Name: Kobe Cyr  Age:  89 y.o., Sex:  male  Medical Record #: [id number]  Today's Date: 6/21/2022    Precautions: Fall Risk    Assessment  Patient is a 89 y.o. male admitted following GLF/syncope from his scooter. Pmhx includes CAD. Pt seen for PT evaluation at this time. Pt sitting EOB upon arrival, demo'd STS and short distance ambulation with SBA/SPV. Activity tolerance limited by ease of SOB, pt then reporting dizziness and BP at EOB 75/58. Pt mostly uses electric scooter at home but repors does walk at times. Anticipate once medically managed, pt will likely be able to return home with use of scooter for mobility and HHPT to progress ambulation and endurance. Will follow.     Plan  Recommend Physical Therapy 3 times per week until therapy goals are met for the following treatments:  Bed Mobility, Gait Training, Neuro Re-Education / Balance, Self Care/Home Evaluation, Therapeutic Activities, and Therapeutic Exercises  DC Equipment Recommendations: None  Discharge Recommendations: Recommend home health for continued physical therapy services (with use of electric scooter at home)        06/21/22 1426   Vitals   Blood Pressure  seated after short activity 75/58, supine 90/60   O2 (LPM) 5   O2 Delivery Device Silicone Nasal Cannula   Vitals Comments SpO2 86-89% with mobility and at rest   Prior Living Situation   Prior Services None   Housing / Facility 1 Story House   Steps Into Home ramp   Equipment Owned Scooter   Lives with - Spouse   Comments reports daughters assist with IADLs as needed and spouse drives.   Prior Level of Functional Mobility   Bed Mobility Independent   Transfer Status Independent   Ambulation Independent   Distance Ambulation (Feet) household distances with rest breaks   Assistive Devices Used no AD and scooter   Comments pt reports he wakes up and walks down the miller to the commode, then states takes a seated rest break and walks back to  his room to lay in bed. reports uses his scooter following that after it finishes charging overnight.   Cognition    Level of Consciousness Alert   Comments pleasant, motivated, receptive to education   Balance Assessment   Sitting Balance (Static) Good   Sitting Balance (Dynamic) Fair +   Standing Balance (Static) Fair   Standing Balance (Dynamic) Fair -   Weight Shift Sitting Fair   Weight Shift Standing Fair   Comments no AD, reaches for environmental objects   Gait Analysis   Gait Level Of Assist Standby Assist   Assistive Device None   Distance (Feet) 10   Deviation fwd trunk, reaching, decr step length   Weight Bearing Status no restrictions   Comments ambulated short distance in room while reaching for walls and tables, reports does not use FWW as he had a GLF with one in the past. distance limited d/t SOB and then dizziness onset.   Bed Mobility    Supine to Sit NT, sittign EOB upon PT arrival   Sit to Supine Supervised (with HOB elevated)   Scooting Supervised   Functional Mobility   Sit to Stand Supervised   Short Term Goals    Short Term Goal # 1 pt will perform supine <> sit with SPV from flat bed to get in/out of bed at home   Short Term Goal # 2 pt will ambulate 50ft with SPV in 6 visits to access home environment

## 2022-06-22 ENCOUNTER — APPOINTMENT (OUTPATIENT)
Dept: RADIOLOGY | Facility: MEDICAL CENTER | Age: 87
DRG: 242 | End: 2022-06-22
Attending: INTERNAL MEDICINE
Payer: MEDICARE

## 2022-06-22 ENCOUNTER — APPOINTMENT (OUTPATIENT)
Dept: CARDIOLOGY | Facility: MEDICAL CENTER | Age: 87
DRG: 242 | End: 2022-06-22
Attending: PHYSICIAN ASSISTANT
Payer: MEDICARE

## 2022-06-22 ENCOUNTER — APPOINTMENT (OUTPATIENT)
Dept: RADIOLOGY | Facility: MEDICAL CENTER | Age: 87
DRG: 242 | End: 2022-06-22
Attending: HOSPITALIST
Payer: MEDICARE

## 2022-06-22 PROBLEM — R00.1 BRADYCARDIA: Status: ACTIVE | Noted: 2022-06-22

## 2022-06-22 PROBLEM — D69.6 THROMBOCYTOPENIA (HCC): Status: ACTIVE | Noted: 2022-06-22

## 2022-06-22 PROBLEM — K59.00 CONSTIPATION: Status: ACTIVE | Noted: 2022-06-22

## 2022-06-22 LAB
ANION GAP SERPL CALC-SCNC: 15 MMOL/L (ref 7–16)
BUN SERPL-MCNC: 32 MG/DL (ref 8–22)
CALCIUM SERPL-MCNC: 8.5 MG/DL (ref 8.5–10.5)
CHLORIDE SERPL-SCNC: 102 MMOL/L (ref 96–112)
CO2 SERPL-SCNC: 19 MMOL/L (ref 20–33)
CREAT SERPL-MCNC: 2.35 MG/DL (ref 0.5–1.4)
EKG IMPRESSION: NORMAL
EKG IMPRESSION: NORMAL
ERYTHROCYTE [DISTWIDTH] IN BLOOD BY AUTOMATED COUNT: 59.4 FL (ref 35.9–50)
GFR SERPLBLD CREATININE-BSD FMLA CKD-EPI: 25 ML/MIN/1.73 M 2
GLUCOSE BLD STRIP.AUTO-MCNC: 141 MG/DL (ref 65–99)
GLUCOSE SERPL-MCNC: 153 MG/DL (ref 65–99)
HCT VFR BLD AUTO: 28.1 % (ref 42–52)
HGB BLD-MCNC: 8.6 G/DL (ref 14–18)
MAGNESIUM SERPL-MCNC: 2.2 MG/DL (ref 1.5–2.5)
MCH RBC QN AUTO: 34.5 PG (ref 27–33)
MCHC RBC AUTO-ENTMCNC: 30.6 G/DL (ref 33.7–35.3)
MCV RBC AUTO: 112.9 FL (ref 81.4–97.8)
PLATELET # BLD AUTO: 139 K/UL (ref 164–446)
PMV BLD AUTO: 10.1 FL (ref 9–12.9)
POTASSIUM SERPL-SCNC: 5 MMOL/L (ref 3.6–5.5)
RBC # BLD AUTO: 2.49 M/UL (ref 4.7–6.1)
SODIUM SERPL-SCNC: 136 MMOL/L (ref 135–145)
TROPONIN T SERPL-MCNC: 170 NG/L (ref 6–19)
WBC # BLD AUTO: 7 K/UL (ref 4.8–10.8)

## 2022-06-22 PROCEDURE — 83735 ASSAY OF MAGNESIUM: CPT

## 2022-06-22 PROCEDURE — 82962 GLUCOSE BLOOD TEST: CPT

## 2022-06-22 PROCEDURE — 85027 COMPLETE CBC AUTOMATED: CPT

## 2022-06-22 PROCEDURE — 99291 CRITICAL CARE FIRST HOUR: CPT | Performed by: STUDENT IN AN ORGANIZED HEALTH CARE EDUCATION/TRAINING PROGRAM

## 2022-06-22 PROCEDURE — A9270 NON-COVERED ITEM OR SERVICE: HCPCS | Performed by: HOSPITALIST

## 2022-06-22 PROCEDURE — 700102 HCHG RX REV CODE 250 W/ 637 OVERRIDE(OP): Performed by: STUDENT IN AN ORGANIZED HEALTH CARE EDUCATION/TRAINING PROGRAM

## 2022-06-22 PROCEDURE — 700117 HCHG RX CONTRAST REV CODE 255: Performed by: INTERNAL MEDICINE

## 2022-06-22 PROCEDURE — 71045 X-RAY EXAM CHEST 1 VIEW: CPT

## 2022-06-22 PROCEDURE — 02H60JZ INSERTION OF PACEMAKER LEAD INTO RIGHT ATRIUM, OPEN APPROACH: ICD-10-PCS | Performed by: INTERNAL MEDICINE

## 2022-06-22 PROCEDURE — A9270 NON-COVERED ITEM OR SERVICE: HCPCS | Performed by: STUDENT IN AN ORGANIZED HEALTH CARE EDUCATION/TRAINING PROGRAM

## 2022-06-22 PROCEDURE — 770020 HCHG ROOM/CARE - TELE (206)

## 2022-06-22 PROCEDURE — 700111 HCHG RX REV CODE 636 W/ 250 OVERRIDE (IP): Performed by: STUDENT IN AN ORGANIZED HEALTH CARE EDUCATION/TRAINING PROGRAM

## 2022-06-22 PROCEDURE — 84484 ASSAY OF TROPONIN QUANT: CPT

## 2022-06-22 PROCEDURE — 36415 COLL VENOUS BLD VENIPUNCTURE: CPT

## 2022-06-22 PROCEDURE — 33208 INSRT HEART PM ATRIAL & VENT: CPT | Mod: KX | Performed by: INTERNAL MEDICINE

## 2022-06-22 PROCEDURE — A9270 NON-COVERED ITEM OR SERVICE: HCPCS | Performed by: INTERNAL MEDICINE

## 2022-06-22 PROCEDURE — 93010 ELECTROCARDIOGRAM REPORT: CPT | Performed by: INTERNAL MEDICINE

## 2022-06-22 PROCEDURE — 93005 ELECTROCARDIOGRAM TRACING: CPT | Performed by: STUDENT IN AN ORGANIZED HEALTH CARE EDUCATION/TRAINING PROGRAM

## 2022-06-22 PROCEDURE — 92950 HEART/LUNG RESUSCITATION CPR: CPT

## 2022-06-22 PROCEDURE — 99233 SBSQ HOSP IP/OBS HIGH 50: CPT | Performed by: INTERNAL MEDICINE

## 2022-06-22 PROCEDURE — 02HK0JZ INSERTION OF PACEMAKER LEAD INTO RIGHT VENTRICLE, OPEN APPROACH: ICD-10-PCS | Performed by: INTERNAL MEDICINE

## 2022-06-22 PROCEDURE — 700111 HCHG RX REV CODE 636 W/ 250 OVERRIDE (IP)

## 2022-06-22 PROCEDURE — 93010 ELECTROCARDIOGRAM REPORT: CPT | Mod: 77 | Performed by: INTERNAL MEDICINE

## 2022-06-22 PROCEDURE — 93005 ELECTROCARDIOGRAM TRACING: CPT | Performed by: INTERNAL MEDICINE

## 2022-06-22 PROCEDURE — C1894 INTRO/SHEATH, NON-LASER: HCPCS

## 2022-06-22 PROCEDURE — 700101 HCHG RX REV CODE 250

## 2022-06-22 PROCEDURE — 80048 BASIC METABOLIC PNL TOTAL CA: CPT

## 2022-06-22 PROCEDURE — 0JH606Z INSERTION OF PACEMAKER, DUAL CHAMBER INTO CHEST SUBCUTANEOUS TISSUE AND FASCIA, OPEN APPROACH: ICD-10-PCS | Performed by: INTERNAL MEDICINE

## 2022-06-22 PROCEDURE — 700102 HCHG RX REV CODE 250 W/ 637 OVERRIDE(OP): Performed by: INTERNAL MEDICINE

## 2022-06-22 PROCEDURE — 700111 HCHG RX REV CODE 636 W/ 250 OVERRIDE (IP): Performed by: INTERNAL MEDICINE

## 2022-06-22 PROCEDURE — 700102 HCHG RX REV CODE 250 W/ 637 OVERRIDE(OP): Performed by: HOSPITALIST

## 2022-06-22 PROCEDURE — 99152 MOD SED SAME PHYS/QHP 5/>YRS: CPT | Performed by: INTERNAL MEDICINE

## 2022-06-22 RX ORDER — CEFAZOLIN SODIUM 1 G/3ML
INJECTION, POWDER, FOR SOLUTION INTRAMUSCULAR; INTRAVENOUS
Status: COMPLETED
Start: 2022-06-22 | End: 2022-06-22

## 2022-06-22 RX ORDER — BUPIVACAINE HYDROCHLORIDE 2.5 MG/ML
INJECTION, SOLUTION EPIDURAL; INFILTRATION; INTRACAUDAL
Status: COMPLETED
Start: 2022-06-22 | End: 2022-06-22

## 2022-06-22 RX ORDER — MIDAZOLAM HYDROCHLORIDE 1 MG/ML
INJECTION INTRAMUSCULAR; INTRAVENOUS
Status: COMPLETED
Start: 2022-06-22 | End: 2022-06-22

## 2022-06-22 RX ORDER — ACETAMINOPHEN 325 MG/1
650 TABLET ORAL EVERY 4 HOURS PRN
Status: DISCONTINUED | OUTPATIENT
Start: 2022-06-22 | End: 2022-06-28 | Stop reason: HOSPADM

## 2022-06-22 RX ORDER — CEFAZOLIN SODIUM 2 G/100ML
2 INJECTION, SOLUTION INTRAVENOUS EVERY 12 HOURS
Status: COMPLETED | OUTPATIENT
Start: 2022-06-23 | End: 2022-06-23

## 2022-06-22 RX ORDER — LIDOCAINE HYDROCHLORIDE 20 MG/ML
INJECTION, SOLUTION INFILTRATION; PERINEURAL
Status: COMPLETED
Start: 2022-06-22 | End: 2022-06-22

## 2022-06-22 RX ADMIN — DOCUSATE SODIUM 50 MG AND SENNOSIDES 8.6 MG 2 TABLET: 8.6; 5 TABLET, FILM COATED ORAL at 05:36

## 2022-06-22 RX ADMIN — CLOPIDOGREL BISULFATE 75 MG: 75 TABLET ORAL at 05:36

## 2022-06-22 RX ADMIN — MIDAZOLAM HYDROCHLORIDE 1.5 MG: 1 INJECTION, SOLUTION INTRAMUSCULAR; INTRAVENOUS at 17:14

## 2022-06-22 RX ADMIN — LIDOCAINE HYDROCHLORIDE: 20 INJECTION, SOLUTION INFILTRATION; PERINEURAL at 16:19

## 2022-06-22 RX ADMIN — FENTANYL CITRATE 75 MCG: 50 INJECTION, SOLUTION INTRAMUSCULAR; INTRAVENOUS at 17:14

## 2022-06-22 RX ADMIN — CEFAZOLIN 3000 MG: 330 INJECTION, POWDER, FOR SOLUTION INTRAMUSCULAR; INTRAVENOUS at 16:16

## 2022-06-22 RX ADMIN — DOCUSATE SODIUM 50 MG AND SENNOSIDES 8.6 MG 2 TABLET: 8.6; 5 TABLET, FILM COATED ORAL at 18:00

## 2022-06-22 RX ADMIN — METOPROLOL TARTRATE 12.5 MG: 25 TABLET, FILM COATED ORAL at 05:36

## 2022-06-22 RX ADMIN — ROSUVASTATIN CALCIUM 40 MG: 20 TABLET, FILM COATED ORAL at 18:35

## 2022-06-22 RX ADMIN — ASPIRIN 81 MG: 81 TABLET, COATED ORAL at 05:35

## 2022-06-22 RX ADMIN — BUPIVACAINE HYDROCHLORIDE: 2.5 INJECTION, SOLUTION EPIDURAL; INFILTRATION; INTRACAUDAL; PERINEURAL at 16:19

## 2022-06-22 RX ADMIN — RANOLAZINE 500 MG: 500 TABLET, FILM COATED, EXTENDED RELEASE ORAL at 05:36

## 2022-06-22 RX ADMIN — IOHEXOL 10 ML: 350 INJECTION, SOLUTION INTRAVENOUS at 17:23

## 2022-06-22 RX ADMIN — ISOSORBIDE MONONITRATE 30 MG: 30 TABLET, EXTENDED RELEASE ORAL at 05:36

## 2022-06-22 RX ADMIN — HEPARIN SODIUM 5000 UNITS: 5000 INJECTION, SOLUTION INTRAVENOUS; SUBCUTANEOUS at 05:37

## 2022-06-22 RX ADMIN — ATROPINE SULFATE 0.5 MG: 0.1 INJECTION, SOLUTION ENDOTRACHEAL; INTRAMUSCULAR; INTRAVENOUS; SUBCUTANEOUS at 06:39

## 2022-06-22 ASSESSMENT — COGNITIVE AND FUNCTIONAL STATUS - GENERAL
WALKING IN HOSPITAL ROOM: A LOT
PERSONAL GROOMING: A LITTLE
DRESSING REGULAR LOWER BODY CLOTHING: A LITTLE
TURNING FROM BACK TO SIDE WHILE IN FLAT BAD: A LOT
MOVING TO AND FROM BED TO CHAIR: A LITTLE
CLIMB 3 TO 5 STEPS WITH RAILING: A LOT
STANDING UP FROM CHAIR USING ARMS: A LOT
DRESSING REGULAR UPPER BODY CLOTHING: A LITTLE
EATING MEALS: A LITTLE
MOVING FROM LYING ON BACK TO SITTING ON SIDE OF FLAT BED: A LOT
DAILY ACTIVITIY SCORE: 17
MOBILITY SCORE: 13
TOILETING: A LITTLE
HELP NEEDED FOR BATHING: A LOT
SUGGESTED CMS G CODE MODIFIER MOBILITY: CL
SUGGESTED CMS G CODE MODIFIER DAILY ACTIVITY: CK

## 2022-06-22 ASSESSMENT — ENCOUNTER SYMPTOMS
DEPRESSION: 0
ABDOMINAL PAIN: 0
CARDIOVASCULAR NEGATIVE: 1
WEIGHT LOSS: 0
BRUISES/BLEEDS EASILY: 0
NECK PAIN: 0
SHORTNESS OF BREATH: 1
COUGH: 0
VOMITING: 0
DIZZINESS: 1
PALPITATIONS: 0
DIZZINESS: 0
MUSCULOSKELETAL NEGATIVE: 1
ORTHOPNEA: 0
HEMOPTYSIS: 0
NAUSEA: 0
DIAPHORESIS: 0
FEVER: 0
SPUTUM PRODUCTION: 0
FOCAL WEAKNESS: 0
PND: 0
CONSTIPATION: 1
PSYCHIATRIC NEGATIVE: 1
SHORTNESS OF BREATH: 0
HEARTBURN: 0
MYALGIAS: 0
EYES NEGATIVE: 1
BLURRED VISION: 0
CHILLS: 0
SORE THROAT: 0
HEADACHES: 0
WEAKNESS: 0

## 2022-06-22 ASSESSMENT — LIFESTYLE VARIABLES: SUBSTANCE_ABUSE: 0

## 2022-06-22 NOTE — PROGRESS NOTES
"Received bedside report from RN, pt care assumed, VSS, pt assessment complete. Pt AAOx4, with no c/o of pain at this time. Pt complaints of feeling \"tired\" and \"little lighted headed\", MD aware. Plan of care discussed with pt and verbalizes no questions. Pt denies any additional needs at this time. Bed locked/in lowest position, bed alarm on, pt educated on fall risk and verbalized understanding, call light within reach, hourly rounding initiated.       "

## 2022-06-22 NOTE — CARE PLAN
The patient is Stable - Low risk of patient condition declining or worsening    Shift Goals  Clinical Goals: monitor BP; safety when sitting/standing; orthostatics  Patient Goals: sleep  Family Goals: opal    Progress made toward(s) clinical / shift goals:    Problem: Fall Risk  Goal: Patient will remain free from falls  Outcome: Progressing       Patient is not progressing towards the following goals:      Problem: Mobility  Goal: Patient's capacity to carry out activities will improve  Outcome: Not Progressing  Flowsheets  Taken 6/22/2022 0234  Mobility:   Monitored for signs of activity intolerance   Provided rest periods between activities   Encouraged mobilization per interdisciplinary team recommendations  Assistance: Assistance of One  Taken 6/21/2022 1902  Level of Mobility: Level IV  Activity Performed: Stand

## 2022-06-22 NOTE — PROGRESS NOTES
"[facility] Medicine Daily Progress Note    Date of Service  6/22/2022    Chief Complaint  Kobe Cyr is a 89 y.o. male admitted 6/19/2022 with syncope    Hospital Course  Kobe Cyr is a 89 y.o. male with history of multivessel CAD not CABG candidate per CTS on medical management, HTN, and HLD. He presented to [facility]/2022 with shortness of breath and syncopal episode.  Patient reported falling out of his wheelchair while wife was out of house.  He reported shortness of breath, dizziness, vertigo prior to fall and syncopal event.  Denies palpitation, chest pain, tongue bite, loss of bladder/bowel incontinence.  In ER, CT head noted left parietal scalp hematoma with no fracture or intracranial hemorrhage.  No acute cervical spine fracture seen on CT c-spine. Non specific ST changes on EKG but in afib, troponin T 204>190.  Cardiology was consulted      Interval Problem Update  Code blue from severe bradycardia this am, see notes from earlier, was evaluated by critical care at the time and they did not see indication to move to IMCU or ICU. Bradycardia resolved with atropine. BB has now been stopped. Pacer pending. Patient has poor recall of events, he states he is feeling \"ok\" currently \"tired\". He denies dizziness, denies chest pain, no cough or sob. Stable on 4L currently and bp currently stable (was down to 84/50 overnight)  He reports he is constipated - discussed with nursing who will give miralax + flatus and no abdominal pain.  We also re addressed code status - he stated \"I don't want anything heroic and I want the plug to be pulled when the time comes, I have had a very strenuous life\". He also said \"I have instructions in my trust, I will have my wife bring them.\" The papers were reviewed and patient and wife met with palliative care again and he asked for his code status to be changed to dnr/ dni. ROS otherwise negative.     I have discussed this patient's plan of care and discharge plan at " IDT rounds today with Case Management, Nursing, Nursing leadership, and other members of the IDT team.    Consultants/Specialty  cardiology  Critical care    Code Status  DNAR/DNI    Disposition  Patient is not medically cleared for discharge.   Anticipate discharge to to home with close outpatient follow-up.  I have placed the appropriate orders for post-discharge needs.    Review of Systems  Review of Systems   Constitutional: Positive for malaise/fatigue. Negative for chills, diaphoresis, fever and weight loss.   HENT: Negative.  Negative for ear discharge, ear pain, hearing loss, sore throat and tinnitus.    Eyes: Negative.  Negative for blurred vision.   Respiratory: Positive for shortness of breath. Negative for cough, hemoptysis and sputum production.    Cardiovascular: Negative.  Negative for chest pain, palpitations and leg swelling.   Gastrointestinal: Positive for constipation. Negative for abdominal pain, heartburn, nausea and vomiting.   Genitourinary: Negative.  Negative for dysuria and urgency.   Musculoskeletal: Negative.  Negative for myalgias and neck pain.   Skin: Negative.  Negative for itching and rash.   Neurological: Positive for dizziness. Negative for focal weakness, weakness and headaches.   Endo/Heme/Allergies: Negative.  Does not bruise/bleed easily.   Psychiatric/Behavioral: Negative.  Negative for depression, substance abuse and suicidal ideas.   All other systems reviewed and are negative.       Physical Exam  Temp:  [36 °C (96.8 °F)-36.8 °C (98.2 °F)] 36 °C (96.8 °F)  Pulse:  [36-88] 79  Resp:  [16-20] 20  BP: ()/(39-83) 111/83  SpO2:  [90 %-94 %] 90 %    Physical Exam  Constitutional:       General: He is not in acute distress.     Appearance: He is not ill-appearing, toxic-appearing or diaphoretic.   HENT:      Head: Normocephalic.      Nose: Nose normal.      Mouth/Throat:      Mouth: Mucous membranes are moist.   Eyes:      General: No scleral icterus.     Extraocular  Movements: Extraocular movements intact.      Pupils: Pupils are equal, round, and reactive to light.   Cardiovascular:      Rate and Rhythm: Normal rate and regular rhythm.   Pulmonary:      Breath sounds: No rhonchi.   Abdominal:      General: Abdomen is flat. There is no distension.      Palpations: There is no mass.      Tenderness: There is no abdominal tenderness. There is no guarding.   Musculoskeletal:         General: No swelling or deformity. Normal range of motion.      Cervical back: Normal range of motion and neck supple.      Right lower leg: No edema.   Skin:     General: Skin is warm.      Capillary Refill: Capillary refill takes 2 to 3 seconds.      Coloration: Skin is not jaundiced.      Findings: No bruising, erythema or lesion.   Neurological:      General: No focal deficit present.      Mental Status: He is alert and oriented to person, place, and time.   Psychiatric:         Mood and Affect: Mood normal.         Fluids    Intake/Output Summary (Last 24 hours) at 6/22/2022 1442  Last data filed at 6/22/2022 1047  Gross per 24 hour   Intake 360 ml   Output 300 ml   Net 60 ml       Laboratory  Recent Labs     06/19/22  1712 06/21/22 0229 06/22/22  0658   WBC 6.2 6.7 7.0   RBC 2.65* 2.64* 2.49*   HEMOGLOBIN 9.1* 9.0* 8.6*   HEMATOCRIT 29.6* 29.7* 28.1*   .7* 112.5* 112.9*   MCH 34.3* 34.1* 34.5*   MCHC 30.7* 30.3* 30.6*   RDW 58.2* 59.0* 59.4*   PLATELETCT 148* 150* 139*   MPV 10.1 10.3 10.1     Recent Labs     06/20/22  1053 06/21/22  0229 06/22/22  0658   SODIUM 137 139 136   POTASSIUM 4.6 5.2 5.0   CHLORIDE 105 106 102   CO2 22 22 19*   GLUCOSE 151* 149* 153*   BUN 30* 31* 32*   CREATININE 2.11* 2.41* 2.35*   CALCIUM 8.8 8.8 8.5             Recent Labs     06/20/22 0222   TRIGLYCERIDE 63   HDL 39*   LDL 25       Imaging  DX-CHEST-LIMITED (1 VIEW)   Final Result      1.  Increased bibasilar atelectasis   2.  New RIGHT mid lung opacity suspicious for pneumonia   3.  Persistently enlarged  cardiac silhouette      EC-ECHOCARDIOGRAM COMPLETE W/O CONT   Final Result      DX-CHEST-PORTABLE (1 VIEW)   Final Result      1.  Mild diffuse interstitial edema and patchy bilateral airspace opacities. Findings could be seen in setting edema and/or infection.   2.  Stable enlargement of the cardiomediastinal silhouette.   3.  Possible small right pleural effusion.      US-RENAL   Final Result      Unremarkable renal ultrasound.      CT-HEAD W/O   Final Result      1.  Left parietal scalp hematoma without evidence of skull fracture or intracranial hemorrhage.         CT-CSPINE WITHOUT PLUS RECONS   Final Result      No CT evidence of acute cervical spine abnormality.      Moderate spondylosis with multilevel degenerative change      Pulmonary scarring and small right pleural fluid with right mediastinal adenopathy. This could be reactive or malignant      US-EXTREMITY VENOUS LOWER BILAT    (Results Pending)   NM-LUNG PERFUSION IMAGING    (Results Pending)   CL-PERMANENT PACEMAKER INSERTION    (Results Pending)        Assessment/Plan  * Syncope and collapse- (present on admission)  Assessment & Plan  CT head noted left parietal scalp hematoma with no fracture or intracranial hemorrhage  No acute cervical spine fracture seen on CT c-spine    In afib, no RVR    Patients syncope may have been related to hypotension related to his cardiac medications, see above  Following closely     Thrombocytopenia (HCC)  Assessment & Plan  Mild  following    Constipation  Assessment & Plan  Continue bowel regimen    Bradycardia  Assessment & Plan  Severe  Symptomatic  See above  Bb stopped  Pacer pending    Positive D dimer  Assessment & Plan  Will check dopplers and VQ scan    Hypotension due to drugs- (present on admission)  Assessment & Plan  Suspect cardiac meds contributing, see above  Imdur and bb now stopped      Acute kidney injury superimposed on chronic kidney disease (HCC)  Assessment & Plan  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    Renal  ultrasound is unremarkable  Creatinine slowly increasing  Continue to follow    Elevated troponin  Assessment & Plan  Echo with preserved EF  Cardiology following  trops decreasing    Acute on chronic respiratory failure with hypoxia (HCC)- (present on admission)  Assessment & Plan  Following  pulm htn  pulm edema contributing - diuresis as tolerated with bp  VQ scan pending    GERD (gastroesophageal reflux disease)- (present on admission)  Assessment & Plan  PPI    CAD (coronary artery disease)- (present on admission)  Assessment & Plan  Prior MI and PCI  Found to have multivessel CAD -- not CABG surgical candidate in 07/2021    Continue ASA/Plavix/statin/BB (as tolerated)  Cannot tolerate bb and imdur due to bradycardia and hypotension  Following  Cardiology following    Hyperlipidemia- (present on admission)  Assessment & Plan  Statin       VTE prophylaxis: SCDs/TEDs    I have performed a physical exam and reviewed and updated ROS and Plan today (6/22/2022). In review of yesterday's note (6/21/2022), there are no changes except as documented above.

## 2022-06-22 NOTE — PROGRESS NOTES
Code Blue Summary     Code Blue initiated at: 0638  Initial rhythm: Symptomatic Bradycardia   Subsequent rhythms: Sinus Bradycardia  Interventions: atropine, manual BVM   Medications administered: Atropine  Total rounds of CPR: 0    Outcome:  pt never lost pulses   Total time of code: ~20mins

## 2022-06-22 NOTE — PROGRESS NOTES
"         Cardiology Progress Note    Name:   Kobe Cyr   YOB: 1931  Age:   89 y.o.  male   MRN:   [id number]    Attending Cardiologist: Dr Lyon  Outpatient Cardiologist: Dr Davey    Chief Complaint:  T-5000 GLF and Head Injury       History of Present Illness  89yo male with severe CAD without targets for revascularization, chronic respiratory failure (combined obstructive and restrictive lung disease) here because his wife called EMS after he fell from his chair and sustained scalp hematoma. He recalls urinating and then walking out of the restroom and feeling lightheaded. This seems to happen on occasion, usually associated with urination.     Interim Events   At 640am code was called for symptomatic bradycardia. Mr Cyr finished having a bowel movement and felt lightheaded. On telemetry at that time he was in AF with rate 29-31 with blood pressure of 80/43. He was transferred to bed and given atropine. His heart rates increased to 60s. He is now in bed, feels like \"hell\" which is hard for him to elaborate but feels weak, no chest pressure, shortness of breath.       Review of Systems   Constitutional: Positive for malaise/fatigue. Negative for chills, diaphoresis and fever.   Respiratory: Negative for cough and shortness of breath.    Cardiovascular: Negative for chest pain, palpitations, orthopnea, leg swelling and PND.   Gastrointestinal: Positive for constipation.   Neurological: Negative for dizziness.       Medical History  Past Surgical History:   Procedure Laterality Date   • KNEE UNICOMPARTMENTAL  10/13/2009    Performed by SAM LINDER at SURGERY [facility] ORS   • ARTHROSCOPY, KNEE     • CORONARY STENT PROCEDURE LAD     • OTHER ORTHOPEDIC SURGERY      right shoulder rotator cuff surgery x2       Family History   Problem Relation Age of Onset   • No Known Problems Mother    • No Known Problems Father        Social History     Tobacco Use   Smoking Status Former Smoker   • " "Packs/day: 2.00   • Years: 35.00   • Pack years: 70.00   • Types: Cigarettes   • Quit date: 1973   • Years since quittin.2   Smokeless Tobacco Never Used       Allergies   Allergen Reactions   • Atorvastatin Myalgia   • Simvastatin Myalgia         Medications   Scheduled Medications   Medication Dose Frequency   • ranolazine  500 mg DAILY   • senna-docusate  2 Tablet BID   • heparin  5,000 Units Q8HRS   • aspirin EC  81 mg DAILY   • clopidogrel  75 mg DAILY   • rosuvastatin  40 mg Q EVENING           Physical Exam  /39   Pulse 88   Temp 36.6 °C (97.8 °F) (Temporal)   Resp 18   Ht 1.803 m (5' 10.98\")   Wt 98.9 kg (218 lb 0.6 oz)   SpO2 91%     Physical Exam  Vitals and nursing note reviewed.   Constitutional:       General: He is not in acute distress.  HENT:      Head: Normocephalic and atraumatic.   Cardiovascular:      Rate and Rhythm: Bradycardia present. Rhythm irregular.      Heart sounds: Murmur heard.   Pulmonary:      Effort: Pulmonary effort is normal.      Breath sounds: Rales (course rales) present.   Abdominal:      General: There is no distension.   Musculoskeletal:      Right lower leg: No edema.      Left lower leg: No edema.   Skin:     General: Skin is warm and dry.   Neurological:      General: No focal deficit present.      Mental Status: He is alert.   Psychiatric:         Judgment: Judgment normal.         Labs (personally reviewed):     Lab Results   Component Value Date/Time    SODIUM 136 2022 06:58 AM    POTASSIUM 5.0 2022 06:58 AM    CHLORIDE 102 2022 06:58 AM    CO2 19 (L) 2022 06:58 AM    GLUCOSE 153 (H) 2022 06:58 AM    BUN 32 (H) 2022 06:58 AM    CREATININE 2.35 (H) 2022 06:58 AM    CREATININE 1.4 2008 11:42 AM     Lab Results   Component Value Date/Time    CHOLSTRLTOT 77 (L) 2022 02:22 AM    LDL 25 2022 02:22 AM    HDL 39 (A) 2022 02:22 AM    TRIGLYCERIDE 63 2022 02:22 AM     Lab Results "   Component Value Date/Time    BNPBTYPENAT 170 (H) 03/08/2012 05:00 PM         Cardiac Imaging and Procedures Review      Personal Telemetry Review  Atrial fibrillation, rates 50s then drop to <35 for ~3min at 6:40    Echo 6/20/22  The left ventricular ejection fraction is visually estimated to be 75%.  Right ventricle is mildly dilated.  Severe biatrial enlargement.  Mild aortic stenosis: V-max 2.5 m/s, MG 14 mmHg, DENEEN 1.5 cm2, DI 0.4.  Mild to moderate tricuspid regurgitation.  Right ventricular systolic pressure is estimated to be 51 mmHg,   moderate secondary pulmonary hypertension.      Assessment and Medical Decision Making:  Symptomatic Bradycardia with hypotension  Persistent Atrial Fibrillation  Recurrent Falls  CAD  Pulmonary Hypertension  - recommend pacemaker due to documented symptomatic bradycardia this morning, although may have been vagal event initiating this, he has a fairly consistent history of falls, presyncope while at home after using the restroom.   - NPO until procedure, either today or tomorrow  - hold heparin products before procedure or for 48 hours after please  - Continue aspirin and statin for his coronary disease. He has completed almost a year of DAPT from his NSTEMI and has high bleeding risk due to frequent falls, will hold clopidogrel for now  - recommend compression stockings, ideally above the knee for his orthostasis      Please see Dr. Lyon's attestation for additions and further recommendations.    Wendi Bautista PA-C  [facility]    I personally spent a total of 18 minutes which includes face-to-face time and non-face-to-face time spent on preparing to see the patient, reviewing hospital notes and tests, obtaining history from the patient, performing a medically appropriate exam, counseling and educating the patient, ordering medications/tests/procedures/referrals as clinically indicated, and documenting information in the  electronic medical record.

## 2022-06-22 NOTE — PROGRESS NOTES
Patient ambulated to bathroom with RN and walker. Pt started to feel dizzy on the toilet. CNA assisted patient to a sitting position. Charge RN called d/t pt slumping over. Code called when patient was gasping and would not respond. Patient assisted back to bed with help of CNA and RNs. Patient hypotensive and bradycardic to the 30s. Code team arrived and took over care.

## 2022-06-22 NOTE — PROGRESS NOTES
Nocturnist cross cover progress note    CODE BLUE called 0638 for symptomatic bradycardia, patient never lost pulses.    Patient reported to the bathroom with RN and walker, while having a bowel movement on the toilet he started feeling dizzy and slumped over, brought to bed and CODE BLUE called when patient was gasping not responding.  Pulse found to be in the 30s, never lost pulses but reportedly did receive approximately 1 minute of CPR.  Received atropine per ACLS protocol with any improvement of heart rate up to the low 100s.    I arrived to the room patient was lying in bed RT bagging the patient, he did not respond initially to commands, he did have a pulse. Following atropine and improvement of blood pressure and heart rate  he did respond to stimuli and commands, ultimately returning to baseline mental status.  Heart rate was in the 80s blood pressure low 100s, he was on 6 L nasal cannula saturating 90%.    Review of chart patient was admitted for ground-level fall and dizziness, he has known severe coronary artery disease without targets for revascularization, found to have mildly elevated troponin, atrial fibrillation with controlled ventricular response.  Echo reviewed showed preserved ejection fraction mild diastolic dysfunction.  Started on gentle diuresis, Imdur, metoprolol, continue telemetry monitoring.  Labs reviewed did not show obvious causes.    End of rapid response patient is alert and oriented, able to move all extremities, no chest pain, no ongoing dyspnea.  Breath sounds are diminished bilaterally, occasional expiratory wheeze no rhonchi, no abdominal pain.    Assessment/plan:   Concern for possible vasovagal episode with symptomatic bradycardia versus possible ischemic event versus sinus node dysfunction versus other.  EKG obtained showed atrial fibrillation right bundle branch block and low voltage throughout.  I discontinued beta-blocker, Imdur, sent CBC CMP magnesium troponin.  We will  keep him on telemetry monitoring.  Intensivist Dr. Cheng came to bedside as well, we reviewed case together, agrees with plan above.  Discussed with daytime hospitalist provider just after the event, she will be following up with cardiology.  Chest x-ray ordered    Patient is critically ill.   The patient continues to have: Symptomatic bradycardia, dysrhythmia, syncope and collapse  The vital organ system that is affected is the: Cardiovascular, CNS  If untreated there is a high chance of deterioration into: Cardiac arrhythmia And eventually death.   The critical care that I am providing today is: Bedside assessment during rapid response/CODE BLUE, discussion with intensive care, primary hospitalist, pharmacy, RT, bedside nursing and rapid team.  Adjustment of cardiac medications, evaluating for cardiac ischemia and electrolyte dysfunction.  Close monitoring and repeated follow-up.  The critical that has been undertaken is medically complex.   There has been no overlap in critical care time.   Critical Care Time not including procedures: 35 minutes

## 2022-06-22 NOTE — DISCHARGE PLANNING
Case Management Discharge Planning    Admission Date: 6/19/2022  GMLOS: 2.3  ALOS: 2    6-Clicks ADL Score: 17  6-Clicks Mobility Score: 13  PT and/or OT Eval ordered: Yes  Post-acute Referrals Ordered: Yes  Post-acute Choice Obtained: No  Has referral(s) been sent to post-acute provider:  No      Anticipated Discharge Dispo: Discharge Disposition: Discharged to home/self care (01)  Discharge Address: [address] [place] [address]  Discharge Contact Phone Number: [phone]    DME Needed: No    Action(s) Taken: Pt was discussed in IDR. Pt was in Code Blue this am, but no CPR as did not loose pulses. Rec'd atropin. Sanya in 30s. Cards is being re-consulted and plan is for pacemaker later today or tomorrow.   PT recs are for HH. Pending for OT. Requested for HH Rx from MD.   Per MD, we will wait for re-eval by PT/OT post pacer for DC recs.     Escalations Completed: None    Medically Clear: No    Next Steps: pending pacer later today or tomorrow, HH Rx and F2F  Choice form for HH    Barriers to Discharge: Medical clearance and Pending Procedures    Is the patient up for discharge tomorrow: No

## 2022-06-22 NOTE — ASSESSMENT & PLAN NOTE
Increased o2 requirement fluctuating  Now back to baseline of 3L after diuresis but needed to stop lasix due to bp  - has been stable off of diuretics   Continue to follow as he may require diuretics prn  pulm htn  pulm edema contributed and greatly improved  Cannot get VQ with pacer patient cannot lift his arm as it could dislodge the leads - LE dopplers negative for dvt - PE unlikely - suspect due to pulm edema and chronic underlying condition

## 2022-06-22 NOTE — PROGRESS NOTES
Monitor summary:  Rhythm: afib  Rate: 57-81  Ectopy: R PVC/BBB  Measurements: -/0.12/-

## 2022-06-22 NOTE — DISCHARGE PLANNING
Medical Social Work    Referral: Code Blue    Intervention: MSW responded to T728-02 for a Code Blue.  Pt is Kobe Cyr (: 1931).  Pt never lost pulses and CPR was never started.  Nursing staff to contact family for updates as needed.    Plan: SW will remain available.

## 2022-06-22 NOTE — PROGRESS NOTES
EP Attending Attestation    Pt seen and examined by me. Symptomatic AV with slow ventricular response. AF is new diagnosis for him, and we cannot tell wether paroxysmal or persistent. Says over last few months increasing dyspnea with any exertion. Will plan for dual chamber PPM in case we are planning to restore sinus rhythm. Would need to be on OAC.    Jeremy Garcia MD    Brief EP Note:    Pt is pending PPM implantation this afternoon, indication is bradycardia HR in the 30's with syncope, however uncertain if this is vasovagal response as he has has multiple episodes in the BR. Afib on tele.    The risks, benefits, and alternatives to permanent pacemaker placement were discussed in great detail to patient, wife, and daughter.  Specific risks mentioned to the patient including bleeding, cardiac perforation with possible tamponade possibly requiring pericardiocentesis or open heart surgery.  In addition the possibility of lead dislodgment (2-3%), pneumothorax (3%), hemothorax, infection were discussed.  Also, mentioned were the risk of death, stroke, and myocardial infarction.  The patient verbalized understanding of the potential complications and wishes to proceed with the procedure.      ASHLEY Quintana.   [facility]  [phone]

## 2022-06-22 NOTE — PALLIATIVE CARE
"Palliative Care follow-up  Received notification from Dr. Elizabeth to readdress code status with patient as patient was in a  Code Blue this morning due to collapse. Patient did not lose pulses so CPR was not performed. Met with patient at bedside. Wife, Mook and daughter there as well. PC RN expressed concern for patient due to patient's Full Code status. Mook stated that patient is a DNR and willing to help patient complete and understand POLST. Patient chose DNR (die a natural death), Comfort Focused Care and artifical nutrition or TB only as a \"bridge to getting better\".     POLST completed, signed by patient and MD, scanned to patient's EMR and original on white board in patients room to take home.     Updated: Dr. Elizabeth. And will update EMR to DNR/DNI.     Plan: Update EMR with new code status. Wife to e-mail copy of Advance Directive for Health Care to PC RN and provided contact information.     Thank you for allowing Palliative Care to support this patient and family. Contact [phone] for additional assistance, change in patient status, or with any questions/concerns.   "

## 2022-06-23 ENCOUNTER — APPOINTMENT (OUTPATIENT)
Dept: RADIOLOGY | Facility: MEDICAL CENTER | Age: 87
DRG: 242 | End: 2022-06-23
Attending: HOSPITALIST
Payer: MEDICARE

## 2022-06-23 LAB
ALBUMIN SERPL BCP-MCNC: 3.5 G/DL (ref 3.2–4.9)
ALBUMIN/GLOB SERPL: 1.1 G/DL
ALP SERPL-CCNC: 56 U/L (ref 30–99)
ALT SERPL-CCNC: 16 U/L (ref 2–50)
ANION GAP SERPL CALC-SCNC: 12 MMOL/L (ref 7–16)
AST SERPL-CCNC: 21 U/L (ref 12–45)
BASOPHILS # BLD AUTO: 0.8 % (ref 0–1.8)
BASOPHILS # BLD: 0.04 K/UL (ref 0–0.12)
BILIRUB SERPL-MCNC: 0.4 MG/DL (ref 0.1–1.5)
BUN SERPL-MCNC: 34 MG/DL (ref 8–22)
CALCIUM SERPL-MCNC: 8.7 MG/DL (ref 8.5–10.5)
CHLORIDE SERPL-SCNC: 102 MMOL/L (ref 96–112)
CO2 SERPL-SCNC: 22 MMOL/L (ref 20–33)
CREAT SERPL-MCNC: 2.48 MG/DL (ref 0.5–1.4)
EKG IMPRESSION: NORMAL
EOSINOPHIL # BLD AUTO: 0.17 K/UL (ref 0–0.51)
EOSINOPHIL NFR BLD: 3.4 % (ref 0–6.9)
ERYTHROCYTE [DISTWIDTH] IN BLOOD BY AUTOMATED COUNT: 56.9 FL (ref 35.9–50)
GFR SERPLBLD CREATININE-BSD FMLA CKD-EPI: 24 ML/MIN/1.73 M 2
GLOBULIN SER CALC-MCNC: 3.3 G/DL (ref 1.9–3.5)
GLUCOSE SERPL-MCNC: 121 MG/DL (ref 65–99)
HCT VFR BLD AUTO: 27.9 % (ref 42–52)
HGB BLD-MCNC: 8.6 G/DL (ref 14–18)
IMM GRANULOCYTES # BLD AUTO: 0.03 K/UL (ref 0–0.11)
IMM GRANULOCYTES NFR BLD AUTO: 0.6 % (ref 0–0.9)
LYMPHOCYTES # BLD AUTO: 0.72 K/UL (ref 1–4.8)
LYMPHOCYTES NFR BLD: 14.4 % (ref 22–41)
MCH RBC QN AUTO: 33.9 PG (ref 27–33)
MCHC RBC AUTO-ENTMCNC: 30.8 G/DL (ref 33.7–35.3)
MCV RBC AUTO: 109.8 FL (ref 81.4–97.8)
MONOCYTES # BLD AUTO: 0.51 K/UL (ref 0–0.85)
MONOCYTES NFR BLD AUTO: 10.2 % (ref 0–13.4)
NEUTROPHILS # BLD AUTO: 3.53 K/UL (ref 1.82–7.42)
NEUTROPHILS NFR BLD: 70.6 % (ref 44–72)
NRBC # BLD AUTO: 0 K/UL
NRBC BLD-RTO: 0 /100 WBC
PLATELET # BLD AUTO: 127 K/UL (ref 164–446)
PMV BLD AUTO: 10.3 FL (ref 9–12.9)
POTASSIUM SERPL-SCNC: 4.4 MMOL/L (ref 3.6–5.5)
PROT SERPL-MCNC: 6.8 G/DL (ref 6–8.2)
RBC # BLD AUTO: 2.54 M/UL (ref 4.7–6.1)
SODIUM SERPL-SCNC: 136 MMOL/L (ref 135–145)
WBC # BLD AUTO: 5 K/UL (ref 4.8–10.8)

## 2022-06-23 PROCEDURE — 700102 HCHG RX REV CODE 250 W/ 637 OVERRIDE(OP): Performed by: NURSE PRACTITIONER

## 2022-06-23 PROCEDURE — 85025 COMPLETE CBC W/AUTO DIFF WBC: CPT

## 2022-06-23 PROCEDURE — 99233 SBSQ HOSP IP/OBS HIGH 50: CPT | Performed by: HOSPITALIST

## 2022-06-23 PROCEDURE — A9270 NON-COVERED ITEM OR SERVICE: HCPCS | Performed by: NURSE PRACTITIONER

## 2022-06-23 PROCEDURE — 93005 ELECTROCARDIOGRAM TRACING: CPT | Performed by: HOSPITALIST

## 2022-06-23 PROCEDURE — 80053 COMPREHEN METABOLIC PANEL: CPT

## 2022-06-23 PROCEDURE — 93970 EXTREMITY STUDY: CPT

## 2022-06-23 PROCEDURE — 700102 HCHG RX REV CODE 250 W/ 637 OVERRIDE(OP): Performed by: INTERNAL MEDICINE

## 2022-06-23 PROCEDURE — 97116 GAIT TRAINING THERAPY: CPT

## 2022-06-23 PROCEDURE — A9270 NON-COVERED ITEM OR SERVICE: HCPCS | Performed by: INTERNAL MEDICINE

## 2022-06-23 PROCEDURE — 99232 SBSQ HOSP IP/OBS MODERATE 35: CPT | Performed by: INTERNAL MEDICINE

## 2022-06-23 PROCEDURE — 97166 OT EVAL MOD COMPLEX 45 MIN: CPT

## 2022-06-23 PROCEDURE — 71045 X-RAY EXAM CHEST 1 VIEW: CPT

## 2022-06-23 PROCEDURE — 99232 SBSQ HOSP IP/OBS MODERATE 35: CPT | Performed by: NURSE PRACTITIONER

## 2022-06-23 PROCEDURE — 93010 ELECTROCARDIOGRAM REPORT: CPT | Performed by: INTERNAL MEDICINE

## 2022-06-23 PROCEDURE — 700111 HCHG RX REV CODE 636 W/ 250 OVERRIDE (IP): Performed by: INTERNAL MEDICINE

## 2022-06-23 PROCEDURE — 97530 THERAPEUTIC ACTIVITIES: CPT

## 2022-06-23 PROCEDURE — A9270 NON-COVERED ITEM OR SERVICE: HCPCS | Performed by: STUDENT IN AN ORGANIZED HEALTH CARE EDUCATION/TRAINING PROGRAM

## 2022-06-23 PROCEDURE — 700102 HCHG RX REV CODE 250 W/ 637 OVERRIDE(OP): Performed by: STUDENT IN AN ORGANIZED HEALTH CARE EDUCATION/TRAINING PROGRAM

## 2022-06-23 PROCEDURE — 36415 COLL VENOUS BLD VENIPUNCTURE: CPT

## 2022-06-23 PROCEDURE — 770020 HCHG ROOM/CARE - TELE (206)

## 2022-06-23 RX ORDER — ECHINACEA PURPUREA EXTRACT 125 MG
2 TABLET ORAL
Status: DISCONTINUED | OUTPATIENT
Start: 2022-06-23 | End: 2022-06-28 | Stop reason: HOSPADM

## 2022-06-23 RX ORDER — FUROSEMIDE 10 MG/ML
20 INJECTION INTRAMUSCULAR; INTRAVENOUS
Status: DISCONTINUED | OUTPATIENT
Start: 2022-06-23 | End: 2022-06-24

## 2022-06-23 RX ADMIN — DOCUSATE SODIUM 50 MG AND SENNOSIDES 8.6 MG 2 TABLET: 8.6; 5 TABLET, FILM COATED ORAL at 18:09

## 2022-06-23 RX ADMIN — ASPIRIN 81 MG: 81 TABLET, COATED ORAL at 05:23

## 2022-06-23 RX ADMIN — RANOLAZINE 500 MG: 500 TABLET, FILM COATED, EXTENDED RELEASE ORAL at 05:23

## 2022-06-23 RX ADMIN — METOPROLOL TARTRATE 12.5 MG: 25 TABLET, FILM COATED ORAL at 18:09

## 2022-06-23 RX ADMIN — CEFAZOLIN SODIUM 2 G: 2 INJECTION, SOLUTION INTRAVENOUS at 18:10

## 2022-06-23 RX ADMIN — ROSUVASTATIN CALCIUM 40 MG: 20 TABLET, FILM COATED ORAL at 18:09

## 2022-06-23 RX ADMIN — DOCUSATE SODIUM 50 MG AND SENNOSIDES 8.6 MG 2 TABLET: 8.6; 5 TABLET, FILM COATED ORAL at 05:23

## 2022-06-23 RX ADMIN — CEFAZOLIN SODIUM 2 G: 2 INJECTION, SOLUTION INTRAVENOUS at 05:41

## 2022-06-23 ASSESSMENT — ENCOUNTER SYMPTOMS
VOMITING: 0
DEPRESSION: 0
DIAPHORESIS: 0
SHORTNESS OF BREATH: 0
COUGH: 0
CARDIOVASCULAR NEGATIVE: 1
FEVER: 0
DIZZINESS: 1
HEADACHES: 0
CONSTIPATION: 1
DIZZINESS: 0
WHEEZING: 0
ABDOMINAL PAIN: 0
PND: 0
CLAUDICATION: 0
ORTHOPNEA: 0
PSYCHIATRIC NEGATIVE: 1
WEIGHT LOSS: 0
BLURRED VISION: 0
EYES NEGATIVE: 1
HEMOPTYSIS: 0
SPUTUM PRODUCTION: 0
BRUISES/BLEEDS EASILY: 0
MYALGIAS: 0
MUSCULOSKELETAL NEGATIVE: 1
PALPITATIONS: 0
WEAKNESS: 0
HEARTBURN: 0
FOCAL WEAKNESS: 0
NAUSEA: 0
SHORTNESS OF BREATH: 1
CHILLS: 0
SORE THROAT: 0
NECK PAIN: 0

## 2022-06-23 ASSESSMENT — GAIT ASSESSMENTS
GAIT LEVEL OF ASSIST: CONTACT GUARD ASSIST
ASSISTIVE DEVICE: HAND HELD ASSIST
DEVIATION: STEP TO;INCREASED BASE OF SUPPORT;BRADYKINETIC
DISTANCE (FEET): 10

## 2022-06-23 ASSESSMENT — COGNITIVE AND FUNCTIONAL STATUS - GENERAL
TOILETING: A LOT
SUGGESTED CMS G CODE MODIFIER MOBILITY: CL
SUGGESTED CMS G CODE MODIFIER DAILY ACTIVITY: CK
EATING MEALS: A LITTLE
CLIMB 3 TO 5 STEPS WITH RAILING: A LOT
MOVING FROM LYING ON BACK TO SITTING ON SIDE OF FLAT BED: UNABLE
MOVING TO AND FROM BED TO CHAIR: UNABLE
HELP NEEDED FOR BATHING: A LOT
DAILY ACTIVITIY SCORE: 14
DRESSING REGULAR UPPER BODY CLOTHING: A LOT
TURNING FROM BACK TO SIDE WHILE IN FLAT BAD: UNABLE
DRESSING REGULAR LOWER BODY CLOTHING: A LOT
WALKING IN HOSPITAL ROOM: A LITTLE
MOBILITY SCORE: 11
PERSONAL GROOMING: A LITTLE
STANDING UP FROM CHAIR USING ARMS: A LITTLE

## 2022-06-23 ASSESSMENT — FIBROSIS 4 INDEX: FIB4 SCORE: 3.76

## 2022-06-23 ASSESSMENT — ACTIVITIES OF DAILY LIVING (ADL): TOILETING: INDEPENDENT

## 2022-06-23 ASSESSMENT — LIFESTYLE VARIABLES: SUBSTANCE_ABUSE: 0

## 2022-06-23 NOTE — OP REPORT
[facility]  PROCEDURE PERFORMED: Permanent Pacemaker Implantation    DATE OF SERVICE: 6/22/2022    : Jeremy Garcia MD    ASSISTANT: None    ANESTHESIA: Local and moderate sedation (start time 1640, stop time 1708, total dose given 1.5 mg IV versed, 75 mcg IV fentanyl)    EBL: 30 cc    SPECIMENS: None    INDICATION(S):  AF with slow ventricular response    Pre-procedure EKG: AF  Post-procedure EKG: AF    DESCRIPTION OF PROCEDURE:  After informed written consent, the patient was brought to the electrophysiology lab in the fasting, unsedated state. The patient was prepped and draped in the usual sterile fashion. The procedure was performed under moderate sedation with local anesthetic. A left upper extremity venogram was performed, demonstrating a patent subclavian vein. A left infraclavicular incision was made with a scalpel and the pectoral device pocket was created using a combination of blunt dissection and electrocautery. The modified Seldinger technique was used to gain access to the left axillary vein. A peel-away hemostasis sheath was placed in the vein. Under fluoroscopic guidance, the pacemaker leads were introduced into the heart. The ventricular lead was advanced to the RVOT and then lowered into position at the RV apex. The atrial lead was positioned on R atrial appendage. The leads were tested and had satisfactory sensing and pacing parameters. High output ventricular pacing did not produce extracardiac stimulation. The leads were sutured to the underlying pectoral muscle with interrupted silk over a silastic suture sleeve. The device pocket was irrigated with antibiotic solution, inspected, and no bleeding was seen. The leads were connected to the pacemaker pulse generator and the device was inserted into the pocket. The wound was closed with three layers of absorbable sutures. Following recovery from sedation, the patient was transferred to a monitored bed in good condition.     IMPLANTED  DEVICE INFORMATION:  Pulse generator is a MDT model W3DR01  Serial # [id number]    LEAD INFORMATION:  1)Right atrial lead is a MDT model #5076-52 , serial #[id number] ,P wave 0.6 millivolts, pacing impedance 627 Ohms.    2)Right ventricular lead is a MDT model #5076-58 , serial #[id number] ,R wave 5.0 millivolts, threshold 0.5 Volts at 0.4 milliseconds, pacing impedance 589 Ohms.    DEVICE PROGRAMMING:  DDDR 60 -120 ppm    FLUOROSCOPY TIME: 2.7 minutes    IMPRESSIONS:  1. Successful dual chamber pacemaker implantation    RECOMMENDATIONS:  1. Transfer to monitored bed  2. Chest x-ray  3. Device interrogation prior to hospital discharge  4. Followup in device clinic

## 2022-06-23 NOTE — PROGRESS NOTES
Pt returned from from cath lab in bed with RN on monitor. Report received by cath lab RN. Pt assessed, VSS, dressing assessed with cath lab RN, dressing CDI. Post ops, hourly rounding initiated, telebox attached to pt, monitor room notified.

## 2022-06-23 NOTE — PROGRESS NOTES
Cardiology Progress Note    Name:   Kobe Cyr   YOB: 1931  Age:   89 y.o.  male   MRN:   [id number]    Attending Cardiologist: Dr Lyon  Outpatient Cardiologist: Dr Davey    Chief Complaint:  T-5000 GLF and Head Injury       History of Present Illness  89yo male with severe CAD without targets for revascularization, chronic respiratory failure (combined obstructive and restrictive lung disease) here because his wife called EMS after he fell from his chair and sustained scalp hematoma. He recalls urinating and then walking out of the restroom and feeling lightheaded. This seems to happen on occasion, usually associated with urination.     Interim Events   2022:  Partially paced, RBBB, A fib  Rapid response for increase in O2 needs, SOB, AMS, lethargy CXR and ECG no change  VSS, Labs reviewed      Review of Systems   Constitutional: Negative for chills and fever.   Respiratory: Negative for cough, hemoptysis, sputum production, shortness of breath and wheezing.    Cardiovascular: Negative for chest pain, palpitations, orthopnea, claudication, leg swelling and PND.   Gastrointestinal: Negative for nausea and vomiting.   Neurological: Negative for dizziness and headaches.   All other systems reviewed and are negative.      Medical History  Past Surgical History:   Procedure Laterality Date   • KNEE UNICOMPARTMENTAL  10/13/2009    Performed by SAM LINDER at SURGERY [facility] ORS   • ARTHROSCOPY, KNEE     • CORONARY STENT PROCEDURE LAD     • OTHER ORTHOPEDIC SURGERY      right shoulder rotator cuff surgery x2       Family History   Problem Relation Age of Onset   • No Known Problems Mother    • No Known Problems Father        Social History     Tobacco Use   Smoking Status Former Smoker   • Packs/day: 2.00   • Years: 35.00   • Pack years: 70.00   • Types: Cigarettes   • Quit date: 1973   • Years since quittin.2   Smokeless Tobacco Never Used       Allergies  "  Allergen Reactions   • Atorvastatin Myalgia   • Simvastatin Myalgia         Medications   Scheduled Medications   Medication Dose Frequency   • ceFAZolin  2 g Q12HRS   • ranolazine  500 mg DAILY   • senna-docusate  2 Tablet BID   • aspirin EC  81 mg DAILY   • [Held by provider] clopidogrel  75 mg DAILY   • rosuvastatin  40 mg Q EVENING           Physical Exam  /80   Pulse 81   Temp 36.4 °C (97.6 °F) (Temporal)   Resp 18   Ht 1.803 m (5' 10.98\")   Wt 99.8 kg (220 lb 0.3 oz)   SpO2 98%     Physical Exam  Vitals and nursing note reviewed.   Constitutional:       Appearance: Normal appearance.   HENT:      Head: Normocephalic and atraumatic.      Nose: Nose normal.   Eyes:      Extraocular Movements: Extraocular movements intact.   Cardiovascular:      Rate and Rhythm: Normal rate and regular rhythm.      Pulses: Normal pulses.           Radial pulses are 2+ on the right side and 2+ on the left side.      Heart sounds: Murmur heard.    Systolic murmur is present with a grade of 2/6.  Pulmonary:      Effort: Pulmonary effort is normal.      Breath sounds: Normal breath sounds.   Abdominal:      General: Abdomen is flat.      Palpations: Abdomen is soft.   Musculoskeletal:         General: Normal range of motion.      Cervical back: Normal range of motion.   Skin:     General: Skin is warm and dry.   Neurological:      General: No focal deficit present.      Mental Status: He is alert and oriented to person, place, and time.   Psychiatric:         Mood and Affect: Mood normal.         Labs (personally reviewed):     Lab Results   Component Value Date/Time    SODIUM 136 06/23/2022 02:17 AM    POTASSIUM 4.4 06/23/2022 02:17 AM    CHLORIDE 102 06/23/2022 02:17 AM    CO2 22 06/23/2022 02:17 AM    GLUCOSE 121 (H) 06/23/2022 02:17 AM    BUN 34 (H) 06/23/2022 02:17 AM    CREATININE 2.48 (H) 06/23/2022 02:17 AM    CREATININE 1.4 05/06/2008 11:42 AM     Lab Results   Component Value Date/Time    CHOLSTRLTOT 77 (L) " 06/20/2022 02:22 AM    LDL 25 06/20/2022 02:22 AM    HDL 39 (A) 06/20/2022 02:22 AM    TRIGLYCERIDE 63 06/20/2022 02:22 AM     Lab Results   Component Value Date/Time    BNPBTYPENAT 170 (H) 03/08/2012 05:00 PM         Cardiac Imaging and Procedures Review      Personal Telemetry Review  Atrial fibrillation, rates 50s then drop to <35 for ~3min at 6:40    Echo 6/20/22  The left ventricular ejection fraction is visually estimated to be 75%.  Right ventricle is mildly dilated.  Severe biatrial enlargement.  Mild aortic stenosis: V-max 2.5 m/s, MG 14 mmHg, DENEEN 1.5 cm2, DI 0.4.  Mild to moderate tricuspid regurgitation.  Right ventricular systolic pressure is estimated to be 51 mmHg,   moderate secondary pulmonary hypertension.    PPM Placement 6/22/2022 by Dr Garcia  IMPLANTED DEVICE INFORMATION:  Pulse generator is a MDT model W3DR01  Serial # [id number]     LEAD INFORMATION:  1)Right atrial lead is a MDT model #5076-52 , serial #[id number] ,P wave 0.6 millivolts, pacing impedance 627 Ohms.     2)Right ventricular lead is a MDT model #5076-58 , serial #[id number] ,R wave 5.0 millivolts, threshold 0.5 Volts at 0.4 milliseconds, pacing impedance 589 Ohms.     DEVICE PROGRAMMING:  DDDR 60 -120 ppm      Assessment and Medical Decision Making:  Symptomatic Bradycardia with hypotension  Persistent Atrial Fibrillation  Recurrent Falls  CAD  Pulmonary Hypertension  - PPM placed 6/22/2022  - Rapid called this AM, CXR and ECG stable  - Continue aspirin and statin for his coronary disease. He has completed almost a year of DAPT from his NSTEMI and has high bleeding risk due to frequent falls, ? Maybe hold clopidogrel  - Recommend compression stockings, ideally above the knee for his orthostasis  -Continue ASA 81 mg daily, furosemide 20 mg IV daily metoprolol 12.5 mg twice daily, ranolazine 500 mg twice daily, rosuvastatin 40 mg every evening      Please see Dr. Lyon's attestation for additions and further  recommendations.        SAMPSON Galvan  [facility]  [phone]    Future Appointments   Date Time Provider Department Center   9/12/2022 11:20 AM Derrick Davey M.D. [facility] None       Please note that this dictation was created using voice recognition software. I have worked with consultants from the vendor as well as technical experts from [facility] to optimize the interface. I have made every reasonable attempt to correct obvious errors, but I expect that there are errors of grammar and possibly content I did not discover before finalizing the note.    My total time spent caring for the patient on the day of the encounter was 14 minutes. This does not include time spent on separately billable procedures/tests.

## 2022-06-23 NOTE — PROGRESS NOTES
[facility] Cardiology Follow-up Note    Date of Service:    6/23/2022      Name:   Kobe Cyr   YOB: 1931  Age:   89 y.o.  male   MRN:   [id number]      Reason for EP consult: Symptomatic AV with slow ventricular response    Attending Provider: Dr Elizabeth    Primary Cardiologist: Dr. Davey    HPI:  Mr Cyr is a 89 y.o. male admitted on 6/19/2 for GLF with head injury, secondary to dizziness and syncope.  Patient experienced a fall with syncope again on 6/22 in which he was noted to have significant bradycardia, dose blue was called, pulses never lost, improved with atropine. General cardiology was consulted and recommendations were made for SSS and need for aggressive antianginal therapy given his non-revascularizable CAD.     Underwent successful implantation chamber PPM yesterday.    Interim Events:  - Personal Telemetry interpretation: A. Fib 70-90, rare V pacing beats  - Vitals: SBP 's, 7L O2  - Labs reviewed: Hg/Hct 8.6/27.0, Cr 2.48  - Currently up in chair with left arm in sling     ROS  Constitutional:  +fatigue. + weakness.  Respiratory:  Denies shortness of breath, no cough.  Cardiovascular:  Denies chest pain.  + lower extremity edema.  Denies orthopnea or PND. + Left upper chest tenderness  : denies polyuria, no dysuria.  GI:  Denies nausea/vomiting.  No abdominal distention.  Neuro:  Denies dizziness, syncope.  Hem/lymph: Denies easy bleeding/bruising.      All other review of systems reviewed and negative.    Past medical, surgical, social, and family history reviewed and unchanged from admission except as noted in HPI.    Medications: Reviewed in MAR  Current Facility-Administered Medications   Medication Dose Frequency Provider Last Rate Last Admin   • metoprolol tartrate (LOPRESSOR) tablet 12.5 mg  12.5 mg TWICE DAILY Heather Caldwell, A.P.N.       • furosemide (LASIX) injection 20 mg  20 mg Q DAY Yesika Elizabeth M.D.       • sodium chloride (OCEAN) 0.65 % nasal spray 2  Spray  2 Spray Q2HRS PRN Yesika Elizabeth M.D.       • acetaminophen (Tylenol) tablet 650 mg  650 mg Q4HRS PRN Jeremy Garcia M.D.       • ceFAZolin in dextrose (Ancef) IVPB premix 2 g  2 g Q12HRS Jeremy Garcia M.D.   Stopped at 06/23/22 0611   • ranolazine (RANEXA) 500 MG SR tablet TB12 500 mg  500 mg DAILY Cisco Lyon M.D.   500 mg at 06/23/22 0523   • senna-docusate (PERICOLACE or SENOKOT S) 8.6-50 MG per tablet 2 Tablet  2 Tablet BID Avni Jernigan M.D.   2 Tablet at 06/23/22 0523    And   • polyethylene glycol/lytes (MIRALAX) PACKET 1 Packet  1 Packet QDAY PRN Avni Jernigan M.D.        And   • magnesium hydroxide (MILK OF MAGNESIA) suspension 30 mL  30 mL QDAY PRN Avni Jernigan M.D.        And   • bisacodyl (DULCOLAX) suppository 10 mg  10 mg QDAY PRN Avni Jernigan M.D.       • acetaminophen (Tylenol) tablet 650 mg  650 mg Q6HRS PRN Avni Jernigan M.D.       • hydrALAZINE (APRESOLINE) injection 10 mg  10 mg Q4HRS PRN Avni Jernigan M.D.       • nitroglycerin (NITROSTAT) tablet 0.4 mg  0.4 mg Q5 MIN PRN Avni Jernigan M.D.       • morphine 4 MG/ML injection 2-4 mg  2-4 mg Q5 MIN PRN Avni Jernigan M.D.       • ondansetron (ZOFRAN) syringe/vial injection 4 mg  4 mg Q4HRS PRN Avni Jernigan M.D.       • ondansetron (ZOFRAN ODT) dispertab 4 mg  4 mg Q4HRS PRN Avni Jernigan M.D.       • guaiFENesin dextromethorphan (ROBITUSSIN DM) 100-10 MG/5ML syrup 10 mL  10 mL Q6HRS PRN Avni Jernigan M.D.       • aspirin EC (ECOTRIN) tablet 81 mg  81 mg DAILY Avni Jernigan M.D.   81 mg at 06/23/22 0523   • clopidogrel (PLAVIX) tablet 75 mg  75 mg DAILY Avni Jernigan M.D.   75 mg at 06/22/22 0536   • rosuvastatin (CRESTOR) tablet 40 mg  40 mg Q EVENING Avni Jernigan M.D.   40 mg at 06/22/22 1835   Last reviewed on 6/19/2022  6:27 PM by Jenna May    Allergies   Allergen Reactions   • Atorvastatin Myalgia   • Simvastatin Myalgia       Physical Exam  Body mass index is 30.7 kg/m². /66   Pulse 91   Temp 36 °C (96.8 °F)  "(Temporal)   Resp 18   Ht 1.803 m (5' 10.98\")   Wt 99.8 kg (220 lb 0.3 oz)   SpO2 96%    Vitals:    06/23/22 0700 06/23/22 0705 06/23/22 0710 06/23/22 0741   BP:   134/80 117/66   Pulse: 83 80 81 91   Resp:    18   Temp:    36 °C (96.8 °F)   TempSrc:    Temporal   SpO2: 99% 98% 98% 96%   Weight:       Height:        Oxygen Therapy:  Pulse Oximetry: 96 %, O2 (LPM): 7, O2 Delivery Device: Silicone Nasal Cannula    General: no acute distress, chronically-ill appearing  Neck: no JVD, no bruits  Lungs: CTAB, normal effort. no wheezing, rales, or rhonchi  Heart: RRR, normal S1 /S2, no murmur, no rub  Chest: Left chest PPM site pressure dressing removed.  Site redressed.  No erythema or hematoma present, small amount of bruising apparently  EXT: trace lower extremity edema, 2+ radial pulses. 2+ pedal pulses.   Abdomen: soft, non tender, non distended  Neurological: No focal deficits, no facial asymmetry.  Normal speech  Psychiatric: Appropriate affect, alert and oriented x 3  Skin: Warm and dry extremities, no rashes    Labs (personally reviewed):     Lab Results   Component Value Date/Time    SODIUM 136 06/23/2022 02:17 AM    POTASSIUM 4.4 06/23/2022 02:17 AM    CHLORIDE 102 06/23/2022 02:17 AM    CO2 22 06/23/2022 02:17 AM    GLUCOSE 121 (H) 06/23/2022 02:17 AM    BUN 34 (H) 06/23/2022 02:17 AM    CREATININE 2.48 (H) 06/23/2022 02:17 AM    CREATININE 1.4 05/06/2008 11:42 AM     Lab Results   Component Value Date/Time    ALKPHOSPHAT 56 06/23/2022 02:17 AM    ASTSGOT 21 06/23/2022 02:17 AM    ALTSGPT 16 06/23/2022 02:17 AM    TBILIRUBIN 0.4 06/23/2022 02:17 AM      Lab Results   Component Value Date/Time    CHOLSTRLTOT 77 (L) 06/20/2022 02:22 AM    LDL 25 06/20/2022 02:22 AM    HDL 39 (A) 06/20/2022 02:22 AM    TRIGLYCERIDE 63 06/20/2022 02:22 AM     Cardiac Imaging and Procedures Review:      EKG 6/23/22: My Personal interpretation reveals Afib 78    Echo 6/20/22:  The left ventricular ejection fraction is visually " estimated to be 75%.  Right ventricle is mildly dilated.  Severe biatrial enlargement.  Mild aortic stenosis: V-max 2.5 m/s, MG 14 mmHg, DENEEN 1.5 cm2, DI 0.4.  Mild to moderate tricuspid regurgitation.  Right ventricular systolic pressure is estimated to be 51 mmHg,   moderate secondary pulmonary hypertension.    Assessment and Medical Decision Making:    S/p PPM for SSS  -successful dual chamber placement on 6/22/22, chronic model  -interrogation this am with normal function, stable parameters   -CXR with no PTX, appropriate lead placement  -EKG showing Afib, occasionally paced on Tele    Newly diagnosed Atrial fibrillation  -Per general cardiology a NOAC is not appropriate given age, history of traumatic falls, and underlying anemia  -Medications: Uptitrate metoprolol as tolerated  -Follow-up with primary cardiologist outpatient, discuss appropriateness of NOAC, rhythm control, possible watchman device    Thank you for allowing me to participate in this patients care.  Please contact me with any questions or concerns.    Please see Dr. Garcia's attestation for additions and further recommendations.  EP service will sign off at this point.    I personally spent a total of 18 minutes which includes face-to-face time and non-face-to-face time spent on preparing to see the patient, reviewing hospital notes and tests, obtaining history from the patient, performing a medically appropriate exam, counseling and educating the patient, ordering medications/tests/procedures/referrals as clinically indicated, and documenting information in the electronic medical record.    Future Appointments   Date Time Provider Department Center   7/1/2022  1:30 PM PACER CHECK-[facility] None   9/12/2022 11:20 AM Derrick Davey M.D. [facility] None       PLEASE NOTE: Some of this dictation was created using voice recognition software. I have made every reasonable attempt to correct obvious errors, but I expect that there are errors of grammar and  possibly content that I did not discover before finalizing the note.     ASHLEY Quintana.   [facility]  [phone]

## 2022-06-23 NOTE — PALLIATIVE CARE
"Palliative Care follow-up  PC RN discussed POLST form with pt at bedside. Reviewed POLST and discussed comfort focused treatment vs selective treatment selections. Pt indicates that when he is discharged, he would be agreeable to return for hospital for treatment and determine what treatments he wants or wouldn't want at that time. Pt updated POLST form to reflect DNR, selective treatment, and artifical nutrition \"as a bridge to get better.\" PC RN also called pt's wife Mook to update about this change. She is in agreement and understands. Mook sent a copy of pt's Advance Directive via email. AD names Mook as DPOA and Nitin Paulino as alternate DPOA. Copy of AD scanned into pt's chart.       Updated: MAL LUEVANO and Dr. Elizabeth    Plan: AD and POLST complete and scanned into EMR.    Thank you for allowing Palliative Care to support this patient and family. Contact [phone] for additional assistance, change in patient status, or with any questions/concerns.   "

## 2022-06-23 NOTE — PROGRESS NOTES
Chief Complaint   Patient presents with   • Follow-Up       HPI:  Kobe Cyr is a 89 y.o. year old male here today for follow-up on SOB, chronic right upper lobe cavitary lesion.  Last seen 5/26/2021 by LIAM Morrow PA-C.  He has a past medical history of hemoptysis, MI, coronary artery disease, carotid artery stenosis, CKD stage III, gout, GERD. 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 Patient has a chronic respiratory failure on 2 L home oxygen as baseline. CT chest showed ill-defined lower lobe interstitial changes with bronchiectasis, chronic right upper lobe cavity and followed with Dr. Tobin in pulmonary clinic.  Pulmonary Dr. Kim had a long discussion with the family, will start  triple inhaler therapy.  Patient needs close follow-up with the pulmonary.    Since last visit, patient was admitted to the ER on 7/9/2021 for lightheadedness and shortness of breath.  He underwent cardiac catheterization which revealed evidence of triple-vessel disease and left main occlusion.  He is currently on heparin and Plavix as he is not a surgical candidate.    doxycycline which he reports taking 1 week a month, omeprazole, benzonatate.  Patient reports having trialed himself off monthly doxycycline with recurrence of hemoptysis including brown sputum in a.m. and stringy red sputum during day.  Reports benzonatate is quite expensive.  Also reports he is on ipratropium nasal spray prescribed by Dr. Valdovinos his ENT.    Chest x-ray (7/13/2021):  Improvement in the multifocal bilateral peripheral pulmonary airspace opacities consistent with an incompletely resolved.   No large pleural effusion or pneumothorax. The remainder is stable.    VQ scan (7/12/2021):  Low probability for pulmonary embolus. Findings consistent with airway disease.      ROS: As per HPI and otherwise negative if not stated.    Past Medical History:   Diagnosis Date   • Anemia  2016   • Arthritis    • Back pain    • CAD (coronary artery disease)    • Cancer (HCC)     skin   • Chronic kidney disease (CKD), stage II (mild) 2016   • Coronary heart disease    • DJD (degenerative joint disease) 2016   • Dyslipidemia 2016   • GERD (gastroesophageal reflux disease) 2016   • [place] measles    • Hematochezia 2016   • High cholesterol    • Hyperglycemia 2010   • Hyperlipidemia 2011   • Hypertension    • Impaired fasting glucose 2016   • Left knee pain 2016   • Myocardial infarction (HCC) 2 yrs ago       • Nasal drainage    • Obesity    • Osteoarthritis of knees, bilateral 2016   • Pulmonary embolism (HCC)    • Rheumatic fever    • Scarlet fever    • Smallpox    • Snoring        Past Surgical History:   Procedure Laterality Date   • KNEE UNICOMPARTMENTAL  10/13/2009    Performed by SAM LINDER at SURGERY [facility]   • ARTHROSCOPY, KNEE     • CORONARY STENT PROCEDURE LAD     • OTHER ORTHOPEDIC SURGERY      right shoulder rotator cuff surgery x2       Family History   Problem Relation Age of Onset   • No Known Problems Mother    • No Known Problems Father        Social History     Socioeconomic History   • Marital status:      Spouse name: Not on file   • Number of children: Not on file   • Years of education: Not on file   • Highest education level: Not on file   Occupational History   • Not on file   Tobacco Use   • Smoking status: Former Smoker     Packs/day: 2.00     Years: 35.00     Pack years: 70.00     Types: Cigarettes     Quit date: 1973     Years since quittin.3   • Smokeless tobacco: Never Used   Vaping Use   • Vaping Use: Never used   Substance and Sexual Activity   • Alcohol use: Yes     Comment: cocktail every night   • Drug use: No   • Sexual activity: Not on file   Other Topics Concern   • Not on file   Social History Narrative   • Not on file     Social Determinants of Health     Financial Resource Strain:    •  "Difficulty of Paying Living Expenses:    Food Insecurity:    • Worried About Running Out of Food in the Last Year:    • Ran Out of Food in the Last Year:    Transportation Needs:    • Lack of Transportation (Medical):    • Lack of Transportation (Non-Medical):    Physical Activity:    • Days of Exercise per Week:    • Minutes of Exercise per Session:    Stress:    • Feeling of Stress :    Social Connections:    • Frequency of Communication with Friends and Family:    • Frequency of Social Gatherings with Friends and Family:    • Attends [employer] Services:    • Active Member of Clubs or Organizations:    • Attends Club or Organization Meetings:    • Marital Status:    Intimate Partner Violence:    • Fear of Current or Ex-Partner:    • Emotionally Abused:    • Physically Abused:    • Sexually Abused:        Allergies as of 08/23/2021 - Reviewed 07/28/2021   Allergen Reaction Noted   • Atorvastatin Myalgia 04/20/2016   • Simvastatin  07/08/2010        Vitals:  Ht 1.803 m (5' 11\")   Wt 105 kg (232 lb)     Current medications as of today   Current Outpatient Medications   Medication Sig Dispense Refill   • clopidogrel (PLAVIX) 75 MG Tab Take 1 tablet by mouth every day for 30 days. 90 tablet 3   • isosorbide mononitrate SR (IMDUR) 60 MG TABLET SR 24 HR Take 1 tablet by mouth every day for 30 days. 30 tablet 11   • rosuvastatin (CRESTOR) 40 MG tablet Take 1 tablet by mouth every evening for 30 days. 90 tablet 3   • metoprolol tartrate (LOPRESSOR) 25 MG Tab Take 0.5 Tablets by mouth 2 times a day. 90 tablet 3   • lisinopril (PRINIVIL) 2.5 MG Tab Take 1 tablet by mouth every day. 90 tablet 3   • benzonatate (TESSALON) 200 MG capsule Take 1 capsule by mouth 2 times a day as needed for Cough. Do not chew. Swallow whole. 60 capsule 6   • nitroglycerin (NITROSTAT) 0.4 MG SUBL Place 1 Tab under tongue as needed for Chest Pain. 25 Tab 1   • aspirin 81 MG tablet Take 81 mg by mouth every day.       No current " facility-administered medications for this visit.         Physical Exam: ***  Gen:           Alert and oriented, No apparent distress. Mood and affect appropriate, normal interaction with examiner.  Eyes:          PERRL, EOM intact, sclere white, conjunctive moist.  Ears:          Not examined. No lesions or deformities.  Hearing:     Grossly intact.  Nose:          Normal, no lesions or deformities.  Dentition:    Good dentition.  Oropharynx:   Tongue normal, posterior pharynx without erythema or exudate.  Mallampati Classification: ***  Neck:        Supple, trachea midline, no masses.  Respiratory Effort: No intercostal retractions or use of accessory muscles.   Lung Auscultation:      Clear to auscultation bilaterally; no rales, rhonchi or wheezing.  CV:            Regular rate and rhythm. No murmurs, rubs or gallops.  Abd:           Not examined. Soft non tender, non distended. Normal active bowel sounds. No masses.  Lymphadenopathy: No palpable nodes or edema.  Gait and Station: Normal.  Digits and Nails: No clubbing, cyanosis, petechiae, or nodes.   Cranial Nerves: II-XII grossly intact.  Skin:        No rashes, lesions or ulcers noted.               Ext:           No cyanosis or edema.      Assessment:  No diagnosis found.    Immunizations:    Flu:***  Pneumovax 23:***  Prevnar 13:***  COVID-19: ***, ***    Plan:  ***    Please note that this dictation was created using voice recognition software. I have made every reasonable attempt to correct obvious errors, but it is possible there are errors of grammar and possibly content that I did not discover before finalizing the note.       HPI: 77yF HTN COPD on 3L home O2 p/w stroke sx - family called EMS for change in her breathing ~1220.  EMS found her tachypneic on her usual 3L home O2 but not hypoxic (98%); they upped her O2 to 4L but also noted R facial droop and weakened R , so they prenotified a stroke code en route to [facility] ED.  As per family, pt w/o prior hx stroke.  Pt is speaking in few words but unable to speak in full sentences, also different from baseline.

## 2022-06-23 NOTE — PROGRESS NOTES
"[facility] Medicine Daily Progress Note    Date of Service  6/23/2022    Chief Complaint  Kobe Cyr is a 89 y.o. male admitted 6/19/2022 with syncope    Hospital Course  Kobe Cyr is a 89 y.o. male with history of multivessel CAD not CABG candidate per CTS on medical management, HTN, and HLD. He presented to [facility] on 6/19/2022 with shortness of breath and syncopal episode.  Patient reported falling out of his wheelchair while wife was out of house.  He reported shortness of breath, dizziness, vertigo prior to fall and syncopal event.  Denies palpitation, chest pain, tongue bite, loss of bladder/bowel incontinence.  In ER, CT head noted left parietal scalp hematoma with no fracture or intracranial hemorrhage.  No acute cervical spine fracture seen on CT c-spine. Non specific ST changes on EKG but in afib, troponin T 204>190.  Cardiology was consulted      Interval Problem Update  Patient is axox3 with impaired recall. He reports he is currently feeling \"ok\". He reports he still has not had a BM, but he is passing flatus and denies abdominal pain. I recommended an enema or suppository and he declined both. He states he had \"another episode of dizziness\" this morning, a rapid response was reportedly called at that time. He states he got up to try to pee and have a bm and he felt dizzy. No significant findings on tele at the time, oxygen requirements reportedly increased, his bp was stable. A CxR was obtained and showed stable pulmonary edema. He denies cough, no numbness or tingling, no chest pain. He does report some mild nausea.   Currently 96% on 7 L (previously on 5L). He tells me his nose is congested and he has had this problem at home previously, no sinus pain or pressure - he agrees to trial of nasal spray. ROS otherwise negative.     I have discussed this patient's plan of care and discharge plan at IDT rounds today with Case Management, Nursing, Nursing leadership, and other members of the IDT " team.    Consultants/Specialty  cardiology  Critical care    Code Status  DNAR/DNI    Disposition  Patient is not medically cleared for discharge.   Anticipate discharge to to home with close outpatient follow-up.  I have placed the appropriate orders for post-discharge needs.    Review of Systems  Review of Systems   Constitutional: Positive for malaise/fatigue. Negative for chills, diaphoresis, fever and weight loss.   HENT: Positive for congestion. Negative for ear discharge, ear pain, hearing loss, sore throat and tinnitus.    Eyes: Negative.  Negative for blurred vision.   Respiratory: Positive for shortness of breath. Negative for cough, hemoptysis and sputum production.    Cardiovascular: Negative.  Negative for chest pain, palpitations and leg swelling.   Gastrointestinal: Positive for constipation. Negative for abdominal pain, heartburn, nausea and vomiting.   Genitourinary: Negative.  Negative for dysuria and urgency.   Musculoskeletal: Negative.  Negative for myalgias and neck pain.   Skin: Negative.  Negative for itching and rash.   Neurological: Positive for dizziness. Negative for focal weakness, weakness and headaches.   Endo/Heme/Allergies: Negative.  Does not bruise/bleed easily.   Psychiatric/Behavioral: Negative.  Negative for depression, substance abuse and suicidal ideas.   All other systems reviewed and are negative.       Physical Exam  Temp:  [36 °C (96.8 °F)-36.7 °C (98 °F)] 36 °C (96.8 °F)  Pulse:  [71-92] 91  Resp:  [14-20] 18  BP: (105-141)/(55-83) 117/66  SpO2:  [84 %-99 %] 96 %    Physical Exam  Constitutional:       General: He is not in acute distress.     Appearance: He is not ill-appearing, toxic-appearing or diaphoretic.   HENT:      Head: Normocephalic.      Nose: Nose normal.      Mouth/Throat:      Mouth: Mucous membranes are moist.   Eyes:      General: No scleral icterus.     Extraocular Movements: Extraocular movements intact.      Pupils: Pupils are equal, round, and  reactive to light.   Cardiovascular:      Rate and Rhythm: Normal rate and regular rhythm.      Comments: Pacer in place, cdi, Left arm in sling  Pulmonary:      Breath sounds: No rhonchi.   Abdominal:      General: Abdomen is flat. There is no distension.      Palpations: There is no mass.      Tenderness: There is no abdominal tenderness. There is no guarding.   Musculoskeletal:         General: No swelling or deformity. Normal range of motion.      Cervical back: Normal range of motion and neck supple.      Right lower leg: No edema.   Skin:     General: Skin is warm.      Capillary Refill: Capillary refill takes 2 to 3 seconds.      Coloration: Skin is not jaundiced.      Findings: Bruising present. No erythema or lesion.   Neurological:      General: No focal deficit present.      Mental Status: He is alert and oriented to person, place, and time.   Psychiatric:         Mood and Affect: Mood normal.         Fluids    Intake/Output Summary (Last 24 hours) at 6/23/2022 1027  Last data filed at 6/23/2022 0829  Gross per 24 hour   Intake 800 ml   Output 525 ml   Net 275 ml       Laboratory  Recent Labs     06/21/22 0229 06/22/22 0658 06/23/22 0217   WBC 6.7 7.0 5.0   RBC 2.64* 2.49* 2.54*   HEMOGLOBIN 9.0* 8.6* 8.6*   HEMATOCRIT 29.7* 28.1* 27.9*   .5* 112.9* 109.8*   MCH 34.1* 34.5* 33.9*   MCHC 30.3* 30.6* 30.8*   RDW 59.0* 59.4* 56.9*   PLATELETCT 150* 139* 127*   MPV 10.3 10.1 10.3     Recent Labs     06/21/22 0229 06/22/22 0658 06/23/22 0217   SODIUM 139 136 136   POTASSIUM 5.2 5.0 4.4   CHLORIDE 106 102 102   CO2 22 19* 22   GLUCOSE 149* 153* 121*   BUN 31* 32* 34*   CREATININE 2.41* 2.35* 2.48*   CALCIUM 8.8 8.5 8.7                   Imaging  DX-CHEST-PORTABLE (1 VIEW)   Final Result         1.  Pulmonary edema and/or infiltrates are identified, which are stable since the prior exam.   2.  Trace bilateral pleural effusions   3.  Cardiomegaly      DX-CHEST-PORTABLE (1 VIEW)   Final Result      1.   Interval placement of cardiac pacemaker      2.  No pneumothorax      DX-CHEST-LIMITED (1 VIEW)   Final Result      1.  Increased bibasilar atelectasis   2.  New RIGHT mid lung opacity suspicious for pneumonia   3.  Persistently enlarged cardiac silhouette      EC-ECHOCARDIOGRAM COMPLETE W/O CONT   Final Result      DX-CHEST-PORTABLE (1 VIEW)   Final Result      1.  Mild diffuse interstitial edema and patchy bilateral airspace opacities. Findings could be seen in setting edema and/or infection.   2.  Stable enlargement of the cardiomediastinal silhouette.   3.  Possible small right pleural effusion.      US-RENAL   Final Result      Unremarkable renal ultrasound.      CT-HEAD W/O   Final Result      1.  Left parietal scalp hematoma without evidence of skull fracture or intracranial hemorrhage.         CT-CSPINE WITHOUT PLUS RECONS   Final Result      No CT evidence of acute cervical spine abnormality.      Moderate spondylosis with multilevel degenerative change      Pulmonary scarring and small right pleural fluid with right mediastinal adenopathy. This could be reactive or malignant      US-EXTREMITY VENOUS LOWER BILAT    (Results Pending)   CL-PERMANENT PACEMAKER INSERTION    (Results Pending)        Assessment/Plan  * Syncope and collapse- (present on admission)  Assessment & Plan  CT head noted left parietal scalp hematoma with no fracture or intracranial hemorrhage  No acute cervical spine fracture seen on CT c-spine      Patients syncope may have been related to hypotension and bradycardia  Vagal stimulation and hypoxia may also be factors  following    Thrombocytopenia (HCC)  Assessment & Plan  Mild  following    Constipation  Assessment & Plan  Continue bowel regimen  Patient declined suppository or enema  Vagal stimulation may be contributing to some of his dizzy spells  Good bowel sounds  Continue to follow    Bradycardia  Assessment & Plan  Resolved s/p pacer    Positive D dimer  Assessment & Plan  Will  check dopplers and VQ scan    Hypotension due to drugs- (present on admission)  Assessment & Plan  Suspect cardiac meds contributing, see above  Imdur and bb now stopped  Resolved - following closely with diuresis    Acute kidney injury superimposed on chronic kidney disease (HCC)  Assessment & Plan  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    Renal ultrasound is unremarkable  Creatinine slowly increasing  Continue to follow    Elevated troponin  Assessment & Plan  Echo with preserved EF  Cardiology following  trops decreasing    Acute on chronic respiratory failure with hypoxia (HCC)- (present on admission)  Assessment & Plan  Worsening this am, pulm edema on exam and nasal congestion - will order saline nasal spray and diurectics  pulm htn  pulm edema contributing - di  VQ scan was planned - cannot get now - with pacer patient cannot lift his arm as it could dislodge the leads - LE dopplers pending    GERD (gastroesophageal reflux disease)- (present on admission)  Assessment & Plan  PPI    CAD (coronary artery disease)- (present on admission)  Assessment & Plan  Prior MI and PCI  Found to have multivessel CAD -- not CABG surgical candidate in 07/2021    Continue ASA/Plavix/statin/BB (as tolerated)  Cannot tolerate bb and imdur due to bradycardia and hypotension  Following  Cardiology following    Hyperlipidemia- (present on admission)  Assessment & Plan  Statin       VTE prophylaxis: SCDs/TEDs    I have performed a physical exam and reviewed and updated ROS and Plan today (6/23/2022). In review of yesterday's note (6/22/2022), there are no changes except as documented above.

## 2022-06-23 NOTE — PROGRESS NOTES
Rapid Response Summary     Nurse concern called at 0706 for: Shortness of breath , Increased oxygen demand , Altered mental status  and Lethargy     VS: Hypoxic  (See Vitals Flowsheet)  Additional info: s/p pacemaker placement, 6/22. AMS, Increased O2 demand, FU EKG and CXR ordered, awaiting results.   MD Paged: N/A  Interventions:    Imaging/Tests: Chest X-ray and EKG    Labs: N/A   Medications: None   Other: N/A  Disposition: Improved with no interventions. Primary RN updated on plan of care. Transfer not indicated at this time.

## 2022-06-23 NOTE — PROGRESS NOTES
Received bedside report from CHLOÉ Estrada, pt care assumed. VS WDL, pt assessment complete. Pt A&Ox4, no c/o pain at this time. POC discussed with pt and verbalizes no questions. Pt denies any additional needs at this time. Bed locked and in lowest position, bed alarm on. Pt educated on fall risk and verbalized understanding, call light within reach, hourly rounding initiated.

## 2022-06-23 NOTE — THERAPY
Physical Therapy   Daily Treatment     Patient Name: Kobe Cyr  Age:  89 y.o., Sex:  male  Medical Record #: [id number]  Today's Date: 6/23/2022    Precautions: Fall Risk    Assessment  Pt seen for PT treatment session, is now s/p pacemaker placement 6/22. Pt requiring more assist to for STS transfers and ambulation in-room. Pt c/o incr WOB and fatigue, required rest breaks during session. Pt needing reminders during session to maintain pacemaker precautions though was able to verbalize LUE restrictions. PT will follow. Recommendation now for postacute therapy to ensure safety and incr functional independence for return home.     Plan  Continue current treatment plan.  DC Equipment Recommendations: Unable to determine at this time  Discharge Recommendations: Recommend post-acute placement for additional physical therapy services prior to discharge home         06/23/22 1003   Cognition    Level of Consciousness Alert   Comments pleasant, cooperative, able to recite PPM precautions but does need cues to maintain   Balance   Sitting Balance (Static) Fair +   Sitting Balance (Dynamic) Fair   Standing Balance (Static) Fair -   Standing Balance (Dynamic) Poor +   Weight Shift Sitting Fair   Weight Shift Standing Fair   Comments standing with HHA   Gait Analysis   Gait Level Of Assist Contact Guard Assist   Assistive Device Hand Held Assist   Distance (Feet) 10   # of Times Distance Traveled 2   Deviation Step To;Increased Base Of Support;Bradykinetic   Weight Bearing Status no restrictions   Skilled Intervention Verbal Cuing;Compensatory Strategies   Comments pt relying on pushing down through HHA, limited by incr WOB   Bed Mobility    Supine to Sit Moderate Assist   Scooting Minimal Assist   Skilled Intervention Verbal Cuing;Tactile Cuing;Sequencing   Comments has difficulty moving to EOB and not pushing through LUE to maintain PPM precautions   Functional Mobility   Sit to Stand mod A from EOB, CGA from raised  commode   Bed, Chair, Wheelchair Transfer Contact Guard Assist   Skilled Intervention Verbal Cuing;Tactile Cuing;Sequencing   Short Term Goals    Short Term Goal # 1 pt will perform supine <> sit with SPV from flat bed to get in/out of bed at home   Goal Outcome # 1 goal not met   Short Term Goal # 2 pt will ambulate 50ft with SPV in 6 visits to access home environment   Goal Outcome # 2 Goal not met   Short Term Goal # 3 pt will perform STS with SPV in 6 visits for improved independence   Goal Outcome # 3 Goal not met

## 2022-06-23 NOTE — DISCHARGE PLANNING
Case Management Discharge Planning    Admission Date: 6/19/2022  GMLOS: 2.3  ALOS: 3    6-Clicks ADL Score: 14  6-Clicks Mobility Score: 11  PT and/or OT Eval ordered: Yes  Post-acute Referrals Ordered: Yes  Post-acute Choice Obtained: Yes  Has referral(s) been sent to post-acute provider:  Yes      Anticipated Discharge Dispo: Discharge Disposition: D/T to SNF with Medicare cert in anticipation of skilled care (03)  Discharge Address: [address] [place] [address]  Discharge Contact Phone Number: [phone]    DME Needed: No    Action(s) Taken: LSW met with pt at bedside for SNF choice. LSW explained SNF and answered all questions. Pt reported he would like to speak with his wife about which facility he should go to. Pt gave consent to send referrals to facilities with the highest ratings in the meantime. Choice form for [facility], [facility], and [facility] faxed to [facility].    Escalations Completed: None    Medically Clear: No    Next Steps: Care coordination will follow up with SNF referrals/pt for choice    Barriers to Discharge: Medical clearance and Pending Placement    Is the patient up for discharge tomorrow: No

## 2022-06-23 NOTE — CARE PLAN
"The patient is Stable - Low risk of patient condition declining or worsening    Shift Goals  Clinical Goals: Patient will recieve after care education after having pacemaker placement.  Patient Goals: \"Rest well and brush teeth.\"  Family Goals: CAMERON    Progress made toward(s) clinical / shift goals:  Provided patient with a verbal discussion and printed hand out related to pacemaker implantation after care instructions as well as [facility]'s \"Living with a pacemaker\" hand out. Patient verbalized understanding and had no questions or concerns. Pt is able to verbalize pain on a scale of 1-10 and is able to verbalize comfort goal. Pain management plan followed and pt educated on nonpharmacologic and pharmacological comfort measures. Pt remains free from falls at this time. Safety precautions in place. Pt educated on calling for assistance when needed.      Patient is not progressing towards the following goals: N/A      "

## 2022-06-23 NOTE — CARE PLAN
Problem: Knowledge Deficit - Standard  Goal: Patient and family/care givers will demonstrate understanding of plan of care, disease process/condition, diagnostic tests and medications  Outcome: Progressing     Problem: Skin Integrity  Goal: Skin integrity is maintained or improved  Outcome: Progressing     Problem: Communication  Goal: The ability to communicate needs accurately and effectively will improve  Outcome: Progressing   The patient is Stable - Low risk of patient condition declining or worsening    Shift Goals  Clinical Goals: Patient will get up to chair for all meals  Patient Goals: decrease dizziness  Family Goals: CAMERON

## 2022-06-23 NOTE — THERAPY
"Occupational Therapy   Initial Evaluation     Patient Name: Kobe Cyr  Age:  89 y.o., Sex:  male  Medical Record #: [id number]  Today's Date: 6/23/2022     Precautions  Precautions: Fall Risk    Assessment  Patient is a 89 y.o. male who presented to the hospital after a syncopal episode. Pt with h/o CAD, HTN, CKD and HLD. EKG revealed new onset a-fib. Pt s/p PPM 6/22. Pt reports that at baseline he completes basic ADLs with modified independence. He takes sponge baths and does not access the shower. During OT eval, pt had difficulty maintaining his pacemaker precautions and he required significant assist to get out of bed. Pt presents with decreased activity tolerance, impaired balance, limited knowledge of post-op precautions impacting his safety and independence with ADLs and ADL transfers. Pt would benefit from OT services.        Plan    Recommend Occupational Therapy 3 times per week until therapy goals are met for the following treatments:  Adaptive Equipment, Self Care/Activities of Daily Living, Therapeutic Activities, and Therapeutic Exercises.    DC Equipment Recommendations: Unable to determine at this time  Discharge Recommendations: Recommend post-acute placement for additional occupational therapy services prior to discharge home     Subjective    \"I'm not supposed to bend this arm.\"      Objective       06/23/22 1005   Prior Living Situation   Prior Services None   Housing / Facility 1 Story House   Steps Into Home   (ramp)   Bathroom Set up   (uses wet wipes to take sponge bath, unable to safely access the shower)   Equipment Owned Scooter   Lives with - Patient's Self Care Capacity Spouse   Comments reports daughter assist with IADLs when needed   Prior Level of ADL Function   Self Feeding Independent   Grooming / Hygiene Independent   Bathing Independent  (sponge bath)   Dressing Independent   Toileting Independent   Prior Level of IADL Function   Prior Level Of Mobility   (walks short " distances and uses scooter for longer distances in the house)   Driving / Transportation Relatives / Others Provide Transportation  (spouse drives)   Comments Pt has assist for IADLs from spouse and daughters   History of Falls   History of Falls Yes   Precautions   Precautions Fall Risk   Vitals   O2 Delivery Device Silicone Nasal Cannula   Pain 0 - 10 Group   Therapist Pain Assessment During Activity;Nurse Notified;0   Cognition    Cognition / Consciousness WDL   Level of Consciousness Alert   Comments hard of hearing, able to verbalize pacemaker precautions but required frequent verbal and tactile cues to maintain them   Active ROM Upper Body   Dominant Hand Right   Comments RUE AROM in within functional limits for basic ADLs, Left shoulder NT due to recent pacemaker placement. Left elbow, wrist and hand WNL   Strength Upper Body   Comments RUE strength intact, LUE NT   Sensation Upper Body   Upper Extremity Sensation  WDL   Comments pt denied numbness/tingling   Upper Body Muscle Tone   Upper Body Muscle Tone  WDL   Balance Assessment   Sitting Balance (Static) Fair +   Sitting Balance (Dynamic) Fair   Standing Balance (Static) Fair -   Standing Balance (Dynamic) Poor +   Weight Shift Sitting Fair   Weight Shift Standing Fair   Comments HHA   Bed Mobility    Supine to Sit Moderate Assist   Scooting Minimal Assist   ADL Assessment   Upper Body Dressing Moderate Assist  (gown and sling)   Lower Body Dressing Maximal Assist  (slippers)   Toileting   (sat on the toilet and attempted BM without success)   6 Clicks Daily Activity Score 14   Functional Mobility   Sit to Stand Moderate Assist   Bed, Chair, Wheelchair Transfer Contact Guard Assist   Toilet Transfers Contact Guard Assist  (BSC placed over the toilet)   Mobility walked to/from bathroom with HHA   Edema / Skin Assessment   Comments multiple skin tears on his arms   Activity Tolerance   Comments increased work of breathing with activity, multiple seated  rests to recover   Patient / Family Goals   Patient / Family Goal #1 to go home   Short Term Goals   Short Term Goal # 1 Pt will stand at the sink to brush teeth and wash hands with SBA   Short Term Goal # 2 Pt will don slippers with SBA   Short Term Goal # 3 Pt will complete toilet transfers with SBA   Education Group   Education Provided Role of Occupational Therapist;Other (comments)   Role of Occupational Therapist Patient Response Patient;Acceptance;Explanation;Verbal Demonstration   Additional Comments educated pt on pacemaker precautions throughout session, continued reinforcement required   Problem List   Problem List Decreased Active Daily Living Skills;Decreased Functional Mobility;Decreased Activity Tolerance;Impaired Postural Control / Balance;Limited Knowledge of Post Op Precautions

## 2022-06-24 LAB
ANION GAP SERPL CALC-SCNC: 9 MMOL/L (ref 7–16)
BASOPHILS # BLD AUTO: 0.9 % (ref 0–1.8)
BASOPHILS # BLD: 0.04 K/UL (ref 0–0.12)
BUN SERPL-MCNC: 30 MG/DL (ref 8–22)
CALCIUM SERPL-MCNC: 8.7 MG/DL (ref 8.5–10.5)
CHLORIDE SERPL-SCNC: 104 MMOL/L (ref 96–112)
CO2 SERPL-SCNC: 26 MMOL/L (ref 20–33)
CREAT SERPL-MCNC: 2.2 MG/DL (ref 0.5–1.4)
EOSINOPHIL # BLD AUTO: 0.2 K/UL (ref 0–0.51)
EOSINOPHIL NFR BLD: 4.4 % (ref 0–6.9)
ERYTHROCYTE [DISTWIDTH] IN BLOOD BY AUTOMATED COUNT: 56.6 FL (ref 35.9–50)
GFR SERPLBLD CREATININE-BSD FMLA CKD-EPI: 28 ML/MIN/1.73 M 2
GLUCOSE SERPL-MCNC: 115 MG/DL (ref 65–99)
HCT VFR BLD AUTO: 26.3 % (ref 42–52)
HGB BLD-MCNC: 8.2 G/DL (ref 14–18)
IMM GRANULOCYTES # BLD AUTO: 0.03 K/UL (ref 0–0.11)
IMM GRANULOCYTES NFR BLD AUTO: 0.7 % (ref 0–0.9)
LYMPHOCYTES # BLD AUTO: 0.66 K/UL (ref 1–4.8)
LYMPHOCYTES NFR BLD: 14.4 % (ref 22–41)
MAGNESIUM SERPL-MCNC: 2.2 MG/DL (ref 1.5–2.5)
MCH RBC QN AUTO: 34 PG (ref 27–33)
MCHC RBC AUTO-ENTMCNC: 31.2 G/DL (ref 33.7–35.3)
MCV RBC AUTO: 109.1 FL (ref 81.4–97.8)
MONOCYTES # BLD AUTO: 0.48 K/UL (ref 0–0.85)
MONOCYTES NFR BLD AUTO: 10.5 % (ref 0–13.4)
NEUTROPHILS # BLD AUTO: 3.18 K/UL (ref 1.82–7.42)
NEUTROPHILS NFR BLD: 69.1 % (ref 44–72)
NRBC # BLD AUTO: 0 K/UL
NRBC BLD-RTO: 0 /100 WBC
PLATELET # BLD AUTO: 115 K/UL (ref 164–446)
PMV BLD AUTO: 10.2 FL (ref 9–12.9)
POTASSIUM SERPL-SCNC: 4.3 MMOL/L (ref 3.6–5.5)
RBC # BLD AUTO: 2.41 M/UL (ref 4.7–6.1)
SODIUM SERPL-SCNC: 139 MMOL/L (ref 135–145)
WBC # BLD AUTO: 4.6 K/UL (ref 4.8–10.8)

## 2022-06-24 PROCEDURE — 80048 BASIC METABOLIC PNL TOTAL CA: CPT

## 2022-06-24 PROCEDURE — 770020 HCHG ROOM/CARE - TELE (206)

## 2022-06-24 PROCEDURE — 85025 COMPLETE CBC W/AUTO DIFF WBC: CPT

## 2022-06-24 PROCEDURE — 700102 HCHG RX REV CODE 250 W/ 637 OVERRIDE(OP): Performed by: NURSE PRACTITIONER

## 2022-06-24 PROCEDURE — 36415 COLL VENOUS BLD VENIPUNCTURE: CPT

## 2022-06-24 PROCEDURE — A9270 NON-COVERED ITEM OR SERVICE: HCPCS | Performed by: STUDENT IN AN ORGANIZED HEALTH CARE EDUCATION/TRAINING PROGRAM

## 2022-06-24 PROCEDURE — A9270 NON-COVERED ITEM OR SERVICE: HCPCS | Performed by: NURSE PRACTITIONER

## 2022-06-24 PROCEDURE — A9270 NON-COVERED ITEM OR SERVICE: HCPCS | Performed by: INTERNAL MEDICINE

## 2022-06-24 PROCEDURE — 700102 HCHG RX REV CODE 250 W/ 637 OVERRIDE(OP): Performed by: INTERNAL MEDICINE

## 2022-06-24 PROCEDURE — 700111 HCHG RX REV CODE 636 W/ 250 OVERRIDE (IP): Performed by: HOSPITALIST

## 2022-06-24 PROCEDURE — 99232 SBSQ HOSP IP/OBS MODERATE 35: CPT | Performed by: HOSPITALIST

## 2022-06-24 PROCEDURE — 83735 ASSAY OF MAGNESIUM: CPT

## 2022-06-24 PROCEDURE — 99232 SBSQ HOSP IP/OBS MODERATE 35: CPT | Performed by: NURSE PRACTITIONER

## 2022-06-24 PROCEDURE — 700102 HCHG RX REV CODE 250 W/ 637 OVERRIDE(OP): Performed by: STUDENT IN AN ORGANIZED HEALTH CARE EDUCATION/TRAINING PROGRAM

## 2022-06-24 RX ORDER — FUROSEMIDE 10 MG/ML
40 INJECTION INTRAMUSCULAR; INTRAVENOUS
Status: DISCONTINUED | OUTPATIENT
Start: 2022-06-25 | End: 2022-06-25

## 2022-06-24 RX ORDER — FUROSEMIDE 10 MG/ML
40 INJECTION INTRAMUSCULAR; INTRAVENOUS ONCE
Status: COMPLETED | OUTPATIENT
Start: 2022-06-24 | End: 2022-06-24

## 2022-06-24 RX ADMIN — DOCUSATE SODIUM 50 MG AND SENNOSIDES 8.6 MG 2 TABLET: 8.6; 5 TABLET, FILM COATED ORAL at 17:16

## 2022-06-24 RX ADMIN — DOCUSATE SODIUM 50 MG AND SENNOSIDES 8.6 MG 2 TABLET: 8.6; 5 TABLET, FILM COATED ORAL at 09:36

## 2022-06-24 RX ADMIN — CLOPIDOGREL BISULFATE 75 MG: 75 TABLET ORAL at 09:36

## 2022-06-24 RX ADMIN — METOPROLOL TARTRATE 12.5 MG: 25 TABLET, FILM COATED ORAL at 09:36

## 2022-06-24 RX ADMIN — ASPIRIN 81 MG: 81 TABLET, COATED ORAL at 09:37

## 2022-06-24 RX ADMIN — RANOLAZINE 500 MG: 500 TABLET, FILM COATED, EXTENDED RELEASE ORAL at 09:36

## 2022-06-24 RX ADMIN — FUROSEMIDE 40 MG: 10 INJECTION, SOLUTION INTRAMUSCULAR; INTRAVENOUS at 09:29

## 2022-06-24 RX ADMIN — FUROSEMIDE 20 MG: 10 INJECTION, SOLUTION INTRAMUSCULAR; INTRAVENOUS at 06:09

## 2022-06-24 RX ADMIN — ROSUVASTATIN CALCIUM 40 MG: 20 TABLET, FILM COATED ORAL at 17:17

## 2022-06-24 RX ADMIN — METOPROLOL TARTRATE 12.5 MG: 25 TABLET, FILM COATED ORAL at 17:17

## 2022-06-24 ASSESSMENT — ENCOUNTER SYMPTOMS
CLAUDICATION: 0
WEIGHT LOSS: 0
SPUTUM PRODUCTION: 0
MYALGIAS: 0
HEMOPTYSIS: 0
DIAPHORESIS: 0
DIZZINESS: 0
DEPRESSION: 0
HEADACHES: 0
WHEEZING: 0
EYES NEGATIVE: 1
MUSCULOSKELETAL NEGATIVE: 1
ORTHOPNEA: 0
PND: 0
SORE THROAT: 0
FEVER: 0
WEAKNESS: 0
VOMITING: 0
NECK PAIN: 0
BRUISES/BLEEDS EASILY: 0
SHORTNESS OF BREATH: 0
PALPITATIONS: 0
BLURRED VISION: 0
ABDOMINAL PAIN: 0
CHILLS: 0
COUGH: 1
PSYCHIATRIC NEGATIVE: 1
CONSTIPATION: 0
COUGH: 0
FOCAL WEAKNESS: 0
NAUSEA: 0
HEARTBURN: 0
CARDIOVASCULAR NEGATIVE: 1

## 2022-06-24 ASSESSMENT — LIFESTYLE VARIABLES: SUBSTANCE_ABUSE: 0

## 2022-06-24 ASSESSMENT — PAIN DESCRIPTION - PAIN TYPE: TYPE: ACUTE PAIN

## 2022-06-24 NOTE — PROGRESS NOTES
[facility] Medicine Daily Progress Note    Date of Service  6/24/2022    Chief Complaint  Kobe Cyr is a 89 y.o. male admitted 6/19/2022 with syncope    Hospital Course  Kobe Cyr is a 89 y.o. male with history of multivessel CAD not CABG candidate per CTS on medical management, HTN, and HLD. He presented to [facility] on 6/19/2022 with shortness of breath and syncopal episode.  Patient reported falling out of his wheelchair while wife was out of house.  He reported shortness of breath, dizziness, vertigo prior to fall and syncopal event.  Denies palpitation, chest pain, tongue bite, loss of bladder/bowel incontinence.  In ER, CT head noted left parietal scalp hematoma with no fracture or intracranial hemorrhage.  No acute cervical spine fracture seen on CT c-spine. Non specific ST changes on EKG but in afib, troponin T 204>190.  Cardiology was consulted      Interval Problem Update  No further dizzy spells, he remains axox3 with some challenges with recall. Oxygen requirements up to 7L earlier, currenly 97% on 6L - will titrate down as tolerated, bp much improved today so can resume diuresis. He did finally have a bm today. Continues to deny pain, pacer site cdi. ROS otherwise negative   No cough.  I have discussed this patient's plan of care and discharge plan at IDT rounds today with Case Management, Nursing, Nursing leadership, and other members of the IDT team.    Consultants/Specialty  cardiology  Critical care    Code Status  DNAR/DNI    Disposition  Patient is not medically cleared for discharge.   Anticipate discharge to to home with close outpatient follow-up.  I have placed the appropriate orders for post-discharge needs.    Review of Systems  Review of Systems   Constitutional: Positive for malaise/fatigue. Negative for chills, diaphoresis, fever and weight loss.   HENT: Negative for congestion, ear discharge, ear pain, hearing loss, sore throat and tinnitus.    Eyes: Negative.  Negative for  blurred vision.   Respiratory: Negative for cough, hemoptysis, sputum production and shortness of breath.    Cardiovascular: Negative.  Negative for chest pain, palpitations and leg swelling.   Gastrointestinal: Negative for abdominal pain, constipation, heartburn, nausea and vomiting.   Genitourinary: Negative.  Negative for dysuria and urgency.   Musculoskeletal: Negative.  Negative for myalgias and neck pain.   Skin: Negative.  Negative for itching and rash.   Neurological: Negative for focal weakness, weakness and headaches.   Endo/Heme/Allergies: Negative.  Does not bruise/bleed easily.   Psychiatric/Behavioral: Negative.  Negative for depression, substance abuse and suicidal ideas.   All other systems reviewed and are negative.       Physical Exam  Temp:  [35.9 °C (96.6 °F)-36.9 °C (98.5 °F)] 35.9 °C (96.6 °F)  Pulse:  [66-99] 71  Resp:  [15-20] 15  BP: (117-141)/(63-80) 118/65  SpO2:  [85 %-97 %] 97 %    Physical Exam  Constitutional:       General: He is not in acute distress.     Appearance: He is not ill-appearing, toxic-appearing or diaphoretic.   HENT:      Head: Normocephalic.      Nose: Nose normal.      Mouth/Throat:      Mouth: Mucous membranes are moist.   Eyes:      General: No scleral icterus.     Extraocular Movements: Extraocular movements intact.      Pupils: Pupils are equal, round, and reactive to light.   Cardiovascular:      Rate and Rhythm: Normal rate and regular rhythm.      Comments: Pacer in place, cdi, Left arm in sling  Pulmonary:      Breath sounds: No rhonchi.   Abdominal:      General: Abdomen is flat. There is no distension.      Palpations: There is no mass.      Tenderness: There is no abdominal tenderness. There is no guarding.   Musculoskeletal:         General: No swelling or deformity. Normal range of motion.      Cervical back: Normal range of motion and neck supple.      Right lower leg: No edema.   Skin:     General: Skin is warm.      Capillary Refill: Capillary refill  takes 2 to 3 seconds.      Coloration: Skin is not jaundiced.      Findings: Bruising present. No erythema or lesion.   Neurological:      General: No focal deficit present.      Mental Status: He is alert and oriented to person, place, and time.   Psychiatric:         Mood and Affect: Mood normal.         Fluids    Intake/Output Summary (Last 24 hours) at 6/24/2022 1430  Last data filed at 6/24/2022 1239  Gross per 24 hour   Intake --   Output 1880 ml   Net -1880 ml       Laboratory  Recent Labs     06/22/22  0658 06/23/22  0217 06/24/22  0241   WBC 7.0 5.0 4.6*   RBC 2.49* 2.54* 2.41*   HEMOGLOBIN 8.6* 8.6* 8.2*   HEMATOCRIT 28.1* 27.9* 26.3*   .9* 109.8* 109.1*   MCH 34.5* 33.9* 34.0*   MCHC 30.6* 30.8* 31.2*   RDW 59.4* 56.9* 56.6*   PLATELETCT 139* 127* 115*   MPV 10.1 10.3 10.2     Recent Labs     06/22/22  0658 06/23/22  0217 06/24/22  0241   SODIUM 136 136 139   POTASSIUM 5.0 4.4 4.3   CHLORIDE 102 102 104   CO2 19* 22 26   GLUCOSE 153* 121* 115*   BUN 32* 34* 30*   CREATININE 2.35* 2.48* 2.20*   CALCIUM 8.5 8.7 8.7                   Imaging  US-EXTREMITY VENOUS LOWER BILAT   Final Result      DX-CHEST-PORTABLE (1 VIEW)   Final Result         1.  Pulmonary edema and/or infiltrates are identified, which are stable since the prior exam.   2.  Trace bilateral pleural effusions   3.  Cardiomegaly      DX-CHEST-PORTABLE (1 VIEW)   Final Result      1.  Interval placement of cardiac pacemaker      2.  No pneumothorax      DX-CHEST-LIMITED (1 VIEW)   Final Result      1.  Increased bibasilar atelectasis   2.  New RIGHT mid lung opacity suspicious for pneumonia   3.  Persistently enlarged cardiac silhouette      EC-ECHOCARDIOGRAM COMPLETE W/O CONT   Final Result      DX-CHEST-PORTABLE (1 VIEW)   Final Result      1.  Mild diffuse interstitial edema and patchy bilateral airspace opacities. Findings could be seen in setting edema and/or infection.   2.  Stable enlargement of the cardiomediastinal silhouette.    3.  Possible small right pleural effusion.      US-RENAL   Final Result      Unremarkable renal ultrasound.      CT-HEAD W/O   Final Result      1.  Left parietal scalp hematoma without evidence of skull fracture or intracranial hemorrhage.         CT-CSPINE WITHOUT PLUS RECONS   Final Result      No CT evidence of acute cervical spine abnormality.      Moderate spondylosis with multilevel degenerative change      Pulmonary scarring and small right pleural fluid with right mediastinal adenopathy. This could be reactive or malignant      CL-PERMANENT PACEMAKER INSERTION    (Results Pending)        Assessment/Plan  * Syncope and collapse- (present on admission)  Assessment & Plan  CT head noted left parietal scalp hematoma with no fracture or intracranial hemorrhage  No acute cervical spine fracture seen on CT c-spine      Patients syncope may have been related to hypotension and bradycardia  Vagal stimulation and hypoxia may also be factors  following    Thrombocytopenia (HCC)  Assessment & Plan  Mild  following    Constipation  Assessment & Plan  Continue bowel regimen  Patient declined suppository or enema  Vagal stimulation may be contributing to some of his dizzy spells  Resolved  following    Bradycardia  Assessment & Plan  Resolved s/p pacer    Positive D dimer  Assessment & Plan  See above  Dopplers negative    Hypotension due to drugs- (present on admission)  Assessment & Plan  Suspect cardiac meds contributing, see above  Imdur and bb now stopped  Resolved - following closely with diuresis    Acute kidney injury superimposed on chronic kidney disease (HCC)  Assessment & Plan  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    Renal ultrasound is unremarkable  Creatinine improving  Monitoring with diuresis  Continue to follow    Elevated troponin  Assessment & Plan  Echo with preserved EF  Cardiology following  trops decreasing    Acute on chronic respiratory failure with hypoxia (HCC)- (present on admission)  Assessment &  Plan  Increased o2 requirement overnight up to 7L, now improving and bp now able to tolerate diuresis  Following closely   pulm htn  pulm edema contributing -   VQ scan was planned - cannot get now - with pacer patient cannot lift his arm as it could dislodge the leads - LE dopplers negative for dvt - PE unlikely - suspect due to pulm edema and chronic underlying condition    GERD (gastroesophageal reflux disease)- (present on admission)  Assessment & Plan  PPI    CAD (coronary artery disease)- (present on admission)  Assessment & Plan  Prior MI and PCI  Found to have multivessel CAD -- not CABG surgical candidate in 07/2021    Continue ASA/Plavix/statin/BB (as tolerated)  Cannot tolerate bb and imdur due to bradycardia and hypotension  Following  Cardiology following    Hyperlipidemia- (present on admission)  Assessment & Plan  Statin       VTE prophylaxis: SCDs/TEDs    I have performed a physical exam and reviewed and updated ROS and Plan today (6/24/2022). In review of yesterday's note (6/23/2022), there are no changes except as documented above.

## 2022-06-24 NOTE — PROGRESS NOTES
Cardiology Progress Note    Name:   Kobe Cyr   YOB: 1931  Age:   89 y.o.  male   MRN:   [id number]    Attending Cardiologist: Dr Lyon  Outpatient Cardiologist: Dr Davey    Chief Complaint:  T-5000 GLF and Head Injury       History of Present Illness  89yo male with severe CAD without targets for revascularization, chronic respiratory failure (combined obstructive and restrictive lung disease) here because his wife called EMS after he fell from his chair and sustained scalp hematoma. He recalls urinating and then walking out of the restroom and feeling lightheaded. This seems to happen on occasion, usually associated with urination.     Interim Events   6/24/2022: Partially paced 60-100s, A fib  VSS, Labs reviewed  Increase in edema    6/23/2022:  Partially paced, RBBB, A fib  Rapid response for increase in O2 needs, SOB, AMS, lethargy CXR and ECG no change  VSS, Labs reviewed      Review of Systems   Constitutional: Negative for chills and fever.   Respiratory: Positive for cough. Negative for hemoptysis, sputum production, shortness of breath and wheezing.    Cardiovascular: Negative for chest pain, palpitations, orthopnea, claudication, leg swelling and PND.   Gastrointestinal: Negative for nausea and vomiting.   Neurological: Negative for dizziness and headaches.   All other systems reviewed and are negative.      Medical History  Past Surgical History:   Procedure Laterality Date   • KNEE UNICOMPARTMENTAL  10/13/2009    Performed by SAM LINDER at SURGERY [facility] ORS   • ARTHROSCOPY, KNEE     • CORONARY STENT PROCEDURE LAD     • OTHER ORTHOPEDIC SURGERY      right shoulder rotator cuff surgery x2       Family History   Problem Relation Age of Onset   • No Known Problems Mother    • No Known Problems Father        Social History     Tobacco Use   Smoking Status Former Smoker   • Packs/day: 2.00   • Years: 35.00   • Pack years: 70.00   • Types: Cigarettes   • Quit date:  "1973   • Years since quittin.2   Smokeless Tobacco Never Used       Allergies   Allergen Reactions   • Atorvastatin Myalgia   • Simvastatin Myalgia         Medications   Scheduled Medications   Medication Dose Frequency   • metoprolol tartrate  12.5 mg TWICE DAILY   • furosemide  20 mg Q DAY   • ranolazine  500 mg DAILY   • senna-docusate  2 Tablet BID   • aspirin EC  81 mg DAILY   • clopidogrel  75 mg DAILY   • rosuvastatin  40 mg Q EVENING           Physical Exam  /73   Pulse 89   Temp 36.4 °C (97.5 °F) (Temporal)   Resp 20   Ht 1.803 m (5' 10.98\")   Wt 99.8 kg (220 lb 0.3 oz)   SpO2 (!) 85%     Physical Exam  Vitals and nursing note reviewed.   Constitutional:       Appearance: Normal appearance.   HENT:      Head: Normocephalic.   Eyes:      Extraocular Movements: Extraocular movements intact.   Cardiovascular:      Rate and Rhythm: Normal rate. Rhythm irregularly irregular.      Pulses: Normal pulses.           Radial pulses are 2+ on the right side and 2+ on the left side.      Heart sounds: Murmur heard.    Systolic murmur is present with a grade of 2/6.     Comments: 2+ BLE edema  Pulmonary:      Effort: Pulmonary effort is normal.      Breath sounds: Examination of the right-lower field reveals decreased breath sounds and rales. Examination of the left-lower field reveals decreased breath sounds and rales. Decreased breath sounds and rales present.      Comments: Requiring 8 L  Abdominal:      General: Abdomen is flat.      Palpations: Abdomen is soft.   Musculoskeletal:         General: Normal range of motion.      Cervical back: Normal range of motion.   Skin:     General: Skin is warm and dry.   Neurological:      General: No focal deficit present.      Mental Status: He is alert. Mental status is at baseline.   Psychiatric:         Mood and Affect: Mood normal.         Labs (personally reviewed):     Lab Results   Component Value Date/Time    SODIUM 139 2022 02:41 AM    " POTASSIUM 4.3 06/24/2022 02:41 AM    CHLORIDE 104 06/24/2022 02:41 AM    CO2 26 06/24/2022 02:41 AM    GLUCOSE 115 (H) 06/24/2022 02:41 AM    BUN 30 (H) 06/24/2022 02:41 AM    CREATININE 2.20 (H) 06/24/2022 02:41 AM    CREATININE 1.4 05/06/2008 11:42 AM     Lab Results   Component Value Date/Time    CHOLSTRLTOT 77 (L) 06/20/2022 02:22 AM    LDL 25 06/20/2022 02:22 AM    HDL 39 (A) 06/20/2022 02:22 AM    TRIGLYCERIDE 63 06/20/2022 02:22 AM     Lab Results   Component Value Date/Time    BNPBTYPENAT 170 (H) 03/08/2012 05:00 PM         Cardiac Imaging and Procedures Review      Personal Telemetry Review  Atrial fibrillation, rates 50s then drop to <35 for ~3min at 6:40    Echo 6/20/22  The left ventricular ejection fraction is visually estimated to be 75%.  Right ventricle is mildly dilated.  Severe biatrial enlargement.  Mild aortic stenosis: V-max 2.5 m/s, MG 14 mmHg, DENEEN 1.5 cm2, DI 0.4.  Mild to moderate tricuspid regurgitation.  Right ventricular systolic pressure is estimated to be 51 mmHg,   moderate secondary pulmonary hypertension.    PPM Placement 6/22/2022 by Dr Garcia  IMPLANTED DEVICE INFORMATION:  Pulse generator is a MDT model W3DR01  Serial # [id number]     LEAD INFORMATION:  1)Right atrial lead is a MDT model #5076-52 , serial #[id number] ,P wave 0.6 millivolts, pacing impedance 627 Ohms.     2)Right ventricular lead is a MDT model #5076-58 , serial #[id number] ,R wave 5.0 millivolts, threshold 0.5 Volts at 0.4 milliseconds, pacing impedance 589 Ohms.     DEVICE PROGRAMMING:  DDDR 60 -120 ppm      Assessment and Medical Decision Making:  Symptomatic Bradycardia with hypotension  Persistent Atrial Fibrillation  Recurrent Falls  CAD  Pulmonary Hypertension  - PPM placed 6/22/2022  - Continue aspirin and statin for his coronary disease. He has completed almost a year of DAPT from his NSTEMI and has high bleeding risk due to frequent falls, ? Maybe hold clopidogrel  - Recommend compression stockings, ideally  above the knee for his orthostasis  -Continue ASA 81 mg daily, metoprolol 12.5 mg twice daily, ranolazine 500 mg twice daily, rosuvastatin 40 mg every evening  - Start furosemide 40 mg IV BID      Please see Dr. Lyon's attestation for additions and further recommendations.        SAMPSON Galvan  [facility]  [phone]      Please note that this dictation was created using voice recognition software. I have worked with consultants from the vendor as well as technical experts from [facility] to optimize the interface. I have made every reasonable attempt to correct obvious errors, but I expect that there are errors of grammar and possibly content I did not discover before finalizing the note.    My total time spent caring for the patient on the day of the encounter was 14 minutes. This does not include time spent on separately billable procedures/tests.

## 2022-06-24 NOTE — PROGRESS NOTES
Report received from day shift RN. Patient resting in bed, A&O x4, VSS, able to make needs known. Call-light in reach. Pain 0/10. Fall precautions in place.

## 2022-06-24 NOTE — CARE PLAN
The patient is Stable - Low risk of patient condition declining or worsening    Shift Goals  Clinical Goals: Monitor O2, monitor rhythm, labs  Patient Goals: Rest, comfort  Family Goals: CAMERON    Progress made toward(s) clinical / shift goals:  yes      Problem: Knowledge Deficit - Standard  Goal: Patient and family/care givers will demonstrate understanding of plan of care, disease process/condition, diagnostic tests and medications  Outcome: Progressing     Problem: Communication  Goal: The ability to communicate needs accurately and effectively will improve  Outcome: Progressing     Problem: Hemodynamics  Goal: Patient's hemodynamics, fluid balance and neurologic status will be stable or improve  Outcome: Progressing     Problem: Mobility  Goal: Patient's capacity to carry out activities will improve  Outcome: Progressing       Patient is not progressing towards the following goals:

## 2022-06-24 NOTE — PROGRESS NOTES
Handoff report received from night shift nurse. Pt care assumed. Pt is currently resting in bed. POC discussed with Pt and Pt verbalizes no questions at this time. Pt is AAOx4, on 9L NC, on Tele monitoring, and VSS. Call light and belongings within reach, bed in lowest and locked position, and Pt educated on use of call light.

## 2022-06-25 LAB
ANION GAP SERPL CALC-SCNC: 13 MMOL/L (ref 7–16)
BASOPHILS # BLD AUTO: 0.6 % (ref 0–1.8)
BASOPHILS # BLD: 0.03 K/UL (ref 0–0.12)
BUN SERPL-MCNC: 29 MG/DL (ref 8–22)
CALCIUM SERPL-MCNC: 8.9 MG/DL (ref 8.5–10.5)
CHLORIDE SERPL-SCNC: 101 MMOL/L (ref 96–112)
CO2 SERPL-SCNC: 26 MMOL/L (ref 20–33)
CREAT SERPL-MCNC: 2.23 MG/DL (ref 0.5–1.4)
EOSINOPHIL # BLD AUTO: 0.21 K/UL (ref 0–0.51)
EOSINOPHIL NFR BLD: 4.5 % (ref 0–6.9)
ERYTHROCYTE [DISTWIDTH] IN BLOOD BY AUTOMATED COUNT: 56.3 FL (ref 35.9–50)
GFR SERPLBLD CREATININE-BSD FMLA CKD-EPI: 27 ML/MIN/1.73 M 2
GLUCOSE SERPL-MCNC: 104 MG/DL (ref 65–99)
HCT VFR BLD AUTO: 27.7 % (ref 42–52)
HGB BLD-MCNC: 8.7 G/DL (ref 14–18)
IMM GRANULOCYTES # BLD AUTO: 0.03 K/UL (ref 0–0.11)
IMM GRANULOCYTES NFR BLD AUTO: 0.6 % (ref 0–0.9)
LYMPHOCYTES # BLD AUTO: 0.83 K/UL (ref 1–4.8)
LYMPHOCYTES NFR BLD: 17.9 % (ref 22–41)
MCH RBC QN AUTO: 34.1 PG (ref 27–33)
MCHC RBC AUTO-ENTMCNC: 31.4 G/DL (ref 33.7–35.3)
MCV RBC AUTO: 108.6 FL (ref 81.4–97.8)
MONOCYTES # BLD AUTO: 0.53 K/UL (ref 0–0.85)
MONOCYTES NFR BLD AUTO: 11.4 % (ref 0–13.4)
NEUTROPHILS # BLD AUTO: 3.01 K/UL (ref 1.82–7.42)
NEUTROPHILS NFR BLD: 65 % (ref 44–72)
NRBC # BLD AUTO: 0 K/UL
NRBC BLD-RTO: 0 /100 WBC
PLATELET # BLD AUTO: 126 K/UL (ref 164–446)
PMV BLD AUTO: 10.2 FL (ref 9–12.9)
POTASSIUM SERPL-SCNC: 3.9 MMOL/L (ref 3.6–5.5)
RBC # BLD AUTO: 2.55 M/UL (ref 4.7–6.1)
SODIUM SERPL-SCNC: 140 MMOL/L (ref 135–145)
WBC # BLD AUTO: 4.6 K/UL (ref 4.8–10.8)

## 2022-06-25 PROCEDURE — 700111 HCHG RX REV CODE 636 W/ 250 OVERRIDE (IP): Performed by: HOSPITALIST

## 2022-06-25 PROCEDURE — 700102 HCHG RX REV CODE 250 W/ 637 OVERRIDE(OP): Performed by: STUDENT IN AN ORGANIZED HEALTH CARE EDUCATION/TRAINING PROGRAM

## 2022-06-25 PROCEDURE — 36415 COLL VENOUS BLD VENIPUNCTURE: CPT

## 2022-06-25 PROCEDURE — 770020 HCHG ROOM/CARE - TELE (206)

## 2022-06-25 PROCEDURE — A9270 NON-COVERED ITEM OR SERVICE: HCPCS | Performed by: INTERNAL MEDICINE

## 2022-06-25 PROCEDURE — 80048 BASIC METABOLIC PNL TOTAL CA: CPT

## 2022-06-25 PROCEDURE — 700102 HCHG RX REV CODE 250 W/ 637 OVERRIDE(OP): Performed by: NURSE PRACTITIONER

## 2022-06-25 PROCEDURE — 700102 HCHG RX REV CODE 250 W/ 637 OVERRIDE(OP): Performed by: INTERNAL MEDICINE

## 2022-06-25 PROCEDURE — A9270 NON-COVERED ITEM OR SERVICE: HCPCS | Performed by: STUDENT IN AN ORGANIZED HEALTH CARE EDUCATION/TRAINING PROGRAM

## 2022-06-25 PROCEDURE — 99233 SBSQ HOSP IP/OBS HIGH 50: CPT | Performed by: INTERNAL MEDICINE

## 2022-06-25 PROCEDURE — 99232 SBSQ HOSP IP/OBS MODERATE 35: CPT | Performed by: NURSE PRACTITIONER

## 2022-06-25 PROCEDURE — 85025 COMPLETE CBC W/AUTO DIFF WBC: CPT

## 2022-06-25 PROCEDURE — 99232 SBSQ HOSP IP/OBS MODERATE 35: CPT | Performed by: HOSPITALIST

## 2022-06-25 PROCEDURE — 700111 HCHG RX REV CODE 636 W/ 250 OVERRIDE (IP): Performed by: NURSE PRACTITIONER

## 2022-06-25 PROCEDURE — A9270 NON-COVERED ITEM OR SERVICE: HCPCS | Performed by: NURSE PRACTITIONER

## 2022-06-25 RX ORDER — FUROSEMIDE 10 MG/ML
20 INJECTION INTRAMUSCULAR; INTRAVENOUS
Status: DISCONTINUED | OUTPATIENT
Start: 2022-06-25 | End: 2022-06-26

## 2022-06-25 RX ADMIN — METOPROLOL TARTRATE 12.5 MG: 25 TABLET, FILM COATED ORAL at 05:35

## 2022-06-25 RX ADMIN — CLOPIDOGREL BISULFATE 75 MG: 75 TABLET ORAL at 05:35

## 2022-06-25 RX ADMIN — RANOLAZINE 500 MG: 500 TABLET, FILM COATED, EXTENDED RELEASE ORAL at 05:35

## 2022-06-25 RX ADMIN — DOCUSATE SODIUM 50 MG AND SENNOSIDES 8.6 MG 2 TABLET: 8.6; 5 TABLET, FILM COATED ORAL at 16:41

## 2022-06-25 RX ADMIN — ASPIRIN 81 MG: 81 TABLET, COATED ORAL at 05:35

## 2022-06-25 RX ADMIN — FUROSEMIDE 40 MG: 10 INJECTION, SOLUTION INTRAMUSCULAR; INTRAVENOUS at 05:35

## 2022-06-25 RX ADMIN — FUROSEMIDE 20 MG: 10 INJECTION, SOLUTION INTRAMUSCULAR; INTRAVENOUS at 16:42

## 2022-06-25 RX ADMIN — ROSUVASTATIN CALCIUM 40 MG: 20 TABLET, FILM COATED ORAL at 16:41

## 2022-06-25 RX ADMIN — METOPROLOL TARTRATE 12.5 MG: 25 TABLET, FILM COATED ORAL at 16:42

## 2022-06-25 ASSESSMENT — ENCOUNTER SYMPTOMS
DEPRESSION: 0
VOMITING: 0
MYALGIAS: 0
WEIGHT LOSS: 0
PSYCHIATRIC NEGATIVE: 1
ORTHOPNEA: 0
EYES NEGATIVE: 1
PALPITATIONS: 0
HEMOPTYSIS: 0
BLURRED VISION: 0
CONSTIPATION: 0
WHEEZING: 0
CARDIOVASCULAR NEGATIVE: 1
CLAUDICATION: 0
COUGH: 0
WEAKNESS: 0
FEVER: 0
NAUSEA: 0
PND: 0
DIAPHORESIS: 0
HEADACHES: 0
FOCAL WEAKNESS: 0
BRUISES/BLEEDS EASILY: 0
ABDOMINAL PAIN: 0
SHORTNESS OF BREATH: 1
SHORTNESS OF BREATH: 0
SORE THROAT: 0
NECK PAIN: 0
DIZZINESS: 0
CHILLS: 0
HEARTBURN: 0
MUSCULOSKELETAL NEGATIVE: 1
COUGH: 1
SPUTUM PRODUCTION: 0

## 2022-06-25 ASSESSMENT — LIFESTYLE VARIABLES: SUBSTANCE_ABUSE: 0

## 2022-06-25 ASSESSMENT — FIBROSIS 4 INDEX
FIB4 SCORE: 3.79
FIB4 SCORE: 3.79

## 2022-06-25 ASSESSMENT — PAIN DESCRIPTION - PAIN TYPE: TYPE: ACUTE PAIN

## 2022-06-25 NOTE — PROGRESS NOTES
Report received from Anju Singleton. Assumed pt care. Pt resting in bed. Pt A&O x 4. Fall precautions in place, call light and belongings within reach, bed in lowest position. No signs of distress.

## 2022-06-25 NOTE — PROGRESS NOTES
Bedside report received and patient care assumed. Pt is resting in bed, A&Ox4, with no complaints of pain, and is on 4 L NC. Tele box on. All fall precautions are in place, belongings at bedside table.  Pt was updated on POC, no questions or concerns. Pt educated on use of call light for assistance.

## 2022-06-25 NOTE — PROGRESS NOTES
"[facility] Medicine Daily Progress Note    Date of Service  6/25/2022    Chief Complaint  Kobe Cyr is a 89 y.o. male admitted 6/19/2022 with syncope    Hospital Course  Kobe Cyr is a 89 y.o. male with history of multivessel CAD not CABG candidate per CTS on medical management, HTN, and HLD. He presented to [facility] on 6/19/2022 with shortness of breath and syncopal episode.  Patient reported falling out of his wheelchair while wife was out of house.  He reported shortness of breath, dizziness, vertigo prior to fall and syncopal event.  Denies palpitation, chest pain, tongue bite, loss of bladder/bowel incontinence.  In ER, CT head noted left parietal scalp hematoma with no fracture or intracranial hemorrhage.  No acute cervical spine fracture seen on CT c-spine. Non specific ST changes on EKG but in afib, troponin T 204>190.  Cardiology was consulted      Interval Problem Update  Axox3, he states he is feeling \"30% better today\", sob and cough improving - oxygen requirements now down to 4L. No dizziness, currently paced. He denies chest pain.   I have discussed this patient's plan of care and discharge plan at IDT rounds today with Case Management, Nursing, Nursing leadership, and other members of the IDT team.    Consultants/Specialty  cardiology  Critical care    Code Status  DNAR/DNI    Disposition  Patient is not medically cleared for discharge.   Anticipate discharge to to home with close outpatient follow-up.  I have placed the appropriate orders for post-discharge needs.    Review of Systems  Review of Systems   Constitutional: Positive for malaise/fatigue. Negative for chills, diaphoresis, fever and weight loss.   HENT: Negative for congestion, ear discharge, ear pain, hearing loss, sore throat and tinnitus.    Eyes: Negative.  Negative for blurred vision.   Respiratory: Negative for cough, hemoptysis, sputum production and shortness of breath.    Cardiovascular: Negative.  Negative for chest " pain, palpitations and leg swelling.   Gastrointestinal: Negative for abdominal pain, constipation, heartburn, nausea and vomiting.   Genitourinary: Negative.  Negative for dysuria and urgency.   Musculoskeletal: Negative.  Negative for myalgias and neck pain.   Skin: Negative.  Negative for itching and rash.   Neurological: Negative for focal weakness, weakness and headaches.   Endo/Heme/Allergies: Negative.  Does not bruise/bleed easily.   Psychiatric/Behavioral: Negative.  Negative for depression, substance abuse and suicidal ideas.   All other systems reviewed and are negative.       Physical Exam  Temp:  [35.9 °C (96.6 °F)-36.6 °C (97.9 °F)] 36.1 °C (96.9 °F)  Pulse:  [65-79] 79  Resp:  [15-19] 18  BP: ()/(49-65) 93/49  SpO2:  [91 %-97 %] 94 %    Physical Exam  Constitutional:       General: He is not in acute distress.     Appearance: He is not ill-appearing, toxic-appearing or diaphoretic.   HENT:      Head: Normocephalic.      Nose: Nose normal.      Mouth/Throat:      Mouth: Mucous membranes are moist.   Eyes:      General: No scleral icterus.     Extraocular Movements: Extraocular movements intact.      Pupils: Pupils are equal, round, and reactive to light.   Cardiovascular:      Rate and Rhythm: Normal rate and regular rhythm.      Comments: Pacer in place, cdi, Left arm in sling  Pulmonary:      Breath sounds: No rhonchi.   Abdominal:      General: Abdomen is flat. There is no distension.      Palpations: There is no mass.      Tenderness: There is no abdominal tenderness. There is no guarding.   Musculoskeletal:         General: No swelling or deformity. Normal range of motion.      Cervical back: Normal range of motion and neck supple.      Right lower leg: No edema.   Skin:     General: Skin is warm.      Capillary Refill: Capillary refill takes 2 to 3 seconds.      Coloration: Skin is not jaundiced.      Findings: Bruising present. No erythema or lesion.   Neurological:      General: No focal  deficit present.      Mental Status: He is alert and oriented to person, place, and time.   Psychiatric:         Mood and Affect: Mood normal.         Fluids    Intake/Output Summary (Last 24 hours) at 6/25/2022 1154  Last data filed at 6/25/2022 0914  Gross per 24 hour   Intake 350 ml   Output 3300 ml   Net -2950 ml       Laboratory  Recent Labs     06/23/22  0217 06/24/22  0241 06/25/22  0235   WBC 5.0 4.6* 4.6*   RBC 2.54* 2.41* 2.55*   HEMOGLOBIN 8.6* 8.2* 8.7*   HEMATOCRIT 27.9* 26.3* 27.7*   .8* 109.1* 108.6*   MCH 33.9* 34.0* 34.1*   MCHC 30.8* 31.2* 31.4*   RDW 56.9* 56.6* 56.3*   PLATELETCT 127* 115* 126*   MPV 10.3 10.2 10.2     Recent Labs     06/23/22 0217 06/24/22  0241 06/25/22  0235   SODIUM 136 139 140   POTASSIUM 4.4 4.3 3.9   CHLORIDE 102 104 101   CO2 22 26 26   GLUCOSE 121* 115* 104*   BUN 34* 30* 29*   CREATININE 2.48* 2.20* 2.23*   CALCIUM 8.7 8.7 8.9                   Imaging  US-EXTREMITY VENOUS LOWER BILAT   Final Result      DX-CHEST-PORTABLE (1 VIEW)   Final Result         1.  Pulmonary edema and/or infiltrates are identified, which are stable since the prior exam.   2.  Trace bilateral pleural effusions   3.  Cardiomegaly      DX-CHEST-PORTABLE (1 VIEW)   Final Result      1.  Interval placement of cardiac pacemaker      2.  No pneumothorax      DX-CHEST-LIMITED (1 VIEW)   Final Result      1.  Increased bibasilar atelectasis   2.  New RIGHT mid lung opacity suspicious for pneumonia   3.  Persistently enlarged cardiac silhouette      EC-ECHOCARDIOGRAM COMPLETE W/O CONT   Final Result      DX-CHEST-PORTABLE (1 VIEW)   Final Result      1.  Mild diffuse interstitial edema and patchy bilateral airspace opacities. Findings could be seen in setting edema and/or infection.   2.  Stable enlargement of the cardiomediastinal silhouette.   3.  Possible small right pleural effusion.      US-RENAL   Final Result      Unremarkable renal ultrasound.      CT-HEAD W/O   Final Result      1.  Left  parietal scalp hematoma without evidence of skull fracture or intracranial hemorrhage.         CT-CSPINE WITHOUT PLUS RECONS   Final Result      No CT evidence of acute cervical spine abnormality.      Moderate spondylosis with multilevel degenerative change      Pulmonary scarring and small right pleural fluid with right mediastinal adenopathy. This could be reactive or malignant      CL-PERMANENT PACEMAKER INSERTION    (Results Pending)        Assessment/Plan  * Syncope and collapse- (present on admission)  Assessment & Plan  CT head noted left parietal scalp hematoma with no fracture or intracranial hemorrhage  No acute cervical spine fracture seen on CT c-spine      Patients syncope may have been related to hypotension and bradycardia  Vagal stimulation and hypoxia may also be factors  following    Thrombocytopenia (HCC)  Assessment & Plan  Mild  improving  following    Constipation  Assessment & Plan  Continue bowel regimen  Patient declined suppository or enema  Vagal stimulation may be contributing to some of his dizzy spells  Resolved  following    Bradycardia  Assessment & Plan  Resolved s/p pacer    Positive D dimer  Assessment & Plan  See above  Dopplers negative    Hypotension due to drugs- (present on admission)  Assessment & Plan  Suspect cardiac meds contributing, see above  Imdur and bb now stopped  Resolved - following closely with diuresis    Acute kidney injury superimposed on chronic kidney disease (HCC)  Assessment & Plan  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    Renal ultrasound is unremarkable  Creatinine improving  Monitoring with diuresis  Continue to follow    Elevated troponin  Assessment & Plan  Echo with preserved EF  Cardiology following  trops decreasing    Acute on chronic respiratory failure with hypoxia (HCC)- (present on admission)  Assessment & Plan  Increased o2 requirement overnight up to 4L, now improving and bp now able to tolerate diuresis  Following closely   pulm htn  pulm edema contributing  and improving with diuresis which is continued   Cannot get VQ with pacer patient cannot lift his arm as it could dislodge the leads - LE dopplers negative for dvt - PE unlikely - suspect due to pulm edema and chronic underlying condition    GERD (gastroesophageal reflux disease)- (present on admission)  Assessment & Plan  PPI    CAD (coronary artery disease)- (present on admission)  Assessment & Plan  Prior MI and PCI  Found to have multivessel CAD -- not CABG surgical candidate in 07/2021    Continue ASA/Plavix/statin/BB (as tolerated)  Bp improving will taper back meds as tolerated   Continue supportive care  Cardiology following    Hyperlipidemia- (present on admission)  Assessment & Plan  Statin       VTE prophylaxis: SCDs/TEDs    I have performed a physical exam and reviewed and updated ROS and Plan today (6/25/2022). In review of yesterday's note (6/24/2022), there are no changes except as documented above.

## 2022-06-25 NOTE — PROGRESS NOTES
Monitor Summary:      Afib I/O paced   With rare trigem, occasional PVC, rare couplet

## 2022-06-25 NOTE — CARE PLAN
The patient is Stable - Low risk of patient condition declining or worsening    Shift Goals  Clinical Goals: Pt will be hemodynamically stable.  Patient Goals: Rest and comfort  Family Goals: CAMERON    Progress made toward(s) clinical / shift goals:    Problem: Knowledge Deficit - Standard  Goal: Patient and family/care givers will demonstrate understanding of plan of care, disease process/condition, diagnostic tests and medications  Outcome: Progressing     Problem: Communication  Goal: The ability to communicate needs accurately and effectively will improve  Outcome: Progressing     Problem: Hemodynamics  Goal: Patient's hemodynamics, fluid balance and neurologic status will be stable or improve  Outcome: Progressing     Problem: Fall Risk  Goal: Patient will remain free from falls  Outcome: Progressing     Problem: Pain - Standard  Goal: Alleviation of pain or a reduction in pain to the patient’s comfort goal  Outcome: Progressing       Patient is not progressing towards the following goals:

## 2022-06-25 NOTE — PROGRESS NOTES
Cardiology Progress Note    Name:   Kobe Cyr   YOB: 1931  Age:   89 y.o.  male   MRN:   [id number]    Attending Cardiologist: Dr Lyon  Outpatient Cardiologist: Dr Davey    Chief Complaint:  T-5000 GLF and Head Injury       History of Present Illness  89yo male with severe CAD without targets for revascularization, chronic respiratory failure (combined obstructive and restrictive lung disease) here because his wife called EMS after he fell from his chair and sustained scalp hematoma. He recalls urinating and then walking out of the restroom and feeling lightheaded. This seems to happen on occasion, usually associated with urination.     Interim Events   6/25/2022: Partially paced, A. fib, 60-80s  VSS, labs reviewed  Breathing improved from yesterday    6/24/2022: Partially paced 60-100s, A fib  VSS, Labs reviewed  Increase in edema    6/23/2022:  Partially paced, RBBB, A fib  Rapid response for increase in O2 needs, SOB, AMS, lethargy CXR and ECG no change  VSS, Labs reviewed      Review of Systems   Constitutional: Positive for malaise/fatigue. Negative for chills and fever.   Respiratory: Positive for cough and shortness of breath. Negative for hemoptysis, sputum production and wheezing.    Cardiovascular: Negative for chest pain, palpitations, orthopnea, claudication, leg swelling and PND.   Gastrointestinal: Negative for nausea and vomiting.   Neurological: Negative for dizziness and headaches.   All other systems reviewed and are negative.      Medical History  Past Surgical History:   Procedure Laterality Date   • KNEE UNICOMPARTMENTAL  10/13/2009    Performed by SAM LINDER at [place] [facility]   • ARTHROSCOPY, KNEE     • CORONARY STENT PROCEDURE LAD     • OTHER ORTHOPEDIC SURGERY      right shoulder rotator cuff surgery x2       Family History   Problem Relation Age of Onset   • No Known Problems Mother    • No Known Problems Father        Social History  "    Tobacco Use   Smoking Status Former Smoker   • Packs/day: 2.00   • Years: 35.00   • Pack years: 70.00   • Types: Cigarettes   • Quit date: 1973   • Years since quittin.2   Smokeless Tobacco Never Used       Allergies   Allergen Reactions   • Atorvastatin Myalgia   • Simvastatin Myalgia         Medications   Scheduled Medications   Medication Dose Frequency   • furosemide  20 mg BID DIURETIC   • metoprolol tartrate  12.5 mg TWICE DAILY   • ranolazine  500 mg DAILY   • senna-docusate  2 Tablet BID   • aspirin EC  81 mg DAILY   • clopidogrel  75 mg DAILY   • rosuvastatin  40 mg Q EVENING           Physical Exam  /56   Pulse 65   Temp 36.1 °C (96.9 °F) (Temporal)   Resp 17   Ht 1.803 m (5' 10.98\")   Wt 99.8 kg (220 lb 0.3 oz)   SpO2 91%     Physical Exam  Vitals and nursing note reviewed.   Constitutional:       Appearance: Normal appearance.   HENT:      Head: Normocephalic and atraumatic.   Eyes:      Extraocular Movements: Extraocular movements intact.   Cardiovascular:      Rate and Rhythm: Normal rate. Rhythm irregular.      Pulses: Normal pulses.      Heart sounds: Normal heart sounds.   Pulmonary:      Effort: Pulmonary effort is normal.      Breath sounds: Normal breath sounds.   Abdominal:      General: Abdomen is flat.      Palpations: Abdomen is soft.   Musculoskeletal:         General: Normal range of motion.      Cervical back: Normal range of motion.   Skin:     General: Skin is warm and dry.      Findings: Abrasion present.      Comments: Multiple abrasions from fall   Neurological:      General: No focal deficit present.      Mental Status: He is alert and oriented to person, place, and time. Mental status is at baseline.   Psychiatric:         Mood and Affect: Mood normal.         Labs (personally reviewed):     Lab Results   Component Value Date/Time    SODIUM 140 2022 02:35 AM    POTASSIUM 3.9 2022 02:35 AM    CHLORIDE 101 2022 02:35 AM    CO2 26 2022 " 02:35 AM    GLUCOSE 104 (H) 06/25/2022 02:35 AM    BUN 29 (H) 06/25/2022 02:35 AM    CREATININE 2.23 (H) 06/25/2022 02:35 AM    CREATININE 1.4 05/06/2008 11:42 AM     Lab Results   Component Value Date/Time    CHOLSTRLTOT 77 (L) 06/20/2022 02:22 AM    LDL 25 06/20/2022 02:22 AM    HDL 39 (A) 06/20/2022 02:22 AM    TRIGLYCERIDE 63 06/20/2022 02:22 AM     Lab Results   Component Value Date/Time    BNPBTYPENAT 170 (H) 03/08/2012 05:00 PM         Cardiac Imaging and Procedures Review      Personal Telemetry Review  Atrial fibrillation, rates 50s then drop to <35 for ~3min at 6:40    Echo 6/20/22  The left ventricular ejection fraction is visually estimated to be 75%.  Right ventricle is mildly dilated.  Severe biatrial enlargement.  Mild aortic stenosis: V-max 2.5 m/s, MG 14 mmHg, DENEEN 1.5 cm2, DI 0.4.  Mild to moderate tricuspid regurgitation.  Right ventricular systolic pressure is estimated to be 51 mmHg,   moderate secondary pulmonary hypertension.    PPM Placement 6/22/2022 by Dr Garcia  IMPLANTED DEVICE INFORMATION:  Pulse generator is a MDT model W3DR01  Serial # [id number]     LEAD INFORMATION:  1)Right atrial lead is a MDT model #5076-52 , serial #[id number] ,P wave 0.6 millivolts, pacing impedance 627 Ohms.     2)Right ventricular lead is a MDT model #5076-58 , serial #[id number] ,R wave 5.0 millivolts, threshold 0.5 Volts at 0.4 milliseconds, pacing impedance 589 Ohms.     DEVICE PROGRAMMING:  DDDR 60 -120 ppm      Assessment and Medical Decision Making:  Symptomatic Bradycardia with hypotension  - PPM placed 6/22/2022    Persistent Atrial Fibrillation  -Continue metoprolol 12.5 mg twice daily  -No anticoagulation due to recurrent falls    Recurrent Falls  -No anticoagulation    CAD  - Continue aspirin and statin for his coronary disease. He has completed almost a year of DAPT from his NSTEMI and has high bleeding risk due to frequent falls, ? Maybe hold clopidogrel  -Continue ASA 81 mg daily, metoprolol 12.5 mg  twice daily, ranolazine 500 mg twice daily, rosuvastatin 40 mg every evening    Pulmonary Hypertension  -Recommend furosemide 40 mg IV BID, however dose was changed  - Daily weights, I/Os, 103 kg. -5130 mL        Please see Dr. Lyon's attestation for additions and further recommendations.  Cardiology will sign off      SAMPSON Galvan  [facility]  [phone]      Please note that this dictation was created using voice recognition software. I have worked with consultants from the vendor as well as technical experts from [facility] to optimize the interface. I have made every reasonable attempt to correct obvious errors, but I expect that there are errors of grammar and possibly content I did not discover before finalizing the note.    My total time spent caring for the patient on the day of the encounter was 12 minutes. This does not include time spent on separately billable procedures/tests.

## 2022-06-25 NOTE — DISCHARGE PLANNING
Agency/Facility Name: [facility]   Spoke To: Susana   Outcome: Per Susana she would like to know if pt is medically cleared today. A bed is open. DPA to follow up with Susana.   CHLOÉ CM Notified     Agency[facility]Facility Name: [facility]   Spoke To: Susana   Outcome: DPA informed Susana pt is not medically cleared. Possibly tomorrow or Monday.

## 2022-06-25 NOTE — CARE PLAN
The patient is Watcher - Medium risk of patient condition declining or worsening    Shift Goals  Clinical Goals: hemodynamically stable, pain management, monitor vs and labs  Patient Goals: Comfort  Family Goals: CAMERON    Progress made toward(s) clinical / shift goals:    Problem: Knowledge Deficit - Standard  Goal: Patient and family/care givers will demonstrate understanding of plan of care, disease process/condition, diagnostic tests and medications  Outcome: Progressing     Problem: Skin Integrity  Goal: Skin integrity is maintained or improved  Outcome: Progressing     Problem: Communication  Goal: The ability to communicate needs accurately and effectively will improve  Outcome: Progressing     Problem: Hemodynamics  Goal: Patient's hemodynamics, fluid balance and neurologic status will be stable or improve  Outcome: Progressing     Problem: Mobility  Goal: Patient's capacity to carry out activities will improve  Outcome: Progressing     Problem: Self Care  Goal: Patient will have the ability to perform ADLs independently or with assistance (bathe, groom, dress, toilet and feed)  Outcome: Progressing     Problem: Fall Risk  Goal: Patient will remain free from falls  Outcome: Progressing     Problem: Pain - Standard  Goal: Alleviation of pain or a reduction in pain to the patient’s comfort goal  Outcome: Progressing

## 2022-06-26 LAB
ANION GAP SERPL CALC-SCNC: 11 MMOL/L (ref 7–16)
BASOPHILS # BLD AUTO: 0.8 % (ref 0–1.8)
BASOPHILS # BLD: 0.04 K/UL (ref 0–0.12)
BUN SERPL-MCNC: 27 MG/DL (ref 8–22)
CALCIUM SERPL-MCNC: 8.5 MG/DL (ref 8.5–10.5)
CHLORIDE SERPL-SCNC: 97 MMOL/L (ref 96–112)
CO2 SERPL-SCNC: 32 MMOL/L (ref 20–33)
CREAT SERPL-MCNC: 2.32 MG/DL (ref 0.5–1.4)
EOSINOPHIL # BLD AUTO: 0.22 K/UL (ref 0–0.51)
EOSINOPHIL NFR BLD: 4.7 % (ref 0–6.9)
ERYTHROCYTE [DISTWIDTH] IN BLOOD BY AUTOMATED COUNT: 55.2 FL (ref 35.9–50)
GFR SERPLBLD CREATININE-BSD FMLA CKD-EPI: 26 ML/MIN/1.73 M 2
GLUCOSE SERPL-MCNC: 110 MG/DL (ref 65–99)
HCT VFR BLD AUTO: 26.4 % (ref 42–52)
HGB BLD-MCNC: 8.4 G/DL (ref 14–18)
IMM GRANULOCYTES # BLD AUTO: 0.03 K/UL (ref 0–0.11)
IMM GRANULOCYTES NFR BLD AUTO: 0.6 % (ref 0–0.9)
LYMPHOCYTES # BLD AUTO: 0.83 K/UL (ref 1–4.8)
LYMPHOCYTES NFR BLD: 17.6 % (ref 22–41)
MCH RBC QN AUTO: 34 PG (ref 27–33)
MCHC RBC AUTO-ENTMCNC: 31.8 G/DL (ref 33.7–35.3)
MCV RBC AUTO: 106.9 FL (ref 81.4–97.8)
MONOCYTES # BLD AUTO: 0.56 K/UL (ref 0–0.85)
MONOCYTES NFR BLD AUTO: 11.9 % (ref 0–13.4)
NEUTROPHILS # BLD AUTO: 3.04 K/UL (ref 1.82–7.42)
NEUTROPHILS NFR BLD: 64.4 % (ref 44–72)
NRBC # BLD AUTO: 0 K/UL
NRBC BLD-RTO: 0 /100 WBC
PLATELET # BLD AUTO: 124 K/UL (ref 164–446)
PMV BLD AUTO: 10.3 FL (ref 9–12.9)
POTASSIUM SERPL-SCNC: 3.4 MMOL/L (ref 3.6–5.5)
RBC # BLD AUTO: 2.47 M/UL (ref 4.7–6.1)
SARS-COV+SARS-COV-2 AG RESP QL IA.RAPID: NOTDETECTED
SODIUM SERPL-SCNC: 140 MMOL/L (ref 135–145)
SPECIMEN SOURCE: NORMAL
WBC # BLD AUTO: 4.7 K/UL (ref 4.8–10.8)

## 2022-06-26 PROCEDURE — 700102 HCHG RX REV CODE 250 W/ 637 OVERRIDE(OP): Performed by: HOSPITALIST

## 2022-06-26 PROCEDURE — A9270 NON-COVERED ITEM OR SERVICE: HCPCS | Performed by: INTERNAL MEDICINE

## 2022-06-26 PROCEDURE — 770020 HCHG ROOM/CARE - TELE (206)

## 2022-06-26 PROCEDURE — 700111 HCHG RX REV CODE 636 W/ 250 OVERRIDE (IP): Performed by: HOSPITALIST

## 2022-06-26 PROCEDURE — A9270 NON-COVERED ITEM OR SERVICE: HCPCS | Performed by: NURSE PRACTITIONER

## 2022-06-26 PROCEDURE — 700102 HCHG RX REV CODE 250 W/ 637 OVERRIDE(OP): Performed by: STUDENT IN AN ORGANIZED HEALTH CARE EDUCATION/TRAINING PROGRAM

## 2022-06-26 PROCEDURE — A9270 NON-COVERED ITEM OR SERVICE: HCPCS | Performed by: HOSPITALIST

## 2022-06-26 PROCEDURE — 99233 SBSQ HOSP IP/OBS HIGH 50: CPT | Performed by: HOSPITALIST

## 2022-06-26 PROCEDURE — 85025 COMPLETE CBC W/AUTO DIFF WBC: CPT

## 2022-06-26 PROCEDURE — 80048 BASIC METABOLIC PNL TOTAL CA: CPT

## 2022-06-26 PROCEDURE — A9270 NON-COVERED ITEM OR SERVICE: HCPCS | Performed by: STUDENT IN AN ORGANIZED HEALTH CARE EDUCATION/TRAINING PROGRAM

## 2022-06-26 PROCEDURE — 700102 HCHG RX REV CODE 250 W/ 637 OVERRIDE(OP): Performed by: NURSE PRACTITIONER

## 2022-06-26 PROCEDURE — 87426 SARSCOV CORONAVIRUS AG IA: CPT

## 2022-06-26 PROCEDURE — 700102 HCHG RX REV CODE 250 W/ 637 OVERRIDE(OP): Performed by: INTERNAL MEDICINE

## 2022-06-26 PROCEDURE — 36415 COLL VENOUS BLD VENIPUNCTURE: CPT

## 2022-06-26 RX ORDER — POTASSIUM CHLORIDE 20 MEQ/1
20 TABLET, EXTENDED RELEASE ORAL ONCE
Status: COMPLETED | OUTPATIENT
Start: 2022-06-26 | End: 2022-06-26

## 2022-06-26 RX ORDER — FUROSEMIDE 10 MG/ML
40 INJECTION INTRAMUSCULAR; INTRAVENOUS
Status: DISCONTINUED | OUTPATIENT
Start: 2022-06-26 | End: 2022-06-26

## 2022-06-26 RX ADMIN — DOCUSATE SODIUM 50 MG AND SENNOSIDES 8.6 MG 2 TABLET: 8.6; 5 TABLET, FILM COATED ORAL at 16:48

## 2022-06-26 RX ADMIN — CLOPIDOGREL BISULFATE 75 MG: 75 TABLET ORAL at 06:26

## 2022-06-26 RX ADMIN — FUROSEMIDE 20 MG: 10 INJECTION, SOLUTION INTRAMUSCULAR; INTRAVENOUS at 06:27

## 2022-06-26 RX ADMIN — RANOLAZINE 500 MG: 500 TABLET, FILM COATED, EXTENDED RELEASE ORAL at 06:26

## 2022-06-26 RX ADMIN — DOCUSATE SODIUM 50 MG AND SENNOSIDES 8.6 MG 2 TABLET: 8.6; 5 TABLET, FILM COATED ORAL at 06:27

## 2022-06-26 RX ADMIN — ASPIRIN 81 MG: 81 TABLET, COATED ORAL at 06:26

## 2022-06-26 RX ADMIN — METOPROLOL TARTRATE 12.5 MG: 25 TABLET, FILM COATED ORAL at 06:26

## 2022-06-26 RX ADMIN — ROSUVASTATIN CALCIUM 40 MG: 20 TABLET, FILM COATED ORAL at 16:48

## 2022-06-26 RX ADMIN — POTASSIUM CHLORIDE 20 MEQ: 20 TABLET, EXTENDED RELEASE ORAL at 15:30

## 2022-06-26 ASSESSMENT — LIFESTYLE VARIABLES: SUBSTANCE_ABUSE: 0

## 2022-06-26 ASSESSMENT — ENCOUNTER SYMPTOMS
FOCAL WEAKNESS: 0
WEIGHT LOSS: 0
SPUTUM PRODUCTION: 0
FEVER: 0
SHORTNESS OF BREATH: 0
SORE THROAT: 0
ABDOMINAL PAIN: 0
BLURRED VISION: 0
HEMOPTYSIS: 0
VOMITING: 0
MYALGIAS: 0
MUSCULOSKELETAL NEGATIVE: 1
HEARTBURN: 0
CONSTIPATION: 0
DEPRESSION: 0
NAUSEA: 0
COUGH: 0
HEADACHES: 0
PSYCHIATRIC NEGATIVE: 1
BRUISES/BLEEDS EASILY: 0
CARDIOVASCULAR NEGATIVE: 1
CHILLS: 0
WEAKNESS: 0
EYES NEGATIVE: 1
PALPITATIONS: 0
NECK PAIN: 0
DIAPHORESIS: 0

## 2022-06-26 ASSESSMENT — FIBROSIS 4 INDEX
FIB4 SCORE: 3.85
FIB4 SCORE: 3.85

## 2022-06-26 NOTE — PROGRESS NOTES
[facility] Medicine Daily Progress Note    Date of Service  6/26/2022    Chief Complaint  Kobe Cyr is a 89 y.o. male admitted 6/19/2022 with syncope    Hospital Course  Kobe Cyr is a 89 y.o. male with history of multivessel CAD not CABG candidate per CTS on medical management, HTN, and HLD. He presented to [facility] on 6/19/2022 with shortness of breath and syncopal episode.  Patient reported falling out of his wheelchair while wife was out of house.  He reported shortness of breath, dizziness, vertigo prior to fall and syncopal event.  Denies palpitation, chest pain, tongue bite, loss of bladder/bowel incontinence.  In ER, CT head noted left parietal scalp hematoma with no fracture or intracranial hemorrhage.  No acute cervical spine fracture seen on CT c-spine. Non specific ST changes on EKG but in afib, troponin T 204>190.  Cardiology was consulted      Interval Problem Update  Axox3, he states he feels much better this am. Oxygen requirements back down to 3L, still had pulm edema on exam  - this responded well to extra lasix this am but then bp decreased - now lasix stopped and parameters adjusted on beta blocker.     Ros otherwise negative.     Consultants/Specialty  cardiology  Critical care    Code Status  DNAR/DNI    Disposition  Patient is not medically cleared for discharge.   Anticipate discharge to snf  I have placed the appropriate orders for post-discharge needs.    Review of Systems  Review of Systems   Constitutional: Positive for malaise/fatigue. Negative for chills, diaphoresis, fever and weight loss.   HENT: Negative for congestion, ear discharge, ear pain, hearing loss, sore throat and tinnitus.    Eyes: Negative.  Negative for blurred vision.   Respiratory: Negative for cough, hemoptysis, sputum production and shortness of breath.    Cardiovascular: Negative.  Negative for chest pain, palpitations and leg swelling.   Gastrointestinal: Negative for abdominal pain, constipation,  heartburn, nausea and vomiting.   Genitourinary: Negative.  Negative for dysuria and urgency.   Musculoskeletal: Negative.  Negative for myalgias and neck pain.   Skin: Negative.  Negative for itching and rash.   Neurological: Negative for focal weakness, weakness and headaches.   Endo/Heme/Allergies: Negative.  Does not bruise/bleed easily.   Psychiatric/Behavioral: Negative.  Negative for depression, substance abuse and suicidal ideas.   All other systems reviewed and are negative.       Physical Exam  Temp:  [36 °C (96.8 °F)-36.7 °C (98 °F)] 36 °C (96.8 °F)  Pulse:  [66-83] 75  Resp:  [16-18] 16  BP: ()/(48-65) 92/51  SpO2:  [93 %-99 %] 93 %    Physical Exam  Constitutional:       General: He is not in acute distress.     Appearance: He is not ill-appearing, toxic-appearing or diaphoretic.   HENT:      Head: Normocephalic.      Nose: Nose normal.      Mouth/Throat:      Mouth: Mucous membranes are moist.   Eyes:      General: No scleral icterus.     Extraocular Movements: Extraocular movements intact.      Pupils: Pupils are equal, round, and reactive to light.   Cardiovascular:      Rate and Rhythm: Normal rate and regular rhythm.      Comments: Pacer in place, cdi, Left arm in sling  Pulmonary:      Breath sounds: Rales present. No rhonchi.   Abdominal:      General: Abdomen is flat. There is no distension.      Palpations: There is no mass.      Tenderness: There is no abdominal tenderness. There is no guarding.   Musculoskeletal:         General: No swelling or deformity. Normal range of motion.      Cervical back: Normal range of motion and neck supple.      Right lower leg: No edema.   Skin:     General: Skin is warm.      Capillary Refill: Capillary refill takes 2 to 3 seconds.      Coloration: Skin is not jaundiced.      Findings: Bruising present. No erythema or lesion.   Neurological:      General: No focal deficit present.      Mental Status: He is alert and oriented to person, place, and time.    Psychiatric:         Mood and Affect: Mood normal.         Fluids    Intake/Output Summary (Last 24 hours) at 6/26/2022 1500  Last data filed at 6/26/2022 1402  Gross per 24 hour   Intake 370 ml   Output 2100 ml   Net -1730 ml       Laboratory  Recent Labs     06/24/22  0241 06/25/22  0235 06/26/22  0243   WBC 4.6* 4.6* 4.7*   RBC 2.41* 2.55* 2.47*   HEMOGLOBIN 8.2* 8.7* 8.4*   HEMATOCRIT 26.3* 27.7* 26.4*   .1* 108.6* 106.9*   MCH 34.0* 34.1* 34.0*   MCHC 31.2* 31.4* 31.8*   RDW 56.6* 56.3* 55.2*   PLATELETCT 115* 126* 124*   MPV 10.2 10.2 10.3     Recent Labs     06/24/22  0241 06/25/22  0235 06/26/22  0243   SODIUM 139 140 140   POTASSIUM 4.3 3.9 3.4*   CHLORIDE 104 101 97   CO2 26 26 32   GLUCOSE 115* 104* 110*   BUN 30* 29* 27*   CREATININE 2.20* 2.23* 2.32*   CALCIUM 8.7 8.9 8.5                   Imaging  US-EXTREMITY VENOUS LOWER BILAT   Final Result      DX-CHEST-PORTABLE (1 VIEW)   Final Result         1.  Pulmonary edema and/or infiltrates are identified, which are stable since the prior exam.   2.  Trace bilateral pleural effusions   3.  Cardiomegaly      DX-CHEST-PORTABLE (1 VIEW)   Final Result      1.  Interval placement of cardiac pacemaker      2.  No pneumothorax      DX-CHEST-LIMITED (1 VIEW)   Final Result      1.  Increased bibasilar atelectasis   2.  New RIGHT mid lung opacity suspicious for pneumonia   3.  Persistently enlarged cardiac silhouette      EC-ECHOCARDIOGRAM COMPLETE W/O CONT   Final Result      DX-CHEST-PORTABLE (1 VIEW)   Final Result      1.  Mild diffuse interstitial edema and patchy bilateral airspace opacities. Findings could be seen in setting edema and/or infection.   2.  Stable enlargement of the cardiomediastinal silhouette.   3.  Possible small right pleural effusion.      US-RENAL   Final Result      Unremarkable renal ultrasound.      CT-HEAD W/O   Final Result      1.  Left parietal scalp hematoma without evidence of skull fracture or intracranial hemorrhage.          CT-CSPINE WITHOUT PLUS RECONS   Final Result      No CT evidence of acute cervical spine abnormality.      Moderate spondylosis with multilevel degenerative change      Pulmonary scarring and small right pleural fluid with right mediastinal adenopathy. This could be reactive or malignant      CL-PERMANENT PACEMAKER INSERTION    (Results Pending)        Assessment/Plan  * Syncope and collapse- (present on admission)  Assessment & Plan  CT head noted left parietal scalp hematoma with no fracture or intracranial hemorrhage  No acute cervical spine fracture seen on CT c-spine      Patients syncope may have been related to hypotension and bradycardia  Vagal stimulation and hypoxia may also be factors  following    Thrombocytopenia ([facility])  Assessment & Plan  Mild  improving  following    Constipation  Assessment & Plan  Continue bowel regimen  Patient declined suppository or enema  Vagal stimulation may be contributing to some of his dizzy spells  Resolved  following    Bradycardia  Assessment & Plan  Resolved s/p pacer    Positive D dimer  Assessment & Plan  See above  Dopplers negative    Hypotension due to drugs- (present on admission)  Assessment & Plan  Suspect cardiac meds contributing, see above  Imdur and bb now stopped  Resolved - following closely with diuresis    Acute kidney injury superimposed on chronic kidney disease ([facility])  Assessment & Plan  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    Renal ultrasound is unremarkable  Creatinine improving  Monitoring with diuresis  Continue to follow    Elevated troponin  Assessment & Plan  Echo with preserved EF  Cardiology following  trops decreasing    Acute on chronic respiratory failure with hypoxia ([facility])- (present on admission)  Assessment & Plan  Increased o2 requirement overnight up to 4L, now improving and bp now able to tolerate diuresis  Following closely   pulm htn  pulm edema contributing and improving with diuresis which is continued   Cannot get VQ with pacer patient cannot  lift his arm as it could dislodge the leads - LE dopplers negative for dvt - PE unlikely - suspect due to pulm edema and chronic underlying condition    GERD (gastroesophageal reflux disease)- (present on admission)  Assessment & Plan  PPI    CAD (coronary artery disease)- (present on admission)  Assessment & Plan  Prior MI and PCI  Found to have multivessel CAD -- not CABG surgical candidate in 07/2021    Continue ASA/Plavix/statin/BB (as tolerated)  Bp improving will taper back meds as tolerated   Continue supportive care  Cardiology following    Hyperlipidemia- (present on admission)  Assessment & Plan  Statin       VTE prophylaxis: SCDs/TEDs    I have performed a physical exam and reviewed and updated ROS and Plan today (6/26/2022). In review of yesterday's note (6/25/2022), there are no changes except as documented above.

## 2022-06-26 NOTE — CARE PLAN
The patient is Watcher - Medium risk of patient condition declining or worsening    Shift Goals  Clinical Goals: Hemodynamically stable, Monitor labs and VS, Safety, Pain management  Patient Goals: Comfort  Family Goals: CAMERON    Progress made toward(s) clinical / shift goals:    Problem: Knowledge Deficit - Standard  Goal: Patient and family/care givers will demonstrate understanding of plan of care, disease process/condition, diagnostic tests and medications  Outcome: Progressing     Problem: Skin Integrity  Goal: Skin integrity is maintained or improved  Outcome: Progressing     Problem: Communication  Goal: The ability to communicate needs accurately and effectively will improve  Outcome: Progressing     Problem: Hemodynamics  Goal: Patient's hemodynamics, fluid balance and neurologic status will be stable or improve  Outcome: Progressing     Problem: Mobility  Goal: Patient's capacity to carry out activities will improve  Outcome: Progressing     Problem: Fall Risk  Goal: Patient will remain free from falls  Outcome: Progressing     Problem: Pain - Standard  Goal: Alleviation of pain or a reduction in pain to the patient’s comfort goal  Outcome: Progressing

## 2022-06-26 NOTE — DISCHARGE PLANNING
Agency[facility]Facility Name: [facility]  Spoke To: Susana   Outcome: Per Susana a bed is available if pt is medically cleared today. DPA to follow up with Susana.  CHLOÉ CM Notified     Agency[facility] Name: [facility]   Outcome: DPA informed Susana via voicemail that pt is not medically clear. MD anticipates tomorrow. DPA to follow up with Susana tomorrow.

## 2022-06-27 ENCOUNTER — APPOINTMENT (OUTPATIENT)
Dept: RADIOLOGY | Facility: MEDICAL CENTER | Age: 87
DRG: 242 | End: 2022-06-27
Attending: HOSPITALIST
Payer: MEDICARE

## 2022-06-27 PROBLEM — N17.9 ACUTE KIDNEY INJURY SUPERIMPOSED ON CHRONIC KIDNEY DISEASE (HCC): Status: RESOLVED | Noted: 2022-06-19 | Resolved: 2022-06-27

## 2022-06-27 PROBLEM — I95.2 HYPOTENSION DUE TO DRUGS: Status: RESOLVED | Noted: 2022-06-20 | Resolved: 2022-06-27

## 2022-06-27 PROBLEM — K59.00 CONSTIPATION: Status: RESOLVED | Noted: 2022-06-22 | Resolved: 2022-06-27

## 2022-06-27 PROBLEM — N18.9 ACUTE KIDNEY INJURY SUPERIMPOSED ON CHRONIC KIDNEY DISEASE (HCC): Status: RESOLVED | Noted: 2022-06-19 | Resolved: 2022-06-27

## 2022-06-27 PROBLEM — R00.1 BRADYCARDIA: Status: RESOLVED | Noted: 2022-06-22 | Resolved: 2022-06-27

## 2022-06-27 PROBLEM — M79.672 LEFT FOOT PAIN: Status: ACTIVE | Noted: 2022-06-27

## 2022-06-27 PROBLEM — R55 SYNCOPE AND COLLAPSE: Status: RESOLVED | Noted: 2022-06-19 | Resolved: 2022-06-27

## 2022-06-27 LAB
ANION GAP SERPL CALC-SCNC: 9 MMOL/L (ref 7–16)
BASOPHILS # BLD AUTO: 0.7 % (ref 0–1.8)
BASOPHILS # BLD: 0.04 K/UL (ref 0–0.12)
BUN SERPL-MCNC: 26 MG/DL (ref 8–22)
CALCIUM SERPL-MCNC: 8.6 MG/DL (ref 8.5–10.5)
CHLORIDE SERPL-SCNC: 98 MMOL/L (ref 96–112)
CO2 SERPL-SCNC: 33 MMOL/L (ref 20–33)
CREAT SERPL-MCNC: 2.09 MG/DL (ref 0.5–1.4)
EOSINOPHIL # BLD AUTO: 0.25 K/UL (ref 0–0.51)
EOSINOPHIL NFR BLD: 4.7 % (ref 0–6.9)
ERYTHROCYTE [DISTWIDTH] IN BLOOD BY AUTOMATED COUNT: 57.3 FL (ref 35.9–50)
GFR SERPLBLD CREATININE-BSD FMLA CKD-EPI: 29 ML/MIN/1.73 M 2
GLUCOSE SERPL-MCNC: 121 MG/DL (ref 65–99)
HCT VFR BLD AUTO: 28.7 % (ref 42–52)
HGB BLD-MCNC: 9 G/DL (ref 14–18)
IMM GRANULOCYTES # BLD AUTO: 0.02 K/UL (ref 0–0.11)
IMM GRANULOCYTES NFR BLD AUTO: 0.4 % (ref 0–0.9)
LYMPHOCYTES # BLD AUTO: 1.04 K/UL (ref 1–4.8)
LYMPHOCYTES NFR BLD: 19.4 % (ref 22–41)
MAGNESIUM SERPL-MCNC: 2 MG/DL (ref 1.5–2.5)
MCH RBC QN AUTO: 34.2 PG (ref 27–33)
MCHC RBC AUTO-ENTMCNC: 31.4 G/DL (ref 33.7–35.3)
MCV RBC AUTO: 109.1 FL (ref 81.4–97.8)
MONOCYTES # BLD AUTO: 0.58 K/UL (ref 0–0.85)
MONOCYTES NFR BLD AUTO: 10.8 % (ref 0–13.4)
NEUTROPHILS # BLD AUTO: 3.43 K/UL (ref 1.82–7.42)
NEUTROPHILS NFR BLD: 64 % (ref 44–72)
NRBC # BLD AUTO: 0 K/UL
NRBC BLD-RTO: 0 /100 WBC
PLATELET # BLD AUTO: 133 K/UL (ref 164–446)
PMV BLD AUTO: 10.1 FL (ref 9–12.9)
POTASSIUM SERPL-SCNC: 4 MMOL/L (ref 3.6–5.5)
RBC # BLD AUTO: 2.63 M/UL (ref 4.7–6.1)
SODIUM SERPL-SCNC: 140 MMOL/L (ref 135–145)
URATE SERPL-MCNC: 6.7 MG/DL (ref 2.5–8.3)
WBC # BLD AUTO: 5.4 K/UL (ref 4.8–10.8)

## 2022-06-27 PROCEDURE — 85025 COMPLETE CBC W/AUTO DIFF WBC: CPT

## 2022-06-27 PROCEDURE — 83735 ASSAY OF MAGNESIUM: CPT

## 2022-06-27 PROCEDURE — 84550 ASSAY OF BLOOD/URIC ACID: CPT

## 2022-06-27 PROCEDURE — 80048 BASIC METABOLIC PNL TOTAL CA: CPT

## 2022-06-27 PROCEDURE — 700102 HCHG RX REV CODE 250 W/ 637 OVERRIDE(OP): Performed by: STUDENT IN AN ORGANIZED HEALTH CARE EDUCATION/TRAINING PROGRAM

## 2022-06-27 PROCEDURE — 770020 HCHG ROOM/CARE - TELE (206)

## 2022-06-27 PROCEDURE — 700102 HCHG RX REV CODE 250 W/ 637 OVERRIDE(OP): Performed by: INTERNAL MEDICINE

## 2022-06-27 PROCEDURE — 73630 X-RAY EXAM OF FOOT: CPT | Mod: LT

## 2022-06-27 PROCEDURE — A9270 NON-COVERED ITEM OR SERVICE: HCPCS | Performed by: STUDENT IN AN ORGANIZED HEALTH CARE EDUCATION/TRAINING PROGRAM

## 2022-06-27 PROCEDURE — 99232 SBSQ HOSP IP/OBS MODERATE 35: CPT | Performed by: HOSPITALIST

## 2022-06-27 PROCEDURE — 700102 HCHG RX REV CODE 250 W/ 637 OVERRIDE(OP): Performed by: HOSPITALIST

## 2022-06-27 PROCEDURE — 36415 COLL VENOUS BLD VENIPUNCTURE: CPT

## 2022-06-27 PROCEDURE — A9270 NON-COVERED ITEM OR SERVICE: HCPCS | Performed by: INTERNAL MEDICINE

## 2022-06-27 PROCEDURE — A9270 NON-COVERED ITEM OR SERVICE: HCPCS | Performed by: HOSPITALIST

## 2022-06-27 PROCEDURE — 700111 HCHG RX REV CODE 636 W/ 250 OVERRIDE (IP): Performed by: STUDENT IN AN ORGANIZED HEALTH CARE EDUCATION/TRAINING PROGRAM

## 2022-06-27 RX ORDER — ACETAMINOPHEN 325 MG/1
650 TABLET ORAL EVERY 6 HOURS PRN
Qty: 30 TABLET | Refills: 0 | Status: SHIPPED | OUTPATIENT
Start: 2022-06-27 | End: 2022-09-12

## 2022-06-27 RX ORDER — AMOXICILLIN 250 MG
2 CAPSULE ORAL 2 TIMES DAILY
Qty: 30 TABLET | Refills: 0 | Status: SHIPPED | OUTPATIENT
Start: 2022-06-27

## 2022-06-27 RX ORDER — RANOLAZINE 500 MG/1
500 TABLET, EXTENDED RELEASE ORAL DAILY
Qty: 60 TABLET | Refills: 3 | Status: SHIPPED | OUTPATIENT
Start: 2022-06-28

## 2022-06-27 RX ORDER — ECHINACEA PURPUREA EXTRACT 125 MG
2 TABLET ORAL
Refills: 3 | Status: SHIPPED
Start: 2022-06-27

## 2022-06-27 RX ADMIN — DOCUSATE SODIUM 50 MG AND SENNOSIDES 8.6 MG 2 TABLET: 8.6; 5 TABLET, FILM COATED ORAL at 06:38

## 2022-06-27 RX ADMIN — ONDANSETRON 4 MG: 4 TABLET, ORALLY DISINTEGRATING ORAL at 17:45

## 2022-06-27 RX ADMIN — RANOLAZINE 500 MG: 500 TABLET, FILM COATED, EXTENDED RELEASE ORAL at 06:38

## 2022-06-27 RX ADMIN — METOPROLOL TARTRATE 12.5 MG: 25 TABLET, FILM COATED ORAL at 17:39

## 2022-06-27 RX ADMIN — METOPROLOL TARTRATE 12.5 MG: 25 TABLET, FILM COATED ORAL at 06:38

## 2022-06-27 RX ADMIN — DOCUSATE SODIUM 50 MG AND SENNOSIDES 8.6 MG 2 TABLET: 8.6; 5 TABLET, FILM COATED ORAL at 17:45

## 2022-06-27 RX ADMIN — ROSUVASTATIN CALCIUM 40 MG: 20 TABLET, FILM COATED ORAL at 17:40

## 2022-06-27 RX ADMIN — ASPIRIN 81 MG: 81 TABLET, COATED ORAL at 06:38

## 2022-06-27 RX ADMIN — CLOPIDOGREL BISULFATE 75 MG: 75 TABLET ORAL at 06:38

## 2022-06-27 ASSESSMENT — ENCOUNTER SYMPTOMS
HEMOPTYSIS: 0
SPUTUM PRODUCTION: 0
VOMITING: 0
NAUSEA: 0
WEAKNESS: 0
DIAPHORESIS: 0
FEVER: 0
CARDIOVASCULAR NEGATIVE: 1
MYALGIAS: 0
FOCAL WEAKNESS: 0
WEIGHT LOSS: 0
HEADACHES: 0
DEPRESSION: 0
BRUISES/BLEEDS EASILY: 0
CONSTIPATION: 0
ABDOMINAL PAIN: 0
NECK PAIN: 0
PALPITATIONS: 0
BLURRED VISION: 0
SORE THROAT: 0
PSYCHIATRIC NEGATIVE: 1
HEARTBURN: 0
SHORTNESS OF BREATH: 0
CHILLS: 0
EYES NEGATIVE: 1
COUGH: 0

## 2022-06-27 ASSESSMENT — LIFESTYLE VARIABLES: SUBSTANCE_ABUSE: 0

## 2022-06-27 NOTE — DISCHARGE PLANNING
Agency[facility]Facility Name: [facility]  Outcome: DPA left a voicemail regarding bed availability and notified Susana Pt is medically cleared today.     0933:  Agency/Facility Name: [facility]  Spoke To: Mook  Outcome: DPA was notified SNF has bed availability, Mook to check with  on transport time and call DPA back.     0938:  Agency/Facility Name: [facility]  Outcome: DPA left a voicemail regarding bed availability. DPA requesting a call back.     1000:  Agency/Facility Name: [facility]  Spoke To: Kae  Outcome: DPA was notified SNF has bed availability today. Kae to check on transport time and call this DPA back with confirmation of transport time.     1010:  Agency/Facility Name: [facility]  Spoke To: Mook  Outcome: DPA notified Mook Pt is going to [facility] per Pt request.     1020:  Agency/Facility Name: [facility]  Spoke To: Kae  Outcome: DPA notified Kae Pt pending on x-ray results for discharge of either today or tomorrow. DPA to call back after results return to confirm day of transport.     1025:  Agency[facility]Facility Name: [facility]  Spoke To: Susana  Outcome: DPA notified SNF Pt has chosen to go to [facility].

## 2022-06-27 NOTE — PROGRESS NOTES
[facility] Medicine Daily Progress Note    Date of Service  6/27/2022    Chief Complaint  Kobe Cyr is a 89 y.o. male admitted 6/19/2022 with syncope    Hospital Course  Kobe Cyr is a 89 y.o. male with history of multivessel CAD not CABG candidate per CTS on medical management, HTN, and HLD. He presented to [facility] on 6/19/2022 with shortness of breath and syncopal episode.  Patient reported falling out of his wheelchair while wife was out of house.  He reported shortness of breath, dizziness, vertigo prior to fall and syncopal event.  Denies palpitation, chest pain, tongue bite, loss of bladder/bowel incontinence.  In ER, CT head noted left parietal scalp hematoma with no fracture or intracranial hemorrhage.  No acute cervical spine fracture seen on CT c-spine. Non specific ST changes on EKG but in afib, troponin T 204>190.  Cardiology was consulted      Interval Problem Update  He remains axox3 with poor recall. He is stable on 3L of oxygen (his baseline) currently, denies sob, no further dizziness. Hope was to discharge him to snf this am but he reports acute severe left foot pain that began early this am - he denies any associated trauma. He reports 8/10 pain and he is unable to bear any weight - pain is in the forefoot and lateral ankle. On exam both areas are swollen, no warmth, no redness. He does report history of gout in his left great toe previously but states it felt different.  ROS otherwise negative - I have ordered an x ray of the foot and uric acid levels - d/c postponed for now    Consultants/Specialty  cardiology  Critical care    Code Status  DNAR/DNI    Disposition  Patient is not medically cleared for discharge.   Anticipate discharge to snf  I have placed the appropriate orders for post-discharge needs.    Review of Systems  Review of Systems   Constitutional: Positive for malaise/fatigue. Negative for chills, diaphoresis, fever and weight loss.   HENT: Negative for congestion, ear  discharge, ear pain, hearing loss, sore throat and tinnitus.    Eyes: Negative.  Negative for blurred vision.   Respiratory: Negative for cough, hemoptysis, sputum production and shortness of breath.    Cardiovascular: Negative.  Negative for chest pain, palpitations and leg swelling.   Gastrointestinal: Negative for abdominal pain, constipation, heartburn, nausea and vomiting.   Genitourinary: Negative.  Negative for dysuria and urgency.   Musculoskeletal: Positive for joint pain. Negative for myalgias and neck pain.   Skin: Negative.  Negative for itching and rash.   Neurological: Negative for focal weakness, weakness and headaches.   Endo/Heme/Allergies: Negative.  Does not bruise/bleed easily.   Psychiatric/Behavioral: Negative.  Negative for depression, substance abuse and suicidal ideas.   All other systems reviewed and are negative.       Physical Exam  Temp:  [36.1 °C (96.9 °F)-36.4 °C (97.6 °F)] 36.4 °C (97.6 °F)  Pulse:  [71-90] 78  Resp:  [16-18] 18  BP: ()/(46-56) 103/46  SpO2:  [91 %-96 %] 96 %    Physical Exam  Constitutional:       General: He is not in acute distress.     Appearance: He is not ill-appearing, toxic-appearing or diaphoretic.   HENT:      Head: Normocephalic.      Nose: Nose normal.      Mouth/Throat:      Mouth: Mucous membranes are moist.   Eyes:      General: No scleral icterus.     Extraocular Movements: Extraocular movements intact.      Pupils: Pupils are equal, round, and reactive to light.   Cardiovascular:      Rate and Rhythm: Normal rate and regular rhythm.      Comments: Pacer in place, cdi, Left arm in sling  Pulmonary:      Breath sounds: No rhonchi or rales.   Abdominal:      General: Abdomen is flat. There is no distension.      Palpations: There is no mass.      Tenderness: There is no abdominal tenderness. There is no guarding.   Musculoskeletal:         General: No swelling or deformity. Normal range of motion.      Cervical back: Normal range of motion and neck  supple.      Right lower leg: No edema.   Skin:     General: Skin is warm.      Capillary Refill: Capillary refill takes 2 to 3 seconds.      Coloration: Skin is not jaundiced.      Findings: Bruising present. No erythema or lesion.   Neurological:      General: No focal deficit present.      Mental Status: He is alert and oriented to person, place, and time.   Psychiatric:         Mood and Affect: Mood normal.         Fluids    Intake/Output Summary (Last 24 hours) at 6/27/2022 1330  Last data filed at 6/27/2022 0628  Gross per 24 hour   Intake 360 ml   Output 1000 ml   Net -640 ml       Laboratory  Recent Labs     06/25/22  0235 06/26/22  0243 06/27/22  0217   WBC 4.6* 4.7* 5.4   RBC 2.55* 2.47* 2.63*   HEMOGLOBIN 8.7* 8.4* 9.0*   HEMATOCRIT 27.7* 26.4* 28.7*   .6* 106.9* 109.1*   MCH 34.1* 34.0* 34.2*   MCHC 31.4* 31.8* 31.4*   RDW 56.3* 55.2* 57.3*   PLATELETCT 126* 124* 133*   MPV 10.2 10.3 10.1     Recent Labs     06/25/22 0235 06/26/22  0243 06/27/22  0217   SODIUM 140 140 140   POTASSIUM 3.9 3.4* 4.0   CHLORIDE 101 97 98   CO2 26 32 33   GLUCOSE 104* 110* 121*   BUN 29* 27* 26*   CREATININE 2.23* 2.32* 2.09*   CALCIUM 8.9 8.5 8.6                   Imaging  DX-FOOT-COMPLETE 3+ LEFT   Final Result         Irregularity at the bases of the 2nd-5th metatarsals, concerning for stress-related injury.      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      US-EXTREMITY VENOUS LOWER BILAT   Final Result      DX-CHEST-PORTABLE (1 VIEW)   Final Result         1.  Pulmonary edema and/or infiltrates are identified, which are stable since the prior exam.   2.  Trace bilateral pleural effusions   3.  Cardiomegaly      DX-CHEST-PORTABLE (1 VIEW)   Final Result      1.  Interval placement of cardiac pacemaker      2.  No pneumothorax      DX-CHEST-LIMITED (1 VIEW)   Final Result      1.  Increased bibasilar atelectasis   2.  New RIGHT mid lung opacity suspicious for pneumonia   3.  Persistently enlarged cardiac  silhouette      EC-ECHOCARDIOGRAM COMPLETE W/O CONT   Final Result      DX-CHEST-PORTABLE (1 VIEW)   Final Result      1.  Mild diffuse interstitial edema and patchy bilateral airspace opacities. Findings could be seen in setting edema and/or infection.   2.  Stable enlargement of the cardiomediastinal silhouette.   3.  Possible small right pleural effusion.      US-RENAL   Final Result      Unremarkable renal ultrasound.      CT-HEAD W/O   Final Result      1.  Left parietal scalp hematoma without evidence of skull fracture or intracranial hemorrhage.         CT-CSPINE WITHOUT PLUS RECONS   Final Result      No CT evidence of acute cervical spine abnormality.      Moderate spondylosis with multilevel degenerative change      Pulmonary scarring and small right pleural fluid with right mediastinal adenopathy. This could be reactive or malignant      CL-PERMANENT PACEMAKER INSERTION    (Results Pending)        Assessment/Plan  Left foot pain  Assessment & Plan  See above  Acute  No trauma  Query gout  Will check x ray and uric acid level  Ice and volteran topical     Thrombocytopenia (HCC)  Assessment & Plan  Mild  improved  Follow intermittently    Positive D dimer  Assessment & Plan  See above  Dopplers negative    Elevated troponin  Assessment & Plan  Echo with preserved EF  Cardiology has now signed off and will follow in clinic  trops decreased    Acute on chronic respiratory failure with hypoxia (HCC)- (present on admission)  Assessment & Plan  Increased o2 requirement fluctuating  Now back to baseline of 3L after diuresis but needed to stop lasix due to bp  - has been stable off of diuretics   Continue to follow as he may require diuretics prn  pulm htn  pulm edema contributed and greatly improved  Cannot get VQ with pacer patient cannot lift his arm as it could dislodge the leads - LE dopplers negative for dvt - PE unlikely - suspect due to pulm edema and chronic underlying condition    GERD (gastroesophageal  reflux disease)- (present on admission)  Assessment & Plan  PPI    CAD (coronary artery disease)- (present on admission)  Assessment & Plan  Prior MI and PCI  Found to have multivessel CAD -- not CABG surgical candidate in 07/2021    Continue ASA/Plavix/statin.   Continue this lower dose of beta blocker if he continues to tolerate  Attempted to restart imdur several times but unable to tolerate due to symptomatic hypotension  Discussed with cardiology - okay to stop plavix as nearly a year out and high risk for further falls  Continue supportive care      Hyperlipidemia- (present on admission)  Assessment & Plan  Statin       VTE prophylaxis: SCDs/TEDs    I have performed a physical exam and reviewed and updated ROS and Plan today (6/27/2022). In review of yesterday's note (6/26/2022), there are no changes except as documented above.

## 2022-06-27 NOTE — ASSESSMENT & PLAN NOTE
See above  Acute  No trauma  Query gout  Will check x ray and uric acid level  Ice and volteran topical

## 2022-06-27 NOTE — DISCHARGE PLANNING
Case Management Discharge Planning    Admission Date: 6/19/2022  GMLOS: 5  ALOS: 7    6-Clicks ADL Score: 14  6-Clicks Mobility Score: 11  PT and/or OT Eval ordered: Yes  Post-acute Referrals Ordered: Yes  Post-acute Choice Obtained: Yes  Has referral(s) been sent to post-acute provider:  Yes      Anticipated Discharge Dispo: Discharge Disposition: D/T to SNF with Medicare cert in anticipation of skilled care (03)  Discharge Address: [address] [place] [address]  Discharge Contact Phone Number: [phone]    DME Needed: No    Action(s) Taken: OTHER    Escalations Completed: None    Medically Clear: No    Next Steps: Record reviewed. SW spoke to patient and updated on SNF placement to his 1st choice- [facility]. Patient stated that he will update his spouse on all matters. Team will follow for discharge planning.     Barriers to Discharge: None    Is the patient up for discharge tomorrow: No

## 2022-06-27 NOTE — DISCHARGE INSTRUCTIONS
Discharge Instructions    Discharged to other by medical transportation with escort. Discharged via ambulance, hospital escort: Yes.  Special equipment needed: Not Applicable    Be sure to schedule a follow-up appointment with your primary care doctor or any specialists as instructed.     Discharge Plan:   Diet Plan: Discussed  Activity Level: Discussed  Confirmed Follow up Appointment: Appointment Scheduled  Confirmed Symptoms Management: Discussed  Medication Reconciliation Updated: Yes    I understand that a diet low in cholesterol, fat, and sodium is recommended for good health. Unless I have been given specific instructions below for another diet, I accept this instruction as my diet prescription.   Other diet: cardiac    Special Instructions: None    Is patient discharged on Warfarin / Coumadin?   No     Depression / Suicide Risk    As you are discharged from this [facility] facility, it is important to learn how to keep safe from harming yourself.    Recognize the warning signs:  Abrupt changes in personality, positive or negative- including increase in energy   Giving away possessions  Change in eating patterns- significant weight changes-  positive or negative  Change in sleeping patterns- unable to sleep or sleeping all the time   Unwillingness or inability to communicate  Depression  Unusual sadness, discouragement and loneliness  Talk of wanting to die  Neglect of personal appearance   Rebelliousness- reckless behavior  Withdrawal from people/activities they love  Confusion- inability to concentrate     If you or a loved one observes any of these behaviors or has concerns about self-harm, here's what you can do:  Talk about it- your feelings and reasons for harming yourself  Remove any means that you might use to hurt yourself (examples: pills, rope, extension cords, firearm)  Get professional help from the community (Mental Health, Substance Abuse, psychological counseling)  Do not be alone:Call your  Safe Contact- someone whom you trust who will be there for you.  Call your local CRISIS HOTLINE 485-9126 or 585-225-4892  Call your local Children's Mobile Crisis Response Team [place] (830) 824-9225 or www.reQall  Call the toll free National Suicide Prevention Hotlines   National Suicide Prevention Lifeline 675-217-SGXE (8627)  [facility] [facility] 800-SUICIDE (917-0548)

## 2022-06-28 VITALS
SYSTOLIC BLOOD PRESSURE: 120 MMHG | HEIGHT: 71 IN | HEART RATE: 75 BPM | RESPIRATION RATE: 18 BRPM | OXYGEN SATURATION: 95 % | TEMPERATURE: 96.9 F | BODY MASS INDEX: 31.73 KG/M2 | DIASTOLIC BLOOD PRESSURE: 61 MMHG | WEIGHT: 226.63 LBS

## 2022-06-28 LAB
SARS-COV+SARS-COV-2 AG RESP QL IA.RAPID: NOTDETECTED
SPECIMEN SOURCE: NORMAL

## 2022-06-28 PROCEDURE — A9270 NON-COVERED ITEM OR SERVICE: HCPCS | Performed by: INTERNAL MEDICINE

## 2022-06-28 PROCEDURE — 99239 HOSP IP/OBS DSCHRG MGMT >30: CPT | Performed by: HOSPITALIST

## 2022-06-28 PROCEDURE — A9270 NON-COVERED ITEM OR SERVICE: HCPCS | Performed by: STUDENT IN AN ORGANIZED HEALTH CARE EDUCATION/TRAINING PROGRAM

## 2022-06-28 PROCEDURE — 700102 HCHG RX REV CODE 250 W/ 637 OVERRIDE(OP): Performed by: INTERNAL MEDICINE

## 2022-06-28 PROCEDURE — A9270 NON-COVERED ITEM OR SERVICE: HCPCS | Performed by: HOSPITALIST

## 2022-06-28 PROCEDURE — 700102 HCHG RX REV CODE 250 W/ 637 OVERRIDE(OP): Performed by: STUDENT IN AN ORGANIZED HEALTH CARE EDUCATION/TRAINING PROGRAM

## 2022-06-28 PROCEDURE — 700102 HCHG RX REV CODE 250 W/ 637 OVERRIDE(OP): Performed by: HOSPITALIST

## 2022-06-28 PROCEDURE — 87426 SARSCOV CORONAVIRUS AG IA: CPT

## 2022-06-28 RX ADMIN — ACETAMINOPHEN 650 MG: 325 TABLET, FILM COATED ORAL at 02:54

## 2022-06-28 RX ADMIN — ASPIRIN 81 MG: 81 TABLET, COATED ORAL at 06:00

## 2022-06-28 RX ADMIN — METOPROLOL TARTRATE 12.5 MG: 25 TABLET, FILM COATED ORAL at 06:31

## 2022-06-28 RX ADMIN — DOCUSATE SODIUM 50 MG AND SENNOSIDES 8.6 MG 2 TABLET: 8.6; 5 TABLET, FILM COATED ORAL at 06:31

## 2022-06-28 RX ADMIN — RANOLAZINE 500 MG: 500 TABLET, FILM COATED, EXTENDED RELEASE ORAL at 06:30

## 2022-06-28 NOTE — CARE PLAN
Problem: Knowledge Deficit - Standard  Goal: Patient and family/care givers will demonstrate understanding of plan of care, disease process/condition, diagnostic tests and medications  Outcome: Progressing     Problem: Communication  Goal: The ability to communicate needs accurately and effectively will improve  Outcome: Progressing     The patient is Stable - Low risk of patient condition declining or worsening    Shift Goals  Clinical Goals: stabilize ankle, comfort, plan of care  Patient Goals: comfort  Family Goals: updates    Progress made toward(s) clinical / shift goals:  comfort, rest, plan of care     Patient is not progressing towards the following goals:

## 2022-06-28 NOTE — DISCHARGE PLANNING
Agency/Facility Name: [facility]  Spoke To: Kae  Outcome: DPA was notified SNF will take Pt today, Kae to call back regarding transport time.     0915:  Agency/Facility Name: [facility]  Spoke To: Kae  Outcome: DPA confirmed a transport time of 1600 via [facility].     RN CM notified.

## 2022-06-28 NOTE — DISCHARGE PLANNING
Case Management Discharge Planning    Admission Date: 6/19/2022  GMLOS: 5  ALOS: 8    6-Clicks ADL Score: 14  6-Clicks Mobility Score: 11  PT and/or OT Eval ordered: Yes  Post-acute Referrals Ordered: Yes  Post-acute Choice Obtained: Yes  Has referral(s) been sent to post-acute provider:  Yes      Anticipated Discharge Dispo: Discharge Disposition: D/T to SNF with Medicare cert in anticipation of skilled care (03)  Discharge Address: [address] [place] [address]  Discharge Contact Phone Number: [phone]    DME Needed: No    Action(s) Taken: Per DPA [facility] has transportation today at 1600 for pt. LSW notified pt's care team and met with pt at bedside to provide update. Pt reported he will call his wife to notify her.    Escalations Completed: None    Medically Clear: Yes    Next Steps: Care coordination will complete cobra pending DC summary    Barriers to Discharge: None    Is the patient up for discharge tomorrow: No-anticipated to DC today to [facility] at 1600.

## 2022-06-28 NOTE — DISCHARGE SUMMARY
Discharge Summary    CHIEF COMPLAINT ON ADMISSION  Chief Complaint   Patient presents with   • T-5000 GLF   • Head Injury       Reason for Admission  TBI     Admission Date  6/19/2022    CODE STATUS  DNAR/DNI    HPI & HOSPITAL COURSE  This is a 89 y.o. male here with *a previous medical history that includes multivessel coronary artery disease, hypertension, dyslipidemia, gout, stage III chronic kidney disease, GERD.  Patient presented for evaluation after ground-level fall.  He reported some presyncopal type symptoms leading up to the fall.  In the ED a CT scan was done which noted parietal scalp hematoma however no acute intracranial processes.  He had a trauma work-up with imaging of his cervical spine which was likewise negative for acute process.  In the ED he was however noted to be in atrial fibrillation with a troponin of around 200    While in-house on 6/22, the patient became acutely bradycardic down to the 30s with an underlying A. fib rhythm.  This led to a repeat evaluation by the cardiology service and recommendation of pacemaker placement, which was done later that same day.    In-house the patient was seen by cardiology.  He was noted to have severe coronary artery disease however no targets for revascularization and therefore not an interventional candidate.  Cardiac echo demonstrated mild aortic stenosis, and a preserved ejection fraction..  They recommended ongoing medical therapy.  As regards his atrial fibrillation, they specifically did not recommend anticoagulation due to advanced age, fall risk and anemia.  They felt that this could be followed up as an outpatient and reconsidered at a later date.    As regards his shortness of breath, this did respond to diuresis, and was felt to be secondary to his heart failure.  On discharge, his diuretics have been stopped, due to some hypotension.  In terms of his respiratory status, the patient feels like he is back to normal.    Patient did develop  some acute onset of left foot pain the day prior to discharge.  He had been laying on his Fernando catheter for period of time and feels that that made his leg go numb.  He describes the pain as a tingling sensation.  He does have a history of gout however reports that this pain is not at all like his normal gout symptoms.  Uric acid was checked and was normal for what ever that is worth.  On the day of discharge, the patient's symptoms are improving.  Plain films were negative other than some advanced arthritic changes.  At this point time as the patient is improving and his symptoms do appear to be more like radiculopathy, we would recommend continued supportive measures and follow.    It is unclear if the initial event about the patient and was in due to mechanical ground-level fall or related to his bradycardia.  He has however not had any further episodes since the pacemaker placement here in house.  He is currently being discharged to rehab for further therapy prior to hopeful discharge home.    Therefore, he is discharged in good and stable condition to skilled nursing facility.    The patient met 2-midnight criteria for an inpatient stay at the time of discharge.    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    GERD (gastroesophageal reflux disease) POA: Yes    Acute on chronic respiratory failure with hypoxia (HCC) POA: Yes    Elevated troponin POA: Unknown    Positive D dimer POA: Unknown    Thrombocytopenia (HCC) POA: Unknown    Left foot pain POA: Unknown  Resolved Problems:    Acute kidney injury superimposed on chronic kidney disease (HCC) POA: Unknown    Leg edema POA: Unknown    Hypotension due to drugs POA: Yes     Bradycardia POA: Unknown    Constipation POA: Unknown      FOLLOW UP  Future Appointments   Date Time Provider Department Center   7/1/2022  1:30 PM PACER [facility] None   8/9/2022  2:00 PM BYRON Vincent [facility] None   9/12/2022 11:20 AM Derrick Davey M.D. [facility] None     Stacey Foy M.D.  [address]  [facility] [address]  [phone]    Follow up in 1 week(s)      David Lu M.D.  [address]  [address]  [place] [address]  [phone]    Follow up in 2 week(s)      [address] [address]  [phone]          MEDICATIONS ON DISCHARGE     Medication List      START taking these medications      Instructions   acetaminophen 325 MG Tabs  Commonly known as: Tylenol   Take 2 Tablets by mouth every 6 hours as needed for Mild Pain or Moderate Pain.  Dose: 650 mg     ranolazine 500 MG Tb12  Commonly known as: RANEXA   Take 1 Tablet by mouth every day.  Dose: 500 mg     senna-docusate 8.6-50 MG Tabs  Commonly known as: PERICOLACE or SENOKOT S   Take 2 Tablets by mouth 2 times a day.  Dose: 2 Tablet     sodium chloride 0.65 % Soln  Commonly known as: OCEAN   Administer 2 Sprays into affected nostril(S) every 2 hours as needed for Congestion (congestion).  Dose: 2 Spray        CHANGE how you take these medications      Instructions   metoprolol tartrate 25 MG Tabs  What changed: when to take this  Commonly known as: LOPRESSOR   Doctor's comments: Hold sbp less 105  Take 0.5 Tablets by mouth 2 times a day.  Dose: 12.5 mg        CONTINUE taking these medications      Instructions   aspirin EC 81 MG Tbec  Commonly known as: ECOTRIN   Take 81 mg by mouth every day.  Dose: 81 mg     fluticasone 50 MCG/ACT nasal spray  Commonly known as: FLONASE   Administer 1 Spray into affected nostril(S) every day.  Dose: 1 Spray     rosuvastatin 40 MG tablet  Commonly known as: CRESTOR   Take 1 Tablet by mouth every evening.  Dose: 40 mg        STOP taking these  medications    clopidogrel 75 MG Tabs  Commonly known as: PLAVIX     isosorbide mononitrate SR 60 MG Tb24  Commonly known as: IMDUR     losartan 25 MG Tabs  Commonly known as: COZAAR            Allergies  Allergies   Allergen Reactions   • Atorvastatin Myalgia   • Simvastatin Myalgia       DIET  Orders Placed This Encounter   Procedures   • Diet Order Diet: Cardiac     Standing Status:   Standing     Number of Occurrences:   1     Order Specific Question:   Diet:     Answer:   Cardiac [6]       ACTIVITY  As tolerated.  Weight bearing as tolerated   Patient is to keep his left arm by his side for period of 6 weeks while the pacemaker heals.    CONSULTATIONS  Cardiology    PROCEDURES  Permanent pacemaker placement as noted above    LABORATORY  Lab Results   Component Value Date    SODIUM 140 06/27/2022    POTASSIUM 4.0 06/27/2022    CHLORIDE 98 06/27/2022    CO2 33 06/27/2022    GLUCOSE 121 (H) 06/27/2022    BUN 26 (H) 06/27/2022    CREATININE 2.09 (H) 06/27/2022    CREATININE 1.4 05/06/2008        Lab Results   Component Value Date    WBC 5.4 06/27/2022    HEMOGLOBIN 9.0 (L) 06/27/2022    HEMATOCRIT 28.7 (L) 06/27/2022    PLATELETCT 133 (L) 06/27/2022        Total time of the discharge process exceeds 40 minutes.  I spent the majority of that time with the patient reviewing discharge planning and instructions.  We discussed issues related to his foot pain, pacemaker, other cardiac conditions.  All questions answered.

## 2022-06-29 NOTE — PROGRESS NOTES
Report given to [facility] RN Janell.   Discharge instructions given to patient at bedside, verbalizes understanding and states plans for follow-up with PCP and Cardiologist as recommended. Individualized instruction and education given. Telemetry monitor/IV cathlon removed. All belongings accounted for, all questions answered at this time. [facility] discharged to [facility] with  [facility].

## 2022-07-10 ENCOUNTER — APPOINTMENT (OUTPATIENT)
Dept: RADIOLOGY | Facility: MEDICAL CENTER | Age: 87
End: 2022-07-10
Attending: EMERGENCY MEDICINE
Payer: MEDICARE

## 2022-07-10 ENCOUNTER — HOSPITAL ENCOUNTER (EMERGENCY)
Facility: MEDICAL CENTER | Age: 87
End: 2022-07-10
Attending: EMERGENCY MEDICINE
Payer: MEDICARE

## 2022-07-10 VITALS
TEMPERATURE: 98 F | RESPIRATION RATE: 18 BRPM | WEIGHT: 222 LBS | HEART RATE: 91 BPM | DIASTOLIC BLOOD PRESSURE: 88 MMHG | OXYGEN SATURATION: 89 % | SYSTOLIC BLOOD PRESSURE: 128 MMHG | BODY MASS INDEX: 30.98 KG/M2

## 2022-07-10 DIAGNOSIS — S00.83XA CONTUSION OF FACE, INITIAL ENCOUNTER: ICD-10-CM

## 2022-07-10 DIAGNOSIS — R55 SYNCOPE, UNSPECIFIED SYNCOPE TYPE: ICD-10-CM

## 2022-07-10 DIAGNOSIS — S09.90XA CLOSED HEAD INJURY, INITIAL ENCOUNTER: ICD-10-CM

## 2022-07-10 LAB
ALBUMIN SERPL BCP-MCNC: 3.6 G/DL (ref 3.2–4.9)
ALBUMIN/GLOB SERPL: 1.1 G/DL
ALP SERPL-CCNC: 61 U/L (ref 30–99)
ALT SERPL-CCNC: 14 U/L (ref 2–50)
ANION GAP SERPL CALC-SCNC: 10 MMOL/L (ref 7–16)
AST SERPL-CCNC: 21 U/L (ref 12–45)
BASOPHILS # BLD AUTO: 1.3 % (ref 0–1.8)
BASOPHILS # BLD: 0.07 K/UL (ref 0–0.12)
BILIRUB SERPL-MCNC: 0.5 MG/DL (ref 0.1–1.5)
BUN SERPL-MCNC: 25 MG/DL (ref 8–22)
CALCIUM SERPL-MCNC: 8.7 MG/DL (ref 8.5–10.5)
CHLORIDE SERPL-SCNC: 102 MMOL/L (ref 96–112)
CO2 SERPL-SCNC: 26 MMOL/L (ref 20–33)
CREAT SERPL-MCNC: 2.1 MG/DL (ref 0.5–1.4)
EKG IMPRESSION: NORMAL
EOSINOPHIL # BLD AUTO: 0.28 K/UL (ref 0–0.51)
EOSINOPHIL NFR BLD: 5 % (ref 0–6.9)
ERYTHROCYTE [DISTWIDTH] IN BLOOD BY AUTOMATED COUNT: 55.8 FL (ref 35.9–50)
GFR SERPLBLD CREATININE-BSD FMLA CKD-EPI: 29 ML/MIN/1.73 M 2
GLOBULIN SER CALC-MCNC: 3.4 G/DL (ref 1.9–3.5)
GLUCOSE SERPL-MCNC: 115 MG/DL (ref 65–99)
HCT VFR BLD AUTO: 27.3 % (ref 42–52)
HGB BLD-MCNC: 8.6 G/DL (ref 14–18)
IMM GRANULOCYTES # BLD AUTO: 0.04 K/UL (ref 0–0.11)
IMM GRANULOCYTES NFR BLD AUTO: 0.7 % (ref 0–0.9)
LIPASE SERPL-CCNC: 12 U/L (ref 11–82)
LYMPHOCYTES # BLD AUTO: 0.86 K/UL (ref 1–4.8)
LYMPHOCYTES NFR BLD: 15.4 % (ref 22–41)
MCH RBC QN AUTO: 34.5 PG (ref 27–33)
MCHC RBC AUTO-ENTMCNC: 31.5 G/DL (ref 33.7–35.3)
MCV RBC AUTO: 109.6 FL (ref 81.4–97.8)
MONOCYTES # BLD AUTO: 0.56 K/UL (ref 0–0.85)
MONOCYTES NFR BLD AUTO: 10 % (ref 0–13.4)
NEUTROPHILS # BLD AUTO: 3.79 K/UL (ref 1.82–7.42)
NEUTROPHILS NFR BLD: 67.6 % (ref 44–72)
NRBC # BLD AUTO: 0 K/UL
NRBC BLD-RTO: 0 /100 WBC
PLATELET # BLD AUTO: 185 K/UL (ref 164–446)
PMV BLD AUTO: 11.1 FL (ref 9–12.9)
POTASSIUM SERPL-SCNC: 4.5 MMOL/L (ref 3.6–5.5)
PROT SERPL-MCNC: 7 G/DL (ref 6–8.2)
RBC # BLD AUTO: 2.49 M/UL (ref 4.7–6.1)
SODIUM SERPL-SCNC: 138 MMOL/L (ref 135–145)
TROPONIN T SERPL-MCNC: 40 NG/L (ref 6–19)
TROPONIN T SERPL-MCNC: 45 NG/L (ref 6–19)
WBC # BLD AUTO: 5.6 K/UL (ref 4.8–10.8)

## 2022-07-10 PROCEDURE — 93005 ELECTROCARDIOGRAM TRACING: CPT | Performed by: EMERGENCY MEDICINE

## 2022-07-10 PROCEDURE — 71045 X-RAY EXAM CHEST 1 VIEW: CPT

## 2022-07-10 PROCEDURE — 36415 COLL VENOUS BLD VENIPUNCTURE: CPT

## 2022-07-10 PROCEDURE — 94760 N-INVAS EAR/PLS OXIMETRY 1: CPT

## 2022-07-10 PROCEDURE — 80053 COMPREHEN METABOLIC PANEL: CPT

## 2022-07-10 PROCEDURE — 72125 CT NECK SPINE W/O DYE: CPT | Mod: MF

## 2022-07-10 PROCEDURE — 84484 ASSAY OF TROPONIN QUANT: CPT

## 2022-07-10 PROCEDURE — 99285 EMERGENCY DEPT VISIT HI MDM: CPT

## 2022-07-10 PROCEDURE — 83690 ASSAY OF LIPASE: CPT

## 2022-07-10 PROCEDURE — 70450 CT HEAD/BRAIN W/O DYE: CPT | Mod: MG

## 2022-07-10 PROCEDURE — 85025 COMPLETE CBC W/AUTO DIFF WBC: CPT

## 2022-07-10 RX ORDER — ASPIRIN 81 MG/1
324 TABLET, CHEWABLE ORAL ONCE
Status: DISCONTINUED | OUTPATIENT
Start: 2022-07-10 | End: 2022-07-10

## 2022-07-10 RX ORDER — ASPIRIN 300 MG/1
300 SUPPOSITORY RECTAL ONCE
Status: DISCONTINUED | OUTPATIENT
Start: 2022-07-10 | End: 2022-07-10

## 2022-07-10 ASSESSMENT — FIBROSIS 4 INDEX: FIB4 SCORE: 3.59

## 2022-07-10 NOTE — DISCHARGE INSTRUCTIONS
I cannot find a cause for your passing out.  There is no signs of heart injury.  No signs of brain injury.  I do not know this will happen again and I have no way to prevent it.  Please continue to follow-up with your cardiologist.

## 2022-07-10 NOTE — DISCHARGE PLANNING
Pt ready to return to [facility]. FRANCISCO spoke with Rick to send pt but they do not have transport. FRANCISCO spoke with [facility] ([facility]) and they will be here between [id number]; trip number [id number].     Transfer packet prepared ([facility] and chart copy) and given to BSN.    Subjective:      Amarilis Coronel  is a 36 y.o. female who presents for discussion of cholecystectomy. Per review of [facility] note, pt presented to ED on 22 with c/o substernal chest pain. Pt also reported back pain, nausea, and SOB. US ABD LTD in ED showed hepatic steatosis, cholelithiasis and gallbladder sludge. Pt had [facility] PHA on 22 that showed cystic duct is patent and gallblader uptake is normal, however, there is an abnormal gallbladder response to oral fatty meal with ejection fraction of 8%. This may be indicative of chronic cholecystitis, biliary dyskinesia, or sphincter of Oddi dysfunction. Today, pt reports increased substernal chest pain after eating. She reports this has been occurring over the past 3-4 months and has started to increase in frequency. Past Medical History:   Diagnosis Date    Hypertension     Symptomatic cholelithiasis 2022       Past Surgical History:   Procedure Laterality Date    DELIVERY       HX GYN         Social History     Tobacco Use    Smoking status: Current Some Day Smoker    Smokeless tobacco: Never Used   Substance Use Topics    Alcohol use: Yes       History reviewed. No pertinent family history. Current Outpatient Medications on File Prior to Visit   Medication Sig Dispense Refill    amLODIPine-atorvastatin (CADUET) 10-10 mg per tablet amlodipine      losartan (COZAAR) 50 mg tablet losartan 50 mg tablet      triamterene-hydroCHLOROthiazide (MAXZIDE) 37.5-25 mg per tablet TAKE 1/2 (ONE-HALF) TABLET BY MOUTH ONCE DAILY      phentermine 37.5 mg capsule       omeprazole (PRILOSEC) 40 mg capsule Take 40 mg by mouth daily. No current facility-administered medications on file prior to visit.        No Known Allergies      Review of Systems:  Constitutional: No fever or chills  Neurologic: No headache  Eyes: No scleral icterus or irritated eyes  Nose: No nasal pain or drainage  Mouth: No oral lesions or sore throat  Cardiac: +hypertension  Pulmonary: No cough or shortness or breath  Gastrointestinal: No nausea, emesis, diarrhea, or constipation  Genitourinary: No dysuria  Musculoskeletal: No muscle or joint tenderness  Skin: No rashes or lesions  Psychiatric: No anxiety or depressed mood    Objective:     Visit Vitals  /85   Pulse 98   Temp 98.3 °F (36.8 °C) (Oral)   Resp 18   Ht 5' 5\" (1.651 m)   Wt 187 lb 3.2 oz (84.9 kg)   SpO2 97%   BMI 31.15 kg/m²        Physical Exam:  General: No acute distress, conversant  Eyes: PERRLA, no scleral icterus  HENT: Normocephalic without oral lesions  Neck: Trachea midline without LAD  Cardiac: Normal pulse rate and rhythm  Pulmonary: Symmetric chest rise with normal effort  Abdomen: Soft, NT, ND, no hernia, no splenomegaly  Skin: Warm without rash  Extremities: No edema or joint stiffness  Psych: Appropriate mood and affect    Labs: No results found for this or any previous visit (from the past 24 hour(s)). Data Review: All previous imaging, testing and lab work was reviewed and interpreted. [facility] GB W PHA 5/18/22  IMPRESSION  Cystic duct is patent and gallblader uptake is normal. However, there is an abnormal gallbladder response to oral fatty meal with ejection fraction of 8%. This may be indicative of chronic cholecystitis, biliary dyskinesia, or sphincter of Oddi dysfunction. Assessment and Plan:       ICD-10-CM ICD-9-CM    1. Symptomatic cholelithiasis  K80.20 574.20        Pt will schedule outpatient laparoscopic cholecystectomy. All questions were answered and patient is in agreement with this plan. Total face to face time with patient: 15 minutes. Greater than 50% of the time was spent in counseling. This note was scribed by Nery Jose as dictated by Dr. Emigdio Peterson.      Signed By: Juana Cooper MD     06/01/22

## 2022-07-10 NOTE — ED TRIAGE NOTES
Pt CARLOTTA grey with c/c of a syncopal event today. Pt was walking back from the restroom to his bed. He sat down then had a syncopal event falling forward and striking his head. Pt currently at [facility] after a pacemaker placement last week. Pt stating he has had several syncopal events at [facility].

## 2022-07-10 NOTE — ED PROVIDER NOTES
ED Provider  Scribed for Darrick Lennon D.O. by Markos Adams. 7/10/2022  11:23 AM    Means of arrival:EMS  History obtained from:Patient  History limited by: None noted    CHIEF COMPLAINT  Chief Complaint   Patient presents with   • Syncope   • T-5000 Head Injury       HPI  Kobe Cyr is a 89 y.o. male who presents via EMS as a code TBI for evaluation of multiple episodes of loss of consciousness onset a few days ago. The patient states he also has mild head pain, lower right sided neck pain, and shortness of breath, but denies any dizziness or lightheadedness. He describes getting a pacemaker placed last Saturday, after which he has been staying at [facility]. He notes that he has been losing consciousness multiple times with no discernable common cause. This episode of loss of consciousness occurred after getting out of the bathroom while sitting on the edge of his bed. He describes falling forward and hitting his head in an unwitnessed fall. Per EMS, the patient's vitals were stable en route although he experienced an episode of A-Fib which was resolved with his pacemaker. He is typically on 5 LPM supplemental oxygen at home, and is currently using 6 LPM on arrival. He was previously anticoagulated with Plavix but has recently switched to Aspirin after his pacemaker placement.    REVIEW OF SYSTEMS  See HPI for further details. All other systems are negative.     PAST MEDICAL HISTORY   has a past medical history of Anemia (8/1/2016), Arthritis, Back pain, CAD (coronary artery disease), Cancer (HCC), Chronic kidney disease (CKD), stage II (mild) (8/1/2016), Coronary heart disease, DJD (degenerative joint disease) (8/1/2016), Dyslipidemia (8/1/2016), GERD (gastroesophageal reflux disease) (8/1/2016), [place] measles, Hematochezia (8/1/2016), High cholesterol, Hyperglycemia (1/6/2010), Hyperlipidemia (5/24/2011), Hypertension, Impaired fasting glucose (8/1/2016), Left knee pain (8/1/2016), Myocardial  infarction (HCC) (2 yrs ago), Nasal drainage, Obesity, Osteoarthritis of knees, bilateral (2016), Pulmonary embolism (HCC), Rheumatic fever, Scarlet fever, Smallpox, and Snoring.    SOCIAL HISTORY  Social History     Tobacco Use   • Smoking status: Former Smoker     Packs/day: 2.00     Years: 35.00     Pack years: 70.00     Types: Cigarettes     Quit date: 1973     Years since quittin.2   • Smokeless tobacco: Never Used   Vaping Use   • Vaping Use: Never used   Substance and Sexual Activity   • Alcohol use: Yes     Comment: cocktail every night   • Drug use: No   • Sexual activity: Not noted       SURGICAL HISTORY   has a past surgical history that includes coronary stent procedure lad; knee unicompartmental (10/13/2009); other orthopedic surgery; and arthroscopy, knee.    CURRENT MEDICATIONS  Current Outpatient Medications   Medication Instructions   • acetaminophen (TYLENOL) 650 mg, Oral, EVERY 6 HOURS PRN   • aspirin EC (ECOTRIN) 81 mg, Oral, DAILY   • fluticasone (FLONASE) 50 mcg, Nasal, DAILY   • metoprolol tartrate (LOPRESSOR) 12.5 mg, Oral, 2 TIMES DAILY   • ranolazine (RANEXA) 500 mg, Oral, DAILY   • rosuvastatin (CRESTOR) 40 mg, Oral, EVERY EVENING   • senna-docusate (PERICOLACE OR SENOKOT S) 8.6-50 MG Tab 2 Tablets, Oral, 2 TIMES DAILY   • sodium chloride (OCEAN) 0.65 % Solution 2 Sprays, Nasal, EVERY 2 HOURS PRN       ALLERGIES  Allergies   Allergen Reactions   • Atorvastatin Myalgia   • Simvastatin Myalgia       PHYSICAL EXAM  VITAL SIGNS: /64   Pulse 65   Resp 17   Wt 101 kg (222 lb)   SpO2 98%   BMI 30.98 kg/m²     Constitutional: Alert in no apparent distress.  HENT: Abrasion to the left forehead, mucous membranes are moist   Eyes: Conjunctiva normal, Non-icteric.   Neck: Normal range of motion, Mild tenderness to the C6-C7 area. Supple.  Lymphatic: No lymphadenopathy noted.   Cardiovascular: Regular rate and rhythm, no murmurs.   Thorax & Lungs: Normal breath sounds, No  respiratory distress, No wheezing, No chest tenderness.   Abdomen: Bowel sounds normal, Soft, No tenderness, No masses, No pulsatile masses. No peritoneal signs.  Skin: Warm, Dry, normal color.   Back: No CVA tenderness.   Extremities: No edema, No tenderness, No cyanosis  Musculoskeletal: Good range of motion in all major joints. No major deformities noted.   Neurologic: Alert and oriented x4, Normal motor function, Normal sensory function, No focal deficits noted.   Psychiatric: Affect normal, Judgment normal, Mood normal.         DIAGNOSTIC STUDIES / PROCEDURES    EKG  12 Lead EKG interpreted by me shown below.   Results for orders placed or performed during the hospital encounter of 07/10/22   EKG   Result Value Ref Range    Report       [facility] Emergency Dept.    Test Date:  2022-07-10  Pt Name:    CARRIE CAMPBELL                  Department: ER  MRN:        [id number]                      Room:       [facility]  Gender:     Male                         Technician: [id number]  :        [id number]                   Requested By:EDWARD BRANDON  Order #:    [id number]                    Reading MD: EDWARD BRANDON D.O.    Measurements  Intervals                                Axis  Rate:       76                           P:          0  VA:         212                          QRS:        63  QRSD:       152                          T:          -30  QT:         440  QTc:        495    Interpretive Statements  VENTRICULAR-PACED COMPLEXES  NO FURTHER RHYTHM ANALYSIS ATTEMPTED DUE TO PACED RHYTHM  RIGHT BUNDLE BRANCH BLOCK  Compared to ECG 2022 07:03:13  Atrial fibrillation no longer present  Electronically Signed On 7- 14:51:33 PDT by EDWARD BRANDON D.O.         LABS  Results for orders placed or performed during the hospital encounter of 07/10/22   CBC w/ Differential   Result Value Ref Range    WBC 5.6 4.8 - 10.8 K/uL    RBC 2.49 (L) 4.70 - 6.10 M/uL    Hemoglobin 8.6 (L) 14.0 - 18.0  g/dL    Hematocrit 27.3 (L) 42.0 - 52.0 %    .6 (H) 81.4 - 97.8 fL    MCH 34.5 (H) 27.0 - 33.0 pg    MCHC 31.5 (L) 33.7 - 35.3 g/dL    RDW 55.8 (H) 35.9 - 50.0 fL    Platelet Count 185 164 - 446 K/uL    MPV 11.1 9.0 - 12.9 fL    Neutrophils-Polys 67.60 44.00 - 72.00 %    Lymphocytes 15.40 (L) 22.00 - 41.00 %    Monocytes 10.00 0.00 - 13.40 %    Eosinophils 5.00 0.00 - 6.90 %    Basophils 1.30 0.00 - 1.80 %    Immature Granulocytes 0.70 0.00 - 0.90 %    Nucleated RBC 0.00 /100 WBC    Neutrophils (Absolute) 3.79 1.82 - 7.42 K/uL    Lymphs (Absolute) 0.86 (L) 1.00 - 4.80 K/uL    Monos (Absolute) 0.56 0.00 - 0.85 K/uL    Eos (Absolute) 0.28 0.00 - 0.51 K/uL    Baso (Absolute) 0.07 0.00 - 0.12 K/uL    Immature Granulocytes (abs) 0.04 0.00 - 0.11 K/uL    NRBC (Absolute) 0.00 K/uL   Comp Metabolic Panel   Result Value Ref Range    Sodium 138 135 - 145 mmol/L    Potassium 4.5 3.6 - 5.5 mmol/L    Chloride 102 96 - 112 mmol/L    Co2 26 20 - 33 mmol/L    Anion Gap 10.0 7.0 - 16.0    Glucose 115 (H) 65 - 99 mg/dL    Bun 25 (H) 8 - 22 mg/dL    Creatinine 2.10 (H) 0.50 - 1.40 mg/dL    Calcium 8.7 8.5 - 10.5 mg/dL    AST(SGOT) 21 12 - 45 U/L    ALT(SGPT) 14 2 - 50 U/L    Alkaline Phosphatase 61 30 - 99 U/L    Total Bilirubin 0.5 0.1 - 1.5 mg/dL    Albumin 3.6 3.2 - 4.9 g/dL    Total Protein 7.0 6.0 - 8.2 g/dL    Globulin 3.4 1.9 - 3.5 g/dL    A-G Ratio 1.1 g/dL   TROPONIN   Result Value Ref Range    Troponin T 45 (H) 6 - 19 ng/L   LIPASE   Result Value Ref Range    Lipase 12 11 - 82 U/L   ESTIMATED GFR   Result Value Ref Range    GFR (CKD-EPI) 29 (A) >60 mL/min/1.73 m 2   TROPONIN   Result Value Ref Range    Troponin T 40 (H) 6 - 19 ng/L   EKG   Result Value Ref Range    Report       [facility] Emergency Dept.    Test Date:  2022-07-10  Pt Name:    CARRIE CAMPBELL                  Department: ER  MRN:        [id number]                      Room:        19  Gender:     Male                         Technician:  [id number]  :        [id number]                   Requested By:EDWARD BRANDON  Order #:    [id number]                    Reading MD: EDWARD BRANDON D.O.    Measurements  Intervals                                Axis  Rate:       76                           P:          0  AK:         212                          QRS:        63  QRSD:       152                          T:          -30  QT:         440  QTc:        495    Interpretive Statements  VENTRICULAR-PACED COMPLEXES  NO FURTHER RHYTHM ANALYSIS ATTEMPTED DUE TO PACED RHYTHM  RIGHT BUNDLE BRANCH BLOCK  Compared to ECG 2022 07:03:13  Atrial fibrillation no longer present  Electronically Signed On 7- 14:51:33 PDT by EDWARD BRANDON D.O.     All labs reviewed by me.    RADIOLOGY  DX-CHEST-PORTABLE (1 VIEW)   Final Result      1.  Bilateral interstitial and airspace infiltrates possibly representing chronic interstitial lung disease, ulnar edema or atypical infection.      2.  Cardiomegaly.      CT-CSPINE WITHOUT PLUS RECONS   Final Result      1.  Multilevel degenerative change of cervical spine as seen previously with associated grade 1 anterolisthesis at C3-4 and C4-5, as on prior.   2.  No acute cervical spine fracture.      CT-HEAD W/O   Final Result      1.  Diffuse atrophy and white matter changes.   2.  No acute intracranial hemorrhage or territorial infarct.   3.  LEFT forehead scalp swelling.   4.  Minimal RIGHT mastoid fluid, likely inflammatory.           The radiologist's interpretations of all radiological studies have been reviewed by me.    Films have been independently by me      COURSE  Pertinent Labs & Imaging studies reviewed. (See chart for details)    11:23 AM - Patient seen and examined at bedside. Discussed plan of care. Ordered for DX-Chest, CT-CSpine w/o plus recons, CT-Head w/o, CBC w/diff, CMP, Troponin STAT, Lipase, and EKG to evaluate his symptoms.     1:21 PM - The patient's troponin is elevated to 45, but this is  an improvement from his result of 170 collected 2 weeks ago. Will repeat and do a 2 hour evaluation.    2:01 PM - Ordered for Troponin to evaluate.    2:48 PM - Repeat troponin shows decrease so no indication for recent cardiac injury. Patient was reevaluated at bedside. Patient is currently asymptomatic. He still has a contusion to the left forehead, but the pain is minimal. Discussed lab and radiology results with the patient and informed them of these results. I discussed plan for discharge and follow up as outlined below. The patient is stable for discharge at this time and will return for any new or worsening symptoms. Patient verbalizes understanding and support with my plan for discharge.      MEDICAL DECISION MAKING  This is a 89 y.o. male who presents via EMS as a code TBI for evaluation of multiple episodes of loss of consciousness onset a few days ago.    Patient presented with syncopal episode.  Was sitting on a bed, fell forward.  There was no loss of consciousness.  He has an obvious hematoma to the frontal forehead.    Patient was brought in by EMS, TBI was called and small trauma team was brought to the nursing station where I evaluate the patient was brought immediately to CT scan.  CT showed no sign of bleed.  He does complain of mild neck pain so CT of the neck was also done.    Patient had recent insertion of a pacemaker put in.  And his EKG does show a paced rhythm.  The patient has no other injuries.  His troponin is mildly elevated this is an improvement from his last troponin but a second troponin was done and shows no trend upward in fact is going down.  With this there is no signs of cardiac injury.  Patient is awake alert oriented has a normal neurological exam, the etiology of the syncope is unknown but hospitalization is not indicated at this time and will be transferred back to rehab and he can follow-up as an outpatient with his cardiologist.    The patient is referred to a primary  physician for blood pressure management, diabetic screening, and for all other preventative health concerns.    DISPOSITION:  Patient will be discharged home in stable condition.    FOLLOW UP:  Stacey Foy M.D.  [id number] [facility] [id number] [phone]    In 3 days      FINAL IMPRESSION  1. Closed head injury, initial encounter    2. Contusion of face, initial encounter    3. Syncope, unspecified syncope type         I, Markos Adams (Scribnalini), am scribing for, and in the presence of, Darrick Lennon D.O..    Electronically signed by: Markos Adams (Danielibnalini), 7/10/2022    I, Darrick Lennon D.O. personally performed the services described in this documentation, as scribed by Markos Adams in my presence, and it is both accurate and complete. C.    The note accurately reflects work and decisions made by me.  Darrick Lennon D.O.  7/10/2022  3:42 PM

## 2022-07-10 NOTE — ED NOTES
Discharge teaching and paperwork provided regarding closed head injury and all questions/concerns answered. VSS, assessment stable and PIV removed. Given information regarding home care and reasons to follow up with ED or primary MD. Patient discharged into the care of [facility] & assisted by [facility]/C out of the ED, to [facility]. Stable. On baseline 5L O2.

## 2022-07-10 NOTE — ED NOTES
Skin tear to L Ring finger cleansed with NS & covered with bandaid.Social work arranging [facility] back to [facility].

## 2022-07-13 ENCOUNTER — TELEPHONE (OUTPATIENT)
Dept: CARDIOLOGY | Facility: MEDICAL CENTER | Age: 87
End: 2022-07-13
Payer: MEDICARE

## 2022-07-13 NOTE — TELEPHONE ENCOUNTER
SC      Caller: Kobe Cyr    Topic/issue: Patient's wife called and said he has been admitted to [facility]. They told him that the area around his pacer is infected. She's asking for advice regarding what she should do with her 's  Callback Number: [phone] (home) [phone] (work)      Thank you      -Cornelius HERNANDEZ

## 2022-07-14 NOTE — TELEPHONE ENCOUNTER
"Spoke to patient over phone. Patient states there was a misunderstanding regarding his pacemaker. The site is not infected. Per patient, facility was \"1 day late\" giving him his shower. He states he feels good and will reach out with any concerns.       "

## 2022-08-25 ENCOUNTER — TELEPHONE (OUTPATIENT)
Dept: INTERNAL MEDICINE | Facility: OTHER | Age: 87
End: 2022-08-25
Payer: MEDICARE

## 2022-08-25 NOTE — TELEPHONE ENCOUNTER
Hi patient called asking if he could get a call back. He is wondering why his BP lowers when he exercises. He has been hospitalized for 9 weeks. He wants to know if you can look at his records and what can he do to help his BP situation

## 2022-08-29 ENCOUNTER — TELEPHONE (OUTPATIENT)
Dept: CARDIOLOGY | Facility: MEDICAL CENTER | Age: 87
End: 2022-08-29
Payer: MEDICARE

## 2022-08-29 NOTE — TELEPHONE ENCOUNTER
Discussed pacer with Jo-Ann MARSH Patient scheduled for check on 09/12/22 prior to BE appointment (changed to in person). Spoke to Harrison at [facility]. Discussed in person appointments. Harrison is in agreement and verbalizes understanding.

## 2022-08-29 NOTE — TELEPHONE ENCOUNTER
SC      Caller: Harrison Ramirez    Topic/issue: Pt is in Skilled nursing and they are not sure if he needs to come in for a Pacer Check or if that is done remotely. Please advise if Pt needs a separate appt from the Virtual one scheduled. Please reach out to Harrison so he can arrange transportation if needed.    Callback Number: PH:  [phone] (Harrison)    Thank You  Brittany MORALEZ

## 2022-09-12 ENCOUNTER — APPOINTMENT (OUTPATIENT)
Dept: CARDIOLOGY | Facility: MEDICAL CENTER | Age: 87
End: 2022-09-12
Payer: MEDICARE

## 2022-09-12 ENCOUNTER — APPOINTMENT (OUTPATIENT)
Dept: RADIOLOGY | Facility: MEDICAL CENTER | Age: 87
DRG: 189 | End: 2022-09-12
Payer: MEDICARE

## 2022-09-12 ENCOUNTER — APPOINTMENT (OUTPATIENT)
Dept: RADIOLOGY | Facility: MEDICAL CENTER | Age: 87
DRG: 189 | End: 2022-09-12
Attending: EMERGENCY MEDICINE
Payer: MEDICARE

## 2022-09-12 ENCOUNTER — HOSPITAL ENCOUNTER (INPATIENT)
Facility: MEDICAL CENTER | Age: 87
LOS: 3 days | DRG: 189 | End: 2022-09-15
Attending: EMERGENCY MEDICINE | Admitting: STUDENT IN AN ORGANIZED HEALTH CARE EDUCATION/TRAINING PROGRAM
Payer: MEDICARE

## 2022-09-12 ENCOUNTER — OFFICE VISIT (OUTPATIENT)
Dept: CARDIOLOGY | Facility: MEDICAL CENTER | Age: 87
End: 2022-09-12
Payer: MEDICARE

## 2022-09-12 VITALS
HEIGHT: 70 IN | HEART RATE: 102 BPM | WEIGHT: 222 LBS | DIASTOLIC BLOOD PRESSURE: 40 MMHG | RESPIRATION RATE: 14 BRPM | SYSTOLIC BLOOD PRESSURE: 80 MMHG | OXYGEN SATURATION: 70 % | BODY MASS INDEX: 31.78 KG/M2

## 2022-09-12 DIAGNOSIS — Z95.0 CARDIAC PACEMAKER IN SITU: ICD-10-CM

## 2022-09-12 DIAGNOSIS — I25.83 CORONARY ARTERY DISEASE DUE TO LIPID RICH PLAQUE: ICD-10-CM

## 2022-09-12 DIAGNOSIS — I35.0 MILD AORTIC STENOSIS: ICD-10-CM

## 2022-09-12 DIAGNOSIS — R79.89: ICD-10-CM

## 2022-09-12 DIAGNOSIS — I48.19 PERSISTENT ATRIAL FIBRILLATION (HCC): ICD-10-CM

## 2022-09-12 DIAGNOSIS — R60.0 EDEMA OF LEFT UPPER ARM: ICD-10-CM

## 2022-09-12 DIAGNOSIS — R06.00 DYSPNEA, UNSPECIFIED TYPE: ICD-10-CM

## 2022-09-12 DIAGNOSIS — R09.02 HYPOXIA: ICD-10-CM

## 2022-09-12 DIAGNOSIS — I25.10 CORONARY ARTERY DISEASE DUE TO LIPID RICH PLAQUE: ICD-10-CM

## 2022-09-12 PROBLEM — J18.9 SEPSIS DUE TO PNEUMONIA (HCC): Status: ACTIVE | Noted: 2022-09-12

## 2022-09-12 PROBLEM — J18.9 COMMUNITY ACQUIRED PNEUMONIA: Status: ACTIVE | Noted: 2022-09-12

## 2022-09-12 PROBLEM — A41.9 SEPSIS DUE TO PNEUMONIA (HCC): Status: ACTIVE | Noted: 2022-09-12

## 2022-09-12 PROBLEM — J96.01 ACUTE RESPIRATORY FAILURE WITH HYPOXIA (HCC): Status: ACTIVE | Noted: 2022-09-12

## 2022-09-12 PROBLEM — I48.91 AF (ATRIAL FIBRILLATION) (HCC): Status: ACTIVE | Noted: 2022-09-12

## 2022-09-12 LAB
ALBUMIN SERPL BCP-MCNC: 3.4 G/DL (ref 3.2–4.9)
ALBUMIN/GLOB SERPL: 0.8 G/DL
ALP SERPL-CCNC: 88 U/L (ref 30–99)
ALT SERPL-CCNC: 11 U/L (ref 2–50)
ANION GAP SERPL CALC-SCNC: 13 MMOL/L (ref 7–16)
APPEARANCE UR: CLEAR
AST SERPL-CCNC: 16 U/L (ref 12–45)
BACTERIA #/AREA URNS HPF: NEGATIVE /HPF
BASOPHILS # BLD AUTO: 1 % (ref 0–1.8)
BASOPHILS # BLD: 0.08 K/UL (ref 0–0.12)
BILIRUB SERPL-MCNC: 0.9 MG/DL (ref 0.1–1.5)
BILIRUB UR QL STRIP.AUTO: NEGATIVE
BUN SERPL-MCNC: 28 MG/DL (ref 8–22)
CALCIUM SERPL-MCNC: 9.3 MG/DL (ref 8.5–10.5)
CHLORIDE SERPL-SCNC: 97 MMOL/L (ref 96–112)
CO2 SERPL-SCNC: 28 MMOL/L (ref 20–33)
COLOR UR: YELLOW
CREAT SERPL-MCNC: 1.89 MG/DL (ref 0.5–1.4)
EKG IMPRESSION: NORMAL
EOSINOPHIL # BLD AUTO: 0.08 K/UL (ref 0–0.51)
EOSINOPHIL NFR BLD: 1 % (ref 0–6.9)
EPI CELLS #/AREA URNS HPF: NEGATIVE /HPF
ERYTHROCYTE [DISTWIDTH] IN BLOOD BY AUTOMATED COUNT: 63 FL (ref 35.9–50)
FLUAV RNA SPEC QL NAA+PROBE: NEGATIVE
FLUBV RNA SPEC QL NAA+PROBE: NEGATIVE
FOLATE SERPL-MCNC: 9.7 NG/ML
GFR SERPLBLD CREATININE-BSD FMLA CKD-EPI: 33 ML/MIN/1.73 M 2
GLOBULIN SER CALC-MCNC: 4.5 G/DL (ref 1.9–3.5)
GLUCOSE SERPL-MCNC: 119 MG/DL (ref 65–99)
GLUCOSE UR STRIP.AUTO-MCNC: NEGATIVE MG/DL
HCT VFR BLD AUTO: 29.5 % (ref 42–52)
HGB BLD-MCNC: 9.3 G/DL (ref 14–18)
HYALINE CASTS #/AREA URNS LPF: ABNORMAL /LPF
IMM GRANULOCYTES # BLD AUTO: 0.08 K/UL (ref 0–0.11)
IMM GRANULOCYTES NFR BLD AUTO: 1 % (ref 0–0.9)
INR PPP: 1.29 (ref 0.87–1.13)
KETONES UR STRIP.AUTO-MCNC: NEGATIVE MG/DL
LACTATE SERPL-SCNC: 2.1 MMOL/L (ref 0.5–2)
LEUKOCYTE ESTERASE UR QL STRIP.AUTO: ABNORMAL
LYMPHOCYTES # BLD AUTO: 0.92 K/UL (ref 1–4.8)
LYMPHOCYTES NFR BLD: 11.1 % (ref 22–41)
MAGNESIUM SERPL-MCNC: 2.1 MG/DL (ref 1.5–2.5)
MCH RBC QN AUTO: 34.3 PG (ref 27–33)
MCHC RBC AUTO-ENTMCNC: 31.5 G/DL (ref 33.7–35.3)
MCV RBC AUTO: 108.9 FL (ref 81.4–97.8)
MICRO URNS: ABNORMAL
MONOCYTES # BLD AUTO: 0.93 K/UL (ref 0–0.85)
MONOCYTES NFR BLD AUTO: 11.2 % (ref 0–13.4)
NEUTROPHILS # BLD AUTO: 6.2 K/UL (ref 1.82–7.42)
NEUTROPHILS NFR BLD: 74.7 % (ref 44–72)
NITRITE UR QL STRIP.AUTO: NEGATIVE
NRBC # BLD AUTO: 0 K/UL
NRBC BLD-RTO: 0 /100 WBC
PH UR STRIP.AUTO: 6.5 [PH] (ref 5–8)
PLATELET # BLD AUTO: 222 K/UL (ref 164–446)
PMV BLD AUTO: 10 FL (ref 9–12.9)
POTASSIUM SERPL-SCNC: 4.1 MMOL/L (ref 3.6–5.5)
PROCALCITONIN SERPL-MCNC: 0.2 NG/ML
PROT SERPL-MCNC: 7.9 G/DL (ref 6–8.2)
PROT UR QL STRIP: 30 MG/DL
PROTHROMBIN TIME: 15.9 SEC (ref 12–14.6)
RBC # BLD AUTO: 2.71 M/UL (ref 4.7–6.1)
RBC # URNS HPF: ABNORMAL /HPF
RBC UR QL AUTO: NEGATIVE
RSV RNA SPEC QL NAA+PROBE: NEGATIVE
SARS-COV-2 RNA RESP QL NAA+PROBE: NOTDETECTED
SODIUM SERPL-SCNC: 138 MMOL/L (ref 135–145)
SP GR UR STRIP.AUTO: 1.02
SPECIMEN SOURCE: NORMAL
TROPONIN T SERPL-MCNC: 38 NG/L (ref 6–19)
TROPONIN T SERPL-MCNC: 42 NG/L (ref 6–19)
UROBILINOGEN UR STRIP.AUTO-MCNC: 1 MG/DL
VIT B12 SERPL-MCNC: >4000 PG/ML (ref 211–911)
WBC # BLD AUTO: 8.3 K/UL (ref 4.8–10.8)
WBC #/AREA URNS HPF: ABNORMAL /HPF

## 2022-09-12 PROCEDURE — 87040 BLOOD CULTURE FOR BACTERIA: CPT | Mod: 91

## 2022-09-12 PROCEDURE — 96374 THER/PROPH/DIAG INJ IV PUSH: CPT

## 2022-09-12 PROCEDURE — 84145 PROCALCITONIN (PCT): CPT

## 2022-09-12 PROCEDURE — 83605 ASSAY OF LACTIC ACID: CPT

## 2022-09-12 PROCEDURE — 99215 OFFICE O/P EST HI 40 MIN: CPT | Performed by: INTERNAL MEDICINE

## 2022-09-12 PROCEDURE — 82746 ASSAY OF FOLIC ACID SERUM: CPT

## 2022-09-12 PROCEDURE — 700111 HCHG RX REV CODE 636 W/ 250 OVERRIDE (IP): Performed by: EMERGENCY MEDICINE

## 2022-09-12 PROCEDURE — 82607 VITAMIN B-12: CPT

## 2022-09-12 PROCEDURE — 80053 COMPREHEN METABOLIC PANEL: CPT

## 2022-09-12 PROCEDURE — 700102 HCHG RX REV CODE 250 W/ 637 OVERRIDE(OP): Performed by: GENERAL PRACTICE

## 2022-09-12 PROCEDURE — C9803 HOPD COVID-19 SPEC COLLECT: HCPCS | Performed by: GENERAL PRACTICE

## 2022-09-12 PROCEDURE — 87086 URINE CULTURE/COLONY COUNT: CPT

## 2022-09-12 PROCEDURE — 770020 HCHG ROOM/CARE - TELE (206)

## 2022-09-12 PROCEDURE — 83735 ASSAY OF MAGNESIUM: CPT

## 2022-09-12 PROCEDURE — 84484 ASSAY OF TROPONIN QUANT: CPT

## 2022-09-12 PROCEDURE — 99223 1ST HOSP IP/OBS HIGH 75: CPT | Mod: AI,GC | Performed by: STUDENT IN AN ORGANIZED HEALTH CARE EDUCATION/TRAINING PROGRAM

## 2022-09-12 PROCEDURE — 93005 ELECTROCARDIOGRAM TRACING: CPT | Performed by: EMERGENCY MEDICINE

## 2022-09-12 PROCEDURE — 71045 X-RAY EXAM CHEST 1 VIEW: CPT

## 2022-09-12 PROCEDURE — 85610 PROTHROMBIN TIME: CPT

## 2022-09-12 PROCEDURE — 93005 ELECTROCARDIOGRAM TRACING: CPT

## 2022-09-12 PROCEDURE — 81001 URINALYSIS AUTO W/SCOPE: CPT

## 2022-09-12 PROCEDURE — 93971 EXTREMITY STUDY: CPT | Mod: LT

## 2022-09-12 PROCEDURE — 700105 HCHG RX REV CODE 258: Performed by: GENERAL PRACTICE

## 2022-09-12 PROCEDURE — 99285 EMERGENCY DEPT VISIT HI MDM: CPT

## 2022-09-12 PROCEDURE — 36415 COLL VENOUS BLD VENIPUNCTURE: CPT

## 2022-09-12 PROCEDURE — 85025 COMPLETE CBC W/AUTO DIFF WBC: CPT

## 2022-09-12 PROCEDURE — 0241U HCHG SARS-COV-2 COVID-19 NFCT DS RESP RNA 4 TRGT MIC: CPT

## 2022-09-12 PROCEDURE — A9270 NON-COVERED ITEM OR SERVICE: HCPCS | Performed by: GENERAL PRACTICE

## 2022-09-12 RX ORDER — ECHINACEA PURPUREA EXTRACT 125 MG
2 TABLET ORAL
Status: DISCONTINUED | OUTPATIENT
Start: 2022-09-12 | End: 2022-09-15 | Stop reason: HOSPADM

## 2022-09-12 RX ORDER — RANOLAZINE 500 MG/1
500 TABLET, EXTENDED RELEASE ORAL DAILY
Status: DISCONTINUED | OUTPATIENT
Start: 2022-09-13 | End: 2022-09-15 | Stop reason: HOSPADM

## 2022-09-12 RX ORDER — BISACODYL 10 MG
10 SUPPOSITORY, RECTAL RECTAL
Status: DISCONTINUED | OUTPATIENT
Start: 2022-09-12 | End: 2022-09-15 | Stop reason: HOSPADM

## 2022-09-12 RX ORDER — CEFTRIAXONE 2 G/1
2 INJECTION, POWDER, FOR SOLUTION INTRAMUSCULAR; INTRAVENOUS ONCE
Status: COMPLETED | OUTPATIENT
Start: 2022-09-12 | End: 2022-09-12

## 2022-09-12 RX ORDER — CLOPIDOGREL BISULFATE 75 MG/1
75 TABLET ORAL DAILY
Status: ON HOLD | COMMUNITY
End: 2022-09-15

## 2022-09-12 RX ORDER — AMOXICILLIN 250 MG
2 CAPSULE ORAL 2 TIMES DAILY
Status: DISCONTINUED | OUTPATIENT
Start: 2022-09-12 | End: 2022-09-15 | Stop reason: HOSPADM

## 2022-09-12 RX ORDER — ACETAMINOPHEN 325 MG/1
650 TABLET ORAL EVERY 6 HOURS PRN
Status: DISCONTINUED | OUTPATIENT
Start: 2022-09-12 | End: 2022-09-15 | Stop reason: HOSPADM

## 2022-09-12 RX ORDER — ROSUVASTATIN CALCIUM 20 MG/1
40 TABLET, COATED ORAL EVERY EVENING
Status: DISCONTINUED | OUTPATIENT
Start: 2022-09-12 | End: 2022-09-15 | Stop reason: HOSPADM

## 2022-09-12 RX ORDER — SODIUM CHLORIDE 9 MG/ML
1000 INJECTION, SOLUTION INTRAVENOUS ONCE
Status: COMPLETED | OUTPATIENT
Start: 2022-09-12 | End: 2022-09-12

## 2022-09-12 RX ORDER — IPRATROPIUM BROMIDE AND ALBUTEROL SULFATE 2.5; .5 MG/3ML; MG/3ML
3 SOLUTION RESPIRATORY (INHALATION)
Status: DISCONTINUED | OUTPATIENT
Start: 2022-09-12 | End: 2022-09-15 | Stop reason: HOSPADM

## 2022-09-12 RX ORDER — ISOSORBIDE DINITRATE 40 MG/1
60 TABLET ORAL 3 TIMES DAILY
Status: ON HOLD | COMMUNITY
End: 2022-09-15

## 2022-09-12 RX ORDER — DOXYCYCLINE 100 MG/1
100 TABLET ORAL EVERY 12 HOURS
Status: DISCONTINUED | OUTPATIENT
Start: 2022-09-12 | End: 2022-09-13

## 2022-09-12 RX ORDER — GUAIFENESIN 600 MG/1
600 TABLET, EXTENDED RELEASE ORAL EVERY 12 HOURS PRN
Status: DISCONTINUED | OUTPATIENT
Start: 2022-09-12 | End: 2022-09-15 | Stop reason: HOSPADM

## 2022-09-12 RX ORDER — POLYETHYLENE GLYCOL 3350 17 G/17G
1 POWDER, FOR SOLUTION ORAL
Status: DISCONTINUED | OUTPATIENT
Start: 2022-09-12 | End: 2022-09-15 | Stop reason: HOSPADM

## 2022-09-12 RX ADMIN — METOPROLOL TARTRATE 12.5 MG: 25 TABLET, FILM COATED ORAL at 23:37

## 2022-09-12 RX ADMIN — CEFTRIAXONE SODIUM 2 G: 2 INJECTION, POWDER, FOR SOLUTION INTRAMUSCULAR; INTRAVENOUS at 16:47

## 2022-09-12 RX ADMIN — DOXYCYCLINE 100 MG: 100 TABLET, FILM COATED ORAL at 20:28

## 2022-09-12 RX ADMIN — ROSUVASTATIN CALCIUM 40 MG: 20 TABLET, FILM COATED ORAL at 23:37

## 2022-09-12 RX ADMIN — APIXABAN 2.5 MG: 5 TABLET, FILM COATED ORAL at 22:11

## 2022-09-12 RX ADMIN — SODIUM CHLORIDE 1000 ML: 9 INJECTION, SOLUTION INTRAVENOUS at 20:45

## 2022-09-12 ASSESSMENT — LIFESTYLE VARIABLES
TOTAL SCORE: 0
ON A TYPICAL DAY WHEN YOU DRINK ALCOHOL HOW MANY DRINKS DO YOU HAVE: 0
HAVE PEOPLE ANNOYED YOU BY CRITICIZING YOUR DRINKING: NO
HAVE YOU EVER FELT YOU SHOULD CUT DOWN ON YOUR DRINKING: NO
TOTAL SCORE: 0
EVER HAD A DRINK FIRST THING IN THE MORNING TO STEADY YOUR NERVES TO GET RID OF A HANGOVER: NO
HOW MANY TIMES IN THE PAST YEAR HAVE YOU HAD 5 OR MORE DRINKS IN A DAY: 0
EVER FELT BAD OR GUILTY ABOUT YOUR DRINKING: NO
CONSUMPTION TOTAL: NEGATIVE
AVERAGE NUMBER OF DAYS PER WEEK YOU HAVE A DRINK CONTAINING ALCOHOL: 0
ALCOHOL_USE: NO
TOTAL SCORE: 0

## 2022-09-12 ASSESSMENT — COGNITIVE AND FUNCTIONAL STATUS - GENERAL
SUGGESTED CMS G CODE MODIFIER DAILY ACTIVITY: CK
MOVING FROM LYING ON BACK TO SITTING ON SIDE OF FLAT BED: A LOT
DAILY ACTIVITIY SCORE: 17
MOBILITY SCORE: 12
DRESSING REGULAR UPPER BODY CLOTHING: A LITTLE
TOILETING: A LOT
HELP NEEDED FOR BATHING: A LOT
MOVING TO AND FROM BED TO CHAIR: A LOT
WALKING IN HOSPITAL ROOM: A LOT
DRESSING REGULAR LOWER BODY CLOTHING: A LOT
CLIMB 3 TO 5 STEPS WITH RAILING: TOTAL
STANDING UP FROM CHAIR USING ARMS: A LOT
SUGGESTED CMS G CODE MODIFIER MOBILITY: CL
TURNING FROM BACK TO SIDE WHILE IN FLAT BAD: A LITTLE

## 2022-09-12 ASSESSMENT — FIBROSIS 4 INDEX
FIB4 SCORE: 2.76
FIB4 SCORE: 1.98
FIB4 SCORE: 2.76

## 2022-09-12 ASSESSMENT — ENCOUNTER SYMPTOMS
WHEEZING: 0
PALPITATIONS: 0
HEADACHES: 0
CHILLS: 0
ABDOMINAL PAIN: 0
DIZZINESS: 0
SPUTUM PRODUCTION: 1
FEVER: 0
SHORTNESS OF BREATH: 1
VOMITING: 0
COUGH: 1
WEAKNESS: 0
NAUSEA: 0

## 2022-09-12 ASSESSMENT — PATIENT HEALTH QUESTIONNAIRE - PHQ9
2. FEELING DOWN, DEPRESSED, IRRITABLE, OR HOPELESS: NOT AT ALL
1. LITTLE INTEREST OR PLEASURE IN DOING THINGS: NOT AT ALL
SUM OF ALL RESPONSES TO PHQ9 QUESTIONS 1 AND 2: 0

## 2022-09-12 NOTE — ED NOTES
Pt back to room stating a nurse from his care home out in [place] brought him here today because he was doing rehab today and pt reporting his heart rate dropped very low. Pt stating recent placement of pace maker and his cardiologist told him to come here

## 2022-09-12 NOTE — PROGRESS NOTES
CARDIOLOGY OUTPATIENT FOLLOWUP    PCP: Stacey Foy M.D.    1. Persistent atrial fibrillation (HCC)    2. Hypoxia    3. Coronary artery disease due to lipid rich plaque    4. Cardiac pacemaker in situ    5. Edema of left upper arm    6. Mild aortic stenosis      Kobe Cyr is experiencing worsening hypoxia along with new edema in the left arm after recent pacemaker placement.  I recommend that the patient be evaluated in the emergency room regarding his hypoxia-respiratory favored over cardiac; away CXR/BNP.  I also recommend antiplatelet therapy be replaced with anticoagulation and a left upper extremity duplex ultrasound be obtained.  It may help also be reasonable to reduce Isordil to alleviate hypotension.    Follow up: 6 weeks    Chief Complaint   Patient presents with    Hyperlipidemia     F/V Dx: Mixed hyperlipidemia    Coronary Artery Disease     F/V Dx: Coronary artery disease involving native coronary artery of native heart without angina pectoris       History: Kobe Cyr is a 89 y.o. male with history of obstructive coronary disease, poor targets for revascularization, atrial fibrillation, bradycardia with pacemaker, mild aortic stenosis, COPD presenting for follow-up.    In June he fell out of his wheelchair, suffered a scalp hematoma and evaluation revealed episodes of bradycardia as well as new atrial fibrillation.  A pacemaker was placed.  His wife's health has been continuing to deteriorate such that she could no longer attend to his needs at home and accordingly 2 weeks ago he moved to a assisted living facility.  While at the facility he has had persistent hypoxia and hypotension.  His exertional capacity has been gradually declining.  He has also developed new left arm edema.    He shares with me that he was very disappointed leaving his [facility]  when moving to the new facility.      ROS:   10 point review systems is otherwise negative except as per the HPI    PE:  BP  "(!) 80/40 (BP Location: Right arm, Patient Position: Sitting, BP Cuff Size: Adult)   Pulse (!) 102   Resp 14   Ht 1.778 m (5' 10\")   Wt 101 kg (222 lb)   SpO2 (!) 70%   BMI 31.85 kg/m²   Gen: no acute distress  HEENT: Symmetric face. Anicteric sclerae. Moist mucus membranes  NECK: No JVD. No lymphadenopathy  CARDIAC: Regular, Normal S1, S2, +systolic murmur  VASCULATURE: carotids are normal bilaterally without bruit  RESP: Bilateral rales, right worse than left  ABD: Soft, non-tender, non-distended  EXT:  Trace lower extremity edema, edematous left upper extremity , no clubbing or cyanosis  SKIN: Warm and dry  NEURO: No gross deficits  PSYCH: Appropriate affect, participates in conversation    The ASCVD Risk score (Lashawn ISAIAS Jr, et al., 2013) failed to calculate.    Past Medical History:   Diagnosis Date    Anemia 8/1/2016    Arthritis     Back pain     CAD (coronary artery disease)     Cancer (HCC)     skin    Chronic kidney disease (CKD), stage II (mild) 8/1/2016    Coronary heart disease     DJD (degenerative joint disease) 8/1/2016    Dyslipidemia 8/1/2016    GERD (gastroesophageal reflux disease) 8/1/2016    [place] measles     Hematochezia 8/1/2016    High cholesterol     Hyperglycemia 1/6/2010    Hyperlipidemia 5/24/2011    Hypertension     Impaired fasting glucose 8/1/2016    Left knee pain 8/1/2016    Myocardial infarction (HCC) 2 yrs ago    2005    Nasal drainage     Obesity     Osteoarthritis of knees, bilateral 8/1/2016    Pulmonary embolism (HCC)     Rheumatic fever     Scarlet fever     Smallpox     Snoring      Allergies   Allergen Reactions    Atorvastatin Myalgia    Simvastatin Myalgia     Outpatient Encounter Medications as of 9/12/2022   Medication Sig Dispense Refill    clopidogrel (PLAVIX) 75 MG Tab Take 75 mg by mouth every day.      isosorbide dinitrate (ISORDIL) 40 MG Tab Take 60 mg by mouth 3 times a day.      metoprolol tartrate (LOPRESSOR) 25 MG Tab Take 0.5 Tablets by mouth 2 times a " day. 60 Tablet 0    ranolazine (RANEXA) 500 MG TABLET SR 12 HR Take 1 Tablet by mouth every day. 60 Tablet 3    senna-docusate (PERICOLACE OR SENOKOT S) 8.6-50 MG Tab Take 2 Tablets by mouth 2 times a day. 30 Tablet 0    sodium chloride (OCEAN) 0.65 % Solution Administer 2 Sprays into affected nostril(S) every 2 hours as needed for Congestion (congestion).  3    aspirin EC (ECOTRIN) 81 MG Tablet Delayed Response Take 81 mg by mouth every day.      rosuvastatin (CRESTOR) 40 MG tablet Take 1 Tablet by mouth every evening. 90 Tablet 3    [DISCONTINUED] acetaminophen (TYLENOL) 325 MG Tab Take 2 Tablets by mouth every 6 hours as needed for Mild Pain or Moderate Pain. (Patient not taking: Reported on 2022) 30 Tablet 0    [DISCONTINUED] fluticasone (FLONASE) 50 MCG/ACT nasal spray Administer 1 Spray into affected nostril(S) every day. (Patient not taking: Reported on 2022) 16 g 2     No facility-administered encounter medications on file as of 2022.     Social History     Socioeconomic History    Marital status:      Spouse name: Not on file    Number of children: Not on file    Years of education: Not on file    Highest education level: Not on file   Occupational History    Not on file   Tobacco Use    Smoking status: Former     Packs/day: 2.00     Years: 35.00     Pack years: 70.00     Types: Cigarettes     Quit date: 1973     Years since quittin.4    Smokeless tobacco: Never   Vaping Use    Vaping Use: Never used   Substance and Sexual Activity    Alcohol use: Not Currently     Comment: cocktail every night    Drug use: No    Sexual activity: Not on file   Other Topics Concern    Not on file   Social History Narrative    Not on file     Social Determinants of Health     Financial Resource Strain: Not on file   Food Insecurity: Not on file   Transportation Needs: Not on file   Physical Activity: Not on file   Stress: Not on file   Social Connections: Not on file   Intimate Partner  Violence: Not on file   Housing Stability: Not on file       Studies  Lab Results   Component Value Date/Time    CHOLSTRLTOT 77 (L) 06/20/2022 02:22 AM    LDL 25 06/20/2022 02:22 AM    HDL 39 (A) 06/20/2022 02:22 AM    TRIGLYCERIDE 63 06/20/2022 02:22 AM       Lab Results   Component Value Date/Time    SODIUM 138 07/10/2022 12:39 PM    POTASSIUM 4.5 07/10/2022 12:39 PM    CHLORIDE 102 07/10/2022 12:39 PM    CO2 26 07/10/2022 12:39 PM    GLUCOSE 115 (H) 07/10/2022 12:39 PM    BUN 25 (H) 07/10/2022 12:39 PM    CREATININE 2.10 (H) 07/10/2022 12:39 PM    CREATININE 1.4 05/06/2008 11:42 AM     Lab Results   Component Value Date/Time    ALKPHOSPHAT 61 07/10/2022 12:39 PM    ASTSGOT 21 07/10/2022 12:39 PM    ALTSGPT 14 07/10/2022 12:39 PM    TBILIRUBIN 0.5 07/10/2022 12:39 PM        For this encounter I reviewed the following medical records BMP, Lipid profile, and CBC

## 2022-09-12 NOTE — ED PROVIDER NOTES
ED Provider Note    CHIEF COMPLAINT  Chief Complaint   Patient presents with    Shortness of Breath     Pt sent from [facility] [place] by Dr. Davey for increase SOB today. Pts RA sat was 80% upon arrival. Pt states he normally wears 4.5L of oxygen, pt placed on 8L     Arm Swelling     Pt has L arm swelling x 2 days       HPI  Kobe Cyr is a 89 y.o. male here for evaluation of shortness of breath and left arm swelling.  Patient was sent over from Dr. Aldana office,  For a low oxygen saturation, cough, and arm swelling.  Patient is normally on 4 L of oxygen at home, and nothing is changed.  He required some additional oxygen at the office, but then was feeling better when he arrived here.  Patient has no chest pain, no abdominal pain, and no back pain.      ROS  See HPI for further details, o/w negative.     PAST MEDICAL HISTORY   has a past medical history of Anemia (2016), Arthritis, Back pain, CAD (coronary artery disease), Cancer (HCC), Chronic kidney disease (CKD), stage II (mild) (2016), Coronary heart disease, DJD (degenerative joint disease) (2016), Dyslipidemia (2016), GERD (gastroesophageal reflux disease) (2016), [place] measles, Hematochezia (2016), High cholesterol, Hyperglycemia (2010), Hyperlipidemia (2011), Hypertension, Impaired fasting glucose (2016), Left knee pain (2016), Myocardial infarction (HCC) (2 yrs ago), Nasal drainage, Obesity, Osteoarthritis of knees, bilateral (2016), Pulmonary embolism ([facility]), Rheumatic fever, Scarlet fever, Smallpox, and Snoring.    SOCIAL HISTORY  Social History     Tobacco Use    Smoking status: Former     Packs/day: 2.00     Years: 35.00     Pack years: 70.00     Types: Cigarettes     Quit date: 1973     Years since quittin.4    Smokeless tobacco: Never   Vaping Use    Vaping Use: Never used   Substance and Sexual Activity    Alcohol use: Not Currently     Comment: cocktail every night    Drug use: No     Sexual activity: Not on file       Family History  No bleeding disorders    SURGICAL HISTORY   has a past surgical history that includes coronary stent procedure lad; knee unicompartmental (10/13/2009); other orthopedic surgery; and arthroscopy, knee.    CURRENT MEDICATIONS  Home Medications       Reviewed by Claudia Soriano R.N. (Registered Nurse) on 09/12/22 at 1247  Med List Status: Partial     Medication Last Dose Status   aspirin EC (ECOTRIN) 81 MG Tablet Delayed Response  Active   clopidogrel (PLAVIX) 75 MG Tab  Active   isosorbide dinitrate (ISORDIL) 40 MG Tab  Active   metoprolol tartrate (LOPRESSOR) 25 MG Tab  Active   ranolazine (RANEXA) 500 MG TABLET SR 12 HR  Active   rosuvastatin (CRESTOR) 40 MG tablet  Active   senna-docusate (PERICOLACE OR SENOKOT S) 8.6-50 MG Tab  Active   sodium chloride (OCEAN) 0.65 % Solution  Active                    ALLERGIES  Allergies   Allergen Reactions    Atorvastatin Myalgia    Simvastatin Myalgia       REVIEW OF SYSTEMS  See HPI for further details. Review of systems as above, otherwise all other systems are negative.     PHYSICAL EXAM  Constitutional: Well developed, well nourished.  Mild acute distress.  HEENT: Normocephalic, atraumatic. Posterior pharynx clear and moist.  Eyes:  EOMI. Normal sclera.  Neck: Supple, Full range of motion, nontender.  Chest/Pulmonary: Diminished breath sounds, equal expansion  Cardio: Regular rate and rhythm with no murmur.   Abdomen: Soft, nontender. No peritoneal signs. No guarding. No palpable masses.  Musculoskeletal: No deformity, no edema, neurovascular intact.   Neuro: Clear speech, appropriate, cooperative, cranial nerves II-XII grossly intact.  Psych: Normal mood and affect    Results for orders placed or performed during the hospital encounter of 09/12/22   CBC with Differential   Result Value Ref Range    WBC 8.3 4.8 - 10.8 K/uL    RBC 2.71 (L) 4.70 - 6.10 M/uL    Hemoglobin 9.3 (L) 14.0 - 18.0 g/dL    Hematocrit 29.5 (L) 42.0 -  52.0 %    .9 (H) 81.4 - 97.8 fL    MCH 34.3 (H) 27.0 - 33.0 pg    MCHC 31.5 (L) 33.7 - 35.3 g/dL    RDW 63.0 (H) 35.9 - 50.0 fL    Platelet Count 222 164 - 446 K/uL    MPV 10.0 9.0 - 12.9 fL    Neutrophils-Polys 74.70 (H) 44.00 - 72.00 %    Lymphocytes 11.10 (L) 22.00 - 41.00 %    Monocytes 11.20 0.00 - 13.40 %    Eosinophils 1.00 0.00 - 6.90 %    Basophils 1.00 0.00 - 1.80 %    Immature Granulocytes 1.00 (H) 0.00 - 0.90 %    Nucleated RBC 0.00 /100 WBC    Neutrophils (Absolute) 6.20 1.82 - 7.42 K/uL    Lymphs (Absolute) 0.92 (L) 1.00 - 4.80 K/uL    Monos (Absolute) 0.93 (H) 0.00 - 0.85 K/uL    Eos (Absolute) 0.08 0.00 - 0.51 K/uL    Baso (Absolute) 0.08 0.00 - 0.12 K/uL    Immature Granulocytes (abs) 0.08 0.00 - 0.11 K/uL    NRBC (Absolute) 0.00 K/uL   Comp Metabolic Panel   Result Value Ref Range    Sodium 138 135 - 145 mmol/L    Potassium 4.1 3.6 - 5.5 mmol/L    Chloride 97 96 - 112 mmol/L    Co2 28 20 - 33 mmol/L    Anion Gap 13.0 7.0 - 16.0    Glucose 119 (H) 65 - 99 mg/dL    Bun 28 (H) 8 - 22 mg/dL    Creatinine 1.89 (H) 0.50 - 1.40 mg/dL    Calcium 9.3 8.5 - 10.5 mg/dL    AST(SGOT) 16 12 - 45 U/L    ALT(SGPT) 11 2 - 50 U/L    Alkaline Phosphatase 88 30 - 99 U/L    Total Bilirubin 0.9 0.1 - 1.5 mg/dL    Albumin 3.4 3.2 - 4.9 g/dL    Total Protein 7.9 6.0 - 8.2 g/dL    Globulin 4.5 (H) 1.9 - 3.5 g/dL    A-G Ratio 0.8 g/dL   ESTIMATED GFR   Result Value Ref Range    GFR (CKD-EPI) 33 (A) >60 mL/min/1.73 m 2   TROPONIN   Result Value Ref Range    Troponin T 42 (H) 6 - 19 ng/L   Prothrombin time (INR)   Result Value Ref Range    PT 15.9 (H) 12.0 - 14.6 sec    INR 1.29 (H) 0.87 - 1.13   Procalcitonin   Result Value Ref Range    Procalcitonin 0.20 <0.25 ng/mL   MAGNESIUM   Result Value Ref Range    Magnesium 2.1 1.5 - 2.5 mg/dL   FOLATE   Result Value Ref Range    Folate -Folic Acid 9.7 >4.0 ng/mL   EKG   Result Value Ref Range    Report       [facility] Emergency Dept.    Test Date:   2022  Pt Name:    CARRIE CAMPBELL                  Department: ER  MRN:        [id number]                      Room:  Gender:     Male                         Technician: [id number]  :        [id number]                   Requested By:ER TRIAGE PROTOCOL  Order #:    [id number]                    Reading MD:    Measurements  Intervals                                Axis  Rate:       92                           P:  TN:                                      QRS:        11  QRSD:       147                          T:          -30  QT:         407  QTc:        504    Interpretive Statements  Atrial fibrillation  Right bundle branch block  Compared to ECG 07/10/2022 12:15:56  Ventricular-paced complex(es) or rhythm no longer present       US-EXTREMITY VENOUS UPPER UNILAT LEFT   Final Result      DX-CHEST-PORTABLE (1 VIEW)   Final Result      1.  Interstitial thickening at the bases, mildly improved from most recent prior, greater on the left. This likely represents underlying interstitial lung disease. Superimposed infection not excluded.   2.  Spiculated mass at the right upper lung, likely representing the mass seen on PET/CT from 2017.            PROCEDURES     MEDICAL RECORD  I have reviewed patient's medical record and pertinent results are listed.    COURSE & MEDICAL DECISION MAKING  I have reviewed any medical record information, laboratory studies and radiographic results as noted above.    The pt will be admitted for iv abx and follow up.  He was sent in here by Dr. Davey, so he may be consulted at some point.  No need for that now.     HYDRATION: Based on the patient's presentation of Dehydration the patient was given IV fluids. IV Hydration was used because oral hydration was not adequate alone. Upon recheck following hydration, the patient was improved.      FINAL IMPRESSION  1. Dyspnea, unspecified type        2.    Pneumonia         Electronically signed by: Deniz Heredia D.O., 2022 3:10 PM

## 2022-09-12 NOTE — ED TRIAGE NOTES
"Chief Complaint   Patient presents with    Shortness of Breath     [facility] for increase SOB today. Pts RA sat was 80% upon arrival. Pt states he normally wears 4.5L of oxygen, pt placed on 8L     Arm Swelling     Pt has L arm swelling x 2 days       Pt BIB wheelchair for above. Pt also c/o having increased difficulty ambulating. Pt sent by Dr. Davey for evaluation. Pt aox4, GCS 15.      BP (!) 97/51   Pulse (!) 102   Temp 36 °C (96.8 °F) (Temporal)   Resp 18   Ht 1.778 m (5' 10\")   Wt 101 kg (222 lb)   SpO2 97% Comment: 80 RA  BMI 31.85 kg/m²     "

## 2022-09-12 NOTE — ED NOTES
Unable to completed med rec at this time   Pt is from [facility] in [place] -[phone]  Called multiple times (4 times) and just kept getting transferred to the nurses station with no answer from a nurse

## 2022-09-13 ENCOUNTER — APPOINTMENT (OUTPATIENT)
Dept: CARDIOLOGY | Facility: MEDICAL CENTER | Age: 87
DRG: 189 | End: 2022-09-13
Attending: STUDENT IN AN ORGANIZED HEALTH CARE EDUCATION/TRAINING PROGRAM
Payer: MEDICARE

## 2022-09-13 PROBLEM — R65.20 SEPSIS WITH ACUTE HYPOXIC RESPIRATORY FAILURE (HCC): Status: ACTIVE | Noted: 2022-09-12

## 2022-09-13 PROBLEM — J96.01 SEPSIS WITH ACUTE HYPOXIC RESPIRATORY FAILURE (HCC): Status: ACTIVE | Noted: 2022-09-12

## 2022-09-13 LAB
ALBUMIN SERPL BCP-MCNC: 3.2 G/DL (ref 3.2–4.9)
ALBUMIN/GLOB SERPL: 0.8 G/DL
ALP SERPL-CCNC: 76 U/L (ref 30–99)
ALT SERPL-CCNC: 9 U/L (ref 2–50)
ANION GAP SERPL CALC-SCNC: 7 MMOL/L (ref 7–16)
AST SERPL-CCNC: 14 U/L (ref 12–45)
BASOPHILS # BLD AUTO: 0.7 % (ref 0–1.8)
BASOPHILS # BLD AUTO: 0.8 % (ref 0–1.8)
BASOPHILS # BLD: 0.05 K/UL (ref 0–0.12)
BASOPHILS # BLD: 0.05 K/UL (ref 0–0.12)
BILIRUB SERPL-MCNC: 0.5 MG/DL (ref 0.1–1.5)
BUN SERPL-MCNC: 30 MG/DL (ref 8–22)
CALCIUM SERPL-MCNC: 8.7 MG/DL (ref 8.5–10.5)
CHLORIDE SERPL-SCNC: 99 MMOL/L (ref 96–112)
CO2 SERPL-SCNC: 31 MMOL/L (ref 20–33)
CREAT SERPL-MCNC: 1.87 MG/DL (ref 0.5–1.4)
EOSINOPHIL # BLD AUTO: 0.15 K/UL (ref 0–0.51)
EOSINOPHIL # BLD AUTO: 0.17 K/UL (ref 0–0.51)
EOSINOPHIL NFR BLD: 2.2 % (ref 0–6.9)
EOSINOPHIL NFR BLD: 2.7 % (ref 0–6.9)
ERYTHROCYTE [DISTWIDTH] IN BLOOD BY AUTOMATED COUNT: 62.8 FL (ref 35.9–50)
ERYTHROCYTE [DISTWIDTH] IN BLOOD BY AUTOMATED COUNT: 62.8 FL (ref 35.9–50)
FERRITIN SERPL-MCNC: 459 NG/ML (ref 22–322)
GFR SERPLBLD CREATININE-BSD FMLA CKD-EPI: 33 ML/MIN/1.73 M 2
GLOBULIN SER CALC-MCNC: 3.8 G/DL (ref 1.9–3.5)
GLUCOSE SERPL-MCNC: 138 MG/DL (ref 65–99)
GRAM STN SPEC: NORMAL
HCT VFR BLD AUTO: 25.1 % (ref 42–52)
HCT VFR BLD AUTO: 25.6 % (ref 42–52)
HGB BLD-MCNC: 8 G/DL (ref 14–18)
HGB BLD-MCNC: 8.2 G/DL (ref 14–18)
HGB RETIC QN AUTO: 34.8 PG/CELL (ref 29–35)
IMM GRANULOCYTES # BLD AUTO: 0.05 K/UL (ref 0–0.11)
IMM GRANULOCYTES # BLD AUTO: 0.07 K/UL (ref 0–0.11)
IMM GRANULOCYTES NFR BLD AUTO: 0.8 % (ref 0–0.9)
IMM GRANULOCYTES NFR BLD AUTO: 1 % (ref 0–0.9)
IMM RETICS NFR: 27.5 % (ref 9.3–17.4)
IRON SATN MFR SERPL: 29 % (ref 15–55)
IRON SERPL-MCNC: 45 UG/DL (ref 50–180)
LACTATE SERPL-SCNC: 1.6 MMOL/L (ref 0.5–2)
LV EJECT FRACT  99904: 45
LV EJECT FRACT MOD 4C 99902: 50.45
LYMPHOCYTES # BLD AUTO: 0.89 K/UL (ref 1–4.8)
LYMPHOCYTES # BLD AUTO: 1.04 K/UL (ref 1–4.8)
LYMPHOCYTES NFR BLD: 14.3 % (ref 22–41)
LYMPHOCYTES NFR BLD: 15.4 % (ref 22–41)
MCH RBC QN AUTO: 34.6 PG (ref 27–33)
MCH RBC QN AUTO: 34.7 PG (ref 27–33)
MCHC RBC AUTO-ENTMCNC: 31.9 G/DL (ref 33.7–35.3)
MCHC RBC AUTO-ENTMCNC: 32 G/DL (ref 33.7–35.3)
MCV RBC AUTO: 108.5 FL (ref 81.4–97.8)
MCV RBC AUTO: 108.7 FL (ref 81.4–97.8)
MONOCYTES # BLD AUTO: 0.76 K/UL (ref 0–0.85)
MONOCYTES # BLD AUTO: 0.81 K/UL (ref 0–0.85)
MONOCYTES NFR BLD AUTO: 12 % (ref 0–13.4)
MONOCYTES NFR BLD AUTO: 12.2 % (ref 0–13.4)
NEUTROPHILS # BLD AUTO: 4.32 K/UL (ref 1.82–7.42)
NEUTROPHILS # BLD AUTO: 4.63 K/UL (ref 1.82–7.42)
NEUTROPHILS NFR BLD: 68.7 % (ref 44–72)
NEUTROPHILS NFR BLD: 69.2 % (ref 44–72)
NRBC # BLD AUTO: 0 K/UL
NRBC # BLD AUTO: 0 K/UL
NRBC BLD-RTO: 0 /100 WBC
NRBC BLD-RTO: 0 /100 WBC
NT-PROBNP SERPL IA-MCNC: ABNORMAL PG/ML (ref 0–125)
PLATELET # BLD AUTO: 161 K/UL (ref 164–446)
PLATELET # BLD AUTO: 167 K/UL (ref 164–446)
PMV BLD AUTO: 10 FL (ref 9–12.9)
PMV BLD AUTO: 9.8 FL (ref 9–12.9)
POTASSIUM SERPL-SCNC: 4 MMOL/L (ref 3.6–5.5)
PROT SERPL-MCNC: 7 G/DL (ref 6–8.2)
RBC # BLD AUTO: 2.31 M/UL (ref 4.7–6.1)
RBC # BLD AUTO: 2.36 M/UL (ref 4.7–6.1)
RETICS # AUTO: 0.07 M/UL (ref 0.04–0.06)
RETICS/RBC NFR: 3.1 % (ref 0.8–2.1)
SIGNIFICANT IND 70042: NORMAL
SITE SITE: NORMAL
SODIUM SERPL-SCNC: 137 MMOL/L (ref 135–145)
SOURCE SOURCE: NORMAL
TIBC SERPL-MCNC: 154 UG/DL (ref 250–450)
UIBC SERPL-MCNC: 109 UG/DL (ref 110–370)
WBC # BLD AUTO: 6.2 K/UL (ref 4.8–10.8)
WBC # BLD AUTO: 6.8 K/UL (ref 4.8–10.8)

## 2022-09-13 PROCEDURE — 83605 ASSAY OF LACTIC ACID: CPT

## 2022-09-13 PROCEDURE — 700111 HCHG RX REV CODE 636 W/ 250 OVERRIDE (IP): Performed by: HOSPITALIST

## 2022-09-13 PROCEDURE — 99233 SBSQ HOSP IP/OBS HIGH 50: CPT | Mod: GC | Performed by: HOSPITALIST

## 2022-09-13 PROCEDURE — 700102 HCHG RX REV CODE 250 W/ 637 OVERRIDE(OP): Performed by: GENERAL PRACTICE

## 2022-09-13 PROCEDURE — 83550 IRON BINDING TEST: CPT

## 2022-09-13 PROCEDURE — 87186 SC STD MICRODIL/AGAR DIL: CPT

## 2022-09-13 PROCEDURE — 87077 CULTURE AEROBIC IDENTIFY: CPT

## 2022-09-13 PROCEDURE — 83880 ASSAY OF NATRIURETIC PEPTIDE: CPT

## 2022-09-13 PROCEDURE — 97602 WOUND(S) CARE NON-SELECTIVE: CPT

## 2022-09-13 PROCEDURE — 97162 PT EVAL MOD COMPLEX 30 MIN: CPT

## 2022-09-13 PROCEDURE — A9270 NON-COVERED ITEM OR SERVICE: HCPCS | Performed by: GENERAL PRACTICE

## 2022-09-13 PROCEDURE — 82728 ASSAY OF FERRITIN: CPT

## 2022-09-13 PROCEDURE — 93306 TTE W/DOPPLER COMPLETE: CPT

## 2022-09-13 PROCEDURE — 85046 RETICYTE/HGB CONCENTRATE: CPT

## 2022-09-13 PROCEDURE — 93306 TTE W/DOPPLER COMPLETE: CPT | Mod: 26 | Performed by: INTERNAL MEDICINE

## 2022-09-13 PROCEDURE — 94669 MECHANICAL CHEST WALL OSCILL: CPT

## 2022-09-13 PROCEDURE — 80053 COMPREHEN METABOLIC PANEL: CPT

## 2022-09-13 PROCEDURE — 97166 OT EVAL MOD COMPLEX 45 MIN: CPT

## 2022-09-13 PROCEDURE — 36415 COLL VENOUS BLD VENIPUNCTURE: CPT

## 2022-09-13 PROCEDURE — 83540 ASSAY OF IRON: CPT

## 2022-09-13 PROCEDURE — 770020 HCHG ROOM/CARE - TELE (206)

## 2022-09-13 PROCEDURE — 87070 CULTURE OTHR SPECIMN AEROBIC: CPT

## 2022-09-13 PROCEDURE — 87205 SMEAR GRAM STAIN: CPT

## 2022-09-13 PROCEDURE — 85025 COMPLETE CBC W/AUTO DIFF WBC: CPT

## 2022-09-13 RX ORDER — PREDNISONE 20 MG/1
20 TABLET ORAL DAILY
Status: CANCELLED | OUTPATIENT
Start: 2022-09-13

## 2022-09-13 RX ORDER — FLUTICASONE PROPIONATE 44 UG/1
2 AEROSOL, METERED RESPIRATORY (INHALATION)
Status: CANCELLED | OUTPATIENT
Start: 2022-09-13

## 2022-09-13 RX ORDER — FUROSEMIDE 10 MG/ML
80 INJECTION INTRAMUSCULAR; INTRAVENOUS ONCE
Status: DISCONTINUED | OUTPATIENT
Start: 2022-09-13 | End: 2022-09-13

## 2022-09-13 RX ORDER — FUROSEMIDE 10 MG/ML
20 INJECTION INTRAMUSCULAR; INTRAVENOUS ONCE
Status: COMPLETED | OUTPATIENT
Start: 2022-09-13 | End: 2022-09-13

## 2022-09-13 RX ADMIN — RANOLAZINE 500 MG: 500 TABLET, FILM COATED, EXTENDED RELEASE ORAL at 10:26

## 2022-09-13 RX ADMIN — APIXABAN 2.5 MG: 5 TABLET, FILM COATED ORAL at 17:29

## 2022-09-13 RX ADMIN — FUROSEMIDE 20 MG: 10 INJECTION, SOLUTION INTRAMUSCULAR; INTRAVENOUS at 17:58

## 2022-09-13 RX ADMIN — APIXABAN 2.5 MG: 5 TABLET, FILM COATED ORAL at 10:25

## 2022-09-13 RX ADMIN — DOXYCYCLINE 100 MG: 100 TABLET, FILM COATED ORAL at 17:29

## 2022-09-13 RX ADMIN — DOCUSATE SODIUM 50 MG AND SENNOSIDES 8.6 MG 2 TABLET: 8.6; 5 TABLET, FILM COATED ORAL at 17:29

## 2022-09-13 RX ADMIN — POLYETHYLENE GLYCOL 3350 1 PACKET: 17 POWDER, FOR SOLUTION ORAL at 10:27

## 2022-09-13 RX ADMIN — METOPROLOL TARTRATE 12.5 MG: 25 TABLET, FILM COATED ORAL at 10:27

## 2022-09-13 RX ADMIN — METOPROLOL TARTRATE 12.5 MG: 25 TABLET, FILM COATED ORAL at 21:01

## 2022-09-13 RX ADMIN — DOXYCYCLINE 100 MG: 100 TABLET, FILM COATED ORAL at 10:26

## 2022-09-13 RX ADMIN — ROSUVASTATIN CALCIUM 40 MG: 20 TABLET, FILM COATED ORAL at 17:29

## 2022-09-13 ASSESSMENT — COGNITIVE AND FUNCTIONAL STATUS - GENERAL
PERSONAL GROOMING: A LITTLE
TOILETING: A LOT
DRESSING REGULAR LOWER BODY CLOTHING: A LOT
SUGGESTED CMS G CODE MODIFIER MOBILITY: CM
MOBILITY SCORE: 9
MOVING FROM LYING ON BACK TO SITTING ON SIDE OF FLAT BED: UNABLE
CLIMB 3 TO 5 STEPS WITH RAILING: TOTAL
HELP NEEDED FOR BATHING: A LOT
TURNING FROM BACK TO SIDE WHILE IN FLAT BAD: UNABLE
MOVING TO AND FROM BED TO CHAIR: UNABLE
WALKING IN HOSPITAL ROOM: A LOT
STANDING UP FROM CHAIR USING ARMS: A LITTLE
SUGGESTED CMS G CODE MODIFIER DAILY ACTIVITY: CK
DRESSING REGULAR UPPER BODY CLOTHING: A LITTLE
DAILY ACTIVITIY SCORE: 16

## 2022-09-13 ASSESSMENT — COPD QUESTIONNAIRES
DURING THE PAST 4 WEEKS HOW MUCH DID YOU FEEL SHORT OF BREATH: SOME OF THE TIME
HAVE YOU SMOKED AT LEAST 100 CIGARETTES IN YOUR ENTIRE LIFE: NO/DON'T KNOW
DO YOU EVER COUGH UP ANY MUCUS OR PHLEGM?: NO/ONLY WITH OCCASIONAL COLDS OR INFECTIONS
COPD SCREENING SCORE: 3

## 2022-09-13 ASSESSMENT — ACTIVITIES OF DAILY LIVING (ADL): TOILETING: REQUIRES ASSIST

## 2022-09-13 ASSESSMENT — ENCOUNTER SYMPTOMS
CHILLS: 0
FEVER: 0
SHORTNESS OF BREATH: 0
COUGH: 0
PALPITATIONS: 0
SPUTUM PRODUCTION: 0
HEADACHES: 0
SENSORY CHANGE: 0
DIZZINESS: 0

## 2022-09-13 ASSESSMENT — GAIT ASSESSMENTS: GAIT LEVEL OF ASSIST: UNABLE TO PARTICIPATE

## 2022-09-13 ASSESSMENT — FIBROSIS 4 INDEX: FIB4 SCORE: 2.64

## 2022-09-13 NOTE — RESPIRATORY CARE
COPD EDUCATION by COPD CLINICAL EDUCATOR  9/13/2022 at 6:24 AM by Makenna Miranda, RRT     Patient reviewed by COPD education team. Patient currently on no medication. PFT done in 2019 shows mild restrictive and obstructive pattern.

## 2022-09-13 NOTE — ED NOTES
Report received from prior RN. Pt alert. Resp normal/unlabored. Bed side rails up/in low position. Pt updated to POC and all questions answered.     Pt changed and cleaned, complaints of stool/urine in bed and diaper taken off pt.

## 2022-09-13 NOTE — WOUND TEAM
[facility]  Inpatient Services  Initial Wound and Skin Care Evaluation    Admission Date: 2022     Last order of IP CONSULT TO WOUND CARE was found on 2022 from Hospital Encounter on 2022     HPI, PMH, SH: Reviewed    Past Surgical History:   Procedure Laterality Date    KNEE UNICOMPARTMENTAL  10/13/2009    Performed by SAM LINDER at SURGERY [facility] ORS    ARTHROSCOPY, KNEE      CORONARY STENT PROCEDURE LAD      OTHER ORTHOPEDIC SURGERY      right shoulder rotator cuff surgery x2     Social History     Tobacco Use    Smoking status: Former     Packs/day: 2.00     Years: 35.00     Pack years: 70.00     Types: Cigarettes     Quit date: 1973     Years since quittin.4    Smokeless tobacco: Never   Substance Use Topics    Alcohol use: Not Currently     Comment: cocktail every night     Chief Complaint   Patient presents with    Shortness of Breath     [facility] for increase SOB today. Pts RA sat was 80% upon arrival. Pt states he normally wears 4.5L of oxygen, pt placed on 8L     Arm Swelling     Pt has L arm swelling x 2 days     Diagnosis: Sepsis due to pneumonia (HCC) [J18.9, A41.9]    Unit where seen by Wound Team: T708/02     WOUND CONSULT/FOLLOW UP RELATED TO:  left ear and sacrum     WOUND HISTORY:  pressure injury POA    WOUND ASSESSMENT/LDA    Wound 22 Pressure Injury Ear POA (Active)   Wound Image   22 1600   Site Assessment Red 22 1600   Periwound Assessment Clean;Dry;Intact 22 1600   Closure Open to air 22 1600   Drainage Amount None 22 1600   Treatments Offloading 22 1600   Wound Cleansing Foam Cleanser/Washcloth 22 1600   Periwound Protectant Not Applicable 22 1600   Dressing Options Open to Air 22 1600   NEXT Weekly Photo (Inpatient Only) 22 1600   WOUND NURSE ONLY - Pressure Injury Stage 1 22 1600   Wound Length (cm) 0.5 cm 22 1600   Wound  Width (cm) 0.5 cm 09/13/22 1600   Wound Surface Area (cm^2) 0.25 cm^2 09/13/22 1600   Shape circular 09/13/22 1600   Wound Odor None 09/13/22 1600   Exposed Structures None 09/13/22 1600   WOUND NURSE ONLY - Time Spent with Patient (mins) 15 09/13/22 1600       Wound 09/12/22 Pressure Injury Bilateral POA (Active)   Wound Image   09/13/22 1600   Site Assessment Purple;Red 09/13/22 1600   Periwound Assessment Pink 09/13/22 0745   Margins Defined edges 09/13/22 1600   Closure Open to air 09/13/22 1600   Drainage Amount None 09/13/22 1600   Drainage Description Green 09/13/22 1600   Treatments Offloading 09/13/22 1600   Wound Cleansing Foam Cleanser/Washcloth 09/13/22 1600   Periwound Protectant Not Applicable 09/13/22 1600   Dressing Cleansing/Solutions Other (Comments) 09/13/22 1600   Dressing Options Open to Air 09/13/22 1600   Dressing Changed New 09/13/22 1600   Dressing Status Clean;Dry;Intact 09/13/22 1600   NEXT Dressing Change/Treatment Date 09/16/22 09/13/22 1600   NEXT Weekly Photo (Inpatient Only) 09/20/22 09/13/22 1600   Shape circular 09/13/22 1600   Wound Odor None 09/13/22 1600   Exposed Structures None 09/13/22 1600   WOUND NURSE ONLY - Time Spent with Patient (mins) 30 09/13/22 1600             Vascular:    ROBIN:   No results found.    Lab Values:    Lab Results   Component Value Date/Time    WBC 6.2 09/13/2022 03:26 AM    RBC 2.31 (L) 09/13/2022 03:26 AM    HEMOGLOBIN 8.0 (L) 09/13/2022 03:26 AM    HEMATOCRIT 25.1 (L) 09/13/2022 03:26 AM    HBA1C 6.4 (H) 07/10/2021 05:29 AM        Culture Results show:  No results found for this or any previous visit (from the past 720 hour(s)).    Pain Level/Medicated:  0/10       INTERVENTIONS BY WOUND TEAM:  Chart and images reviewed. Discussed with bedside RN. All areas of concern (based on picture review, LDA review and discussion with bedside RN) have been thoroughly assessed. Documentation of areas based on significant findings. This RN in to assess patient.  Performed standard wound care which includes appropriate positioning, dressing removal and non-selective debridement. Pictures and measurements obtained weekly if/when required.  Preparation for Dressing removal: NITA  Non-selectively Debrided with:  NS and gauze.  Sharp debridement: NA  Aminata wound: Cleansed with NS and gauze  Primary Dressing: Mepilex sacral    Left ear: NITA, offloading with gray foam    Interdisciplinary consultation: Patient, Bedside RN (Scarlett)    EVALUATION / RATIONALE FOR TREATMENT:  Most Recent Date:  9/13/22: Offloading to sacrum and left ear.      Goals: Steady decrease in wound area and depth weekly.    WOUND TEAM PLAN OF CARE ([X] for frequency of wound follow up,):   Nursing to follow dressing orders written for wound care. Contact wound team if area fails to progress, deteriorates or with any questions/concerns if something comes up before next scheduled follow up (See below as to whether wound is following and frequency of wound follow up)  Dressing changes by wound team:                   Follow up 3 times weekly:                NPWT change 3 times weekly:     Follow up 1-2 times weekly:      Follow up Bi-Monthly:                   Follow up as needed:   x  Other (explain):     NURSING PLAN OF CARE ORDERS (X):  Dressing changes: See Dressing Care orders: x  Skin care: See Skin Care orders:   RN Prevention Protocol:   Rectal tube care: See Rectal Tube Care orders:   Other orders:    RSKIN:   CURRENTLY IN PLACE (X), APPLIED THIS VISIT (A), ORDERED (O):   Q shift Sigifredo:  X  Q shift pressure point assessments:  X    Surface/Positioning   Pressure redistribution mattress    x        Low Airloss          ICU Low Airloss   Bariatric BLANCA     Waffle cushion     x   Waffle Overlay    x      Reposition q 2 hours  x    TAPs Turning system     Z Valdemar Pillow     Offloading/Redistribution   Sacral Mepilex (Silicone dressing)   A  Heel Mepilex (Silicone dressing)   x      Heel float boots (Prevalon  boot)             Float Heels off Bed with Pillows     A      Respiratory   Silicone O2 tubing       x  Gray Foam Ear protectors   x  Cannula fixation Device (Tender )          High flow offloading Clip    Elastic head band offloading device      Anchorfast                                                         Trach with Optifoam split foam             Containment/Moisture Prevention     Rectal tube or BMS    Purwick/Condom Cath   x     Fernando Catheter    Barrier wipes           Barrier paste  x     Antifungal tx      Interdry        Mobilization       Up to chair    x    Ambulate    x  PT/OT  x    Nutrition       Dietician        Diabetes Education      PO   x  TF     TPN     NPO   # days     Other        Anticipated discharge plans:   LTACH:        SNF/Rehab:                  Home Health Care:           Outpatient Wound Center:            Self/Family Care:    x    Other:                  Vac Discharge Needs: NA  Not Applicable Pt not on a wound vac:     x  Regular Vac while inpatient, alternative dressing at DC:        Regular Vac in use and continued at DC:            Reg. Vac w/ Skin Sub/Biologic in use. Will need to be changed 2x wkly:      Veraflo Vac while inpatient, ok to transition to Regular Vac on Discharge:           Veraflo Vac while inpatient, will need to remain on Veraflo Vac upon discharge:

## 2022-09-13 NOTE — DISCHARGE PLANNING
Received Choice form at 2097  Agency/Facility Name: [facility]   Referral sent per Choice form @ [phone]

## 2022-09-13 NOTE — THERAPY
"Physical Therapy   Initial Evaluation     Patient Name: Kobe Cyr  Age:  89 y.o., Sex:  male  Medical Record #: [id number]  Today's Date: 9/13/2022          Assessment    The patient is a 89 y.o. male who presented to acute with c/o SOB, L UE edema, and difficulty ambulating. Medical dx of sepsis secondary to pneumonia. 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 Of note, pt admitted to SNF in [place] 2 weeks ago due spouse being unable to provide level of care required at home. He presented to acute PT with pain, decreased activity tolerance, and impaired balance which are preventing him from performing mobility at [facility]. He mobilized as detailed below, but was unable to attempt further mobility due to c/o pain and fatigue. Recommend pt sit up right during meal times to prevent hospital acquired debility while in house. Will follow.     Plan    Recommend Physical Therapy 4 times per week until therapy goals are met for the following treatments:  Bed Mobility, Community Re-integration, Gait Training, Manual Therapy, Neuro Re-Education / Balance, Self Care/Home Evaluation, Stair Training, Therapeutic Activities, and Therapeutic Exercises    DC Equipment Recommendations: Unable to determine at this time  Discharge Recommendations: Recommend post-acute placement for additional physical therapy services prior to discharge home       Subjective    \"My mind is still very analytical for a 89 year old.\"      Objective       09/13/22 0935   Cosignature   Documentation Review Approved with modifications made by preceptor in flowsheet   Charge Group   PT Evaluation PT Evaluation Mod  (32 min)   Initial Contact Note    Initial Contact Note Order Received and Verified, Physical Therapy Evaluation in Progress with Full Report to Follow.   Vitals   O2 (LPM) 4   O2 Delivery Device Silicone Nasal Cannula   Pain 0 - 10 Group   Location Rectum   Location Orientation " Inner   Description Burning   Therapist Pain Assessment Post Activity Pain Same as Prior to Activity;Nurse Notified  (reported burning sensation in rectum has been present since admit. pt also c/o urge to defecate with OOB activity)   Prior Living Situation   Prior Services Continuous (24 Hour) Care Giving Per Service   Housing / Facility Skilled Nursing Facility   Equipment Owned Front-Wheel Walker   Lives with - Patient's Self Care Capacity Other (Comments)   Comments Pt transferred from [facility] in [place] due to wife's inability to provide care for pt   Prior Level of Functional Mobility   Bed Mobility Required Assist   Transfer Status Required Assist   Ambulation Required Assist   Distance Ambulation (Feet)   (pt states being able to ambulate ~440 ft before fatigue)   Assistive Devices Used Front-Wheel Walker   Comments pt reports functional mobility was improving at SNF prior to admit   History of Falls   History of Falls Yes   Date of Last Fall   (pt reports multiple falls in past month)   Cognition    Cognition / Consciousness X   Level of Consciousness Alert   Attention Impaired   Comments pt was pleasant and cooperative, somewhat self limiting due to pain. pt was tangential and verbose, required redirection to attend to task   Passive ROM Lower Body   Passive ROM Lower Body WDL   Comments not formally tested but functional for mobility   Active ROM Lower Body    Active ROM Lower Body  WDL   Comments as above   Strength Lower Body   Lower Body Strength  X   Comments pt able to stand with Mercedes, MMT deferred, anticipate strength and endurance impaired   Sensation Lower Body   Lower Extremity Sensation   Not Tested   Lower Body Muscle Tone   Lower Body Muscle Tone  Not Tested   Coordination Lower Body    Coordination Lower Body  WDL   Comments not formally tested but functional for mobility   Balance Assessment   Sitting Balance (Static) Fair   Sitting Balance (Dynamic) Fair   Standing Balance  "(Static) Fair -   Standing Balance (Dynamic) Fair -   Weight Shift Sitting Fair   Weight Shift Standing Poor   Comments with FWW, no LOB   Gait Analysis   Gait Level Of Assist Unable to Participate   Weight Bearing Status no restrictions   Vision Deficits Impacting Mobility NT   Comments gait deferred due to pain and fatigue   Bed Mobility    Supine to Sit Minimal Assist  (for upright trunk)   Sit to Supine   (NT)   Scooting Supervised  (seated)   Comments with HOB elevated and use of rails.   Functional Mobility   Sit to Stand Minimal Assist   Bed, Chair, Wheelchair Transfer Contact Guard Assist   Transfer Method Stand Step   How much difficulty does the patient currently have...   Turning over in bed (including adjusting bedclothes, sheets and blankets)? 1   Sitting down on and standing up from a chair with arms (e.g., wheelchair, bedside commode, etc.) 1   Moving from lying on back to sitting on the side of the bed? 1   How much help from another person does the patient currently need...   Moving to and from a bed to a chair (including a wheelchair)? 3   Need to walk in a hospital room? 2   Climbing 3-5 steps with a railing? 1   6 clicks Mobility Score 9   Activity Tolerance   Sitting in Chair pt in chair post session   Sitting Edge of Bed 8 mins   Standing 2 mins total   Comments limited by pain and fatigue   Patient / Family Goals    Patient / Family Goal #1 \"return home to my wife\"   Short Term Goals    Short Term Goal # 1 The patient will be able to perform supine <> sit with no bed features and SPV within 6tx in order to improve functional independence with bed mobility   Short Term Goal # 2 The patient will be able to ambulate >150ft with FWW and SBA within 6tx in order to safely access dining area in SNF   Short Term Goal # 3 The patient will be able to perform all functional transfers with FWW and SPV within 6tx in order to improve functional independence   Education Group   Education Provided Role of " Physical Therapist   Role of Physical Therapist Patient Response Patient;Acceptance;Explanation;Verbal Demonstration   Problem List    Problems Pain;Impaired Bed Mobility;Impaired Transfers;Impaired Ambulation;Impaired Balance;Decreased Activity Tolerance   Anticipated Discharge Equipment and Recommendations   DC Equipment Recommendations Unable to determine at this time   Discharge Recommendations Recommend post-acute placement for additional physical therapy services prior to discharge home   Interdisciplinary Plan of Care Collaboration   IDT Collaboration with  Occupational Therapist;Nursing   Patient Position at End of Therapy Chair Alarm On;Call Light within Reach;Tray Table within Reach;Phone within Reach;Seated   Collaboration Comments with OT. RN aware of visit, response   Session Information   Date / Session Number  9/13 - 1 (1/4, 9/19)

## 2022-09-13 NOTE — ASSESSMENT & PLAN NOTE
CHADSVASC 6 (age, HTN, MI, VTE). Rate controlled.  -start Eliquis 2.5mg BID (age >80, cr >1.5, wt. >60kg)  -continue metoprolol  -tele  -Mg >2, K>4

## 2022-09-13 NOTE — THERAPY
"Occupational Therapy   Initial Evaluation     Patient Name: Kobe Cyr  Age:  89 y.o., Sex:  male  Medical Record #: [id number]  Today's Date: 9/13/2022     Precautions  Precautions: (P) Fall Risk    Assessment  Patient is 89 y.o. male who presents to acute w/ c/o SOB, L UE edema, and difficulty ambulating. Pt found to have sepsis secondary to PNA. Per pt he txf'd from [facility] to [facility] in [place] and appears to reside there long term. Pt demo'd impaired balance, functional mobility, and activity tolerance requiring assist for BADLs and txfs. Will continue to follow while in house .      Plan    Recommend Occupational Therapy 3 times per week until therapy goals are met for the following treatments:  Adaptive Equipment, Neuro Re-Education / Balance, Self Care/Activities of Daily Living, Therapeutic Activities, and Therapeutic Exercises.    DC Equipment Recommendations: (P) Unable to determine at this time  Discharge Recommendations: (P) Recommend post-acute placement for additional occupational therapy services prior to discharge home     Subjective    \"[facility] has really great breakfast and lunch, they slack off at dinner though\"     Objective       09/13/22 0936   Charge Group   OT Evaluation OT Evaluation Mod   Total Time Spent   OT Time Spent Yes   OT Evaluation (Minutes) 35   Initial Contact Note    Initial Contact Note Order Received and Verified, Occupational Therapy Evaluation in Progress with Full Report to Follow.   Prior Living Situation   Prior Services Continuous (24 Hour) Care Giving Per Service   Housing / Facility Skilled Nursing Facility   Bathroom Set up Walk In Shower;Shower Chair;Grab Bars   Equipment Owned Front-Wheel Walker;Tub / Shower Seat;Grab Bar(s) In Tub / Shower;Grab Bar(s) By Toilet   Lives with - Patient's Self Care Capacity Attendant / Paid Care Giver   Comments Pt has been residing at [facility] due to pts wife inability to care for him   Prior Level of ADL " "Function   Self Feeding Independent   Grooming / Hygiene Requires Assist   Bathing Requires Assist   Dressing Requires Assist   Toileting Requires Assist   Precautions   Precautions Fall Risk   Vitals   O2 (LPM) 4   O2 Delivery Device Silicone Nasal Cannula   Pain 0 - 10 Group   Therapist Pain Assessment Post Activity Pain Same as Prior to Activity;Nurse Notified  (c/o anal \"burning\", pt found to be incont of bowels)   Cognition    Cognition / Consciousness X   Level of Consciousness Alert   Safety Awareness Impaired   Attention Impaired   Comments Pleasent and cooperative, tangential, required frequent redirction to task   Active ROM Upper Body   Comments WFL   Strength Upper Body   Upper Body Strength  WDL   Comments able to vault on FWW   Coordination Upper Body   Coordination WDL   Balance Assessment   Sitting Balance (Static) Fair   Sitting Balance (Dynamic) Fair   Standing Balance (Static) Fair -   Standing Balance (Dynamic) Fair -   Weight Shift Sitting Fair   Weight Shift Standing Fair   Comments w/ FWW   Bed Mobility    Supine to Sit Minimal Assist   Sit to Supine   (NT in chair post)   Scooting Supervised   ADL Assessment   Grooming Supervision;Seated   Upper Body Dressing Minimal Assist   Lower Body Dressing Maximal Assist   Toileting Maximal Assist  (incont of stool)   How much help from another person does the patient currently need...   Putting on and taking off regular lower body clothing? 2   Bathing (including washing, rinsing, and drying)? 2   Toileting, which includes using a toilet, bedpan, or urinal? 2   Putting on and taking off regular upper body clothing? 3   Taking care of personal grooming such as brushing teeth? 3   Eating meals? 4   6 Clicks Daily Activity Score 16   Functional Mobility   Sit to Stand Minimal Assist   Bed, Chair, Wheelchair Transfer Minimal Assist   Transfer Method Stand Step   Mobility SPT w/ FWW   Activity Tolerance   Sitting in Chair 10+ min (up post)   Sitting Edge of " Bed 10 min   Standing 2-3 min total   Patient / Family Goals   Patient / Family Goal #1 To go back to [facility]   Short Term Goals   Short Term Goal # 1 Pt will demo toilet txf w/ SBA   Short Term Goal # 2 Pt will demo standing grooming w/ SBA   Education Group   Role of Occupational Therapist Patient Response Patient;Acceptance;Explanation;Demonstration;Action Demonstration;Verbal Demonstration   Problem List   Problem List Decreased Active Daily Living Skills;Decreased Homemaking Skills;Decreased Functional Mobility;Decreased Activity Tolerance;Impaired Postural Control / Balance   Anticipated Discharge Equipment and Recommendations   DC Equipment Recommendations Unable to determine at this time   Discharge Recommendations Recommend post-acute placement for additional occupational therapy services prior to discharge home   Interdisciplinary Plan of Care Collaboration   IDT Collaboration with  Nursing;Physical Therapist   Patient Position at End of Therapy Seated;Chair Alarm On;Call Light within Reach;Tray Table within Reach;Phone within Reach   Collaboration Comments report given   Session Information   Date / Session Number  9/13, 1 (1/3, 9/19)   Priority 2

## 2022-09-13 NOTE — H&P
History & Physical Note    Date of Admission: 9/12/2022  Admission Status: Emergency    Attending: Dr. Nair    Chief Complaint: shortness of breath and left arm swelling     History of Present Illness (HPI):   Kobe is a 89 y.o. 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 Patient normally requires 4.5L oxygen, but required 8L associated with left arm swelling for two days after recent pacemaker palced. patient brought to cardiology today from home in [place] for cardiac rehab.Bp 80/40 in Cardiology office.patient denies pain      BP 97/51, , afebrile, RR 18, 5L NC >90%    Hgb 9.3 ( with Hgb baseline 8-9), elevated immature granulocytes with WBC 8, Glucose 119, Cr 1.89 (baseline 2.1) with GFR 33, trop 42->38 (baseline 40's July 2022), lactic acid 2.1    CXR:1.  Interstitial thickening at the bases, mildly improved from most recent prior, greater on the left. This likely represents underlying interstitial lung disease. Superimposed infection not excluded.  2.  Spiculated mass at the right upper lung, likely representing the mass seen on PET/CT from 2017.    LUE US: no DVT    EKG: Afib rate 90's, no acute ischemic changes. RBBB no significant changes from prior ECG July 2022.    Blood cx drawn in ED, and given Rocephin 2gm IV.    Cardiology recommends antiplatelet therapy be replaced with anticoagulation and la left upper extremity duplex US and reduce Isordil given hypotension.    I discussed with Dr. Davey, stop DAPT (ASA, plavix) and start Eliquis.    Reportedly patient fell from wheelchair in June 2022 and had a hematoma sclap and evaluation demonstrated bradycardia and new afib with pacemaker placed. Patient moved to assisted living 2 weeks ago due  to wife unable to care of rhim.     Review of Systems:   Review of Systems   Constitutional:  Negative for chills, fever and malaise/fatigue.   Respiratory:  Positive for cough, sputum production and shortness of breath. Negative for wheezing.    Cardiovascular:  Negative for chest pain and palpitations.   Gastrointestinal:  Negative for abdominal pain, nausea and vomiting.   Neurological:  Negative for dizziness, weakness and headaches.       Past Medical History:   Past Medical History was reviewed with patient.   has a past medical history of Anemia (8/1/2016), Arthritis, Back pain, CAD (coronary artery disease), Cancer (HCC), Chronic kidney disease (CKD), stage II (mild) (8/1/2016), Coronary heart disease, DJD (degenerative joint disease) (8/1/2016), Dyslipidemia (8/1/2016), GERD (gastroesophageal reflux disease) (8/1/2016), Cymraes measles, Hematochezia (8/1/2016), High cholesterol, Hyperglycemia (1/6/2010), Hyperlipidemia (5/24/2011), Hypertension, Impaired fasting glucose (8/1/2016), Left knee pain (8/1/2016), Myocardial infarction (HCC) (2 yrs ago), Nasal drainage, Obesity, Osteoarthritis of knees, bilateral (8/1/2016), Pulmonary embolism ([facility]), Rheumatic fever, Scarlet fever, Smallpox, and Snoring.    Past Surgical History: Past Surgical History was reviewed with patient.   has a past surgical history that includes coronary stent procedure lad; knee unicompartmental (10/13/2009); other orthopedic surgery; and arthroscopy, knee.    Medications: Medications have been reviewed with patient.  Prior to Admission Medications   Prescriptions Last Dose Informant Patient Reported? Taking?   aspirin EC (ECOTRIN) 81 MG Tablet Delayed Response  Patient Yes No   Sig: Take 81 mg by mouth every day.   clopidogrel (PLAVIX) 75 MG Tab   Yes No   Sig: Take 75 mg by mouth every day.   isosorbide dinitrate (ISORDIL) 40 MG Tab   Yes No   Sig: Take 60 mg by mouth 3 times a day.   metoprolol tartrate (LOPRESSOR) 25 MG Tab   No No    Sig: Take 0.5 Tablets by mouth 2 times a day.   ranolazine (RANEXA) 500 MG TABLET SR 12 HR   No No   Sig: Take 1 Tablet by mouth every day.   rosuvastatin (CRESTOR) 40 MG tablet  Patient No No   Sig: Take 1 Tablet by mouth every evening.   senna-docusate (PERICOLACE OR SENOKOT S) 8.6-50 MG Tab   No No   Sig: Take 2 Tablets by mouth 2 times a day.   sodium chloride (OCEAN) 0.65 % Solution   No No   Sig: Administer 2 Sprays into affected nostril(S) every 2 hours as needed for Congestion (congestion).      Facility-Administered Medications: None        Allergies: Allergies have been reviewed with patient.  Allergies   Allergen Reactions    Atorvastatin Myalgia    Simvastatin Myalgia       Family History: reviewed  family history includes No Known Problems in his father and mother.     Social History:   Tobacco: quit 45 years ago, 1 PPD for 32 years   Alcohol: 1oz alcohol daily   Recreational drugs (illegal and prescription):  none   Employment: retired  Activity Level: not limited   Living situation:  assisted living facility  Recent travel:  no  Primary Care Provider: reviewed Stacey Foy M.D.  Other (stressors, spirituality, exposures):  no  Physical Exam:     Temp:  [36 °C (96.8 °F)-36.3 °C (97.4 °F)] 36.3 °C (97.4 °F)  Pulse:  [] 81  Resp:  [14-31] 23  BP: ()/(40-80) 129/80  SpO2:  [70 %-100 %] 95 %    Physical Exam  Vitals and nursing note reviewed.   Constitutional:       General: He is not in acute distress.     Appearance: Normal appearance. He is ill-appearing. He is not toxic-appearing or diaphoretic.   HENT:      Head: Normocephalic and atraumatic.      Mouth/Throat:      Mouth: Mucous membranes are dry.      Pharynx: No oropharyngeal exudate or posterior oropharyngeal erythema.   Eyes:      Conjunctiva/sclera: Conjunctivae normal.   Cardiovascular:      Rate and Rhythm: Normal rate. Rhythm irregular.      Pulses: Normal pulses.      Heart sounds: Normal heart sounds. No murmur heard.     No friction rub. No gallop.   Pulmonary:      Effort: No respiratory distress.      Breath sounds: No stridor. Rhonchi and rales present. No wheezing.      Comments: Bilateral upper to mid rhonchi and slight bibasilar rales  Abdominal:      General: Bowel sounds are normal. There is no distension.      Palpations: Abdomen is soft.      Tenderness: There is no abdominal tenderness.   Musculoskeletal:         General: No tenderness.      Right lower leg: Edema present.      Left lower leg: Edema present.      Comments: 1+ BLE pitting edema   Skin:     General: Skin is dry.      Comments: BLE venous stasis dermatitis   Neurological:      General: No focal deficit present.      Mental Status: He is alert and oriented to person, place, and time.   Psychiatric:         Mood and Affect: Mood normal.         Behavior: Behavior normal.       Labs:   Recent Labs     09/12/22  1258   WBC 8.3   RBC 2.71*   HEMOGLOBIN 9.3*   HEMATOCRIT 29.5*   .9*   MCH 34.3*   RDW 63.0*   PLATELETCT 222   MPV 10.0   NEUTSPOLYS 74.70*   LYMPHOCYTES 11.10*   MONOCYTES 11.20   EOSINOPHILS 1.00   BASOPHILS 1.00     Recent Labs     09/12/22  1258   SODIUM 138   POTASSIUM 4.1   CHLORIDE 97   CO2 28   GLUCOSE 119*   BUN 28*             Imaging:   US-EXTREMITY VENOUS UPPER UNILAT LEFT   Final Result      DX-CHEST-PORTABLE (1 VIEW)   Final Result      1.  Interstitial thickening at the bases, mildly improved from most recent prior, greater on the left. This likely represents underlying interstitial lung disease. Superimposed infection not excluded.   2.  Spiculated mass at the right upper lung, likely representing the mass seen on PET/CT from 2017.            Previous Data Review: reviewed      Assessment:        * Sepsis due to pneumonia (HCC)- (present on admission)  Assessment & Plan  This is Sepsis Present on admission  SIRS criteria identified on my evaluation include: Tachycardia, with heart rate greater than 90 BPM and Tachypnea, with  respirations greater than 20 per minute   . (SIRS 2/4: HR >90, RR >20). qsofa 1/3. procalcitonin 0.20  -1L NS given, reassess volume status given has pneumonia, avoid volume overload  -Target MAP above 60 and urine 0.5 ml/Kg/hour.   -Ceftriaxone,Doxycycline  -f/u Bcx, Ucx, Sputum cx  Source is lungs  Sepsis protocol initiated  IV antibiotics as appropriate for source of sepsis  Reassessment: I have reassessed the patient's hemodynamic status          Acute respiratory failure with hypoxia (HCC)  Assessment & Plan  Secondary to CAP. Improved from 8L->5L no exam  -RT protocol  -titreat 88-92%  -Duoenbs PRN    Community acquired pneumonia  Assessment & Plan  CURB-65:  - Start on Ceftriaxone and doxyclycline.  -Monitors vitals and intake/output  -Oxygen to maintain saturation > 90%, Duoneb, Dulera. If patient wheezing add solumedrol 40 Q8. Monitor RR.  - Follow up with blood and sputum culture  - aspiration, fall precautions  -Follow CBC, BMP, Lactic acid, influenza PCR, mycoplasma IgM, pneumococcal urine AG and Legionella urine Ag.       Edema of left upper arm  Assessment & Plan  No DVT per US    High blood lactic acid level  Assessment & Plan  Elevated  -trend    AF (atrial fibrillation) ([facility])  Assessment & Plan  CHADSVASC 6 (age, HTN, MI, VTE). Rate controlled.  -start Eliquis 2.5mg BID (age >80, cr >1.5, wt. >60kg)  -continue metoprolol  -tele  -Mg >2, K>4    Elevated troponin- (present on admission)  Assessment & Plan  downtrending. ECG no acute ischemic changes.   Likely NSTEMI II    Mild aortic stenosis- (present on admission)  Assessment & Plan  Per Echo June 2022  -avoid hypotension     COPD (chronic obstructive pulmonary disease) ([facility])- (present on admission)  Assessment & Plan  Oxygen dependent.   -Duonebs PRN  -Guaifenesin    Stage 3b chronic kidney disease (HCC)- (present on admission)  Assessment & Plan  Cr at baseline 1.89 (baseline 2.1)  -avoid nephrotoxins, renally dose meds, strict I/0's,  CTM    Anemia- (present on admission)  Assessment & Plan  Macrocytic. No signs of bleeding. Around baseline.  -B12/folate/iron/ferritin    Essential hypertension- (present on admission)  Assessment & Plan  Initially hypotensive, now slightly hypertensive. CTM    CAD (coronary artery disease)- (present on admission)  Assessment & Plan  No CABG candidate. Stop DAPT per Dr. Davey  -continue statin  -cardiac diet    Hyperlipidemia- (present on admission)  Assessment & Plan  Continue statin         Quality Measures:  Lines:   PIV                                     DVT Prophylaxis: SCDs, Eliquis  Ulcer Prophylaxis:   one       Antibiotics:  Ceftriaxone,Doxycycline                 CODE STATUS: Full Code    DISPO:inpatient

## 2022-09-13 NOTE — ASSESSMENT & PLAN NOTE
CURB-65:  - antibiotics: doxyclycline, C3 stopped  -Monitors vitals and intake/output  -Oxygen to maintain saturation > 90%, Duoneb, Dulera. If patient wheezing add solumedrol 40 Q8. Monitor RR.  - Follow up with blood and sputum culture  - aspiration, fall precautions  -Follow CBC, BMP, Lactic acid, influenza PCR, mycoplasma IgM, pneumococcal urine AG and Legionella urine Ag.

## 2022-09-13 NOTE — PROGRESS NOTES
Report received. Patient oriented x4. Pain level 0 out of 10. Just states bottom feels sore. Denies SOB at rest, states feels sob with movement. Productive sputum, suction provided. Fall risk interventions in place, bed in low position, and bed alarm on.

## 2022-09-13 NOTE — DISCHARGE PLANNING
Case Management Discharge Planning    Admission Date: 9/12/2022  GMLOS: 4.8  ALOS: 1    6-Clicks ADL Score: 17  6-Clicks Mobility Score: 9  PT and/or OT Eval ordered: Yes  Post-acute Referrals Ordered: Yes  Post-acute Choice Obtained: Yes  Has referral(s) been sent to post-acute provider:  Yes      Anticipated Discharge Dispo: Discharge Disposition: D/T to SNF with Medicare cert in anticipation of skilled care (03)    DME Needed: No    Action(s) Taken: RN CM met with the patient at bedside to discuss the discharge plan.  Patient was previously discharged to [facility].  Patient explained that he was moved to [facility] in [place] to be closer to family.  Patient would like to return to [facility] upon discharge.  Choice form faxed to RICHARD Snell.  PT/OT evals are pending.    Escalations Completed: Pending discharge destination    Medically Clear: No    Next Steps: Case coordination to follow for medical clearance.    Barriers to Discharge: Medical clearance, Pending Placement, and Pending PT Evaluation    Is the patient up for discharge tomorrow: Potentially

## 2022-09-13 NOTE — ED NOTES
Lab drawn, pt educated about urine sample, verbalized understanding but doesn't need to urinate, given urinal

## 2022-09-13 NOTE — CARE PLAN
The patient is Stable - Low risk of patient condition declining or worsening         Progress made toward(s) clinical / shift goals:  progressing    Patient is not progressing towards the following goals:

## 2022-09-13 NOTE — PROGRESS NOTES
4 Eyes Skin Assessment Completed by CHLOÉ mujica and CHLOÉ tejada.    Head WDL  Ears Redness and Non-Blanching  Nose WDL  Mouth WDL  Neck WDL  Breast/Chest WDL  Shoulder Blades WDL  Spine WDL  (R) Arm/Elbow/Hand WDL  (L) Arm/Elbow/Hand Abrasion  Abdomen WDL  Groin WDL  Scrotum/Coccyx/Buttocks Redness, Non-Blanching, Excoriation, and Discoloration  (R) Leg Edema  (L) Leg Edema  (R) Heel/Foot/Toe WDL  (L) Heel/Foot/Toe WDL          Devices In Places Tele Box, Pulse Ox, and Nasal Cannula      Interventions In Place Gray Ear Foams, Waffle Overlay, Pillows, Q2 Turns, and Barrier Cream    Possible Skin Injury Yes    Pictures Uploaded Into Epic Yes  Wound Consult Placed Yes  RN Wound Prevention Protocol Ordered Yes

## 2022-09-13 NOTE — ED NOTES
Call to receiving unit [id number], RN taking care to come to bedside for report and transport, pt on tele

## 2022-09-13 NOTE — CARE PLAN
Problem: Knowledge Deficit - Standard  Goal: Patient and family/care givers will demonstrate understanding of plan of care, disease process/condition, diagnostic tests and medications  Note: Iv abx, RT      Problem: Fall Risk  Goal: Patient will remain free from falls  Note: Fall precautions in place    The patient is Watcher - Medium risk of patient condition declining or worsening         Progress made toward(s) clinical / shift goals:  iv abx given     Patient is not progressing towards the following goals:

## 2022-09-14 LAB
ANION GAP SERPL CALC-SCNC: 8 MMOL/L (ref 7–16)
BASOPHILS # BLD AUTO: 0.5 % (ref 0–1.8)
BASOPHILS # BLD: 0.03 K/UL (ref 0–0.12)
BUN SERPL-MCNC: 29 MG/DL (ref 8–22)
CALCIUM SERPL-MCNC: 8.6 MG/DL (ref 8.5–10.5)
CHLORIDE SERPL-SCNC: 100 MMOL/L (ref 96–112)
CO2 SERPL-SCNC: 30 MMOL/L (ref 20–33)
CREAT SERPL-MCNC: 1.62 MG/DL (ref 0.5–1.4)
EOSINOPHIL # BLD AUTO: 0.27 K/UL (ref 0–0.51)
EOSINOPHIL NFR BLD: 4.1 % (ref 0–6.9)
ERYTHROCYTE [DISTWIDTH] IN BLOOD BY AUTOMATED COUNT: 63.1 FL (ref 35.9–50)
GFR SERPLBLD CREATININE-BSD FMLA CKD-EPI: 40 ML/MIN/1.73 M 2
GLUCOSE SERPL-MCNC: 126 MG/DL (ref 65–99)
HCT VFR BLD AUTO: 25.9 % (ref 42–52)
HGB BLD-MCNC: 8.1 G/DL (ref 14–18)
IMM GRANULOCYTES # BLD AUTO: 0.06 K/UL (ref 0–0.11)
IMM GRANULOCYTES NFR BLD AUTO: 0.9 % (ref 0–0.9)
LYMPHOCYTES # BLD AUTO: 0.92 K/UL (ref 1–4.8)
LYMPHOCYTES NFR BLD: 13.9 % (ref 22–41)
MCH RBC QN AUTO: 34.3 PG (ref 27–33)
MCHC RBC AUTO-ENTMCNC: 31.3 G/DL (ref 33.7–35.3)
MCV RBC AUTO: 109.7 FL (ref 81.4–97.8)
MONOCYTES # BLD AUTO: 0.78 K/UL (ref 0–0.85)
MONOCYTES NFR BLD AUTO: 11.8 % (ref 0–13.4)
NEUTROPHILS # BLD AUTO: 4.55 K/UL (ref 1.82–7.42)
NEUTROPHILS NFR BLD: 68.8 % (ref 44–72)
NRBC # BLD AUTO: 0 K/UL
NRBC BLD-RTO: 0 /100 WBC
PLATELET # BLD AUTO: 165 K/UL (ref 164–446)
PMV BLD AUTO: 10.3 FL (ref 9–12.9)
POTASSIUM SERPL-SCNC: 4.6 MMOL/L (ref 3.6–5.5)
RBC # BLD AUTO: 2.36 M/UL (ref 4.7–6.1)
SODIUM SERPL-SCNC: 138 MMOL/L (ref 135–145)
WBC # BLD AUTO: 6.6 K/UL (ref 4.8–10.8)

## 2022-09-14 PROCEDURE — 80048 BASIC METABOLIC PNL TOTAL CA: CPT

## 2022-09-14 PROCEDURE — 99232 SBSQ HOSP IP/OBS MODERATE 35: CPT | Mod: GC | Performed by: HOSPITALIST

## 2022-09-14 PROCEDURE — 700102 HCHG RX REV CODE 250 W/ 637 OVERRIDE(OP): Performed by: GENERAL PRACTICE

## 2022-09-14 PROCEDURE — 36415 COLL VENOUS BLD VENIPUNCTURE: CPT

## 2022-09-14 PROCEDURE — 85025 COMPLETE CBC W/AUTO DIFF WBC: CPT

## 2022-09-14 PROCEDURE — 94669 MECHANICAL CHEST WALL OSCILL: CPT

## 2022-09-14 PROCEDURE — A9270 NON-COVERED ITEM OR SERVICE: HCPCS | Performed by: GENERAL PRACTICE

## 2022-09-14 PROCEDURE — 97530 THERAPEUTIC ACTIVITIES: CPT

## 2022-09-14 PROCEDURE — 770020 HCHG ROOM/CARE - TELE (206)

## 2022-09-14 RX ADMIN — APIXABAN 2.5 MG: 5 TABLET, FILM COATED ORAL at 05:21

## 2022-09-14 RX ADMIN — METOPROLOL TARTRATE 12.5 MG: 25 TABLET, FILM COATED ORAL at 18:13

## 2022-09-14 RX ADMIN — DOCUSATE SODIUM 50 MG AND SENNOSIDES 8.6 MG 2 TABLET: 8.6; 5 TABLET, FILM COATED ORAL at 05:21

## 2022-09-14 RX ADMIN — DOCUSATE SODIUM 50 MG AND SENNOSIDES 8.6 MG 2 TABLET: 8.6; 5 TABLET, FILM COATED ORAL at 18:13

## 2022-09-14 RX ADMIN — ROSUVASTATIN CALCIUM 40 MG: 20 TABLET, FILM COATED ORAL at 18:13

## 2022-09-14 RX ADMIN — METOPROLOL TARTRATE 12.5 MG: 25 TABLET, FILM COATED ORAL at 05:20

## 2022-09-14 RX ADMIN — APIXABAN 2.5 MG: 5 TABLET, FILM COATED ORAL at 18:13

## 2022-09-14 RX ADMIN — RANOLAZINE 500 MG: 500 TABLET, FILM COATED, EXTENDED RELEASE ORAL at 05:21

## 2022-09-14 ASSESSMENT — ENCOUNTER SYMPTOMS
FEVER: 0
CHILLS: 0
SHORTNESS OF BREATH: 0
DIZZINESS: 0
HEADACHES: 0
COUGH: 0
PALPITATIONS: 0
SPUTUM PRODUCTION: 0
SENSORY CHANGE: 0

## 2022-09-14 ASSESSMENT — COGNITIVE AND FUNCTIONAL STATUS - GENERAL
MOBILITY SCORE: 8
MOVING TO AND FROM BED TO CHAIR: UNABLE
MOVING FROM LYING ON BACK TO SITTING ON SIDE OF FLAT BED: UNABLE
WALKING IN HOSPITAL ROOM: A LOT
TURNING FROM BACK TO SIDE WHILE IN FLAT BAD: UNABLE
SUGGESTED CMS G CODE MODIFIER MOBILITY: CM
CLIMB 3 TO 5 STEPS WITH RAILING: TOTAL
STANDING UP FROM CHAIR USING ARMS: A LOT

## 2022-09-14 ASSESSMENT — PAIN DESCRIPTION - PAIN TYPE: TYPE: ACUTE PAIN

## 2022-09-14 ASSESSMENT — GAIT ASSESSMENTS: GAIT LEVEL OF ASSIST: UNABLE TO PARTICIPATE

## 2022-09-14 ASSESSMENT — FIBROSIS 4 INDEX: FIB4 SCORE: 2.57

## 2022-09-14 NOTE — PROGRESS NOTES
Report received from [facility] shift RN, assumed patient care. Patient is calmly resting in bed, no signs of distress, even and unlabored breathing noted. Pt on 4L O2 via NC. Tele box on and in place. Patient has call light within reach, fall precautions in place. Patient remains safe and stable, will continue to monitor.

## 2022-09-14 NOTE — PROGRESS NOTES
[facility] [place] Internal Medicine Daily Progress Note    Date of Service  9/14/2022    [facility] [place] Team: R IM White Team   Attending: Teresa Olson M.d.  Senior Resident: Dr. Singh  Intern:  Dr. Jeffrey  Contact Number: [phone]    Chief Complaint  Kobe Cyr is a 89 y.o. male admitted 9/12/2022 with shortness of breath, increased oxygen demand.     Hospital Course  Kobe Cyr is a 89 y/o male presenting with Shortness of breath and increasing oxygen demand. Patient was noted to need 8L of O2 at a clinic compared to home 4.5L; patient was sent directly from clinic to ED. Patient was initially positive for SIRS criteria with tachycardia, tachypnia. Empirically given doxycycline and ceftriaxone, and given 1L NS in ED. Given lasix 20mg, The antibiotics were stopped shortly after given clinical improvement. Patient quickly improved and is currently on home O2 demand. Slowly added home BP medications back onboard throughout admission due to volatile Blood pressures.    Interval Problem Update  -no acute events overnight  -Patient complains of constipation; PRNs given and BM resulted later in day.  -Patient reports response to 09/13 lasix. Reports urinating all night.   -CM reports approval by [place].  -Dermatitis noted    I have discussed this patient's plan of care and discharge plan at IDT rounds today with Case Management, Nursing, Nursing leadership, and other members of the IDT team.    Consultants/Specialty  N/a    Code Status  Full Code    Disposition  Patient is medically cleared for discharge.   Anticipate discharge to to home with close outpatient follow-up.  I have placed the appropriate orders for post-discharge needs.    Review of Systems  Review of Systems   Constitutional:  Negative for chills and fever.   Respiratory:  Negative for cough, sputum production and shortness of breath.    Cardiovascular:  Negative for chest pain, palpitations and leg swelling.   Neurological:  Negative for dizziness, sensory  "change and headaches.      Physical Exam  Temp:  [35.9 °C (96.6 °F)-36.6 °C (97.9 °F)] 36.1 °C (96.9 °F)  Pulse:  [65-92] 69  Resp:  [16-19] 16  BP: ()/(56-81) 118/81  SpO2:  [89 %-97 %] 97 %    Physical Exam  Vitals and nursing note reviewed.   Constitutional:       General: He is not in acute distress.     Appearance: He is normal weight. He is not toxic-appearing.   HENT:      Head: Normocephalic and atraumatic.      Nose: Nose normal.   Eyes:      Extraocular Movements: Extraocular movements intact.   Cardiovascular:      Rate and Rhythm: Normal rate and regular rhythm.      Pulses: Normal pulses.      Heart sounds: Normal heart sounds. No murmur heard.     Comments: Unable to appreciate murmur.  Pulmonary:      Effort: Pulmonary effort is normal. No respiratory distress.      Breath sounds: Wheezing and rales present.      Comments: Wheezes appear to be \"drier\" in quality.  Chest:      Chest wall: No tenderness.   Musculoskeletal:         General: Swelling present. No deformity.      Right lower leg: No edema.      Left lower leg: No edema.      Comments: Swelling noted in UE antecubital fossa   Skin:     General: Skin is warm and dry.      Coloration: Skin is not jaundiced.      Findings: No erythema or rash.   Neurological:      General: No focal deficit present.      Mental Status: He is alert. Mental status is at baseline.   Psychiatric:         Mood and Affect: Mood normal.         Behavior: Behavior normal.         Thought Content: Thought content normal.         Judgment: Judgment normal.       Fluids    Intake/Output Summary (Last 24 hours) at 9/14/2022 1624  Last data filed at 9/14/2022 0858  Gross per 24 hour   Intake 120 ml   Output 1040 ml   Net -920 ml       Laboratory  Recent Labs     09/13/22  0029 09/13/22  0326 09/14/22  0242   WBC 6.8 6.2 6.6   RBC 2.36* 2.31* 2.36*   HEMOGLOBIN 8.2* 8.0* 8.1*   HEMATOCRIT 25.6* 25.1* 25.9*   .5* 108.7* 109.7*   MCH 34.7* 34.6* 34.3*   MCHC 32.0* " 31.9* 31.3*   RDW 62.8* 62.8* 63.1*   PLATELETCT 167 161* 165   MPV 9.8 10.0 10.3     Recent Labs     09/12/22  1258 09/13/22  0029 09/14/22  0242   SODIUM 138 137 138   POTASSIUM 4.1 4.0 4.6   CHLORIDE 97 99 100   CO2 28 31 30   GLUCOSE 119* 138* 126*   BUN 28* 30* 29*   CREATININE 1.89* 1.87* 1.62*   CALCIUM 9.3 8.7 8.6     Recent Labs     09/12/22  1258   INR 1.29*               Imaging  EC-ECHOCARDIOGRAM COMPLETE W/O CONT   Final Result      US-EXTREMITY VENOUS UPPER UNILAT LEFT   Final Result      DX-CHEST-PORTABLE (1 VIEW)   Final Result      1.  Interstitial thickening at the bases, mildly improved from most recent prior, greater on the left. This likely represents underlying interstitial lung disease. Superimposed infection not excluded.   2.  Spiculated mass at the right upper lung, likely representing the mass seen on PET/CT from 2017.           Assessment/Plan  Problem Representation:    * Acute on chronic respiratory failure with hypoxia (HCC)- (present on admission)  Assessment & Plan  Sepsis Present on admission= SIRS criteria identified on evaluation include: Tachycardia, with heart rate greater than 90 BPM and Tachypnea, with respirations greater than 20 per minute.  (SIRS 2/4: HR >90, RR >20). qsofa 1/3. procalcitonin 0.20.  1L NS given in ED  Differential COPD exacerbation, CAP,     -d/c Ceftriaxone, Doxycycline (last dose doxy 09/13/22)  -f/u Bcx, Ucx, Sputum cx  Sepsis protocol initiated  -No Lasix 09/14/22, will monitor response and add more if needed.  -considering steroids  -Continue Spiriva if tolerated,       Community acquired pneumonia  Assessment & Plan  CURB-65:  - antibiotics: doxyclycline, C3 stopped  -Monitors vitals and intake/output  -Oxygen to maintain saturation > 90%, Duoneb, Dulera. If patient wheezing add solumedrol 40 Q8. Monitor RR.  - Follow up with blood and sputum culture  - aspiration, fall precautions  -Follow CBC, BMP, Lactic acid, influenza PCR, mycoplasma IgM,  pneumococcal urine AG and Legionella urine Ag.       COPD (chronic obstructive pulmonary disease) (HCC)- (present on admission)  Assessment & Plan  Oxygen dependent.   -Duonebs PRN, spiriva scheduled  -Guaifenesin    High blood lactic acid level  Assessment & Plan  Trended to wnl     Acute respiratory failure with hypoxia (HCC)  Assessment & Plan  Secondary to CAP. Improved from 8L->5L no exam  -RT protocol  -titreat 88-92%  -Duoenbs PRN    AF (atrial fibrillation) (HCC)  Assessment & Plan  CHADSVASC 6 (age, HTN, MI, VTE). Rate controlled.  -start Eliquis 2.5mg BID (age >80, cr >1.5, wt. >60kg)  -continue metoprolol  -tele  -Mg >2, K>4    Edema of left upper arm  Assessment & Plan  No DVT per US    Sepsis with acute hypoxic respiratory failure (HCC)- (present on admission)  Assessment & Plan          Elevated troponin- (present on admission)  Assessment & Plan  downtrending. ECG no acute ischemic changes.   Likely NSTEMI II    Mild aortic stenosis- (present on admission)  Assessment & Plan  Per Echo June 2022  -avoid hypotension     Stage 3b chronic kidney disease (HCC)- (present on admission)  Assessment & Plan  Cr at baseline 1.87 (baseline 2.1)  -avoid nephrotoxins, renally dose meds, strict I/0's, CTM    Anemia- (present on admission)  Assessment & Plan  Macrocytic. No signs of bleeding. Around baseline.  -B12/folate/iron/ferritin    Essential hypertension- (present on admission)  Assessment & Plan  Initially hypotensive, now slightly hypertensive. CTM    CAD (coronary artery disease)- (present on admission)  Assessment & Plan  No CABG candidate. Stop DAPT per Dr. Davey  -continue statin  -cardiac diet    Hyperlipidemia- (present on admission)  Assessment & Plan  Continue statin       VTE prophylaxis: therapeutic anticoagulation with Eliquis    I have performed a physical exam and reviewed and updated ROS and Plan today (9/14/2022). In review of yesterday's note (9/13/2022), there are no changes except as  documented above.

## 2022-09-14 NOTE — ASSESSMENT & PLAN NOTE
Sepsis Present on admission= SIRS criteria identified on evaluation include: Tachycardia, with heart rate greater than 90 BPM and Tachypnea, with respirations greater than 20 per minute.  (SIRS 2/4: HR >90, RR >20). qsofa 1/3. procalcitonin 0.20.  1L NS given in ED  Differential COPD exacerbation, CAP,     -d/c Ceftriaxone, Doxycycline (last dose doxy 09/13/22)  -f/u Bcx, Ucx, Sputum cx  Sepsis protocol initiated  -No Lasix 09/14/22, will monitor response and add more if needed.  -considering steroids  -Continue Spiriva if tolerated,

## 2022-09-14 NOTE — THERAPY
"Physical Therapy   Daily Treatment     Patient Name: Kobe Cyr  Age:  89 y.o., Sex:  male  Medical Record #: [id number]  Today's Date: 9/14/2022     Precautions  Precautions: Fall Risk  Comments: hx of orthostatic HoTN    Assessment    Patient limited by rectal pain and increased WOB/SOB with activity this session. He mobilized as detailed below; he was able to perform stand step transfer with FWW and min A EOB to BSC and required prolonged time on BSC for BM. He required frequent cueing to prevent straining and to breathe while toileting. He initially reported dizziness with first attempt at standing but no further report of concern for hypotension. Will continue to follow.    Plan    Continue current treatment plan.    DC Equipment Recommendations: Unable to determine at this time  Discharge Recommendations: Recommend post-acute placement for additional physical therapy services prior to discharge home      Subjective    \"I sit at the EOB and do my exercises to help with my blood pressure.\"     Objective       09/14/22 0399   Charge Group   Charges  Yes   PT Therapeutic Activities 3  (40 min)   Precautions   Precautions Fall Risk   Comments hx of orthostatic HoTN   Vitals   O2 (LPM) 4   O2 Delivery Device Silicone Nasal Cannula   Vitals Comments desats with activity, required cueing for nasal inhalation with NC   Pain 0 - 10 Group   Location Rectum   Therapist Pain Assessment During Activity;Nurse Notified   Cognition    Cognition / Consciousness WDL   Level of Consciousness Alert   Comments pleasant, cooperative. likely at baseline. distracted by need for BM and rectal pain   Balance   Sitting Balance (Static) Fair   Sitting Balance (Dynamic) Fair   Standing Balance (Static) Fair -   Standing Balance (Dynamic) Fair -   Weight Shift Sitting Fair   Weight Shift Standing Fair   Skilled Intervention Verbal Cuing;Compensatory Strategies;Sequencing   Comments standing with FWW, forward flexed posture but no overt " "LOB   Gait Analysis   Gait Level Of Assist Unable to Participate  (deferred given initial report of dizziness and prolonged time on BSC)   Bed Mobility    Supine to Sit   (NT, sitting EOB pre and post)   Scooting Supervised  (seated)   Skilled Intervention Verbal Cuing;Compensatory Strategies   Functional Mobility   Sit to Stand Moderate Assist  (improved to min A during session)   Bed, Chair, Wheelchair Transfer Minimal Assist   Toilet Transfers Minimal Assist  (to BSC)   Transfer Method Stand Step   Skilled Intervention Verbal Cuing;Compensatory Strategies;Sequencing   How much difficulty does the patient currently have...   Turning over in bed (including adjusting bedclothes, sheets and blankets)? 1   Sitting down on and standing up from a chair with arms (e.g., wheelchair, bedside commode, etc.) 1   Moving from lying on back to sitting on the side of the bed? 1   How much help from another person does the patient currently need...   Moving to and from a bed to a chair (including a wheelchair)? 2   Need to walk in a hospital room? 2   Climbing 3-5 steps with a railing? 1   6 clicks Mobility Score 8   Activity Tolerance   Sitting in Chair 15+ min on BSC   Sitting Edge of Bed 10+ min   Standing 2-3 min total   Comments limited by increased WOB/SOB with activity and rectal pain   Patient / Family Goals    Patient / Family Goal #1 \"return home to my wife\"   Goal #1 Outcome Goal not met   Short Term Goals    Short Term Goal # 1 The patient will be able to perform supine <> sit with no bed features and SPV within 6tx in order to improve functional independence with bed mobility   Goal Outcome # 1   (not observed this session)   Short Term Goal # 2 The patient will be able to ambulate >150ft with FWW and SBA within 6tx in order to safely access dining area in SNF   Goal Outcome # 2 Goal not met   Short Term Goal # 3 The patient will be able to perform all functional transfers with FWW and SPV within 6tx in order to " improve functional independence   Goal Outcome # 3 Goal not met   Anticipated Discharge Equipment and Recommendations   DC Equipment Recommendations Unable to determine at this time   Discharge Recommendations Recommend post-acute placement for additional physical therapy services prior to discharge home   Interdisciplinary Plan of Care Collaboration   IDT Collaboration with  Nursing   Patient Position at End of Therapy Seated;Edge of Bed;Call Light within Reach;Tray Table within Reach;Phone within Reach   Collaboration Comments RN aware of visit, response   Session Information   Date / Session Number  9/14-2 (2/4, 9/19)

## 2022-09-14 NOTE — CARE PLAN
"The patient is Stable - Low risk of patient condition declining or worsening    Shift Goals  Clinical Goals: Patient will verbalize understanding of POC by end of shift.  Patient Goals: \"Get to go home to see my Grandkids, Son and his wife while they are still in town on the 17th.\"  Family Goals: CAMERON    Progress made toward(s) clinical / shift goals:  Provided patient with a verbal discussion for education on Pneumonia and current treatment plan of care. Discussed pneumonia prevention strategies. Patient verbalized understanding and had no questions or concerns. Pt remains free from falls at this time. Safety precautions in place. Pt educated on calling for assistance when needed.      Patient is not progressing towards the following goals: N/A      "

## 2022-09-14 NOTE — PROGRESS NOTES
[facility] Internal Medicine Daily Progress Note    Date of Service  9/13/2022    [facility] Team: R IM White Team   Attending: Teresa Olson M.d.  Senior Resident: Dr. Singh  Intern:  Dr. Jeffrey  Contact Number: [phone]    Chief Complaint  Kobe Cyr is a 89 y.o. male admitted 9/12/2022 with Shortness of breath, increased oxygen demand.    Hospital Course  No notes on file    Interval Problem Update    -No acute events overnight  -Tolerating antibiotics. Have not had duonebs yet.  -Patient back on home O2 dose, lactic acid and trops trending downward.    I have discussed this patient's plan of care and discharge plan at IDT rounds today with Case Management, Nursing, Nursing leadership, and other members of the IDT team.      Code Status  Full Code    Disposition  Patient is not medically cleared for discharge.   Anticipate discharge to to home with close outpatient follow-up.  I have placed the appropriate orders for post-discharge needs.    Review of Systems  Review of Systems   Constitutional:  Negative for chills and fever.   Respiratory:  Negative for cough, sputum production and shortness of breath.    Cardiovascular:  Negative for chest pain, palpitations and leg swelling.   Neurological:  Negative for dizziness, sensory change and headaches.      Physical Exam  Temp:  [36.3 °C (97.4 °F)-36.7 °C (98.1 °F)] 36.7 °C (98.1 °F)  Pulse:  [] 84  Resp:  [18-28] 18  BP: ()/(50-80) 95/50  SpO2:  [86 %-99 %] 91 %    Physical Exam  Vitals and nursing note reviewed.   Constitutional:       General: He is not in acute distress.     Appearance: He is normal weight. He is not toxic-appearing.   HENT:      Head: Normocephalic and atraumatic.      Nose: Nose normal.   Eyes:      Extraocular Movements: Extraocular movements intact.   Cardiovascular:      Rate and Rhythm: Normal rate and regular rhythm.      Pulses: Normal pulses.      Heart sounds: Normal heart sounds. No murmur heard.     Comments: Unable to  appreciate murmur.  Pulmonary:      Effort: Pulmonary effort is normal. No respiratory distress.      Breath sounds: Wheezing and rales present.   Chest:      Chest wall: No tenderness.   Musculoskeletal:         General: Swelling present. No deformity.      Right lower leg: No edema.      Left lower leg: No edema.      Comments: Swelling noted in UE antecubital fossa   Skin:     General: Skin is warm.      Coloration: Skin is not jaundiced.      Findings: No erythema or rash.   Neurological:      General: No focal deficit present.      Mental Status: He is alert. Mental status is at baseline.   Psychiatric:         Mood and Affect: Mood normal.         Behavior: Behavior normal.         Thought Content: Thought content normal.         Judgment: Judgment normal.       Fluids    Intake/Output Summary (Last 24 hours) at 9/13/2022 1749  Last data filed at 9/13/2022 1519  Gross per 24 hour   Intake 1240 ml   Output 575 ml   Net 665 ml       Laboratory  Recent Labs     09/12/22  1258 09/13/22  0029 09/13/22  0326   WBC 8.3 6.8 6.2   RBC 2.71* 2.36* 2.31*   HEMOGLOBIN 9.3* 8.2* 8.0*   HEMATOCRIT 29.5* 25.6* 25.1*   .9* 108.5* 108.7*   MCH 34.3* 34.7* 34.6*   MCHC 31.5* 32.0* 31.9*   RDW 63.0* 62.8* 62.8*   PLATELETCT 222 167 161*   MPV 10.0 9.8 10.0     Recent Labs     09/12/22  1258 09/13/22  0029   SODIUM 138 137   POTASSIUM 4.1 4.0   CHLORIDE 97 99   CO2 28 31   GLUCOSE 119* 138*   BUN 28* 30*   CREATININE 1.89* 1.87*   CALCIUM 9.3 8.7     Recent Labs     09/12/22  1258   INR 1.29*               Imaging  US-EXTREMITY VENOUS UPPER UNILAT LEFT   Final Result      DX-CHEST-PORTABLE (1 VIEW)   Final Result      1.  Interstitial thickening at the bases, mildly improved from most recent prior, greater on the left. This likely represents underlying interstitial lung disease. Superimposed infection not excluded.   2.  Spiculated mass at the right upper lung, likely representing the mass seen on PET/CT from 2017.       EC-ECHOCARDIOGRAM COMPLETE W/O CONT    (Results Pending)        Assessment/Plan  Problem Representation:    * Acute on chronic respiratory failure with hypoxia (HCC)- (present on admission)  Assessment & Plan  Sepsis Present on admission= SIRS criteria identified on evaluation include: Tachycardia, with heart rate greater than 90 BPM and Tachypnea, with respirations greater than 20 per minute.  (SIRS 2/4: HR >90, RR >20). qsofa 1/3. procalcitonin 0.20.  1L NS given in ED  Differential COPD exacerbation, CAP,     -d/c Ceftriaxone, continue Doxycycline  -f/u Bcx, Ucx, Sputum cx  Sepsis protocol initiated  -Adding 20mg Lasix, will monitor response and add more if output not enough  -considering steroids  -Spiriva added      Community acquired pneumonia  Assessment & Plan  CURB-65:  - continue doxyclycline.  -Monitors vitals and intake/output  -Oxygen to maintain saturation > 90%, Duoneb, Dulera. If patient wheezing add solumedrol 40 Q8. Monitor RR.  - Follow up with blood and sputum culture  - aspiration, fall precautions  -Follow CBC, BMP, Lactic acid, influenza PCR, mycoplasma IgM, pneumococcal urine AG and Legionella urine Ag.       COPD (chronic obstructive pulmonary disease) (HCC)- (present on admission)  Assessment & Plan  Oxygen dependent.   -Duonebs PRN, spiriva scheduled  -Guaifenesin    High blood lactic acid level  Assessment & Plan  Trended to wnl     Acute respiratory failure with hypoxia (HCC)  Assessment & Plan  Secondary to CAP. Improved from 8L->5L no exam  -RT protocol  -titreat 88-92%  -Duoenbs PRN    AF (atrial fibrillation) (HCC)  Assessment & Plan  CHADSVASC 6 (age, HTN, MI, VTE). Rate controlled.  -start Eliquis 2.5mg BID (age >80, cr >1.5, wt. >60kg)  -continue metoprolol  -tele  -Mg >2, K>4    Edema of left upper arm  Assessment & Plan  No DVT per US    Sepsis with acute hypoxic respiratory failure (HCC)- (present on admission)  Assessment & Plan          Elevated troponin- (present on  admission)  Assessment & Plan  downtrending. ECG no acute ischemic changes.   Likely NSTEMI II    Mild aortic stenosis- (present on admission)  Assessment & Plan  Per Echo June 2022  -avoid hypotension     Stage 3b chronic kidney disease (HCC)- (present on admission)  Assessment & Plan  Cr at baseline 1.87 (baseline 2.1)  -avoid nephrotoxins, renally dose meds, strict I/0's, CTM    Anemia- (present on admission)  Assessment & Plan  Macrocytic. No signs of bleeding. Around baseline.  -B12/folate/iron/ferritin    Essential hypertension- (present on admission)  Assessment & Plan  Initially hypotensive, now slightly hypertensive. CTM    CAD (coronary artery disease)- (present on admission)  Assessment & Plan  No CABG candidate. Stop DAPT per Dr. Davey  -continue statin  -cardiac diet    Hyperlipidemia- (present on admission)  Assessment & Plan  Continue statin         VTE prophylaxis: therapeutic anticoagulation with Eliquis    I have performed a physical exam and reviewed and updated ROS and Plan today (9/13/2022). In review of yesterday's note (9/12/2022), there are no changes except as documented above.

## 2022-09-14 NOTE — PROGRESS NOTES
Received bedside report from RN, pt care assumed. VS WDL, pt assessment complete. Pt A&Ox4, No c/o pain at this time. POC discussed with pt and verbalizes no questions. Pt denies any additional needs at this time. Bed locked and in lowest position.. Pt educated on fall risk and verbalized understanding, call light within reach, hourly rounding initiated.

## 2022-09-14 NOTE — DISCHARGE PLANNING
Case Management Discharge Planning    Admission Date: 9/12/2022  GMLOS: 4.8  ALOS: 2    6-Clicks ADL Score: 16  6-Clicks Mobility Score: 9  PT and/or OT Eval ordered: Yes  Post-acute Referrals Ordered: Yes  Post-acute Choice Obtained: Yes  Has referral(s) been sent to post-acute provider:  Yes      Anticipated Discharge Dispo: Discharge Disposition: D/T to SNF with Medicare cert in anticipation of skilled care (03)    DME Needed: No    Action(s) Taken: Patient discussed during IDT rounds.  Plan is to watch the patient for 1 more night and then transfer back to SNF tomorrow.  RN CM faxed a request to the Ride Line for a T ride tomorrow at 1300.    Escalations Completed: Ride Line    Medically Clear: No    Next Steps: Case coordination to inform [facility] once transport is arranged.    Barriers to Discharge: Medical clearance and Transportation    Is the patient up for discharge tomorrow: Yes    Is transport arranged for discharge disposition: Pending

## 2022-09-14 NOTE — HOSPITAL COURSE
Kobe Cyr is a 89 y/o male presenting with Shortness of breath and increasing oxygen demand. Patient was noted to need 8L of O2 at a clinic compared to home 4.5L; patient was sent directly from clinic to ED. Patient was initially positive for SIRS criteria with tachycardia, tachypnia. Empirically given doxycycline and ceftriaxone, and given 1L NS in ED. Given lasix 20mg, The antibiotics were stopped shortly after given clinical improvement. Patient quickly improved and is currently on home O2 demand. Slowly added home BP medications back onboard throughout admission due to volatile Blood pressures.

## 2022-09-14 NOTE — DISCHARGE PLANNING
DC Transport Scheduled    Received request at: 1225    Transport Company Scheduled:  [employer]      Scheduled Date: 09/15/2022  Scheduled Time: 1300    [place]      Notified care team of scheduled transport via Vo[employer].     If there are any changes needed to the DC transportation scheduled, please contact [facility] at ext. [phone] between the hours of 5058-9860 Mon-Fri. If outside those hours, contact the ED Case Manager at ext. [phone].

## 2022-09-14 NOTE — DISCHARGE PLANNING
Agency/Facility Name: [facility]  Spoke To: Admissions  Outcome: Pt accepted, bed available today. Facility requesting CM to set up transport at anytime.    CM CHLOÉ Mendieta notified    [id number]  Agency/Facility Name: [facility]  Spoke To: [place]  Outcome: DPA notified facility that transport is set up for tomorrow at 1300.

## 2022-09-15 VITALS
TEMPERATURE: 97.2 F | SYSTOLIC BLOOD PRESSURE: 96 MMHG | DIASTOLIC BLOOD PRESSURE: 50 MMHG | OXYGEN SATURATION: 90 % | BODY MASS INDEX: 34.02 KG/M2 | HEIGHT: 70 IN | WEIGHT: 237.66 LBS | HEART RATE: 69 BPM | RESPIRATION RATE: 18 BRPM

## 2022-09-15 PROBLEM — R79.89 ELEVATED TROPONIN: Status: RESOLVED | Noted: 2022-06-19 | Resolved: 2022-09-15

## 2022-09-15 PROBLEM — R60.0 EDEMA OF LEFT UPPER ARM: Status: RESOLVED | Noted: 2022-09-12 | Resolved: 2022-09-15

## 2022-09-15 PROBLEM — A41.9 SEPSIS WITH ACUTE HYPOXIC RESPIRATORY FAILURE (HCC): Status: RESOLVED | Noted: 2022-09-12 | Resolved: 2022-09-15

## 2022-09-15 PROBLEM — J96.21 ACUTE ON CHRONIC RESPIRATORY FAILURE WITH HYPOXIA (HCC): Status: RESOLVED | Noted: 2021-07-10 | Resolved: 2022-09-15

## 2022-09-15 PROBLEM — J18.9 COMMUNITY ACQUIRED PNEUMONIA: Status: RESOLVED | Noted: 2022-09-12 | Resolved: 2022-09-15

## 2022-09-15 PROBLEM — R79.89: Status: RESOLVED | Noted: 2022-09-12 | Resolved: 2022-09-15

## 2022-09-15 PROBLEM — J96.01 SEPSIS WITH ACUTE HYPOXIC RESPIRATORY FAILURE (HCC): Status: RESOLVED | Noted: 2022-09-12 | Resolved: 2022-09-15

## 2022-09-15 PROBLEM — R65.20 SEPSIS WITH ACUTE HYPOXIC RESPIRATORY FAILURE (HCC): Status: RESOLVED | Noted: 2022-09-12 | Resolved: 2022-09-15

## 2022-09-15 LAB
BACTERIA SPEC RESP CULT: ABNORMAL
BACTERIA SPEC RESP CULT: ABNORMAL
BACTERIA UR CULT: NORMAL
GRAM STN SPEC: ABNORMAL
SIGNIFICANT IND 70042: ABNORMAL
SIGNIFICANT IND 70042: NORMAL
SITE SITE: ABNORMAL
SITE SITE: NORMAL
SOURCE SOURCE: ABNORMAL
SOURCE SOURCE: NORMAL

## 2022-09-15 PROCEDURE — 700102 HCHG RX REV CODE 250 W/ 637 OVERRIDE(OP)

## 2022-09-15 PROCEDURE — A9270 NON-COVERED ITEM OR SERVICE: HCPCS | Performed by: GENERAL PRACTICE

## 2022-09-15 PROCEDURE — 700102 HCHG RX REV CODE 250 W/ 637 OVERRIDE(OP): Performed by: GENERAL PRACTICE

## 2022-09-15 PROCEDURE — 99238 HOSP IP/OBS DSCHRG MGMT 30/<: CPT | Mod: GC | Performed by: HOSPITALIST

## 2022-09-15 PROCEDURE — A9270 NON-COVERED ITEM OR SERVICE: HCPCS

## 2022-09-15 RX ORDER — POLYETHYLENE GLYCOL 3350 17 G/17G
17 POWDER, FOR SOLUTION ORAL DAILY
Qty: 30 EACH | Refills: 3 | Status: SHIPPED | OUTPATIENT
Start: 2022-09-15

## 2022-09-15 RX ADMIN — RANOLAZINE 500 MG: 500 TABLET, FILM COATED, EXTENDED RELEASE ORAL at 05:43

## 2022-09-15 RX ADMIN — METOPROLOL TARTRATE 12.5 MG: 25 TABLET, FILM COATED ORAL at 05:43

## 2022-09-15 RX ADMIN — APIXABAN 2.5 MG: 5 TABLET, FILM COATED ORAL at 05:46

## 2022-09-15 RX ADMIN — TIOTROPIUM BROMIDE INHALATION SPRAY 5 MCG: 3.12 SPRAY, METERED RESPIRATORY (INHALATION) at 09:53

## 2022-09-15 RX ADMIN — DOCUSATE SODIUM 50 MG AND SENNOSIDES 8.6 MG 2 TABLET: 8.6; 5 TABLET, FILM COATED ORAL at 05:46

## 2022-09-15 ASSESSMENT — PAIN DESCRIPTION - PAIN TYPE
TYPE: ACUTE PAIN
TYPE: ACUTE PAIN

## 2022-09-15 NOTE — PROGRESS NOTES
Report received from [facility] shift RN, assumed patient care. Patient is calmly resting in bed, no signs of distress, even and unlabored breathing noted. Pt on 4L O2 via NC. Tele box on and in place. Patient has call light within reach, fall precautions in place. Patient remains safe and stable, will continue to monitor.

## 2022-09-15 NOTE — DOCUMENTATION QUERY
[facility]                                                                       Query Response Note      PATIENT:               CARRIE CAMPBELL  ACCT #:                  [id number]  MRN:                     [id number]  :                      [id number]  ADMIT DATE:       2022 1:10 PM  DISCH DATE:          RESPONDING  PROVIDER #:        [id number]           QUERY TEXT:    Atrial fibrillation is listed as a diagnosis in the H&P and  PN.  Previously on  cardiology documented persistent atrial fibrillation in their outpatient PN. Please specify the type.    NOTE:  If an appropriate response is not listed below, please respond with a new note.      The patient's Clinical Indicators include:  2022 ED note - new atrial fibrillation  2022 Cardiology outpatient PN - Dx Persistent atrial fibrillation -  A pacemaker was placed.  H&P - Dx Atrial fibrillation     Treatment - Pacemaker; Eliquis; Metoprolol    Risk factors - atrial fibrillation    Thank you,  Brigida Durbin BSN  Clinical   Connect via [employer] computer  Options provided:   -- Other persistent atrial fibrillation (AF that does not terminate within 7 days or that requires repeat pharmacological or electrical cardioversion.   -- Other explanation, (please specify the other explanation)   -- Unable to determine      Query created by: Brigida Durbin on 2022 1:43 PM    RESPONSE TEXT:    Other persistent atrial fibrillation (AF that does not terminate within 7 days or that requires repeat pharmacological or electrical cardioversion.       QUERY TEXT:    Please clarify the clinical relevance for the diagnostic finding of lactic acid 2.1 with treatment of one IV NS bolus.    NOTE- If an appropriate response is not listed below, please respond with a new note.      The patient's clinical indicators include:  Admit labs:  lactic acid 2.1   labs:  lactic acid 1.6    Treatment - IV NS bolus x 1    Risk factors - Acute on chronic respiratory failure, likely NSTEMI II, COPD, CKD stage 3    Thank you,  Brigida Durbin BSN  Clinical   Connect via [employer]  Options provided:   -- Lactic acidosis is ruled in   -- Findings of no clinical significance   -- Other explanation, (please specify the other explanation)   -- Unable to determine      Query created by: Brigiad Durbin on 9/14/2022 1:43 PM    RESPONSE TEXT:    Findings of no clinical significance       QUERY TEXT:    There is conflicting documentation in the medical record.  Can this documentation be further clarified?     Does not have PNA is documented in the 9/13 PN.      Dx Sepsis - Differential COPD exacerbation, CAP is also documented in the 9/13 PN.    The patient's Clinical Indicators include:  Admit vitals:   Admit labs: WBC 8.3, lactic acid 2.1, procalcitonin 0.20    9/13 PN - Does not have PNA will d/c antbx; Dx Sepsis - Differential COPD exacerbation, CAP    Treatment - IV Ceftriaxone - discontinued 9/13; PO Doxycycline; IV NS bolus    Risk factors - Sepsis    Thank you,  Brigida RESENDEZN  Clinical   Connect via [employer]  Options provided:   -- Patient does not have PNA, sepsis ruled out   -- Patient does have PNA, sepsis ruled in   -- Other explanation, (please specify the other explanation)   -- Unable to determine      Query created by: Brigida Durbin on 9/14/2022 1:43 PM    RESPONSE TEXT:    Patient does not have PNA, sepsis ruled out          Electronically signed by:  LESLIE DE LA CRUZ MD 9/14/2022 4:49 PM

## 2022-09-15 NOTE — DISCHARGE SUMMARY
[facility] Discharge Summary    Attending: Teresa Olson M.d.  Senior Resident:   Intern:    Contact Number: [phone]    CHIEF COMPLAINT ON ADMISSION  Chief Complaint   Patient presents with    Shortness of Breath     Pt sent from [facility] by Dr. Davey for increase SOB today. Pts RA sat was 80% upon arrival. 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  Pulmonary function testing last obtained 6/25/2019 demonstrated FEV1 of 1.87 L or 70% predicted, FVC of 2.84 L or 73% predicted, FEV1/FVC ratio of 66. No significant postbronchodilator change, residual volume 95% predicted, TLC 77% predicted, DLCO 51% predicted. He presented on 9/12 sent over from his cardiologist office for acute on chronic hypoxic respiratory failure.  Patient was noted to need 8L of O2 at a clinic compared to home 4.5L.     Patient was initially positive for SIRS criteria with tachycardia, tachypnia. He was also hypotensive on admission with blood pressure of 80/40.  Empirically given doxycycline and ceftriaxone, and given 1L NS in ED. The antibiotics were stopped after 2 days after given clinical improvement and low suspicion of pneumonia in the absence of leukocytosis, procalcitonin of 0.20. Of note sputum cultures were positive for pseudomonas on 9/13, however given his clinical improvement and CXR from  9/12 with bibasilar infiltrates likely secondary to ILD this was presumed to be a colonization of  pseudomonas and less likely to be an active infection. Patient states his cough and sputum production today are at baseline.      ProBNP on admission was elevated at 12,695 from 8664.  He was also given lasix 20mg for crackles on exam and suspicion for fluid overload, however this finding was likely secondary to patient's interstitial lung disease and Lasix were not continued.     Patient reported left arm swelling and ultrasound was obtained, negative for DVT.  He denied any chest pain and there is low suspicion for pulmonary embolism.       Regarding his history of CAD, he underwent stenting of his LAD in 2005.  Patient is had no exertional angina.  Myocardial perfusion scan in June, 2019 revealed no ischemia.Echocardiogram on 9/13/2022 revealed ejection fraction of 45%  from 75% in 2021. and right ventricular systolic pressure 40 mmHg, mild to moderate tricuspid regurgitation, mild aortic valve stenosis, mild mitral regurgitation. Cardiology was consulted and  recommended that antiplatelet therapy be replaced with Eliquis 2.5 due to [id number] of 6,  CKD and advanced age.  His home isosorbide was also discontinued and blood pressures remained within goal 100s-120s.    His oxygen requirements decreased to his home requirements of 4 L with minimal intervention. Per patient he states that in the past his COPD exacerbations resolved with 1 week of doxycycline. He reports his cough has been improving.  He will be discharged with Spiriva as he is concerned about forgetting to rinse his mouth with inhaled steroids.     Labs are notable for macrocytic anemia.  Hemoglobin remained stable around 8 throughout his admission.  He did have an elevated reticulocyte count iron levels 45,  TIBC 154, percent saturation 29. B 12 > 4000. TSH 5.17 on 6/19 ,  LFTs wnl.  There were no signs of gross bleeding during this admission.     Creatinine 1.89 on admission improved to 1.62. Of not patient reports some dysuria with condom  catheter.     He was noted to have a sacral breakdown ulcer during this admission. Wound care was consulted and following. No concern for infection. Recommended self care.     Therefore, he is discharged in good and stable condition to skilled nursing facility.    The patient met 2-midnight criteria for an inpatient stay at the time of discharge.    Discharge Date    9/15/2022    Physical Exam on Day of Discharge  Physical Exam  Constitutional:       Appearance: Normal appearance.   HENT:      Nose: No congestion.      Mouth/Throat:      Mouth: Mucous membranes are moist.      Pharynx: Oropharynx is clear.   Eyes:      Conjunctiva/sclera: Conjunctivae normal.      Pupils: Pupils are equal, round, and reactive to light.   Neurological:      Mental Status: He is alert.       FOLLOW UP ITEMS POST DISCHARGE   Follow up with PCP in 1 -2 weeks to evaluate anemia   Follow up with cardiology in one month to further evaluate drop in EF.    Follow up with pulmonology in 2-4 weeks      If there is worsening cough and increased O2 requirements with a new fever, he should be readmitted and treated for pseudomonas pneumonia.     DISCHARGE DIAGNOSES  Principal Problem:    Acute on chronic respiratory failure with hypoxia (HCC) POA: Yes  Active Problems:    Hyperlipidemia (Chronic) POA: Yes    CAD (coronary artery disease) POA: Yes      Overview: Anterior MI 2005, stent to proximal and mid LAD 2005.    Essential hypertension (Chronic) POA: Yes    Anemia POA: Yes      Overview: Patient with stable anemia on labs from outside hospital near the end of       December, patient was previously at 10, likely multifactorial in origin,       concern for acute blood loss.      -Plan per as above    Stage 3b chronic kidney disease (HCC) POA: Yes    COPD (chronic obstructive pulmonary disease) ([facility]) POA: Yes    Mild aortic stenosis POA: Yes    Elevated troponin POA: Yes    Sepsis with acute hypoxic respiratory failure (HCC) POA: Yes       Overview:           Community acquired pneumonia POA: Unknown    Edema of left upper arm POA: Unknown    AF (atrial fibrillation) (HCC) POA: Unknown    High blood lactic acid level POA: Unknown  Resolved Problems:    * No resolved hospital problems. *      FOLLOW UP  No future appointments.  [facility]  [address] [address] [address]  [phone]          MEDICATIONS ON DISCHARGE     Medication List        ASK your doctor about these medications        Instructions   aspirin EC 81 MG Tbec  Commonly known as: ECOTRIN   Take 81 mg by mouth every day.  Dose: 81 mg     isosorbide dinitrate 40 MG Tabs  Commonly known as: ISORDIL   Take 60 mg by mouth 3 times a day.  Dose: 60 mg     metoprolol tartrate 25 MG Tabs  Commonly known as: LOPRESSOR   Doctor's comments: Hold sbp less 105  Take 0.5 Tablets by mouth 2 times a day.  Dose: 12.5 mg     Plavix 75 MG Tabs  Generic drug: clopidogrel   Take 75 mg by mouth every day.  Dose: 75 mg     ranolazine 500 MG Tb12  Commonly known as: RANEXA   Take 1 Tablet by mouth every day.  Dose: 500 mg     rosuvastatin 40 MG tablet  Commonly known as: CRESTOR   Take 1 Tablet by mouth every evening.  Dose: 40 mg     senna-docusate 8.6-50 MG Tabs  Commonly known as: PERICOLACE or SENOKOT S   Take 2 Tablets by mouth 2 times a day.  Dose: 2 Tablet     sodium chloride 0.65 % Soln  Commonly known as: OCEAN   Administer 2 Sprays into affected nostril(S) every 2 hours as needed for Congestion (congestion).  Dose: 2 Spray              Allergies  Allergies   Allergen Reactions    Atorvastatin Myalgia    Simvastatin Myalgia       DIET  Orders Placed This Encounter   Procedures    Diet Order Diet: Regular     Standing Status:   Standing     Number of Occurrences:   1     Order Specific Question:   Diet:     Answer:   Regular [1]       ACTIVITY  As tolerated.  Weight bearing as tolerated    LINES, DRAINS, AND WOUNDS  This is an automated list. Peripheral IVs will be removed  prior to discharge.  Peripheral IV 09/12/22 20 G Right Antecubital (Active)   Site Assessment Clean;Dry;Intact 09/14/22 2000   Dressing Type Transparent 09/14/22 2000   Line Status Scrubbed the hub prior to access;Flushed;Saline locked 09/14/22 2000   Dressing Status Clean;Dry;Intact 09/14/22 2000   Dressing Intervention N/A 09/14/22 2000   Dressing Change Due 09/17/22 09/13/22 0745   Date Primary Tubing Changed 09/13/22 09/13/22 0100   NEXT Primary Tubing Change  09/14/22 09/13/22 0100   [facility] ([facility]) 0 09/14/22 2000   Phlebitis Scale ([facility] Only) 0 09/14/22 2000     External Urinary Catheter ([facility]) (Active)   Collection Container Standard drainage bag 09/14/22 0800   Output (mL) 400 mL 09/14/22 1620      Wound 09/12/22 Pressure Injury Ear POA (Active)   Wound Image   09/13/22 1600   Site Assessment Red;Intact 09/14/22 2000   Periwound Assessment Clean;Dry;Intact 09/14/22 2000   Margins Attached edges 09/14/22 2000   Closure Open to air 09/14/22 0800   Drainage Amount None 09/14/22 0800   Treatments Site care;Offloading 09/14/22 0800   Wound Cleansing Not Applicable 09/13/22 2000   Periwound Protectant Not Applicable 09/13/22 1600   Dressing Options Open to Air 09/14/22 0800   Dressing Status Open to Air 09/14/22 0800   NEXT Weekly Photo (Inpatient Only) 09/20/22 09/13/22 1600   WOUND NURSE ONLY - Pressure Injury Stage 1 09/13/22 1600   Wound Length (cm) 0.5 cm 09/13/22 1600   Wound Width (cm) 0.5 cm 09/13/22 1600   Wound Surface Area (cm^2) 0.25 cm^2 09/13/22 1600   Shape circular 09/13/22 1600   Wound Odor None 09/13/22 1600   Exposed Structures None 09/13/22 1600   WOUND NURSE ONLY - Time Spent with Patient (mins) 15 09/13/22 1600       Wound 09/12/22 Pressure Injury Bilateral POA (Active)   Wound Image   09/14/22 0800   Site Assessment Red;Pink;Excoriated 09/14/22 2000   Periwound Assessment Pink;Non-blanchable erythema 09/14/22 2000   Margins Defined edges 09/14/22 2000   Closure None  09/14/22 0800   Drainage Amount None 09/14/22 0800   Drainage Description Green 09/13/22 1600   Treatments Site care;Offloading 09/14/22 0800   Wound Cleansing Foam Cleanser/Washcloth 09/14/22 0800   Periwound Protectant Skin Moisturizer 09/14/22 0800   Dressing Cleansing/Solutions Not Applicable 09/14/22 0800   Dressing Options Mepilex 09/14/22 0800   Dressing Changed Changed 09/14/22 0800   Dressing Status Clean;Dry;Intact 09/14/22 0800   NEXT Dressing Change/Treatment Date 09/16/22 09/13/22 1600   NEXT Weekly Photo (Inpatient Only) 09/20/22 09/13/22 1600   Shape circular 09/13/22 1600   Wound Odor None 09/13/22 1600   Exposed Structures None 09/13/22 1600   WOUND NURSE ONLY - Time Spent with Patient (mins) 30 09/13/22 1600       Peripheral IV 09/12/22 20 G Right Antecubital (Active)   Site Assessment Clean;Dry;Intact 09/14/22 2000   Dressing Type Transparent 09/14/22 2000   Line Status Scrubbed the hub prior to access;Flushed;Saline locked 09/14/22 2000   Dressing Status Clean;Dry;Intact 09/14/22 2000   Dressing Intervention N/A 09/14/22 2000   Dressing Change Due 09/17/22 09/13/22 0745   Date Primary Tubing Changed 09/13/22 09/13/22 0100   NEXT Primary Tubing Change  09/14/22 09/13/22 0100   Infiltration Grading ([facility]) 0 09/14/22 2000   Phlebitis Scale ([facility] Only) 0 09/14/22 2000               MENTAL STATUS ON TRANSFER   A&Ox4       CONSULTATIONS  Cardiology    PROCEDURES  NA    LABORATORY  Lab Results   Component Value Date    SODIUM 138 09/14/2022    POTASSIUM 4.6 09/14/2022    CHLORIDE 100 09/14/2022    CO2 30 09/14/2022    GLUCOSE 126 (H) 09/14/2022    BUN 29 (H) 09/14/2022    CREATININE 1.62 (H) 09/14/2022    CREATININE 1.4 05/06/2008        Lab Results   Component Value Date    WBC 6.6 09/14/2022    HEMOGLOBIN 8.1 (L) 09/14/2022    HEMATOCRIT 25.9 (L) 09/14/2022    PLATELETCT 165 09/14/2022        Total time of the discharge process exceeds 55 minutes.

## 2022-09-15 NOTE — DISCHARGE PLANNING
Vaccine Information Statement    Influenza (Flu) Vaccine (Inactivated or Recombinant): What you need to know    Many Vaccine Information Statements are available in Yoruba and other languages. See www.immunize.org/vis  Hojas de Información Sobre Vacunas están disponibles en Español y en muchos otros idiomas. Visite www.immunize.org/vis    1. Why get vaccinated? Influenza (flu) is a contagious disease that spreads around the [place] every year, usually between October and May. [phone] or visiting the [place] [employer] website at www.[facility].

## 2022-09-15 NOTE — DISCHARGE PLANNING
Case Management Discharge Planning    Admission Date: 9/12/2022  GMLOS: 3.6  ALOS: 3    6-Clicks ADL Score: 16  6-Clicks Mobility Score: 8  PT and/or OT Eval ordered: Yes  Post-acute Referrals Ordered: Yes  Post-acute Choice Obtained: Yes  Has referral(s) been sent to post-acute provider:  Yes      Anticipated Discharge Dispo: Discharge Disposition: D/T to SNF with Medicare cert in anticipation of skilled care (03)  Discharge Address: [facility] [address] [facility] [address] ([facility])    DME Needed: No    Action(s) Taken: Patient is clear for discharge today.  Patient is going to [facility] transport is arranged at 1300.  RN CM discussed this with the patient at bedside, patient consented to transfer.  Patient called his wife Mook and updated her.  Transfer packet completed and given to discharge CHLOÉ Crooks.    Escalations Completed: None    Medically Clear: Yes    Next Steps: Case coordination available to discuss any further discharge barriers.    Barriers to Discharge: None    Is the patient up for discharge tomorrow: Discharging today    @1420- RN CM was notified by discharge CHLOÉ Crooks that [facility] requested a faxed copy of the AVS.  RN CM faxed a copy of the AVS through Epic.

## 2022-09-15 NOTE — CARE PLAN
Problem: Skin Integrity  Goal: Skin integrity is maintained or improved  Outcome: Progressing  Note: Interventions in use to maintain and improve skin integrity include pillows for support/repositioning and to float heels, sacral mepilex, grey oxygen ear foam pads, barrier cream/barrier paste and powder.       Problem: Fall Risk  Goal: Patient will remain free from falls  Outcome: Progressing  Note: Patient has remained free from falls/injury.  Fall precautions in place.  Patient educated on the importance of calling for assistance.  Patient has used call light appropriately throughout the night.     The patient is Stable - Low risk of patient condition declining or worsening    Shift Goals  Clinical Goals: Patient will sleep comfortably throughout the night and will remain free from pain and injury  Patient Goals: Rest and comfort  Family Goals: CAMERON.  No family present    Progress made toward(s) clinical / shift goals:  nterventions in use to maintain and improve skin integrity include pillows for support/repositioning and to float heels, sacral mepilex, grey oxygen ear foam pads, barrier cream/barrier paste and powder.  Patient has remained free from falls/injury.  Fall precautions in place.  Patient educated on the importance of calling for assistance.  Patient has used call light appropriately throughout the night. Patient has been sleeping comfortably throughout the night.         Patient is not progressing towards the following goals: N/A

## 2022-09-15 NOTE — CARE PLAN
"  Problem: Knowledge Deficit - Standard  Goal: Patient and family/care givers will demonstrate understanding of plan of care, disease process/condition, diagnostic tests and medications  Outcome: Progressing     Problem: Hemodynamics  Goal: Patient's hemodynamics, fluid balance and neurologic status will be stable or improve  Outcome: Progressing     Problem: Fluid Volume  Goal: Fluid volume balance will be maintained  Outcome: Progressing     Problem: Urinary - Renal Perfusion  Goal: Ability to achieve and maintain adequate renal perfusion and functioning will improve  Outcome: Progressing     Problem: Respiratory  Goal: Patient will achieve/maintain optimum respiratory ventilation and gas exchange  Outcome: Progressing     Problem: Mechanical Ventilation  Goal: Safe management of artificial airway and ventilation  Outcome: Progressing  Goal: Successful weaning off mechanical ventilator, spontaneously maintains adequate gas exchange  Outcome: Progressing  Goal: Patient will be able to express needs and understand communication  Outcome: Progressing     Problem: Physical Regulation  Goal: Diagnostic test results will improve  Outcome: Progressing  Goal: Signs and symptoms of infection will decrease  Outcome: Progressing     Problem: Skin Integrity  Goal: Skin integrity is maintained or improved  Outcome: Progressing     Problem: Fall Risk  Goal: Patient will remain free from falls  Outcome: Progressing   The patient is Stable - Low risk of patient condition declining or worsening    Shift Goals  Clinical Goals: Patient will verbalize understanding of POC by end of shift.  Patient Goals: \"Get to go home to see my Grandkids, Son and his wife while they are still in town on the 17th.\"  Family Goals: CAMERON    Progress made toward(s) clinical / shift goals: Patient had large bowel movement today, increased activity tolerance. Discharge planned for tomorrow afternoon.    Patient is not progressing towards the following " goals: N/A

## 2022-09-15 NOTE — DISCHARGE INSTRUCTIONS
Discharge Instructions    Discharged to other by medical transportation with escort. Discharged via wheelchair, hospital escort: Yes.  Special equipment needed: Not Applicable    Be sure to schedule a follow-up appointment with your primary care doctor or any specialists as instructed.     Discharge Plan:   Diet Plan: Discussed  Activity Level: Discussed  Confirmed Follow up Appointment: Appointment Scheduled  Confirmed Symptoms Management: Discussed  Medication Reconciliation Updated: Yes    I understand that a diet low in cholesterol, fat, and sodium is recommended for good health. Unless I have been given specific instructions below for another diet, I accept this instruction as my diet prescription.   Other diet: regular    Special Instructions: None    -Is this patient being discharged with medication to prevent blood clots?  Aspirin    Is patient discharged on Warfarin / Coumadin?   No       Community-Acquired Pneumonia, Adult  Pneumonia is an infection of the lungs. It causes swelling in the airways of the lungs. Mucus and fluid may also build up inside the airways.  One type of pneumonia can happen while a person is in a hospital. A different type can happen when a person is not in a hospital (community-acquired pneumonia).   What are the causes?    This condition is caused by germs (viruses, bacteria, or fungi). Some types of germs can be passed from one person to another. This can happen when you breathe in droplets from the cough or sneeze of an infected person.  What increases the risk?  You are more likely to develop this condition if you:  Have a long-term (chronic) disease, such as:  Chronic obstructive pulmonary disease (COPD).  Asthma.  Cystic fibrosis.  Congestive heart failure.  Diabetes.  Kidney disease.  Have HIV.  Have sickle cell disease.  Have had your spleen removed.  Do not take good care of your teeth and mouth (poor dental hygiene).  Have a medical condition that increases the risk of  breathing in droplets from your own mouth and nose.  Have a weakened body defense system (immune system).  Are a smoker.  Travel to areas where the germs that cause this illness are common.  Are around certain animals or the places they live.  What are the signs or symptoms?  A dry cough.  A wet (productive) cough.  Fever.  Sweating.  Chest pain. This often happens when breathing deeply or coughing.  Fast breathing or trouble breathing.  Shortness of breath.  Shaking chills.  Feeling tired (fatigue).  Muscle aches.  How is this treated?  Treatment for this condition depends on many things. Most adults can be treated at home. In some cases, treatment must happen in a hospital. Treatment may include:  Medicines given by mouth or through an IV tube.  Being given extra oxygen.  Respiratory therapy.  In rare cases, treatment for very bad pneumonia may include:  Using a machine to help you breathe.  Having a procedure to remove fluid from around your lungs.  Follow these instructions at home:  Medicines  Take over-the-counter and prescription medicines only as told by your doctor.  Only take cough medicine if you are losing sleep.  If you were prescribed an antibiotic medicine, take it as told by your doctor. Do not stop taking the antibiotic even if you start to feel better.  General instructions    Sleep with your head and neck raised (elevated). You can do this by sleeping in a recliner or by putting a few pillows under your head.  Rest as needed. Get at least 8 hours of sleep each night.  Drink enough water to keep your pee (urine) pale yellow.  Eat a healthy diet that includes plenty of vegetables, fruits, whole grains, low-fat dairy products, and lean protein.  Do not use any products that contain nicotine or tobacco. These include cigarettes, e-cigarettes, and chewing tobacco. If you need help quitting, ask your doctor.  Keep all follow-up visits as told by your doctor. This is important.  How is this prevented?  A  shot (vaccine) can help prevent pneumonia. Shots are often suggested for:  People older than 65 years of age.  People older than 19 years of age who:  Are having cancer treatment.  Have long-term (chronic) lung disease.  Have problems with their body's defense system.  You may also prevent pneumonia if you take these actions:  Get the flu (influenza) shot every year.  Go to the dentist as often as told.  Wash your hands often. If you cannot use soap and water, use hand .  Contact a doctor if:  You have a fever.  You lose sleep because your cough medicine does not help.  Get help right away if:  You are short of breath and it gets worse.  You have more chest pain.  Your sickness gets worse. This is very serious if:  You are an older adult.  Your body's defense system is weak.  You cough up blood.  Summary  Pneumonia is an infection of the lungs.  Most adults can be treated at home. Some will need treatment in a hospital.  Drink enough water to keep your pee pale yellow.  Get at least 8 hours of sleep each night.  This information is not intended to replace advice given to you by your health care provider. Make sure you discuss any questions you have with your health care provider.  Document Released: 06/05/2009 Document Revised: 04/08/2020 Document Reviewed: 08/15/2019  Elsevier Patient Education © 2020 [employer]

## 2022-09-15 NOTE — PROGRESS NOTES
GMT arrived for patient transport to [facility]. Discharge education provided by Valeriy LUEVANO. Patients belongings gathered. Telemetry monitor and IV removed. Patient assisted into wheelchair and documentation given to [facility] personnel. Report called to [facility], patient information endorsed to Patricia LUEVANO. Patient left unit via wheelchair, and remains safe and stable.

## 2022-09-15 NOTE — PROGRESS NOTES
Received Bedside report. Assumed care at 1900. This pt is AOx4, voiding via condom catheter, 0/10 pain. Patient and RN discussed plan of care: questions answered. Labs noted, assessment complete, patient tolerating regular diet. Tele box in place. Pt is on 4 L of O2 via nasal cannula. Call light in place, fall precautions in place, patient educated on importance of calling for assistance. No additional needs at this time. VSS

## 2022-09-17 LAB
BACTERIA BLD CULT: NORMAL
BACTERIA BLD CULT: NORMAL
SIGNIFICANT IND 70042: NORMAL
SIGNIFICANT IND 70042: NORMAL
SITE SITE: NORMAL
SITE SITE: NORMAL
SOURCE SOURCE: NORMAL
SOURCE SOURCE: NORMAL

## 2022-09-21 ENCOUNTER — TELEPHONE (OUTPATIENT)
Dept: CARDIOLOGY | Facility: MEDICAL CENTER | Age: 87
End: 2022-09-21
Payer: MEDICARE

## 2022-09-21 DIAGNOSIS — R60.0 LOCALIZED EDEMA: ICD-10-CM

## 2022-09-21 RX ORDER — FUROSEMIDE 40 MG/1
40 TABLET ORAL DAILY
Qty: 30 TABLET | Refills: 5 | Status: SHIPPED
Start: 2022-09-21

## 2022-09-21 NOTE — TELEPHONE ENCOUNTER
"Left arm swelling was noted during recently follow up and then was sent to ER. S/w pt, he states that his left arm swelling went away when he was in the hospital. The swelling got worse again and for the first time this morning, he noticed weeping of his arm in one spot next to his elbow, coming out of a \"wrinkle\". Only a few drops of blood were seen, now just clear liquid.     Pt admits to swelling in his feet as well.   Pt states his weight was 216 as of yesterday.   Admits to a little worsening of shortness of breath as well/ He mentions \"Well I'm 89 and I should probably just die\" and then laughs.     Pt went back to his senior residence in [place] upon discharge 9/15. He has a nurse that comes to see him at the facility.     He is not currently on diuretic therapy.     To Dr. Davey       "

## 2022-09-21 NOTE — TELEPHONE ENCOUNTER
BE        Caller: Kobe Cyr    Topic/issue: Patient was calling about an Edema that he has on his left arm that is slowly seeping blood or other fluid out and was asking for a call back to discuss it and what is going on and he's asking for a call back  Callback Number: [phone]    Thank you    -Cornelius HERNANDEZ

## 2022-09-22 NOTE — TELEPHONE ENCOUNTER
Derrick Davey M.D.  You 33 minutes ago (5:07 PM)     I suggest an arm compression sleeve and starting lasix 40 mg daily. BMP in one week.   Thx   BE    -----------------------------------------------------------  S/w pt and reviewed Dr. Davey's recommendations. He agrees with plan and states that he will notify his RN of these recommendations.     Contacted [facility] senior resident and was able to be transferred to the nurse Harrison.   Per Harrison, pt has a order for lasix and potassium from their facility doctor however pt is refusing to take it. He is hoping that now that we have advised to take it he will listen. Harrison requested orders be sent to Fax # [phone].     BMP, lasix rx, and compression rx faxed as requested.

## 2023-12-29 NOTE — CARE PLAN
The patient is Stable - Low risk of patient condition declining or worsening    Shift Goals  Clinical Goals: VSS; cardio consult  Patient Goals: sleep  Family Goals: opal    Progress made toward(s) clinical / shift goals:    Problem: Self Care  Goal: Patient will have the ability to perform ADLs independently or with assistance (bathe, groom, dress, toilet and feed)  Outcome: Progressing     Problem: Fall Risk  Goal: Patient will remain free from falls  Outcome: Progressing       Patient is not progressing towards the following goals:       No

## 2024-02-23 ENCOUNTER — PATIENT MESSAGE (OUTPATIENT)
Dept: CARDIOLOGY | Facility: MEDICAL CENTER | Age: 89
End: 2024-02-23
Payer: MEDICARE

## 2024-02-23 DIAGNOSIS — I25.10 CORONARY ARTERY DISEASE DUE TO LIPID RICH PLAQUE: ICD-10-CM

## 2024-02-23 DIAGNOSIS — I25.10 CORONARY ARTERY DISEASE INVOLVING NATIVE CORONARY ARTERY OF NATIVE HEART WITHOUT ANGINA PECTORIS: ICD-10-CM

## 2024-02-23 DIAGNOSIS — J44.9 CHRONIC OBSTRUCTIVE PULMONARY DISEASE, UNSPECIFIED COPD TYPE (HCC): ICD-10-CM

## 2024-02-23 DIAGNOSIS — I48.19 PERSISTENT ATRIAL FIBRILLATION (HCC): ICD-10-CM

## 2024-02-23 DIAGNOSIS — I25.83 CORONARY ARTERY DISEASE DUE TO LIPID RICH PLAQUE: ICD-10-CM

## 2024-08-28 NOTE — ED NOTES
ERP to bedside    Jaren Romo [facility]  Internal Medicine  [address], [address]  [place] [address]  Phone: [phone]  Fax: [phone]  Follow Up Time: